# Patient Record
Sex: MALE | Race: WHITE | NOT HISPANIC OR LATINO | Employment: FULL TIME | URBAN - METROPOLITAN AREA
[De-identification: names, ages, dates, MRNs, and addresses within clinical notes are randomized per-mention and may not be internally consistent; named-entity substitution may affect disease eponyms.]

---

## 2021-05-24 ENCOUNTER — RECORDS - HEALTHEAST (OUTPATIENT)
Dept: ADMINISTRATIVE | Facility: CLINIC | Age: 61
End: 2021-05-24

## 2021-05-25 ENCOUNTER — RECORDS - HEALTHEAST (OUTPATIENT)
Dept: ADMINISTRATIVE | Facility: CLINIC | Age: 61
End: 2021-05-25

## 2021-05-27 ENCOUNTER — RECORDS - HEALTHEAST (OUTPATIENT)
Dept: ADMINISTRATIVE | Facility: CLINIC | Age: 61
End: 2021-05-27

## 2021-05-29 ENCOUNTER — RECORDS - HEALTHEAST (OUTPATIENT)
Dept: ADMINISTRATIVE | Facility: CLINIC | Age: 61
End: 2021-05-29

## 2021-06-01 ENCOUNTER — RECORDS - HEALTHEAST (OUTPATIENT)
Dept: ADMINISTRATIVE | Facility: CLINIC | Age: 61
End: 2021-06-01

## 2024-03-11 ENCOUNTER — APPOINTMENT (OUTPATIENT)
Dept: ULTRASOUND IMAGING | Facility: HOSPITAL | Age: 64
End: 2024-03-11
Attending: EMERGENCY MEDICINE
Payer: OTHER MISCELLANEOUS

## 2024-03-11 ENCOUNTER — APPOINTMENT (OUTPATIENT)
Dept: RADIOLOGY | Facility: HOSPITAL | Age: 64
End: 2024-03-11
Attending: STUDENT IN AN ORGANIZED HEALTH CARE EDUCATION/TRAINING PROGRAM
Payer: OTHER MISCELLANEOUS

## 2024-03-11 ENCOUNTER — HOSPITAL ENCOUNTER (EMERGENCY)
Facility: HOSPITAL | Age: 64
Discharge: HOME | End: 2024-03-11
Attending: EMERGENCY MEDICINE | Admitting: EMERGENCY MEDICINE
Payer: OTHER MISCELLANEOUS

## 2024-03-11 VITALS
HEIGHT: 73 IN | TEMPERATURE: 98.8 F | SYSTOLIC BLOOD PRESSURE: 138 MMHG | WEIGHT: 280 LBS | OXYGEN SATURATION: 97 % | HEART RATE: 75 BPM | BODY MASS INDEX: 37.11 KG/M2 | RESPIRATION RATE: 16 BRPM | DIASTOLIC BLOOD PRESSURE: 62 MMHG

## 2024-03-11 DIAGNOSIS — M25.572 ACUTE LEFT ANKLE PAIN: ICD-10-CM

## 2024-03-11 DIAGNOSIS — R79.89 ELEVATED SERUM CREATININE: ICD-10-CM

## 2024-03-11 DIAGNOSIS — E87.5 HYPERKALEMIA: ICD-10-CM

## 2024-03-11 LAB
ANION GAP SERPL CALCULATED.3IONS-SCNC: 8 MMOL/L (ref 7–15)
BASOPHILS # BLD AUTO: 0 10E3/UL (ref 0–0.2)
BASOPHILS NFR BLD AUTO: 0 %
BUN SERPL-MCNC: 56.5 MG/DL (ref 8–23)
CALCIUM SERPL-MCNC: 8.6 MG/DL (ref 8.8–10.2)
CHLORIDE SERPL-SCNC: 112 MMOL/L (ref 98–107)
CREAT SERPL-MCNC: 2.68 MG/DL (ref 0.67–1.17)
CRP SERPL-MCNC: <3 MG/L
DEPRECATED HCO3 PLAS-SCNC: 20 MMOL/L (ref 22–29)
EGFRCR SERPLBLD CKD-EPI 2021: 26 ML/MIN/1.73M2
EOSINOPHIL # BLD AUTO: 0.2 10E3/UL (ref 0–0.7)
EOSINOPHIL NFR BLD AUTO: 3 %
ERYTHROCYTE [DISTWIDTH] IN BLOOD BY AUTOMATED COUNT: 12.2 % (ref 10–15)
GLUCOSE SERPL-MCNC: 161 MG/DL (ref 70–99)
HCT VFR BLD AUTO: 37.9 % (ref 40–53)
HGB BLD-MCNC: 12.4 G/DL (ref 13.3–17.7)
IMM GRANULOCYTES # BLD: 0 10E3/UL
IMM GRANULOCYTES NFR BLD: 0 %
LYMPHOCYTES # BLD AUTO: 1.1 10E3/UL (ref 0.8–5.3)
LYMPHOCYTES NFR BLD AUTO: 14 %
MCH RBC QN AUTO: 31.5 PG (ref 26.5–33)
MCHC RBC AUTO-ENTMCNC: 32.7 G/DL (ref 31.5–36.5)
MCV RBC AUTO: 96 FL (ref 78–100)
MONOCYTES # BLD AUTO: 0.6 10E3/UL (ref 0–1.3)
MONOCYTES NFR BLD AUTO: 8 %
NEUTROPHILS # BLD AUTO: 5.8 10E3/UL (ref 1.6–8.3)
NEUTROPHILS NFR BLD AUTO: 75 %
NRBC # BLD AUTO: 0 10E3/UL
NRBC BLD AUTO-RTO: 0 /100
PLATELET # BLD AUTO: 317 10E3/UL (ref 150–450)
POTASSIUM SERPL-SCNC: 5.5 MMOL/L (ref 3.4–5.3)
RBC # BLD AUTO: 3.94 10E6/UL (ref 4.4–5.9)
SODIUM SERPL-SCNC: 140 MMOL/L (ref 135–145)
WBC # BLD AUTO: 7.7 10E3/UL (ref 4–11)

## 2024-03-11 PROCEDURE — 96374 THER/PROPH/DIAG INJ IV PUSH: CPT

## 2024-03-11 PROCEDURE — 73630 X-RAY EXAM OF FOOT: CPT | Mod: LT

## 2024-03-11 PROCEDURE — 80048 BASIC METABOLIC PNL TOTAL CA: CPT | Performed by: EMERGENCY MEDICINE

## 2024-03-11 PROCEDURE — 99285 EMERGENCY DEPT VISIT HI MDM: CPT | Mod: 25

## 2024-03-11 PROCEDURE — 36415 COLL VENOUS BLD VENIPUNCTURE: CPT | Performed by: EMERGENCY MEDICINE

## 2024-03-11 PROCEDURE — 29505 APPLICATION LONG LEG SPLINT: CPT | Mod: LT

## 2024-03-11 PROCEDURE — 93971 EXTREMITY STUDY: CPT | Mod: LT

## 2024-03-11 PROCEDURE — 86140 C-REACTIVE PROTEIN: CPT | Performed by: EMERGENCY MEDICINE

## 2024-03-11 PROCEDURE — 85025 COMPLETE CBC W/AUTO DIFF WBC: CPT | Performed by: EMERGENCY MEDICINE

## 2024-03-11 PROCEDURE — 258N000003 HC RX IP 258 OP 636: Performed by: EMERGENCY MEDICINE

## 2024-03-11 PROCEDURE — 250N000011 HC RX IP 250 OP 636: Performed by: EMERGENCY MEDICINE

## 2024-03-11 PROCEDURE — 73610 X-RAY EXAM OF ANKLE: CPT | Mod: LT

## 2024-03-11 RX ORDER — KETOROLAC TROMETHAMINE 30 MG/ML
30 INJECTION, SOLUTION INTRAMUSCULAR; INTRAVENOUS ONCE
Status: COMPLETED | OUTPATIENT
Start: 2024-03-11 | End: 2024-03-11

## 2024-03-11 RX ADMIN — KETOROLAC TROMETHAMINE 30 MG: 30 INJECTION, SOLUTION INTRAMUSCULAR; INTRAVENOUS at 07:45

## 2024-03-11 RX ADMIN — SODIUM CHLORIDE 500 ML: 9 INJECTION, SOLUTION INTRAVENOUS at 08:39

## 2024-03-11 ASSESSMENT — COLUMBIA-SUICIDE SEVERITY RATING SCALE - C-SSRS
1. IN THE PAST MONTH, HAVE YOU WISHED YOU WERE DEAD OR WISHED YOU COULD GO TO SLEEP AND NOT WAKE UP?: NO
2. HAVE YOU ACTUALLY HAD ANY THOUGHTS OF KILLING YOURSELF IN THE PAST MONTH?: NO
6. HAVE YOU EVER DONE ANYTHING, STARTED TO DO ANYTHING, OR PREPARED TO DO ANYTHING TO END YOUR LIFE?: NO

## 2024-03-11 ASSESSMENT — ACTIVITIES OF DAILY LIVING (ADL)
ADLS_ACUITY_SCORE: 35
ADLS_ACUITY_SCORE: 35

## 2024-03-11 NOTE — ED PROVIDER NOTES
EMERGENCY DEPARTMENT ENCOUNTER      NAME: Mayito Sood  AGE: 63 year old male  YOB: 1960  MRN: [id number]  EVALUATION DATE & TIME: 3/11/2024  7:17 AM    PCP: No primary care provider on file.    ED PROVIDER: Elizabeth Olsen M.D.      Chief Complaint   Patient presents with    Ankle Pain         FINAL IMPRESSION:  1. Elevated serum creatinine    2. Hyperkalemia    3. Acute left ankle pain        MEDICAL DECISION MAKING:    Pertinent Labs & Imaging studies reviewed. (See chart for details)  ED Course as of 03/11/24 0847   Mon Mar 11, 2024   0721 Afebrile.  Vital signs are unremarkable.  Patient is coming in for evaluation of heel pain.  Was walking at 6 AM.  [place] a pop and was then began to have pain in his calf and in his heel.  He has a history of tendon rupture previously on his right foot status post repair.  No antibiotics recently.  No fluoroquinolones.  No trauma.  No falls.  He is got a history of neuropathy at baseline unchanged.  Having pain primarily in his left heel, posterior ankle and pain up into his calf.    Physical exam for patient here with tenderness palpation along his heel and at the insertion point of his Achilles tendon.  He is able to flex and extend though with discomfort his foot with dorsiflexion and plantarflexion.  Neuropathy at baseline with sensory deficit at baseline on his distal left extremity.  He also has tenderness to palpation in his calf just inferior to his knee.  Otherwise his exam is unremarkable.    Patient with history of tendon rupture in the past, spontaneous.  Follows with orthopedics secondary to that.  No fluoroquinolones or recent antibiotics.  No trauma.  His exam here does have some tenderness, difficult to evaluate because previous surgery on his right foot but he does seem slightly steele in the left ankle.  Plan for x-ray ankle, foot.  Will get ultrasound.  Check labs, CRP.  Does not appear to be consistent with infected joint.  Given the calf  tenderness in an abundance of caution we will get an ultrasound to rule out DVT.  Does not appear to be infectious etiology at this point.  He did hear a pop, likely partial tendon rupture.   0813 Checking labs for patient here, he did get some Toradol for the pain.  His creatinine did did come back elevated, more elevated than it had been previously.  Creatinine here 2.68.  Potassium here as high as well 5.5.  Glucose elevated as well.  No leukocytosis.   0814 Reviewed previous labs for patient, 2/16/2024 potassium up at that time as well 5.3 creatinine at that at that time was 1.73.     0814 Reviewed outpatient note from primary care 2/16/2024   0814 He will need to have this closely followed with his primary care doctor.   0820 Did patient's wife at bedside regarding this.  She states he is recently been placed on medication for weight loss and diabetes.  She says that the injectable but it is not Ozempic.  She states that he has not been snacking he does eat dinner.  Has not been drinking very much water.  Will give him some fluids here for his kidney function.  I did explain to her the importance of very close follow-up with his primary care doctor.   0846 Patient and wife both updated as to results of the basic metabolic panel for patient.  Patient states he has not been drinking water and cannot remember the last time he drank water.  May be an element of dehydration.  Is getting some fluids here.  In terms of his heel and ankle, concern for possible tendon rupture versus tendinopathy.  Is in a cam boot.  Will get around with crutches.  Has had issues in the past and does have orthopedic surgeon that he does follow-up with.  Will have him follow-up with orthopedic surgery, will likely need outpatient MRI nonurgently.  Discussed with him the results of his labs, x-rays and ultrasound.  Plan to discharge home, close follow-up.         Medical Decision Making  Obtained supplemental history:Supplemental history  obtained?: Documented in chart  Reviewed external records: External records reviewed?: Documented in chart  Care impacted by chronic illness:Hypertension  Care significantly affected by social determinants of health:N/A  Did you consider but not order tests?: Work up considered but not performed and documented in chart, if applicable  Did you interpret images independently?: Independent interpretation of ECG and images noted in documentation, when applicable.  Consultation discussion with other provider:Did you involve another provider (consultant, , pharmacy, etc.)?: No  Discharge. No recommendations on prescription strength medication(s). See documentation for any additional details.      Critical care: 0 minutes excluding separately billable procedures.  Includes bedside management, time reviewing test results, review of records, discussing the case with staff, documenting the medical record and time spent with family members (or surrogate decision makers) discussing specific treatment issues.          ED COURSE:  7:17 AM I met with the patient, obtained history, performed an initial exam, and discussed options and plan for diagnostics and treatment here in the ED.   8:15 AM Rechecked and updated patient.   8:40 AM Rechecked and updated patient.     The importance of close follow up was discussed. We reviewed warning signs and symptoms, and I instructed Mr. Sood to return to the emergency department immediately if he develops any new or worsening symptoms. I provided additional verbal discharge instructions. Mr. Sood expressed understanding and agreement with this plan of care, his questions were answered, and he was discharged in stable condition.     MEDICATIONS GIVEN IN THE EMERGENCY:  Medications   ketorolac (TORADOL) injection 30 mg (30 mg Intravenous $Given 3/11/24 4011)   sodium chloride 0.9% BOLUS 500 mL (500 mLs Intravenous $New Bag 3/11/24 4096)       NEW PRESCRIPTIONS STARTED AT TODAY'S ER  "VISIT:  New Prescriptions    No medications on file          =================================================================    HPI    Patient information was obtained from: Patient    Use of : N/A        Mayito Sood is a 63 year old male who presents foot pain.    Patient reports he felt a \"pop\" in his left heel around 6 AM. He has since had pain in the area and has been unable to ambulate. The pain radiates to his calf, which feels tight. Patient has a ruptured disc, so he has decreased sensation in his feet. He notes a history of right foot issues and surgery.  Patient denies any fall.      REVIEW OF SYSTEMS   Review of Systems      PAST MEDICAL HISTORY:  No past medical history on file.    PAST SURGICAL HISTORY:  Past Surgical History:   Procedure Laterality Date    HC REPAIR ACHILLES TENDON,PRIMARY      Description: Primary Repair Of Ruptured Achilles Tendon;  Proc Date: 01/09/2003;    IR LUMBAR EPIDURAL STEROID INJECTION  12/4/2003    IR LUMBAR EPIDURAL STEROID INJECTION  7/7/2009    IR LUMBAR EPIDURAL STEROID INJECTION  8/20/2009    PICC  8/29/2014            CURRENT MEDICATIONS:    No current facility-administered medications for this encounter.    Current Outpatient Medications:     ACCU-CHEK FASTCLIX Misc, [ACCU-CHEK FASTCLIX MISC] USE AS DIRECTED FOUR TIMES DAILY, Disp: 102 each, Rfl: 0    ACCU-CHEK SMARTVIEW TEST STRIP Strp, [ACCU-CHEK SMARTVIEW TEST STRIP STRP] TEST FOUR TIMES DAILY, Disp: 100 strip, Rfl: 0    amLODIPine (NORVASC) 5 MG tablet, [AMLODIPINE (NORVASC) 5 MG TABLET] Take 1 tablet (5 mg total) by mouth daily., Disp: 15 tablet, Rfl: 0    amLODIPine (NORVASC) 5 MG tablet, [AMLODIPINE (NORVASC) 5 MG TABLET] Take 5 mg by mouth daily., Disp: , Rfl:     carboxymethyl-gly-gxnp45-VF 0.5-1-0.5 % Dpet, [CARBOXYMETHYL-GLY-LDFX45-TK 0.5-1-0.5 % DPET] Apply 1-2 drops to eye as needed., Disp: 30 each, Rfl: 0    diphenhydrAMINE-acetaminophen (TYLENOL PM)  mg Tab, " "[DIPHENHYDRAMINE-ACETAMINOPHEN (TYLENOL PM)  MG TAB] Take 1 tablet by mouth bedtime as needed., Disp: , Rfl:     escitalopram oxalate (LEXAPRO) 10 MG tablet, [ESCITALOPRAM OXALATE (LEXAPRO) 10 MG TABLET] Take 10 mg by mouth daily., Disp: , Rfl:     insulin glargine (LANTUS SOLOSTAR) 100 unit/mL (3 mL) pen, [INSULIN GLARGINE (LANTUS SOLOSTAR) 100 UNIT/ML (3 ML) PEN] INJECT 25 UNITS UNDER THE SKIN EVERY EVENING, Disp: 15 mL, Rfl: 0    insulin lispro 100 unit/mL InPn, [INSULIN LISPRO 100 UNIT/ML INPN] Sliding scale- 0 unit, 141-180 2 units, 181-220 4 units, 221-260 6 units, 261-300 8 units, 301-340 10 units--take with meal insulin, Disp: , Rfl:     insulin needles, disposable, (ULTICARE) 32 x 5/32 \" Ndle, [INSULIN NEEDLES, DISPOSABLE, (ULTICARE) 32 X 5/32 \" NDLE] Use as Directed with Humalog, Disp: 100 each, Rfl: 0    lisinopril (PRINIVIL,ZESTRIL) 20 MG tablet, [LISINOPRIL (PRINIVIL,ZESTRIL) 20 MG TABLET] Take 1 tablet (20 mg total) by mouth daily., Disp: 20 tablet, Rfl: 0    mometasone (NASONEX) 50 mcg/actuation nasal spray, [MOMETASONE (NASONEX) 50 MCG/ACTUATION NASAL SPRAY] 1 spray into each nostril 2 (two) times a day., Disp: 17 g, Rfl: 2    pen needle, diabetic 31 gauge x 5/16\" Ndle, [PEN NEEDLE, DIABETIC 31 GAUGE X 5/16\" NDLE] Use 1 pen As Directed as needed., Disp: 100 each, Rfl: 1    polymyxin B-trimethoprim (POLYTRIM) 10,000 unit- 1 mg/mL Drop ophthalmic drops, [POLYMYXIN B-TRIMETHOPRIM (POLYTRIM) 10,000 UNIT- 1 MG/ML DROP OPHTHALMIC DROPS] Apply one drop in affected eye every 3 hours while awake x 7 days., Disp: 10 mL, Rfl: 1    ALLERGIES:  Allergies   Allergen Reactions    Shellfish Containing Products [Shellfish-Derived Products] Anaphylaxis    Iodine Unknown    Prednisone Unknown     Depression--gets emotional and angry         FAMILY HISTORY:  Family History   Problem Relation Age of Onset    Cancer Mother        SOCIAL HISTORY:   Social History     Socioeconomic History    Marital status: " "Single   Tobacco Use    Smoking status: Never    Smokeless tobacco: Never   Substance and Sexual Activity    Alcohol use: No    Drug use: No   Social History Narrative    Patient is employed as a  at a post [employer] and works overnight shifts.  Patient works on cars as a hobby.       PHYSICAL EXAM:    Vitals: /59   Pulse 76   Temp 98.8  F (37.1  C) (Oral)   Resp 16   Ht 1.854 m (6' 1\")   Wt 127 kg (280 lb)   SpO2 97%   BMI 36.94 kg/m     General:. Alert and interactive, comfortable appearing.  HENT: Oropharynx without erythema or exudates. MMM.  TMs clear bilaterally.  Eyes: Pupils mid-sized and equally reactive.   Neck: Full AROM.  No midline tenderness to palpation.  Cardiovascular: Regular rate and rhythm. Peripheral pulses 2+ bilaterally.  Chest/Pulmonary: Normal work of breathing. Lung sounds clear and equal throughout, no wheezes or crackles. No chest wall tenderness or deformities.  Abdomen: Soft, nondistended. Nontender without guarding or rebound.  Back/Spine: No CVA or midline tenderness.  Extremities: No lower extremity edema. Tenderness to palpation along his heel and at the insertion point of his Achilles tendon. Able to flex and extend though with discomfort his foot with dorsiflexion and plantarflexion. Tenderness to palpation in his calf just inferior to his knee.  Skin: Warm and dry. Normal skin color.   Neuro: Speech clear. CNs grossly intact. Moves all extremities appropriately. Strength and sensation grossly intact to all extremities. Neuropathy at baseline with sensory deficit at baseline on his distal left extremity.  Psych: Normal affect/mood, cooperative, memory appropriate.    LAB:  All pertinent labs reviewed and interpreted.  Labs Ordered and Resulted from Time of ED Arrival to Time of ED Departure   BASIC METABOLIC PANEL - Abnormal       Result Value    Sodium 140      Potassium 5.5 (*)     Chloride 112 (*)     Carbon Dioxide (CO2) 20 (*)     Anion Gap 8      Urea " Nitrogen 56.5 (*)     Creatinine 2.68 (*)     GFR Estimate 26 (*)     Calcium 8.6 (*)     Glucose 161 (*)    CBC WITH PLATELETS AND DIFFERENTIAL - Abnormal    WBC Count 7.7      RBC Count 3.94 (*)     Hemoglobin 12.4 (*)     Hematocrit 37.9 (*)     MCV 96      MCH 31.5      MCHC 32.7      RDW 12.2      Platelet Count 317      % Neutrophils 75      % Lymphocytes 14      % Monocytes 8      % Eosinophils 3      % Basophils 0      % Immature Granulocytes 0      NRBCs per 100 WBC 0      Absolute Neutrophils 5.8      Absolute Lymphocytes 1.1      Absolute Monocytes 0.6      Absolute Eosinophils 0.2      Absolute Basophils 0.0      Absolute Immature Granulocytes 0.0      Absolute NRBCs 0.0     CRP INFLAMMATION - Normal    CRP Inflammation <3.00         RADIOLOGY:  US Lower Extremity Venous Duplex Left   Final Result   IMPRESSION:   1.  No deep or superficial venous thrombosis in the left lower extremity.      Foot XR, G/E 3 views, left   Final Result   IMPRESSION: The left ankle is negative for fracture or disruption of ankle mortise. Plantar and Achilles calcaneal spurring. Accessory os trigonum. The left foot is negative for fracture. Degenerative change at the first MTP joint.      XR Ankle Left G/E 3 Views   Final Result   IMPRESSION: The left ankle is negative for fracture or disruption of ankle mortise. Plantar and Achilles calcaneal spurring. Accessory os trigonum. The left foot is negative for fracture. Degenerative change at the first MTP joint.              I, Joelle Awan, am serving as a scribe to document services personally performed by Dr. Elizabeth Olsen  based on my observation and the provider's statements to me. I, Elizabeth Olsen MD attest that Joelle Awan is acting in a scribe capacity, has observed my performance of the services and has documented them in accordance with my direction.      Elizabeth Olsen M.D.  Emergency Medicine  [facility] EMERGENCY  DEPARTMENT  [address] [place] [id number]  [phone]  Dept: [phone]       Elizabeth Olsen MD  03/11/24 0847

## 2024-03-11 NOTE — DISCHARGE INSTRUCTIONS
As we discussed your ultrasound and x-rays here all look normal.  Your lab work was abnormal and the fact that it look like your kidney function is up from what it has been in the past.  You will need to make sure that you are drinking plenty of water.  You will need follow-up with your primary care doctor this week to follow that up.  In terms of your ankle and heel pain.  I am concerned you may have either some tendinitis versus tendon rupture.  Keep the cam boot on while you are at home.  You may take it off to sleep.  Crutches to get around as needed.  It is important that you call and make an appointment with orthopedics for follow-up.

## 2024-03-11 NOTE — ED TRIAGE NOTES
PT was at work when he felt his left ankle pop. Pain in ankle and calf.     Triage Assessment (Adult)       Row Name 03/11/24 0721          Triage Assessment    Airway WDL WDL        Respiratory WDL    Respiratory WDL WDL        Skin Circulation/Temperature WDL    Skin Circulation/Temperature WDL WDL        Cardiac WDL    Cardiac WDL WDL        Peripheral/Neurovascular WDL    Peripheral Neurovascular WDL WDL        Cognitive/Neuro/Behavioral WDL    Cognitive/Neuro/Behavioral WDL WDL

## 2024-03-11 NOTE — ED NOTES
Bed: JNED-20  Expected date: 3/11/24  Expected time: 7:05 AM  Means of arrival: Ambulance  Comments:  [facility] - 63yom left ankle injury.  Was walking and heard a pop, no thinners

## 2024-03-20 ENCOUNTER — HOSPITAL ENCOUNTER (EMERGENCY)
Facility: HOSPITAL | Age: 64
Discharge: HOME | End: 2024-03-20
Admitting: PHYSICIAN ASSISTANT
Payer: COMMERCIAL

## 2024-03-20 VITALS
OXYGEN SATURATION: 97 % | TEMPERATURE: 98.2 F | BODY MASS INDEX: 37.11 KG/M2 | WEIGHT: 280 LBS | RESPIRATION RATE: 22 BRPM | HEIGHT: 73 IN | DIASTOLIC BLOOD PRESSURE: 58 MMHG | HEART RATE: 74 BPM | SYSTOLIC BLOOD PRESSURE: 106 MMHG

## 2024-03-20 DIAGNOSIS — S91.102A OPEN WOUND OF LEFT GREAT TOE, INITIAL ENCOUNTER: ICD-10-CM

## 2024-03-20 LAB
ANION GAP SERPL CALCULATED.3IONS-SCNC: 8 MMOL/L (ref 7–15)
BASOPHILS # BLD AUTO: 0 10E3/UL (ref 0–0.2)
BASOPHILS NFR BLD AUTO: 0 %
BUN SERPL-MCNC: 40.2 MG/DL (ref 8–23)
CALCIUM SERPL-MCNC: 9.2 MG/DL (ref 8.8–10.2)
CHLORIDE SERPL-SCNC: 105 MMOL/L (ref 98–107)
CREAT SERPL-MCNC: 2.11 MG/DL (ref 0.67–1.17)
CRP SERPL-MCNC: 26 MG/L
DEPRECATED HCO3 PLAS-SCNC: 22 MMOL/L (ref 22–29)
EGFRCR SERPLBLD CKD-EPI 2021: 35 ML/MIN/1.73M2
EOSINOPHIL # BLD AUTO: 0.2 10E3/UL (ref 0–0.7)
EOSINOPHIL NFR BLD AUTO: 2 %
ERYTHROCYTE [DISTWIDTH] IN BLOOD BY AUTOMATED COUNT: 11.8 % (ref 10–15)
GLUCOSE SERPL-MCNC: 159 MG/DL (ref 70–99)
HCT VFR BLD AUTO: 38.6 % (ref 40–53)
HGB BLD-MCNC: 12.7 G/DL (ref 13.3–17.7)
IMM GRANULOCYTES # BLD: 0 10E3/UL
IMM GRANULOCYTES NFR BLD: 0 %
LYMPHOCYTES # BLD AUTO: 0.9 10E3/UL (ref 0.8–5.3)
LYMPHOCYTES NFR BLD AUTO: 10 %
MCH RBC QN AUTO: 31.5 PG (ref 26.5–33)
MCHC RBC AUTO-ENTMCNC: 32.9 G/DL (ref 31.5–36.5)
MCV RBC AUTO: 96 FL (ref 78–100)
MONOCYTES # BLD AUTO: 0.7 10E3/UL (ref 0–1.3)
MONOCYTES NFR BLD AUTO: 8 %
NEUTROPHILS # BLD AUTO: 6.6 10E3/UL (ref 1.6–8.3)
NEUTROPHILS NFR BLD AUTO: 80 %
NRBC # BLD AUTO: 0 10E3/UL
NRBC BLD AUTO-RTO: 0 /100
PLATELET # BLD AUTO: 371 10E3/UL (ref 150–450)
POTASSIUM SERPL-SCNC: 5.4 MMOL/L (ref 3.4–5.3)
RBC # BLD AUTO: 4.03 10E6/UL (ref 4.4–5.9)
SODIUM SERPL-SCNC: 135 MMOL/L (ref 135–145)
WBC # BLD AUTO: 8.4 10E3/UL (ref 4–11)

## 2024-03-20 PROCEDURE — 36415 COLL VENOUS BLD VENIPUNCTURE: CPT | Performed by: PHYSICIAN ASSISTANT

## 2024-03-20 PROCEDURE — 86140 C-REACTIVE PROTEIN: CPT | Performed by: PHYSICIAN ASSISTANT

## 2024-03-20 PROCEDURE — 85004 AUTOMATED DIFF WBC COUNT: CPT | Performed by: PHYSICIAN ASSISTANT

## 2024-03-20 PROCEDURE — 80048 BASIC METABOLIC PNL TOTAL CA: CPT | Performed by: PHYSICIAN ASSISTANT

## 2024-03-20 PROCEDURE — 99283 EMERGENCY DEPT VISIT LOW MDM: CPT

## 2024-03-20 RX ORDER — CEPHALEXIN 500 MG/1
500 CAPSULE ORAL 4 TIMES DAILY
Qty: 40 CAPSULE | Refills: 0 | Status: SHIPPED | OUTPATIENT
Start: 2024-03-20 | End: 2024-03-30

## 2024-03-20 ASSESSMENT — COLUMBIA-SUICIDE SEVERITY RATING SCALE - C-SSRS
6. HAVE YOU EVER DONE ANYTHING, STARTED TO DO ANYTHING, OR PREPARED TO DO ANYTHING TO END YOUR LIFE?: NO
2. HAVE YOU ACTUALLY HAD ANY THOUGHTS OF KILLING YOURSELF IN THE PAST MONTH?: NO
1. IN THE PAST MONTH, HAVE YOU WISHED YOU WERE DEAD OR WISHED YOU COULD GO TO SLEEP AND NOT WAKE UP?: NO

## 2024-03-20 ASSESSMENT — ACTIVITIES OF DAILY LIVING (ADL)
ADLS_ACUITY_SCORE: 35
ADLS_ACUITY_SCORE: 33

## 2024-03-20 NOTE — ED TRIAGE NOTES
Pt has a left  achilles tendon rupture on 3/11 and had his cast placed on 3/13.  Today noticed there was blood on his socks and he cut the part of the cast by the toe and noted he has a wound.  Pt is a diabetic and does not feel anything on both feet due to back problems.  Had preop today for surgery next week.     Triage Assessment (Adult)       Row Name 03/20/24 0946          Triage Assessment    Airway WDL WDL        Respiratory WDL    Respiratory WDL WDL        Skin Circulation/Temperature WDL    Skin Circulation/Temperature WDL X  left bif toe has a wound/ sore from cast        Cardiac WDL    Cardiac WDL WDL        Peripheral/Neurovascular WDL    Peripheral Neurovascular WDL WDL        Cognitive/Neuro/Behavioral WDL    Cognitive/Neuro/Behavioral WDL WDL

## 2024-03-20 NOTE — ED PROVIDER NOTES
EMERGENCY DEPARTMENT ENCOUNTER      NAME: Mayito Sood  AGE: 63 year old male  YOB: 1960  MRN: [id number]  EVALUATION DATE & TIME: 3/20/2024  9:50 AM    PCP: Jone Garcia    ED PROVIDER: Manuel Smith PA-C      Chief Complaint   Patient presents with    Foot Injury       FINAL IMPRESSION:  1. Open wound of left great toe, initial encounter        ED COURSE & MEDICAL DECISION MAKING:    Pertinent Labs & Imaging studies reviewed. (See chart for details)  9:51 AM I met the patient and performed my initial interview and exam.   10:43 AM Spoke with [facility]  ASHLEY who recommends leaving the cast in place, recommend following up in clinic later this week for reassessment.  10:43 AM patient updated, plan for discharge.     63 year old male presents to the Emergency Department for evaluation of left achilles tendon rupture, now with possible wound.     ED Course as of 03/20/24 1048   Wed Mar 20, 2024   1003 Patient is a 63-year-old male, past medical history of left-sided Achilles tendon rupture on 03/11/2024, cast placed on 03/13/2024, with previous history of polyneuropathy, and diabetes, who presents emergency department for evaluation of wound.  Patient is diabetic, does not feel anything in his feet, have the cast rubbing against his toe.  Had a preop appointment today, however they did not look at his toe, on examination here, he does have some scabbing, and ulceration of the skin with some surrounding erythema.  The remainder the foot here are unremarkable from what I can see.  The patient denies any fever cough cold or chills.  No other signs of infection at this point.  Will obtain basic labs here, inflammatory markers.  Will attempt to reach out to [facility] regarding recommendations.  Patient is scheduled for surgery on 03/25/2024.  Plan for basic labs, consultation with orthopedics, and disposition.   1022 No leukocytosis here in the emergency department.  Hemoglobin stable  when compared to previous   1040 Mild elevation in CRP here.   1047 Patient's potassium here stable compared to previous, creatinine also stable.  Patient seen and reevaluated, updated on laboratory results here in the emergency department.  I was able to discuss with the PA from [facility], who recommends keeping the cast on, they will see him in clinic later this week, and started him on antibiotics.  Headache is a reasonable plan given his other risk factors for possible worsening wound infection.  Patient be covered with antibiotics here, plan for discharge.       Medical Decision Making  Obtained supplemental history:Supplemental history obtained?: No  Reviewed external records: External records reviewed?: Outpatient Record: Outpatient office visit on 03/20/2024, nurse triage on call from today  Care impacted by chronic illness:Other: Polyneuropathy, SIRS, diabetes  Care significantly affected by social determinants of health:N/A  Did you consider but not order tests?: Work up considered but not performed and documented in chart, if applicable  Did you interpret images independently?: Independent interpretation of ECG and images noted in documentation, when applicable.  Consultation discussion with other provider:Did you involve another provider (consultant, MH, pharmacy, etc.)?: No  Discharge. I prescribed additional prescription strength medication(s) as charted. See documentation for any additional details.         At the conclusion of the encounter I discussed the results of all of the tests and the disposition. The questions were answered. The patient or family acknowledged understanding and was agreeable with the care plan.       0 minutes of critical care time     MEDICATIONS GIVEN IN THE EMERGENCY:  Medications - No data to display    NEW PRESCRIPTIONS STARTED AT TODAY'S ER VISIT  New Prescriptions    CEPHALEXIN (KEFLEX) 500 MG CAPSULE    Take 1 capsule (500 mg) by mouth 4 times daily for 10 days  "         =================================================================    HPI    Patient information was obtained from: Patient     Use of : N/A      Mayito Sood is a 63 year old male with a pertinent history of osteomyelitis, neuropathy, diabetes, hypertension who presents to this ED  for evaluation of left-sided great toe wound, ulceration from cast rubbing on the area.  Patient with a history of polyneuropathy, does not feel his great toe, now has a scab over the area.  No evidence of active bleeding.  No fever cough cold or chills.  Mild amount of erythema.    PAST MEDICAL HISTORY:  No past medical history on file.    PAST SURGICAL HISTORY:  Past Surgical History:   Procedure Laterality Date    HC REPAIR ACHILLES TENDON,PRIMARY      Description: Primary Repair Of Ruptured Achilles Tendon;  Proc Date: 01/09/2003;    IR LUMBAR EPIDURAL STEROID INJECTION  12/4/2003    IR LUMBAR EPIDURAL STEROID INJECTION  7/7/2009    IR LUMBAR EPIDURAL STEROID INJECTION  8/20/2009    [facility]  8/29/2014                CURRENT MEDICATIONS:    cephALEXin (KEFLEX) 500 MG capsule  ACCU-CHEK FASTCLIX Misc  ACCU-CHEK SMARTVIEW TEST STRIP Strp  amLODIPine (NORVASC) 5 MG tablet  amLODIPine (NORVASC) 5 MG tablet  carboxymethyl-[id number]-BE 0.5-1-0.5 % Dpet  diphenhydrAMINE-acetaminophen (TYLENOL PM)  mg Tab  escitalopram oxalate (LEXAPRO) 10 MG tablet  insulin glargine (LANTUS SOLOSTAR) 100 unit/mL (3 mL) pen  insulin lispro 100 unit/mL InPn  insulin needles, disposable, (ULTICARE) 32 x 5/32 \" Ndle  lisinopril (PRINIVIL,ZESTRIL) 20 MG tablet  mometasone (NASONEX) 50 mcg/actuation nasal spray  pen needle, diabetic 31 gauge x 5/16\" Ndle  polymyxin B-trimethoprim (POLYTRIM) 10,000 unit- 1 mg/mL Drop ophthalmic drops         ALLERGIES:  Allergies   Allergen Reactions    Shellfish Containing Products [Shellfish-Derived Products] Anaphylaxis    Iodine Unknown    Prednisone Unknown     Depression--gets emotional and angry   " "      FAMILY HISTORY:  Family History   Problem Relation Age of Onset    Cancer Mother        SOCIAL HISTORY:   Social History     Socioeconomic History    Marital status: Single   Tobacco Use    Smoking status: Never    Smokeless tobacco: Never   Substance and Sexual Activity    Alcohol use: No    Drug use: No   Social History Narrative    Patient is employed as a  at a post [employer] and works overnight shifts.  Patient works on cars as a hobby.       VITALS:  BP 95/55   Pulse 80   Temp 98.2  F (36.8  C) (Oral)   Resp 22   Ht 1.854 m (6' 1\")   Wt 127 kg (280 lb)   SpO2 98%   BMI 36.94 kg/m      PHYSICAL EXAM    Physical Exam  Vitals and nursing note reviewed.   Constitutional:       General: He is not in acute distress.     Appearance: Normal appearance. He is normal weight. He is not toxic-appearing or diaphoretic.   HENT:      Right Ear: External ear normal.      Left Ear: External ear normal.   Eyes:      Conjunctiva/sclera: Conjunctivae normal.   Cardiovascular:      Rate and Rhythm: Normal rate and regular rhythm.   Pulmonary:      Effort: Pulmonary effort is normal.   Abdominal:      General: Abdomen is flat.      Palpations: Abdomen is soft.   Musculoskeletal:      Comments: Cast in place to the left side of the lower leg   Skin:     Findings: Erythema present.      Comments: Wound to the left great toe, erythema, some scabbing of the area.  No evidence of active bleeding.  Patient with history of polyneuropathy, does not have feeling in his toes.   Neurological:      Mental Status: He is alert. Mental status is at baseline.           LAB:  All pertinent labs reviewed and interpreted.  Labs Ordered and Resulted from Time of ED Arrival to Time of ED Departure   CRP INFLAMMATION - Abnormal       Result Value    CRP Inflammation 26.00 (*)    BASIC METABOLIC PANEL - Abnormal    Sodium 135      Potassium 5.4 (*)     Chloride 105      Carbon Dioxide (CO2) 22      Anion Gap 8      Urea Nitrogen 40.2 " (*)     Creatinine 2.11 (*)     GFR Estimate 35 (*)     Calcium 9.2      Glucose 159 (*)    CBC WITH PLATELETS AND DIFFERENTIAL - Abnormal    WBC Count 8.4      RBC Count 4.03 (*)     Hemoglobin 12.7 (*)     Hematocrit 38.6 (*)     MCV 96      MCH 31.5      MCHC 32.9      RDW 11.8      Platelet Count 371      % Neutrophils 80      % Lymphocytes 10      % Monocytes 8      % Eosinophils 2      % Basophils 0      % Immature Granulocytes 0      NRBCs per 100 WBC 0      Absolute Neutrophils 6.6      Absolute Lymphocytes 0.9      Absolute Monocytes 0.7      Absolute Eosinophils 0.2      Absolute Basophils 0.0      Absolute Immature Granulocytes 0.0      Absolute NRBCs 0.0         RADIOLOGY:  Reviewed all pertinent imaging. Please see official radiology report.  No orders to display     PROCEDURES:   None.     Manuel Smith PA-C  Emergency Medicine  [facility] EMERGENCY DEPARTMENT  [facility] [facility] [id number] [phone]  Dept: [phone]       Manuel Smith PA-C  03/20/24 1048

## 2024-03-20 NOTE — DISCHARGE INSTRUCTIONS
You are seen here in the emergency department for evaluation of left-sided foot wound, ulceration.  Your laboratory studies here do not show significant abnormalities.  I am going to place you on some antibiotics to ensure that there is no worsening infection.  I did discuss case with your orthopedic providers, and they recommend keeping the cast in place, they will see you in clinic later this week, reach out to them to schedule an appointment for follow-up to ensure that the wound is healing appropriately, does not getting worse prior to surgery.

## 2024-06-11 ENCOUNTER — HOSPITAL ENCOUNTER (INPATIENT)
Facility: HOSPITAL | Age: 64
LOS: 2 days | Discharge: HOME | End: 2024-06-13
Attending: FAMILY MEDICINE | Admitting: INTERNAL MEDICINE
Payer: COMMERCIAL

## 2024-06-11 DIAGNOSIS — N17.9 AKI (ACUTE KIDNEY INJURY) (H): ICD-10-CM

## 2024-06-11 DIAGNOSIS — N20.1 URETEROLITHIASIS: ICD-10-CM

## 2024-06-11 LAB
ALBUMIN SERPL BCG-MCNC: 4.1 G/DL (ref 3.5–5.2)
ALBUMIN UR-MCNC: 100 MG/DL
ALP SERPL-CCNC: 255 U/L (ref 40–150)
ALT SERPL W P-5'-P-CCNC: 16 U/L (ref 0–70)
ANION GAP SERPL CALCULATED.3IONS-SCNC: 14 MMOL/L (ref 7–15)
APPEARANCE UR: CLEAR
AST SERPL W P-5'-P-CCNC: 19 U/L (ref 0–45)
BASOPHILS # BLD AUTO: 0 10E3/UL (ref 0–0.2)
BASOPHILS NFR BLD AUTO: 0 %
BILIRUB DIRECT SERPL-MCNC: <0.2 MG/DL (ref 0–0.3)
BILIRUB SERPL-MCNC: 0.5 MG/DL
BILIRUB UR QL STRIP: NEGATIVE
BUN SERPL-MCNC: 34.3 MG/DL (ref 8–23)
CALCIUM SERPL-MCNC: 8.4 MG/DL (ref 8.8–10.2)
CHLORIDE SERPL-SCNC: 100 MMOL/L (ref 98–107)
COLOR UR AUTO: ABNORMAL
CREAT SERPL-MCNC: 3.18 MG/DL (ref 0.67–1.17)
DEPRECATED HCO3 PLAS-SCNC: 18 MMOL/L (ref 22–29)
EGFRCR SERPLBLD CKD-EPI 2021: 21 ML/MIN/1.73M2
EOSINOPHIL # BLD AUTO: 0 10E3/UL (ref 0–0.7)
EOSINOPHIL NFR BLD AUTO: 0 %
ERYTHROCYTE [DISTWIDTH] IN BLOOD BY AUTOMATED COUNT: 12.3 % (ref 10–15)
GLUCOSE SERPL-MCNC: 295 MG/DL (ref 70–99)
GLUCOSE UR STRIP-MCNC: >1000 MG/DL
HCT VFR BLD AUTO: 39.9 % (ref 40–53)
HGB BLD-MCNC: 13.6 G/DL (ref 13.3–17.7)
HGB UR QL STRIP: ABNORMAL
IMM GRANULOCYTES # BLD: 0.1 10E3/UL
IMM GRANULOCYTES NFR BLD: 1 %
KETONES UR STRIP-MCNC: NEGATIVE MG/DL
LACTATE SERPL-SCNC: 2.2 MMOL/L (ref 0.7–2)
LEUKOCYTE ESTERASE UR QL STRIP: NEGATIVE
LIPASE SERPL-CCNC: 22 U/L (ref 13–60)
LYMPHOCYTES # BLD AUTO: 0.8 10E3/UL (ref 0.8–5.3)
LYMPHOCYTES NFR BLD AUTO: 7 %
MCH RBC QN AUTO: 32.2 PG (ref 26.5–33)
MCHC RBC AUTO-ENTMCNC: 34.1 G/DL (ref 31.5–36.5)
MCV RBC AUTO: 95 FL (ref 78–100)
MONOCYTES # BLD AUTO: 1 10E3/UL (ref 0–1.3)
MONOCYTES NFR BLD AUTO: 8 %
NEUTROPHILS # BLD AUTO: 10.2 10E3/UL (ref 1.6–8.3)
NEUTROPHILS NFR BLD AUTO: 84 %
NITRATE UR QL: NEGATIVE
NRBC # BLD AUTO: 0 10E3/UL
NRBC BLD AUTO-RTO: 0 /100
PH UR STRIP: 5.5 [PH] (ref 5–7)
PLATELET # BLD AUTO: 332 10E3/UL (ref 150–450)
POTASSIUM SERPL-SCNC: 4.8 MMOL/L (ref 3.4–5.3)
PROT SERPL-MCNC: 8.7 G/DL (ref 6.4–8.3)
RBC # BLD AUTO: 4.22 10E6/UL (ref 4.4–5.9)
RBC URINE: 4 /HPF
SODIUM SERPL-SCNC: 132 MMOL/L (ref 135–145)
SP GR UR STRIP: 1.02 (ref 1–1.03)
UROBILINOGEN UR STRIP-MCNC: <2 MG/DL
WBC # BLD AUTO: 12.1 10E3/UL (ref 4–11)
WBC URINE: 2 /HPF

## 2024-06-11 PROCEDURE — 250N000011 HC RX IP 250 OP 636: Mod: JZ | Performed by: EMERGENCY MEDICINE

## 2024-06-11 PROCEDURE — 83690 ASSAY OF LIPASE: CPT | Performed by: FAMILY MEDICINE

## 2024-06-11 PROCEDURE — 250N000011 HC RX IP 250 OP 636: Mod: JZ | Performed by: INTERNAL MEDICINE

## 2024-06-11 PROCEDURE — 96374 THER/PROPH/DIAG INJ IV PUSH: CPT

## 2024-06-11 PROCEDURE — 96375 TX/PRO/DX INJ NEW DRUG ADDON: CPT

## 2024-06-11 PROCEDURE — 81001 URINALYSIS AUTO W/SCOPE: CPT | Performed by: FAMILY MEDICINE

## 2024-06-11 PROCEDURE — 85025 COMPLETE CBC W/AUTO DIFF WBC: CPT | Performed by: FAMILY MEDICINE

## 2024-06-11 PROCEDURE — 120N000001 HC R&B MED SURG/OB

## 2024-06-11 PROCEDURE — 99285 EMERGENCY DEPT VISIT HI MDM: CPT | Mod: 25

## 2024-06-11 PROCEDURE — 96361 HYDRATE IV INFUSION ADD-ON: CPT

## 2024-06-11 PROCEDURE — 258N000003 HC RX IP 258 OP 636: Mod: JZ | Performed by: FAMILY MEDICINE

## 2024-06-11 PROCEDURE — 36415 COLL VENOUS BLD VENIPUNCTURE: CPT | Performed by: FAMILY MEDICINE

## 2024-06-11 PROCEDURE — 99223 1ST HOSP IP/OBS HIGH 75: CPT | Performed by: INTERNAL MEDICINE

## 2024-06-11 PROCEDURE — 82374 ASSAY BLOOD CARBON DIOXIDE: CPT | Performed by: FAMILY MEDICINE

## 2024-06-11 PROCEDURE — 83605 ASSAY OF LACTIC ACID: CPT | Performed by: FAMILY MEDICINE

## 2024-06-11 PROCEDURE — 84155 ASSAY OF PROTEIN SERUM: CPT | Performed by: FAMILY MEDICINE

## 2024-06-11 PROCEDURE — 250N000013 HC RX MED GY IP 250 OP 250 PS 637: Performed by: FAMILY MEDICINE

## 2024-06-11 PROCEDURE — 258N000003 HC RX IP 258 OP 636: Mod: JZ | Performed by: INTERNAL MEDICINE

## 2024-06-11 PROCEDURE — 250N000011 HC RX IP 250 OP 636: Mod: JZ | Performed by: FAMILY MEDICINE

## 2024-06-11 RX ORDER — INSULIN GLARGINE 100 [IU]/ML
22 INJECTION, SOLUTION SUBCUTANEOUS EVERY MORNING
COMMUNITY
Start: 2024-04-12 | End: 2024-07-09

## 2024-06-11 RX ORDER — HYDROCHLOROTHIAZIDE 50 MG/1
50 TABLET ORAL DAILY
Status: ON HOLD | COMMUNITY
Start: 2024-06-02 | End: 2024-07-10

## 2024-06-11 RX ORDER — AMOXICILLIN 250 MG
2 CAPSULE ORAL 2 TIMES DAILY PRN
Status: DISCONTINUED | OUTPATIENT
Start: 2024-06-11 | End: 2024-06-13 | Stop reason: HOSPADM

## 2024-06-11 RX ORDER — LIDOCAINE 40 MG/G
CREAM TOPICAL
Status: DISCONTINUED | OUTPATIENT
Start: 2024-06-11 | End: 2024-06-13 | Stop reason: HOSPADM

## 2024-06-11 RX ORDER — PRAVASTATIN SODIUM 10 MG
10 TABLET ORAL DAILY
COMMUNITY
Start: 2024-02-16

## 2024-06-11 RX ORDER — AMLODIPINE BESYLATE 10 MG/1
5 TABLET ORAL DAILY
COMMUNITY
Start: 2024-05-03 | End: 2024-07-09

## 2024-06-11 RX ORDER — KETOROLAC TROMETHAMINE 15 MG/ML
15 INJECTION, SOLUTION INTRAMUSCULAR; INTRAVENOUS ONCE
Status: COMPLETED | OUTPATIENT
Start: 2024-06-11 | End: 2024-06-11

## 2024-06-11 RX ORDER — ESCITALOPRAM OXALATE 20 MG/1
20 TABLET ORAL DAILY
COMMUNITY
Start: 2024-05-03 | End: 2024-07-09

## 2024-06-11 RX ORDER — ONDANSETRON 2 MG/ML
4 INJECTION INTRAMUSCULAR; INTRAVENOUS EVERY 6 HOURS PRN
Status: DISCONTINUED | OUTPATIENT
Start: 2024-06-11 | End: 2024-06-13 | Stop reason: HOSPADM

## 2024-06-11 RX ORDER — TAMSULOSIN HYDROCHLORIDE 0.4 MG/1
0.4 CAPSULE ORAL DAILY
Status: ON HOLD | COMMUNITY
Start: 2024-06-09 | End: 2024-06-13

## 2024-06-11 RX ORDER — HYDROMORPHONE HCL IN WATER/PF 6 MG/30 ML
0.4 PATIENT CONTROLLED ANALGESIA SYRINGE INTRAVENOUS
Status: DISCONTINUED | OUTPATIENT
Start: 2024-06-11 | End: 2024-06-13 | Stop reason: HOSPADM

## 2024-06-11 RX ORDER — AMOXICILLIN 250 MG
1 CAPSULE ORAL 2 TIMES DAILY PRN
Status: DISCONTINUED | OUTPATIENT
Start: 2024-06-11 | End: 2024-06-13 | Stop reason: HOSPADM

## 2024-06-11 RX ORDER — EMPAGLIFLOZIN 10 MG/1
10 TABLET, FILM COATED ORAL DAILY
COMMUNITY
Start: 2024-06-02 | End: 2024-07-09

## 2024-06-11 RX ORDER — HYDROMORPHONE HCL IN WATER/PF 6 MG/30 ML
0.2 PATIENT CONTROLLED ANALGESIA SYRINGE INTRAVENOUS
Status: DISCONTINUED | OUTPATIENT
Start: 2024-06-11 | End: 2024-06-13 | Stop reason: HOSPADM

## 2024-06-11 RX ORDER — ONDANSETRON 4 MG/1
4 TABLET, ORALLY DISINTEGRATING ORAL EVERY 6 HOURS PRN
Status: DISCONTINUED | OUTPATIENT
Start: 2024-06-11 | End: 2024-06-13 | Stop reason: HOSPADM

## 2024-06-11 RX ORDER — HYDRALAZINE HYDROCHLORIDE 20 MG/ML
5 INJECTION INTRAMUSCULAR; INTRAVENOUS EVERY 6 HOURS PRN
Status: DISCONTINUED | OUTPATIENT
Start: 2024-06-11 | End: 2024-06-13 | Stop reason: HOSPADM

## 2024-06-11 RX ORDER — SODIUM CHLORIDE, SODIUM LACTATE, POTASSIUM CHLORIDE, CALCIUM CHLORIDE 600; 310; 30; 20 MG/100ML; MG/100ML; MG/100ML; MG/100ML
INJECTION, SOLUTION INTRAVENOUS CONTINUOUS
Status: DISCONTINUED | OUTPATIENT
Start: 2024-06-11 | End: 2024-06-13 | Stop reason: HOSPADM

## 2024-06-11 RX ORDER — CALCIUM CARBONATE 500 MG/1
1000 TABLET, CHEWABLE ORAL 4 TIMES DAILY PRN
Status: DISCONTINUED | OUTPATIENT
Start: 2024-06-11 | End: 2024-06-13 | Stop reason: HOSPADM

## 2024-06-11 RX ADMIN — FAMOTIDINE 20 MG: 10 INJECTION, SOLUTION INTRAVENOUS at 23:57

## 2024-06-11 RX ADMIN — Medication 50 MG: at 17:28

## 2024-06-11 RX ADMIN — HYDROMORPHONE HYDROCHLORIDE 1 MG: 1 INJECTION, SOLUTION INTRAMUSCULAR; INTRAVENOUS; SUBCUTANEOUS at 18:37

## 2024-06-11 RX ADMIN — KETOROLAC TROMETHAMINE 15 MG: 15 INJECTION, SOLUTION INTRAMUSCULAR; INTRAVENOUS at 17:26

## 2024-06-11 RX ADMIN — SODIUM CHLORIDE 1000 ML: 9 INJECTION, SOLUTION INTRAVENOUS at 17:26

## 2024-06-11 RX ADMIN — SODIUM CHLORIDE, POTASSIUM CHLORIDE, SODIUM LACTATE AND CALCIUM CHLORIDE: 600; 310; 30; 20 INJECTION, SOLUTION INTRAVENOUS at 23:57

## 2024-06-11 ASSESSMENT — ACTIVITIES OF DAILY LIVING (ADL)
ADLS_ACUITY_SCORE: 35
ADLS_ACUITY_SCORE: 33

## 2024-06-11 ASSESSMENT — COLUMBIA-SUICIDE SEVERITY RATING SCALE - C-SSRS
6. HAVE YOU EVER DONE ANYTHING, STARTED TO DO ANYTHING, OR PREPARED TO DO ANYTHING TO END YOUR LIFE?: NO
1. IN THE PAST MONTH, HAVE YOU WISHED YOU WERE DEAD OR WISHED YOU COULD GO TO SLEEP AND NOT WAKE UP?: NO
2. HAVE YOU ACTUALLY HAD ANY THOUGHTS OF KILLING YOURSELF IN THE PAST MONTH?: NO

## 2024-06-11 NOTE — ED TRIAGE NOTES
Pt arrive to triage for abdominal pain that began on Saturday. He endorses nausea, vomiting, and loose stools but not diarrhea. He reports pain is located in the LLQ. He reports he was diagnosed with a kidney stone on Saturday.

## 2024-06-11 NOTE — ED NOTES
EMERGENCY DEPARTMENT SIGN OUT NOTE        ED COURSE AND MEDICAL DECISION MAKING  Patient was signed out to me by Dr Vaughn Gunderson at 5:50 PM.  8:57 PM Spoke with Dr. Perea of [facility].  10:01 PM Spoke with hospitalist Dr. Clay for patient's admission.    In brief, Mayito Sood is a 63 year old male who initially presented abdominal pain.     At time of sign out, disposition was pending labs.  UA does not show any evidence of infection but BMP does show very significant TARUN from his baseline including increasing from the 1.92 days ago.  I consulted with [facility], the recommendation was for admission for stent placement in the morning.  Discussed with the hospitalist who agreed to the admission.    FINAL IMPRESSION    1. TARUN (acute kidney injury) (H24)    2. Ureterolithiasis        ED MEDS  Medications   ketorolac (TORADOL) injection 15 mg (15 mg Intravenous $Given 6/11/24 1726)   sodium chloride 0.9% BOLUS 1,000 mL (0 mLs Intravenous Stopped 6/11/24 1900)   dimenhyDRINATE chew tab 50 mg (50 mg Oral $Given 6/11/24 1728)   HYDROmorphone (DILAUDID) injection 1 mg (1 mg Intravenous $Given 6/11/24 1837)       LAB  Labs Ordered and Resulted from Time of ED Arrival to Time of ED Departure   LACTIC ACID WHOLE BLOOD - Abnormal       Result Value    Lactic Acid 2.2 (*)    BASIC METABOLIC PANEL - Abnormal    Sodium 132 (*)     Potassium 4.8      Chloride 100      Carbon Dioxide (CO2) 18 (*)     Anion Gap 14      Urea Nitrogen 34.3 (*)     Creatinine 3.18 (*)     GFR Estimate 21 (*)     Calcium 8.4 (*)     Glucose 295 (*)    HEPATIC FUNCTION PANEL - Abnormal    Protein Total 8.7 (*)     Albumin 4.1      Bilirubin Total 0.5      Alkaline Phosphatase 255 (*)     AST 19      ALT 16      Bilirubin Direct <0.20     CBC WITH PLATELETS AND DIFFERENTIAL - Abnormal    WBC Count 12.1 (*)     RBC Count 4.22 (*)     Hemoglobin 13.6      Hematocrit 39.9 (*)     MCV 95      MCH 32.2      MCHC 34.1      RDW 12.3      Platelet Count 332      %  Neutrophils 84      % Lymphocytes 7      % Monocytes 8      % Eosinophils 0      % Basophils 0      % Immature Granulocytes 1      NRBCs per 100 WBC 0      Absolute Neutrophils 10.2 (*)     Absolute Lymphocytes 0.8      Absolute Monocytes 1.0      Absolute Eosinophils 0.0      Absolute Basophils 0.0      Absolute Immature Granulocytes 0.1      Absolute NRBCs 0.0     ROUTINE UA WITH MICROSCOPIC REFLEX TO CULTURE - Abnormal    Color Urine Light Yellow      Appearance Urine Clear      Glucose Urine >1000 (*)     Bilirubin Urine Negative      Ketones Urine Negative      Specific Gravity Urine 1.017      Blood Urine 0.2 mg/dL (*)     pH Urine 5.5      Protein Albumin Urine 100 (*)     Urobilinogen Urine <2.0      Nitrite Urine Negative      Leukocyte Esterase Urine Negative      RBC Urine 4 (*)     WBC Urine 2     LIPASE - Normal    Lipase 22           RADIOLOGY    No orders to display       DISCHARGE MEDS  New Prescriptions    No medications on file       Polo Taylor DO  [facility] EMERGENCY DEPARTMENT  [facility] [facility] [id number] [phone]         Polo Taylor DO  06/12/24 0012

## 2024-06-11 NOTE — ED PROVIDER NOTES
"EMERGENCY DEPARTMENT ENCOUNTER      NAME: Mayito Sood  AGE: 63 year old male  YOB: 1960  MRN: [id number]  EVALUATION DATE & TIME: No admission date for patient encounter.    PCP: Jone Garcia    ED PROVIDER: Vaughn Gunderson M.D.    Chief Complaint   Patient presents with    Abdominal Pain       FINAL IMPRESSION:  1. TARUN (acute kidney injury) (H24)    2. Ureterolithiasis        ED COURSE & MEDICAL DECISION MAKING:    Pertinent Labs & Imaging studies independently interpreted by me. (See chart for details)      Reviewed prior office visit from May 20 which was with vascular surgery for wound recheck of a toe ulcer which at that time was healed.    ED Course as of 06/12/24 0957 Tue Jun 11, 2024   1650 Patient seen and examined, presents with left-sided abdominal pain along with nausea.  Reports he was diagnosed with a kidney stone a couple days ago.  On initial exam here vitally stable, pale and diaphoretic, appears uncomfortable.     1653 Reviewed most recent emergency department visit at outside facility when patient also presented with left lower quadrant pain, ST with \" 2 mm obstructing left proximal ureteral stone adjacent to the ureteropelvic junction. Mild left hydronephrosis.  Left perinephric fat stranding much more prominent than right. There is also left gillian ureteral fat stranding. \"  creatinine 1.9 at that time.   1700      Signout to oncoming provider.      At the conclusion of the encounter I discussed the results of all of the tests and the disposition. The questions were answered. The patient or family acknowledged understanding and was agreeable with the care plan.     Medical Decision Making  Obtained supplemental history:Supplemental history obtained?: No  Reviewed external records: External records reviewed?: Inpatient Record: [facility] ED 06/09/2024  Care impacted by chronic illness:Diabetes and Hypertension  Care significantly affected by social " determinants of health:N/A  Did you consider but not order tests?: Work up considered but not performed and documented in chart, if applicable  Did you interpret images independently?: Independent interpretation of ECG and images noted in documentation, when applicable.  Consultation discussion with other provider:Did you involve another provider (consultant, , pharmacy, etc.)?: I discussed the care with another health care provider, see documentation for details.  Admission considered. Patient was signed out to the oncoming physician, disposition pending.    MEDICATIONS GIVEN IN THE EMERGENCY:  Medications   lidocaine 1 % 0.1-1 mL ( Other Auto Hold 6/12/24 0920)   lidocaine (LMX4) cream ( Topical Auto Hold 6/12/24 0920)   sodium chloride (PF) 0.9% PF flush 3 mL ( Intracatheter Automatically Held 6/17/24 2230)   sodium chloride (PF) 0.9% PF flush 3 mL ( Intracatheter Auto Hold 6/12/24 0920)   senna-docusate (SENOKOT-S/PERICOLACE) 8.6-50 MG per tablet 1 tablet ( Oral Auto Hold 6/12/24 0920)     Or   senna-docusate (SENOKOT-S/PERICOLACE) 8.6-50 MG per tablet 2 tablet ( Oral Auto Hold 6/12/24 0920)   calcium carbonate (TUMS) chewable tablet 1,000 mg ( Oral Auto Hold 6/12/24 0920)   HYDROmorphone (DILAUDID) injection 0.2 mg ( Intravenous Auto Hold 6/12/24 0920)   HYDROmorphone (DILAUDID) injection 0.4 mg ( Intravenous Auto Hold 6/12/24 0920)   ondansetron (ZOFRAN ODT) ODT tab 4 mg ( Oral Auto Hold 6/12/24 0920)     Or   ondansetron (ZOFRAN) injection 4 mg ( Intravenous Auto Hold 6/12/24 0920)   famotidine (PEPCID) injection 20 mg ( Intravenous Automatically Held 6/17/24 2300)   lactated ringers infusion ( Intravenous $New Bag 6/12/24 0810)   hydrALAZINE (APRESOLINE) injection 5 mg ( Intravenous Auto Hold 6/12/24 0920)   prochlorperazine (COMPAZINE) injection 5 mg ( Intravenous Auto Hold 6/12/24 0920)     Or   prochlorperazine (COMPAZINE) tablet 5 mg ( Oral Auto Hold 6/12/24 0920)     Or   prochlorperazine (COMPAZINE)  suppository 25 mg ( Rectal Auto Hold 6/12/24 0920)   ketorolac (TORADOL) injection 15 mg (15 mg Intravenous $Given 6/11/24 1726)   sodium chloride 0.9% BOLUS 1,000 mL (0 mLs Intravenous Stopped 6/11/24 1900)   dimenhyDRINATE chew tab 50 mg (50 mg Oral $Given 6/11/24 1728)   HYDROmorphone (DILAUDID) injection 1 mg (1 mg Intravenous $Given 6/11/24 1837)       NEW PRESCRIPTIONS STARTED AT TODAY'S ER VISIT  Current Discharge Medication List          =================================================================    HPI    Patient information was obtained from: Patient      Mayito Sood is a 63 year old male with a pertinent history of Hypertension and diabetes who presents to this ED via walk in for evaluation of lower left quadrant abdominal and flank pain.     Patient noticed pain in his left flank and lower left quadrant on Saturday (6/8/24). He said he has had intermittent vomiting and diarrhea since the onset of the pain. Patient visited an emergency room in [place], [place] because [place] there. They informed him that the pain was likely coming from a kidney stone. They prescribed hydrocodone and Flomax.     Patient denied bloody stool and fever. No urinary symptoms.    No there medical problems were mentioned at this time.       REVIEW OF SYSTEMS   Review of Systems as stated in HPI.  All other systems reviewed and negative    PAST MEDICAL HISTORY:  History reviewed. No pertinent past medical history.    PAST SURGICAL HISTORY:  Past Surgical History:   Procedure Laterality Date    HC REPAIR ACHILLES TENDON,PRIMARY      Description: Primary Repair Of Ruptured Achilles Tendon;  Proc Date: 01/09/2003;    IR LUMBAR EPIDURAL STEROID INJECTION  12/4/2003    IR LUMBAR EPIDURAL STEROID INJECTION  7/7/2009    IR LUMBAR EPIDURAL STEROID INJECTION  8/20/2009    [facility]  8/29/2014            CURRENT MEDICATIONS:    Current Facility-Administered Medications   Medication Dose Route Frequency Provider Last Rate  Last Admin    [Auto Hold] calcium carbonate (TUMS) chewable tablet 1,000 mg  1,000 mg Oral 4x Daily PRN Hayde Clay MD        [Auto Hold] famotidine (PEPCID) injection 20 mg  20 mg Intravenous Q24H Hayde Clay MD   20 mg at 06/11/24 2357    [Auto Hold] hydrALAZINE (APRESOLINE) injection 5 mg  5 mg Intravenous Q6H PRN Hayde Clay MD        [Auto Hold] HYDROmorphone (DILAUDID) injection 0.2 mg  0.2 mg Intravenous Q2H PRN Hayde Clay MD        [Auto Hold] HYDROmorphone (DILAUDID) injection 0.4 mg  0.4 mg Intravenous Q2H PRN Hayde Clay MD   0.4 mg at 06/12/24 0811    lactated ringers infusion   Intravenous Continuous Hayde Clay  mL/hr at 06/12/24 0810 New Bag at 06/12/24 0810    [Auto Hold] lidocaine (LMX4) cream   Topical Q1H PRN Hayde Clay MD        [Auto Hold] lidocaine 1 % 0.1-1 mL  0.1-1 mL Other Q1H PRN Hayde Clay MD        [Auto Hold] ondansetron (ZOFRAN ODT) ODT tab 4 mg  4 mg Oral Q6H PRN Hayde Clay MD        Or    [Auto Hold] ondansetron (ZOFRAN) injection 4 mg  4 mg Intravenous Q6H PRN Hayde Clay MD   4 mg at 06/12/24 0522    [Auto Hold] prochlorperazine (COMPAZINE) injection 5 mg  5 mg Intravenous Q6H PRN Niurka Ellsworth MD        Or    [Auto Hold] prochlorperazine (COMPAZINE) tablet 5 mg  5 mg Oral Q6H PRN Niurka Ellsworth MD        Or    [Auto Hold] prochlorperazine (COMPAZINE) suppository 25 mg  25 mg Rectal Q12H PRN Niurka Ellsworth MD        [Auto Hold] senna-docusate (SENOKOT-S/PERICOLACE) 8.6-50 MG per tablet 1 tablet  1 tablet Oral BID PRN Hayde Clay MD        Or    [Auto Hold] senna-docusate (SENOKOT-S/PERICOLACE) 8.6-50 MG per tablet 2 tablet  2 tablet Oral BID PRN Hayde Clay MD        [Auto Hold] sodium chloride (PF) 0.9% PF flush 3 mL  3 mL Intracatheter Q8H Hayde Clay MD   3 mL at 06/11/24 2357    [Auto Hold] sodium chloride (PF) 0.9% PF flush 3 mL  3 mL Intracatheter q1 min prn Hayde Clay MD           ALLERGIES:  Allergies    Allergen Reactions    Shellfish Containing Products [Shellfish-Derived Products] Anaphylaxis    Iodine Unknown    Prednisone Unknown     Depression--gets emotional and angry         FAMILY HISTORY:  Family History   Problem Relation Age of Onset    Cancer Mother        SOCIAL HISTORY:   Social History     Socioeconomic History    Marital status: Single   Tobacco Use    Smoking status: Never    Smokeless tobacco: Never   Substance and Sexual Activity    Alcohol use: No    Drug use: No   Social History Narrative    Patient is employed as a  at a post [employer] and works overnight shifts.  Patient works on cars as a hobby.     Social Determinants of Health     Financial Resource Strain: Low Risk  (3/12/2024)    Received from [facility], [facility]    Financial Resource Strain     Difficulty of Paying Living Expenses: 3   Food Insecurity: No Food Insecurity (3/12/2024)    Received from [facility], [facility]    Food Insecurity     Worried About Running Out of Food in the Last Year: 1   Transportation Needs: No Transportation Needs (3/12/2024)    Received from [facility], [facility]    Transportation Needs     Lack of Transportation (Medical): 1   Social Connections: Socially Isolated (3/12/2024)    Received from [facility], [facility]    Social Connections     Frequency of Communication with Friends and Family: 4   Housing Stability: Low Risk  (3/12/2024)    Received from [facility], [facility]    Housing Stability     Unable to Pay for Housing in the Last Year: 1       VITALS:  BP (!) 178/80 (BP Location: Left arm, Patient Position: Semi-Cool's, Cuff Size: Adult Large)    "Pulse 75   Temp 98.1  F (36.7  C) (Oral)   Resp 18   Ht 1.854 m (6' 1\")   Wt 127 kg (280 lb)   SpO2 98%   BMI 36.94 kg/m      PHYSICAL EXAM:  Physical Exam  Vitals and nursing note reviewed.   Constitutional:       Appearance: He is diaphoretic.   HENT:      Head: Normocephalic and atraumatic.      Right Ear: External ear normal.      Left Ear: External ear normal.      Nose: Nose normal.      Mouth/Throat:      Mouth: Mucous membranes are moist.   Eyes:      Extraocular Movements: Extraocular movements intact.      Conjunctiva/sclera: Conjunctivae normal.      Pupils: Pupils are equal, round, and reactive to light.   Cardiovascular:      Rate and Rhythm: Normal rate and regular rhythm.   Pulmonary:      Effort: Pulmonary effort is normal.      Breath sounds: Normal breath sounds. No wheezing or rales.   Abdominal:      General: Abdomen is flat. There is no distension.      Palpations: Abdomen is soft.      Tenderness: There is abdominal tenderness (Left lateral). There is no guarding.   Musculoskeletal:         General: Normal range of motion.      Cervical back: Normal range of motion and neck supple.      Right lower leg: No edema.      Left lower leg: No edema.   Lymphadenopathy:      Cervical: No cervical adenopathy.   Skin:     General: Skin is warm.      Coloration: Skin is pale.   Neurological:      General: No focal deficit present.      Mental Status: He is alert and oriented to person, place, and time. Mental status is at baseline.      Comments: No gross focal neurologic deficits   Psychiatric:         Mood and Affect: Mood normal.         Behavior: Behavior normal.         Thought Content: Thought content normal.          LAB:  All pertinent labs reviewed and interpreted.  Results for orders placed or performed during the hospital encounter of 06/11/24   Lactic acid whole blood   Result Value Ref Range    Lactic Acid 2.2 (H) 0.7 - 2.0 mmol/L   Basic metabolic panel   Result Value Ref Range    " Sodium 132 (L) 135 - 145 mmol/L    Potassium 4.8 3.4 - 5.3 mmol/L    Chloride 100 98 - 107 mmol/L    Carbon Dioxide (CO2) 18 (L) 22 - 29 mmol/L    Anion Gap 14 7 - 15 mmol/L    Urea Nitrogen 34.3 (H) 8.0 - 23.0 mg/dL    Creatinine 3.18 (H) 0.67 - 1.17 mg/dL    GFR Estimate 21 (L) >60 mL/min/1.73m2    Calcium 8.4 (L) 8.8 - 10.2 mg/dL    Glucose 295 (H) 70 - 99 mg/dL   Hepatic function panel   Result Value Ref Range    Protein Total 8.7 (H) 6.4 - 8.3 g/dL    Albumin 4.1 3.5 - 5.2 g/dL    Bilirubin Total 0.5 <=1.2 mg/dL    Alkaline Phosphatase 255 (H) 40 - 150 U/L    AST 19 0 - 45 U/L    ALT 16 0 - 70 U/L    Bilirubin Direct <0.20 0.00 - 0.30 mg/dL   Result Value Ref Range    Lipase 22 13 - 60 U/L   CBC with platelets and differential   Result Value Ref Range    WBC Count 12.1 (H) 4.0 - 11.0 10e3/uL    RBC Count 4.22 (L) 4.40 - 5.90 10e6/uL    Hemoglobin 13.6 13.3 - 17.7 g/dL    Hematocrit 39.9 (L) 40.0 - 53.0 %    MCV 95 78 - 100 fL    MCH 32.2 26.5 - 33.0 pg    MCHC 34.1 31.5 - 36.5 g/dL    RDW 12.3 10.0 - 15.0 %    Platelet Count 332 150 - 450 10e3/uL    % Neutrophils 84 %    % Lymphocytes 7 %    % Monocytes 8 %    % Eosinophils 0 %    % Basophils 0 %    % Immature Granulocytes 1 %    NRBCs per 100 WBC 0 <1 /100    Absolute Neutrophils 10.2 (H) 1.6 - 8.3 10e3/uL    Absolute Lymphocytes 0.8 0.8 - 5.3 10e3/uL    Absolute Monocytes 1.0 0.0 - 1.3 10e3/uL    Absolute Eosinophils 0.0 0.0 - 0.7 10e3/uL    Absolute Basophils 0.0 0.0 - 0.2 10e3/uL    Absolute Immature Granulocytes 0.1 <=0.4 10e3/uL    Absolute NRBCs 0.0 10e3/uL   UA with Microscopic reflex to Culture    Specimen: Urine, Clean Catch   Result Value Ref Range    Color Urine Light Yellow Colorless, Straw, Light Yellow, Yellow    Appearance Urine Clear Clear    Glucose Urine >1000 (A) Negative mg/dL    Bilirubin Urine Negative Negative    Ketones Urine Negative Negative mg/dL    Specific Gravity Urine 1.017 1.001 - 1.030    Blood Urine 0.2 mg/dL (A) Negative     pH Urine 5.5 5.0 - 7.0    Protein Albumin Urine 100 (A) Negative mg/dL    Urobilinogen Urine <2.0 <2.0 mg/dL    Nitrite Urine Negative Negative    Leukocyte Esterase Urine Negative Negative    RBC Urine 4 (H) <=2 /HPF    WBC Urine 2 <=5 /HPF   Comprehensive metabolic panel   Result Value Ref Range    Sodium 135 135 - 145 mmol/L    Potassium 5.6 (H) 3.4 - 5.3 mmol/L    Carbon Dioxide (CO2) 23 22 - 29 mmol/L    Anion Gap 7 7 - 15 mmol/L    Urea Nitrogen 36.0 (H) 8.0 - 23.0 mg/dL    Creatinine 3.01 (H) 0.67 - 1.17 mg/dL    GFR Estimate 23 (L) >60 mL/min/1.73m2    Calcium 8.0 (L) 8.8 - 10.2 mg/dL    Chloride 105 98 - 107 mmol/L    Glucose 284 (H) 70 - 99 mg/dL    Alkaline Phosphatase 265 (H) 40 - 150 U/L    AST 42 0 - 45 U/L    ALT 48 0 - 70 U/L    Protein Total 7.4 6.4 - 8.3 g/dL    Albumin 3.4 (L) 3.5 - 5.2 g/dL    Bilirubin Total 0.4 <=1.2 mg/dL   CBC with platelets and differential   Result Value Ref Range    WBC Count 11.0 4.0 - 11.0 10e3/uL    RBC Count 3.65 (L) 4.40 - 5.90 10e6/uL    Hemoglobin 11.6 (L) 13.3 - 17.7 g/dL    Hematocrit 35.5 (L) 40.0 - 53.0 %    MCV 97 78 - 100 fL    MCH 31.8 26.5 - 33.0 pg    MCHC 32.7 31.5 - 36.5 g/dL    RDW 12.4 10.0 - 15.0 %    Platelet Count 264 150 - 450 10e3/uL    % Neutrophils 82 %    % Lymphocytes 6 %    % Monocytes 11 %    % Eosinophils 1 %    % Basophils 0 %    % Immature Granulocytes 1 %    NRBCs per 100 WBC 0 <1 /100    Absolute Neutrophils 9.0 (H) 1.6 - 8.3 10e3/uL    Absolute Lymphocytes 0.7 (L) 0.8 - 5.3 10e3/uL    Absolute Monocytes 1.2 0.0 - 1.3 10e3/uL    Absolute Eosinophils 0.1 0.0 - 0.7 10e3/uL    Absolute Basophils 0.0 0.0 - 0.2 10e3/uL    Absolute Immature Granulocytes 0.1 <=0.4 10e3/uL    Absolute NRBCs 0.0 10e3/uL       RADIOLOGY:  Reviewed all pertinent imaging. Please see official radiology report.  No orders to display       I, José Levin , am serving as a scribe to document services personally performed by Dr. Gunderson based on my observation and the  provider's statements to me. I, Vaughn Gunderson MD attest that José Levin  is acting in a scribe capacity, has observed my performance of the services and has documented them in accordance with my direction.    Vaughn Gunderson M.D.  Emergency Medicine  [facility] EMERGENCY DEPARTMENT  [address] [place] [id number]  [phone]  Dept: [phone]       Vaughn Gunderson MD  06/12/24 0958

## 2024-06-12 ENCOUNTER — ANESTHESIA EVENT (OUTPATIENT)
Dept: SURGERY | Facility: HOSPITAL | Age: 64
End: 2024-06-12
Payer: COMMERCIAL

## 2024-06-12 ENCOUNTER — ANESTHESIA (OUTPATIENT)
Dept: SURGERY | Facility: HOSPITAL | Age: 64
End: 2024-06-12
Payer: COMMERCIAL

## 2024-06-12 ENCOUNTER — PREP FOR PROCEDURE (OUTPATIENT)
Dept: UROLOGY | Facility: CLINIC | Age: 64
End: 2024-06-12
Payer: COMMERCIAL

## 2024-06-12 ENCOUNTER — APPOINTMENT (OUTPATIENT)
Dept: RADIOLOGY | Facility: HOSPITAL | Age: 64
End: 2024-06-12
Attending: STUDENT IN AN ORGANIZED HEALTH CARE EDUCATION/TRAINING PROGRAM
Payer: COMMERCIAL

## 2024-06-12 DIAGNOSIS — N20.1 URETERAL STONE: Primary | ICD-10-CM

## 2024-06-12 DIAGNOSIS — D41.4 BLADDER NEOPLASM OF UNCERTAIN MALIGNANT POTENTIAL: ICD-10-CM

## 2024-06-12 LAB
ALBUMIN SERPL BCG-MCNC: 3.4 G/DL (ref 3.5–5.2)
ALP SERPL-CCNC: 265 U/L (ref 40–150)
ALT SERPL W P-5'-P-CCNC: 48 U/L (ref 0–70)
ANION GAP SERPL CALCULATED.3IONS-SCNC: 7 MMOL/L (ref 7–15)
AST SERPL W P-5'-P-CCNC: 42 U/L (ref 0–45)
BASOPHILS # BLD AUTO: 0 10E3/UL (ref 0–0.2)
BASOPHILS NFR BLD AUTO: 0 %
BILIRUB SERPL-MCNC: 0.4 MG/DL
BUN SERPL-MCNC: 36 MG/DL (ref 8–23)
CALCIUM SERPL-MCNC: 8 MG/DL (ref 8.8–10.2)
CHLORIDE SERPL-SCNC: 105 MMOL/L (ref 98–107)
CREAT SERPL-MCNC: 3.01 MG/DL (ref 0.67–1.17)
DEPRECATED HCO3 PLAS-SCNC: 23 MMOL/L (ref 22–29)
EGFRCR SERPLBLD CKD-EPI 2021: 23 ML/MIN/1.73M2
EOSINOPHIL # BLD AUTO: 0.1 10E3/UL (ref 0–0.7)
EOSINOPHIL NFR BLD AUTO: 1 %
ERYTHROCYTE [DISTWIDTH] IN BLOOD BY AUTOMATED COUNT: 12.4 % (ref 10–15)
GLUCOSE BLDC GLUCOMTR-MCNC: 232 MG/DL (ref 70–99)
GLUCOSE BLDC GLUCOMTR-MCNC: 348 MG/DL (ref 70–99)
GLUCOSE BLDC GLUCOMTR-MCNC: 448 MG/DL (ref 70–99)
GLUCOSE SERPL-MCNC: 284 MG/DL (ref 70–99)
HCT VFR BLD AUTO: 35.5 % (ref 40–53)
HGB BLD-MCNC: 11.6 G/DL (ref 13.3–17.7)
IMM GRANULOCYTES # BLD: 0.1 10E3/UL
IMM GRANULOCYTES NFR BLD: 1 %
LYMPHOCYTES # BLD AUTO: 0.7 10E3/UL (ref 0.8–5.3)
LYMPHOCYTES NFR BLD AUTO: 6 %
MCH RBC QN AUTO: 31.8 PG (ref 26.5–33)
MCHC RBC AUTO-ENTMCNC: 32.7 G/DL (ref 31.5–36.5)
MCV RBC AUTO: 97 FL (ref 78–100)
MONOCYTES # BLD AUTO: 1.2 10E3/UL (ref 0–1.3)
MONOCYTES NFR BLD AUTO: 11 %
NEUTROPHILS # BLD AUTO: 9 10E3/UL (ref 1.6–8.3)
NEUTROPHILS NFR BLD AUTO: 82 %
NRBC # BLD AUTO: 0 10E3/UL
NRBC BLD AUTO-RTO: 0 /100
PLATELET # BLD AUTO: 264 10E3/UL (ref 150–450)
POTASSIUM SERPL-SCNC: 5.6 MMOL/L (ref 3.4–5.3)
POTASSIUM SERPL-SCNC: 5.9 MMOL/L (ref 3.4–5.3)
PROT SERPL-MCNC: 7.4 G/DL (ref 6.4–8.3)
RBC # BLD AUTO: 3.65 10E6/UL (ref 4.4–5.9)
SODIUM SERPL-SCNC: 135 MMOL/L (ref 135–145)
WBC # BLD AUTO: 11 10E3/UL (ref 4–11)

## 2024-06-12 PROCEDURE — 250N000012 HC RX MED GY IP 250 OP 636 PS 637: Performed by: HOSPITALIST

## 2024-06-12 PROCEDURE — 999N000180 XR SURGERY CARM FLUORO LESS THAN 5 MIN: Mod: TC

## 2024-06-12 PROCEDURE — 36415 COLL VENOUS BLD VENIPUNCTURE: CPT | Performed by: HOSPITALIST

## 2024-06-12 PROCEDURE — 255N000002 HC RX 255 OP 636: Performed by: STUDENT IN AN ORGANIZED HEALTH CARE EDUCATION/TRAINING PROGRAM

## 2024-06-12 PROCEDURE — 74420 UROGRAPHY RTRGR +-KUB: CPT | Mod: 26 | Performed by: STUDENT IN AN ORGANIZED HEALTH CARE EDUCATION/TRAINING PROGRAM

## 2024-06-12 PROCEDURE — 999N000141 HC STATISTIC PRE-PROCEDURE NURSING ASSESSMENT: Performed by: STUDENT IN AN ORGANIZED HEALTH CARE EDUCATION/TRAINING PROGRAM

## 2024-06-12 PROCEDURE — C2617 STENT, NON-COR, TEM W/O DEL: HCPCS | Performed by: STUDENT IN AN ORGANIZED HEALTH CARE EDUCATION/TRAINING PROGRAM

## 2024-06-12 PROCEDURE — 85025 COMPLETE CBC W/AUTO DIFF WBC: CPT | Performed by: INTERNAL MEDICINE

## 2024-06-12 PROCEDURE — 80053 COMPREHEN METABOLIC PANEL: CPT | Performed by: INTERNAL MEDICINE

## 2024-06-12 PROCEDURE — 0T778DZ DILATION OF LEFT URETER WITH INTRALUMINAL DEVICE, VIA NATURAL OR ARTIFICIAL OPENING ENDOSCOPIC: ICD-10-PCS | Performed by: STUDENT IN AN ORGANIZED HEALTH CARE EDUCATION/TRAINING PROGRAM

## 2024-06-12 PROCEDURE — 52332 CYSTOSCOPY AND TREATMENT: CPT | Mod: LT | Performed by: STUDENT IN AN ORGANIZED HEALTH CARE EDUCATION/TRAINING PROGRAM

## 2024-06-12 PROCEDURE — 250N000009 HC RX 250: Performed by: ANESTHESIOLOGY

## 2024-06-12 PROCEDURE — 258N000003 HC RX IP 258 OP 636: Mod: JZ | Performed by: ANESTHESIOLOGY

## 2024-06-12 PROCEDURE — 99222 1ST HOSP IP/OBS MODERATE 55: CPT | Mod: 25 | Performed by: STUDENT IN AN ORGANIZED HEALTH CARE EDUCATION/TRAINING PROGRAM

## 2024-06-12 PROCEDURE — 84132 ASSAY OF SERUM POTASSIUM: CPT | Performed by: HOSPITALIST

## 2024-06-12 PROCEDURE — BT1F1ZZ FLUOROSCOPY OF LEFT KIDNEY, URETER AND BLADDER USING LOW OSMOLAR CONTRAST: ICD-10-PCS | Performed by: STUDENT IN AN ORGANIZED HEALTH CARE EDUCATION/TRAINING PROGRAM

## 2024-06-12 PROCEDURE — 250N000011 HC RX IP 250 OP 636: Mod: JZ | Performed by: INTERNAL MEDICINE

## 2024-06-12 PROCEDURE — 250N000009 HC RX 250: Performed by: STUDENT IN AN ORGANIZED HEALTH CARE EDUCATION/TRAINING PROGRAM

## 2024-06-12 PROCEDURE — 250N000011 HC RX IP 250 OP 636: Mod: JZ

## 2024-06-12 PROCEDURE — 250N000011 HC RX IP 250 OP 636: Mod: JZ | Performed by: ANESTHESIOLOGY

## 2024-06-12 PROCEDURE — 258N000003 HC RX IP 258 OP 636: Mod: JZ | Performed by: INTERNAL MEDICINE

## 2024-06-12 PROCEDURE — 250N000011 HC RX IP 250 OP 636: Mod: JZ | Performed by: STUDENT IN AN ORGANIZED HEALTH CARE EDUCATION/TRAINING PROGRAM

## 2024-06-12 PROCEDURE — C1769 GUIDE WIRE: HCPCS | Performed by: STUDENT IN AN ORGANIZED HEALTH CARE EDUCATION/TRAINING PROGRAM

## 2024-06-12 PROCEDURE — 120N000001 HC R&B MED SURG/OB

## 2024-06-12 PROCEDURE — 250N000011 HC RX IP 250 OP 636: Performed by: STUDENT IN AN ORGANIZED HEALTH CARE EDUCATION/TRAINING PROGRAM

## 2024-06-12 PROCEDURE — 99232 SBSQ HOSP IP/OBS MODERATE 35: CPT | Performed by: HOSPITALIST

## 2024-06-12 PROCEDURE — 370N000017 HC ANESTHESIA TECHNICAL FEE, PER MIN: Performed by: STUDENT IN AN ORGANIZED HEALTH CARE EDUCATION/TRAINING PROGRAM

## 2024-06-12 PROCEDURE — 360N000082 HC SURGERY LEVEL 2 W/ FLUORO, PER MIN: Performed by: STUDENT IN AN ORGANIZED HEALTH CARE EDUCATION/TRAINING PROGRAM

## 2024-06-12 PROCEDURE — 710N000010 HC RECOVERY PHASE 1, LEVEL 2, PER MIN: Performed by: STUDENT IN AN ORGANIZED HEALTH CARE EDUCATION/TRAINING PROGRAM

## 2024-06-12 PROCEDURE — 250N000013 HC RX MED GY IP 250 OP 250 PS 637: Performed by: ANESTHESIOLOGY

## 2024-06-12 PROCEDURE — 250N000013 HC RX MED GY IP 250 OP 250 PS 637: Performed by: HOSPITALIST

## 2024-06-12 PROCEDURE — 36415 COLL VENOUS BLD VENIPUNCTURE: CPT | Performed by: INTERNAL MEDICINE

## 2024-06-12 PROCEDURE — 258N000001 HC RX 258: Performed by: STUDENT IN AN ORGANIZED HEALTH CARE EDUCATION/TRAINING PROGRAM

## 2024-06-12 PROCEDURE — 272N000001 HC OR GENERAL SUPPLY STERILE: Performed by: STUDENT IN AN ORGANIZED HEALTH CARE EDUCATION/TRAINING PROGRAM

## 2024-06-12 DEVICE — URETERAL STENT
Type: IMPLANTABLE DEVICE | Site: URETER | Status: NON-FUNCTIONAL
Brand: PERCUFLEX™ PLUS
Removed: 2024-07-10

## 2024-06-12 RX ORDER — CEFAZOLIN SODIUM/WATER 3 G/30 ML
3 SYRINGE (ML) INTRAVENOUS
Status: COMPLETED | OUTPATIENT
Start: 2024-06-12 | End: 2024-06-12

## 2024-06-12 RX ORDER — HYDROMORPHONE HCL IN WATER/PF 6 MG/30 ML
0.4 PATIENT CONTROLLED ANALGESIA SYRINGE INTRAVENOUS EVERY 5 MIN PRN
Status: DISCONTINUED | OUTPATIENT
Start: 2024-06-12 | End: 2024-06-12 | Stop reason: HOSPADM

## 2024-06-12 RX ORDER — ACETAMINOPHEN 325 MG/1
975 TABLET ORAL ONCE
Status: COMPLETED | OUTPATIENT
Start: 2024-06-12 | End: 2024-06-12

## 2024-06-12 RX ORDER — PROCHLORPERAZINE 25 MG
25 SUPPOSITORY, RECTAL RECTAL EVERY 12 HOURS PRN
Status: DISCONTINUED | OUTPATIENT
Start: 2024-06-12 | End: 2024-06-13 | Stop reason: HOSPADM

## 2024-06-12 RX ORDER — NICOTINE POLACRILEX 4 MG
15-30 LOZENGE BUCCAL
Status: DISCONTINUED | OUTPATIENT
Start: 2024-06-12 | End: 2024-06-13 | Stop reason: HOSPADM

## 2024-06-12 RX ORDER — PROCHLORPERAZINE MALEATE 5 MG
5 TABLET ORAL EVERY 6 HOURS PRN
Status: DISCONTINUED | OUTPATIENT
Start: 2024-06-12 | End: 2024-06-13 | Stop reason: HOSPADM

## 2024-06-12 RX ORDER — LIDOCAINE HYDROCHLORIDE 20 MG/ML
JELLY TOPICAL PRN
Status: DISCONTINUED | OUTPATIENT
Start: 2024-06-12 | End: 2024-06-12 | Stop reason: HOSPADM

## 2024-06-12 RX ORDER — SODIUM CHLORIDE, SODIUM LACTATE, POTASSIUM CHLORIDE, CALCIUM CHLORIDE 600; 310; 30; 20 MG/100ML; MG/100ML; MG/100ML; MG/100ML
INJECTION, SOLUTION INTRAVENOUS CONTINUOUS
Status: DISCONTINUED | OUTPATIENT
Start: 2024-06-12 | End: 2024-06-12 | Stop reason: HOSPADM

## 2024-06-12 RX ORDER — AMLODIPINE BESYLATE 5 MG/1
10 TABLET ORAL DAILY
Status: DISCONTINUED | OUTPATIENT
Start: 2024-06-12 | End: 2024-06-13 | Stop reason: HOSPADM

## 2024-06-12 RX ORDER — HYDROMORPHONE HCL IN WATER/PF 6 MG/30 ML
0.2 PATIENT CONTROLLED ANALGESIA SYRINGE INTRAVENOUS EVERY 5 MIN PRN
Status: DISCONTINUED | OUTPATIENT
Start: 2024-06-12 | End: 2024-06-12 | Stop reason: HOSPADM

## 2024-06-12 RX ORDER — ONDANSETRON 2 MG/ML
4 INJECTION INTRAMUSCULAR; INTRAVENOUS EVERY 30 MIN PRN
Status: DISCONTINUED | OUTPATIENT
Start: 2024-06-12 | End: 2024-06-12 | Stop reason: HOSPADM

## 2024-06-12 RX ORDER — NALOXONE HYDROCHLORIDE 0.4 MG/ML
0.4 INJECTION, SOLUTION INTRAMUSCULAR; INTRAVENOUS; SUBCUTANEOUS
Status: DISCONTINUED | OUTPATIENT
Start: 2024-06-12 | End: 2024-06-13 | Stop reason: HOSPADM

## 2024-06-12 RX ORDER — PROPOFOL 10 MG/ML
INJECTION, EMULSION INTRAVENOUS CONTINUOUS PRN
Status: DISCONTINUED | OUTPATIENT
Start: 2024-06-12 | End: 2024-06-12

## 2024-06-12 RX ORDER — DEXAMETHASONE SODIUM PHOSPHATE 10 MG/ML
INJECTION, SOLUTION INTRAMUSCULAR; INTRAVENOUS PRN
Status: DISCONTINUED | OUTPATIENT
Start: 2024-06-12 | End: 2024-06-12

## 2024-06-12 RX ORDER — DEXTROSE MONOHYDRATE 25 G/50ML
25-50 INJECTION, SOLUTION INTRAVENOUS
Status: DISCONTINUED | OUTPATIENT
Start: 2024-06-12 | End: 2024-06-13 | Stop reason: HOSPADM

## 2024-06-12 RX ORDER — CEFAZOLIN SODIUM/WATER 3 G/30 ML
3 SYRINGE (ML) INTRAVENOUS SEE ADMIN INSTRUCTIONS
Status: CANCELLED | OUTPATIENT
Start: 2024-07-10

## 2024-06-12 RX ORDER — FENTANYL CITRATE 50 UG/ML
25 INJECTION, SOLUTION INTRAMUSCULAR; INTRAVENOUS EVERY 5 MIN PRN
Status: DISCONTINUED | OUTPATIENT
Start: 2024-06-12 | End: 2024-06-12 | Stop reason: HOSPADM

## 2024-06-12 RX ORDER — HYDROCHLOROTHIAZIDE 25 MG/1
50 TABLET ORAL DAILY
Status: DISCONTINUED | OUTPATIENT
Start: 2024-06-12 | End: 2024-06-13 | Stop reason: HOSPADM

## 2024-06-12 RX ORDER — ONDANSETRON 4 MG/1
4 TABLET, ORALLY DISINTEGRATING ORAL EVERY 30 MIN PRN
Status: DISCONTINUED | OUTPATIENT
Start: 2024-06-12 | End: 2024-06-12 | Stop reason: HOSPADM

## 2024-06-12 RX ORDER — NALOXONE HYDROCHLORIDE 0.4 MG/ML
0.1 INJECTION, SOLUTION INTRAMUSCULAR; INTRAVENOUS; SUBCUTANEOUS
Status: DISCONTINUED | OUTPATIENT
Start: 2024-06-12 | End: 2024-06-12 | Stop reason: HOSPADM

## 2024-06-12 RX ORDER — NALOXONE HYDROCHLORIDE 0.4 MG/ML
0.2 INJECTION, SOLUTION INTRAMUSCULAR; INTRAVENOUS; SUBCUTANEOUS
Status: DISCONTINUED | OUTPATIENT
Start: 2024-06-12 | End: 2024-06-13 | Stop reason: HOSPADM

## 2024-06-12 RX ORDER — CEFAZOLIN SODIUM/WATER 3 G/30 ML
3 SYRINGE (ML) INTRAVENOUS
Status: CANCELLED | OUTPATIENT
Start: 2024-07-10

## 2024-06-12 RX ORDER — TAMSULOSIN HYDROCHLORIDE 0.4 MG/1
0.4 CAPSULE ORAL DAILY
Status: DISCONTINUED | OUTPATIENT
Start: 2024-06-12 | End: 2024-06-13 | Stop reason: HOSPADM

## 2024-06-12 RX ORDER — ESCITALOPRAM OXALATE 10 MG/1
20 TABLET ORAL DAILY
Status: DISCONTINUED | OUTPATIENT
Start: 2024-06-12 | End: 2024-06-13 | Stop reason: HOSPADM

## 2024-06-12 RX ORDER — SODIUM POLYSTYRENE SULFONATE 15 G/60ML
15 SUSPENSION ORAL; RECTAL ONCE
Status: COMPLETED | OUTPATIENT
Start: 2024-06-12 | End: 2024-06-12

## 2024-06-12 RX ORDER — HYDROMORPHONE HYDROCHLORIDE 1 MG/ML
0.3 INJECTION, SOLUTION INTRAMUSCULAR; INTRAVENOUS; SUBCUTANEOUS
Status: DISCONTINUED | OUTPATIENT
Start: 2024-06-12 | End: 2024-06-12 | Stop reason: HOSPADM

## 2024-06-12 RX ORDER — CEFAZOLIN SODIUM/WATER 3 G/30 ML
3 SYRINGE (ML) INTRAVENOUS SEE ADMIN INSTRUCTIONS
Status: DISCONTINUED | OUTPATIENT
Start: 2024-06-12 | End: 2024-06-12 | Stop reason: HOSPADM

## 2024-06-12 RX ORDER — FENTANYL CITRATE 50 UG/ML
INJECTION, SOLUTION INTRAMUSCULAR; INTRAVENOUS PRN
Status: DISCONTINUED | OUTPATIENT
Start: 2024-06-12 | End: 2024-06-12

## 2024-06-12 RX ORDER — LIDOCAINE 40 MG/G
CREAM TOPICAL
Status: DISCONTINUED | OUTPATIENT
Start: 2024-06-12 | End: 2024-06-12 | Stop reason: HOSPADM

## 2024-06-12 RX ORDER — FENTANYL CITRATE 50 UG/ML
50 INJECTION, SOLUTION INTRAMUSCULAR; INTRAVENOUS EVERY 5 MIN PRN
Status: DISCONTINUED | OUTPATIENT
Start: 2024-06-12 | End: 2024-06-12 | Stop reason: HOSPADM

## 2024-06-12 RX ORDER — ONDANSETRON 2 MG/ML
INJECTION INTRAMUSCULAR; INTRAVENOUS PRN
Status: DISCONTINUED | OUTPATIENT
Start: 2024-06-12 | End: 2024-06-12

## 2024-06-12 RX ORDER — PROPOFOL 10 MG/ML
INJECTION, EMULSION INTRAVENOUS PRN
Status: DISCONTINUED | OUTPATIENT
Start: 2024-06-12 | End: 2024-06-12

## 2024-06-12 RX ORDER — DIPHENHYDRAMINE HYDROCHLORIDE 50 MG/ML
INJECTION INTRAMUSCULAR; INTRAVENOUS PRN
Status: DISCONTINUED | OUTPATIENT
Start: 2024-06-12 | End: 2024-06-12

## 2024-06-12 RX ORDER — DEXAMETHASONE SODIUM PHOSPHATE 4 MG/ML
INJECTION, SOLUTION INTRA-ARTICULAR; INTRALESIONAL; INTRAMUSCULAR; INTRAVENOUS; SOFT TISSUE PRN
Status: DISCONTINUED | OUTPATIENT
Start: 2024-06-12 | End: 2024-06-12

## 2024-06-12 RX ORDER — PRAVASTATIN SODIUM 10 MG
10 TABLET ORAL DAILY
Status: DISCONTINUED | OUTPATIENT
Start: 2024-06-12 | End: 2024-06-13 | Stop reason: HOSPADM

## 2024-06-12 RX ADMIN — LIDOCAINE HYDROCHLORIDE 50 ML: 10 INJECTION, SOLUTION INFILTRATION; PERINEURAL at 11:05

## 2024-06-12 RX ADMIN — HYDROMORPHONE HYDROCHLORIDE 0.4 MG: 0.2 INJECTION, SOLUTION INTRAMUSCULAR; INTRAVENOUS; SUBCUTANEOUS at 00:16

## 2024-06-12 RX ADMIN — DIPHENHYDRAMINE HYDROCHLORIDE 50 MG: 50 INJECTION, SOLUTION INTRAMUSCULAR; INTRAVENOUS at 11:19

## 2024-06-12 RX ADMIN — HYDROMORPHONE HYDROCHLORIDE 0.4 MG: 0.2 INJECTION, SOLUTION INTRAMUSCULAR; INTRAVENOUS; SUBCUTANEOUS at 04:07

## 2024-06-12 RX ADMIN — ESCITALOPRAM OXALATE 20 MG: 10 TABLET ORAL at 18:32

## 2024-06-12 RX ADMIN — PROPOFOL 50 MG: 10 INJECTION, EMULSION INTRAVENOUS at 11:06

## 2024-06-12 RX ADMIN — DEXAMETHASONE SODIUM PHOSPHATE 4 MG: 10 INJECTION, SOLUTION INTRAMUSCULAR; INTRAVENOUS at 11:10

## 2024-06-12 RX ADMIN — FAMOTIDINE 20 MG: 10 INJECTION, SOLUTION INTRAVENOUS at 22:05

## 2024-06-12 RX ADMIN — SODIUM CHLORIDE, POTASSIUM CHLORIDE, SODIUM LACTATE AND CALCIUM CHLORIDE: 600; 310; 30; 20 INJECTION, SOLUTION INTRAVENOUS at 10:04

## 2024-06-12 RX ADMIN — Medication 3 G: at 10:58

## 2024-06-12 RX ADMIN — PHENYLEPHRINE HYDROCHLORIDE 100 MCG: 10 INJECTION INTRAVENOUS at 11:30

## 2024-06-12 RX ADMIN — HYDROMORPHONE HYDROCHLORIDE 0.3 MG: 1 INJECTION, SOLUTION INTRAMUSCULAR; INTRAVENOUS; SUBCUTANEOUS at 10:34

## 2024-06-12 RX ADMIN — MIDAZOLAM 2 MG: 1 INJECTION INTRAMUSCULAR; INTRAVENOUS at 11:05

## 2024-06-12 RX ADMIN — INSULIN ASPART 7 UNITS: 100 INJECTION, SOLUTION INTRAVENOUS; SUBCUTANEOUS at 17:47

## 2024-06-12 RX ADMIN — FENTANYL CITRATE 50 MCG: 50 INJECTION INTRAMUSCULAR; INTRAVENOUS at 11:05

## 2024-06-12 RX ADMIN — PHENYLEPHRINE HYDROCHLORIDE 200 MCG: 10 INJECTION INTRAVENOUS at 11:19

## 2024-06-12 RX ADMIN — HYDROMORPHONE HYDROCHLORIDE 0.4 MG: 0.2 INJECTION, SOLUTION INTRAMUSCULAR; INTRAVENOUS; SUBCUTANEOUS at 08:11

## 2024-06-12 RX ADMIN — SODIUM POLYSTYRENE SULFONATE 15 G: 15 SUSPENSION ORAL; RECTAL at 22:55

## 2024-06-12 RX ADMIN — TAMSULOSIN HYDROCHLORIDE 0.4 MG: 0.4 CAPSULE ORAL at 18:32

## 2024-06-12 RX ADMIN — AMLODIPINE BESYLATE 10 MG: 5 TABLET ORAL at 18:32

## 2024-06-12 RX ADMIN — ONDANSETRON 4 MG: 2 INJECTION INTRAMUSCULAR; INTRAVENOUS at 05:22

## 2024-06-12 RX ADMIN — ACETAMINOPHEN 975 MG: 325 TABLET ORAL at 10:07

## 2024-06-12 RX ADMIN — ONDANSETRON 4 MG: 2 INJECTION INTRAMUSCULAR; INTRAVENOUS at 11:03

## 2024-06-12 RX ADMIN — PROPOFOL 150 MCG/KG/MIN: 10 INJECTION, EMULSION INTRAVENOUS at 11:03

## 2024-06-12 RX ADMIN — PRAVASTATIN SODIUM 10 MG: 10 TABLET ORAL at 18:32

## 2024-06-12 RX ADMIN — HYDROMORPHONE HYDROCHLORIDE 0.4 MG: 0.2 INJECTION, SOLUTION INTRAMUSCULAR; INTRAVENOUS; SUBCUTANEOUS at 06:11

## 2024-06-12 RX ADMIN — ONDANSETRON 4 MG: 2 INJECTION INTRAMUSCULAR; INTRAVENOUS at 11:05

## 2024-06-12 RX ADMIN — HYDROMORPHONE HYDROCHLORIDE 0.4 MG: 0.2 INJECTION, SOLUTION INTRAMUSCULAR; INTRAVENOUS; SUBCUTANEOUS at 02:17

## 2024-06-12 RX ADMIN — FENTANYL CITRATE 25 MCG: 50 INJECTION INTRAMUSCULAR; INTRAVENOUS at 11:18

## 2024-06-12 RX ADMIN — SODIUM CHLORIDE, POTASSIUM CHLORIDE, SODIUM LACTATE AND CALCIUM CHLORIDE: 600; 310; 30; 20 INJECTION, SOLUTION INTRAVENOUS at 08:10

## 2024-06-12 ASSESSMENT — ACTIVITIES OF DAILY LIVING (ADL)
ADLS_ACUITY_SCORE: 35
ADLS_ACUITY_SCORE: 35
ADLS_ACUITY_SCORE: 21
ADLS_ACUITY_SCORE: 35
ADLS_ACUITY_SCORE: 20
ADLS_ACUITY_SCORE: 20
ADLS_ACUITY_SCORE: 21
ADLS_ACUITY_SCORE: 35
ADLS_ACUITY_SCORE: 21
ADLS_ACUITY_SCORE: 35
ADLS_ACUITY_SCORE: 21
ADLS_ACUITY_SCORE: 35
ADLS_ACUITY_SCORE: 20
ADLS_ACUITY_SCORE: 35

## 2024-06-12 NOTE — PROGRESS NOTES
"[facility]    Medicine Progress Note - Hospitalist Service    Date of Admission:  6/11/2024    Assessment & Plan     Mayito is a 63-year-old male with past medical history of obesity, diabetes, hypertension, CKD admitted with left-sided flank pain.  Workup revealed obstructing left proximal ureteral calculus with TARUN      Obstructive left uropathy  -CT abdomen was done outside ED in [place] which revealed a left proximal ureteral calculus.  -Status post cystourethroscopy with left retrograde pyelography with placement of a left ureteral stent today.  -Pain control, antiemetics as needed  -Continue oral diet    TARUN on CKD likely secondary to obstructive uropathy  Hyperkalemia  -Baseline creatinine around 2.5, noted to be 3.2 on admission  -Recheck BMP now and monitor K    DM with hyperglycemia  -sliding scale insulin, Monitor BS    Hypertension  -Hold PTA hydrochlorothiazide 2/2 to TARUN  -Continue amlodipine    Mood disorder-PTA lexapro              Diet: Renal Diet (non-dialysis)    DVT Prophylaxis: Ambulate every shift  Gutierrez Catheter: Not present  Lines: None     Cardiac Monitoring: None  Code Status: Full Code      Clinically Significant Risk Factors Present on Admission        # Hyperkalemia: Highest K = 5.6 mmol/L in last 2 days, will monitor as appropriate   # Hypocalcemia: Lowest Ca = 8 mg/dL in last 2 days, will monitor and replace as appropriate     # Hypoalbuminemia: Lowest albumin = 3.4 g/dL at 6/12/2024  7:19 AM, will monitor as appropriate     # Hypertension: Noted on problem list             # Obesity: Estimated body mass index is 36.94 kg/m  as calculated from the following:    Height as of this encounter: 1.854 m (6' 1\").    Weight as of this encounter: 127 kg (280 lb).              Disposition Plan     Medically Ready for Discharge: Anticipated Tomorrow             Niurka Ellsworth MD  Hospitalist Service  [facility]  Securely message with [employer] (more " info)  Text page via [facility] Paging/Directory   ______________________________________________________________________    Interval History   .  Patient is new to me. Chart reviewed and events noted.  Patient seen and examined.   Currently denies any abdominal pain.  States symptoms have improved.  No nausea or vomiting.  No fever.  No dysuria or polyuria.      Physical Exam   Vital Signs: Temp: 98.2  F (36.8  C) Temp src: Oral BP: (!) 146/66 Pulse: 66   Resp: 18 SpO2: 92 % O2 Device: None (Room air) Oxygen Delivery: 2 LPM  Weight: 280 lbs 0 oz    General: Pleasant, NAD  HEENT:EOMI, AT,NC  CVS:RRR, no edema  RS:CTAB  Abd: Soft, NT,ND  Neurology:Grossly normal  Psy:Approrpiate affect        Medical Decision Making       >40 MINUTES SPENT BY ME on the date of service doing chart review, history, exam, documentation & further activities per the note.  MANAGEMENT DISCUSSED with the following over the past 24 hours: Patient, family by bedside       Data     I have personally reviewed the following data over the past 24 hrs:    11.0  \   11.6 (L)   / 264     135 105 36.0 (H) /  232 (H)   5.6 (H) 23 3.01 (H) \     ALT: 48 AST: 42 AP: 265 (H) TBILI: 0.4   ALB: 3.4 (L) TOT PROTEIN: 7.4 LIPASE: 22     Procal: N/A CRP: N/A Lactic Acid: 2.2 (H)          Name band;

## 2024-06-12 NOTE — PROGRESS NOTES
Patient admitted to room 11 at approximately 1325 via cart from surgery.  Reason for Admission:   Report received from:   Patient was accompanied by Self.  Discharge transportation provided by:  Patient ambulated/transferred:  hover mat. air adeel.  Patient is alert and orientated x 4.  Outpatient Observation education provided to: (patient, family, friend)  MDRO Education done if applicable (MRSA, VRE, etc)  Safety risks were identified during admission:  fall.   Yellow risk/fall band applied:  Yes  Detailed Belongings: Shoes, cane, clothes, phone, phone , glasses, credit cards, debit card, and $387 cash. Plan is for girlfriend to take credit & debit cards and cash home this afternoon.

## 2024-06-12 NOTE — MEDICATION SCRIBE - ADMISSION MEDICATION HISTORY
Medication Scribe Admission Medication History    Admission medication history is complete. The information provided in this note is only as accurate as the sources available at the time of the update.    Information Source(s): Patient via in-person    Pertinent Information: Patient states that he did not take his medications since Friday (except Nasonex and Flomax)  Patient states that he stop taking his Victoza and Lisinopril due to sever headache and fall after taking.    Changes made to PTA medication list:  Added: Jardiance, Hydrochlorothiazide, Pravastatin, Tamsulosin (per patient and fill history)  Deleted: Carboxymethyl, Polytrim (per patient)  Changed: None    Allergies reviewed with patient and updates made in EHR: yes    Medication History Completed By: Waqas Barrera 6/11/2024 10:11 PM    PTA Med List   Medication Sig Last Dose    amLODIPine (NORVASC) 10 MG tablet Take 10 mg by mouth daily 6/7/2024 at am    diphenhydrAMINE-acetaminophen (TYLENOL PM)  mg Tab Take 1 tablet by mouth nightly as needed for sleep Unknown at prn    escitalopram (LEXAPRO) 20 MG tablet Take 20 mg by mouth daily 6/7/2024 at am    hydrochlorothiazide (HYDRODIURIL) 50 MG tablet Take 50 mg by mouth daily 6/7/2024 at am    INSULIN GLARGINE 100 UNIT/ML pen 22 Units every morning 6/7/2024 at am    JARDIANCE 10 MG TABS tablet Take 10 mg by mouth daily 6/7/2024 at am    mometasone (NASONEX) 50 mcg/actuation nasal spray [MOMETASONE (NASONEX) 50 MCG/ACTUATION NASAL SPRAY] 1 spray into each nostril 2 (two) times a day. 6/11/2024 at am    pravastatin (PRAVACHOL) 10 MG tablet Take 10 mg by mouth daily 6/7/2024 at am    tamsulosin (FLOMAX) 0.4 MG capsule Take 0.4 mg by mouth daily 6/11/2024 at am

## 2024-06-12 NOTE — ANESTHESIA POSTPROCEDURE EVALUATION
Patient: Mayito Sood    Procedure: Procedure(s):  CYSTOSCOPY, WITH RETROGRADE PYELOGRAM AND URETERAL STENT PLACEMENT       Anesthesia Type:  MAC    Note:  Disposition: Admission   Postop Pain Control: Uneventful            Sign Out: Well controlled pain   PONV: No   Neuro/Psych: Uneventful            Sign Out: Acceptable/Baseline neuro status   Airway/Respiratory: Uneventful            Sign Out: Acceptable/Baseline resp. status   CV/Hemodynamics: Uneventful            Sign Out: Acceptable CV status; No obvious hypovolemia; No obvious fluid overload   Other NRE: NONE   DID A NON-ROUTINE EVENT OCCUR? No           Last vitals:  Vitals Value Taken Time   /70 06/12/24 1245   Temp 36.9  C (98.4  F) 06/12/24 1245   Pulse 63 06/12/24 1256   Resp 12 06/12/24 1256   SpO2 94 % 06/12/24 1256   Vitals shown include unfiled device data.    Electronically Signed By: Michael Alvarado MD  June 12, 2024  1:20 PM

## 2024-06-12 NOTE — H&P
[employer]    History and Physical - Hospitalist Service       Date of Admission:  6/11/2024    Assessment & Plan   Mayito Sood is a 63 year old male with a medical history significant for hypertension, seasonal allergies, type II DM, CKD stage 3 and other medical comorbidities who is admitted with abdominal pain due to obstructive uropathy due to kidney stone and acute on chronic renal failure.    Patient Active Problem List   Diagnosis    Obesity    Polyneuropathy    Lumbar Disc Degeneration    Male Erectile Disorder    Acute Sinusitis    Cellulitis    Hyperglycemia    Acute pain    Neuropathy    SIRS (systemic inflammatory response syndrome) (H)    Bacteremia    Osteomyelitis (H)    Diabetes (H)    HTN (hypertension)    Infection involving bone (H)    Ureterolithiasis    TARUN (acute kidney injury) (H24)      Obstructive uropathy-CT abdomen and pelvis done showed a 2 mm obstructing left proximal ureteric stone at the uteropelvic junction with mild hydronephrosis and left perinephric stranding.  UA does not look infected however patient may be at risk for pyelonephritis.  Will start antibiotics if patient starts spiking fevers.    Acute urolithiasis-management per urology.  Hydration for now.  Add Flonase.  Acute on chronic renal failure    Chronic back pain-baseline creatinine 1.9-2.5.  Avoid nephrotoxic drugs.  Continue hydration and follow-up on urine output.    Hypertension-hydralazine as needed for blood pressure control.  Hold hydrochlorothiazide and amlodipine.    Type II [id number], sliding scale insulin.  Blood sugar goal 100-1 40    Obesity-outpatient follow-up    Seasonal allergies-Flonase as needed.    Rest of patient's medical problems management remains unchanged         Diet:  N.p.o. after midnight  DVT Prophylaxis: Pneumatic Compression Devices  Gutierrez Catheter: Not present  Lines: Peripheral     Cardiac Monitoring: Yes  Code Status:  Full code    Clinically Significant  "Risk Factors Present on Admission          # Hypocalcemia: Lowest Ca = 8.4 mg/dL in last 2 days, will monitor and replace as appropriate        # Acute Kidney Injury, unspecified: based on a >150% or 0.3 mg/dL increase in last creatinine compared to past 90 day average, will monitor renal function  # Hypertension: Noted on problem list             # Obesity: Estimated body mass index is 36.94 kg/m  as calculated from the following:    Height as of this encounter: 1.854 m (6' 1\").    Weight as of this encounter: 127 kg (280 lb).              Disposition Plan     Medically Ready for Discharge: Anticipated in 2-4 Days           Hayde Clay MD  Hospitalist Service  [facility]  Securely message with [employer] (more info)  Text page via [facility] Paging/Directory     ______________________________________________________________________    Chief Complaint   Abdominal pain        History of Present Illness   Mayito Sood is a 63 year old male with a medical history significant for hypertension, seasonal allergies, type II DM, CKD stage 4 and other medical comorbidities who is admitted with abdominal pain due to obstructive uropathy due to kidney stone and acute on chronic renal failure.     Per history, patient presented with acute renal failure (ARF) and CKD attributed to ureterolithiasis and was diagnosed 2 days ago at [place]. The creatinine level was 1.9 and he was found to have a  2 mm ureteral stone at the ureterovesical junction (UVJ) by CT.     He did not appear to have infection then and does not appear to have infection now.  His pain level has progressively increased and she came to the ER for further evaluation.      CT abdomen and pelvis done showed results below.    Comparison: None      Findings:   Minor atelectasis at the lung bases.   Cardiomegaly.     Cholecystectomy.   2 mm obstructing left proximal ureteral stone adjacent to the ureteropelvic junction. Mild left hydronephrosis. "   Left perinephric fat stranding much more prominent than right. There is also left gillian ureteral fat stranding.   Subcentimeter segment 7 liver cyst. Subcentimeter right mid renal cysts.   Abdominal solid organs otherwise unremarkable   No bowel obstruction, pneumoperitoneum, or pneumatosis.   Moderate stool.     Moderate urinary bladder distention.   Prostatomegaly.   Normal appendix.   The bones are intact.     IMPRESSION:   1. 2 mm obstructing left proximal ureteral stone just beyond the ureteropelvic junction.   2. No diverticulitis.       Case was discussed with urologist on-call and the recommendation was to admit for pain control, hydration and possible stent placement in the morning.    Patient had no major complaints when seen.  This is his first kidney stone.  Pain was graded as 4 out of 10    Past Medical History    Type II DM with neuropathy  Hallux malleus  Left foot ulcer  History of osteomyelitis  Hammertoe of the left foot  Gait disturbance  Preulcerative callus  Seasonal allergies  Hypertension  Hyperlipidemia  Rupture of left Achilles tendon 03/20/2024     Stage 3a chronic kidney disease 02/16/2024     Stage 3 chronic kidney disease 02/08/2023     Anxiety and depression 02/08/2023     Allergic rhinitis due to pollen 02/08/2023     Age-related nuclear cataract of both eyes 05/26/2021     Status post laparoscopic cholecystectomy 06/03/2019     Proliferative diabetic retinopathy of both eyes with macular edema associated with type 2 diabetes mellitus 08/30/2018     Overview:    Formatting of this note might be different from the original.  Last seen   Dyslipidemia 07/05/2017     Obesity (BMI 30.0-34.9) 07/05/2017     Type 2 diabetes mellitus without complication, with long-term current use of insulin 02/17/2017     HTN (hypertension) 09/25/2015       Depression, major 10/8/2014     HTN (hypertension) 9/25/2015     Type 2 diabetes mellitus without complication, with long-term current use of insulin  (HC) 2/17/2017     Dyslipidemia 7/5/2017     Calculus of gallbladder       Cellulitis and abscess of leg       Obesity (BMI 30.0-34.9)       Proliferative diabetic retinopathy of both eyes with macular edema associated with type 2 diabetes mellitus (HC)       Cholecystitis            Past Surgical History   Past Surgical History:   Procedure Laterality Date    HC REPAIR ACHILLES TENDON,PRIMARY      Description: Primary Repair Of Ruptured Achilles Tendon;  Proc Date: 01/09/2003;    IR LUMBAR EPIDURAL STEROID INJECTION  12/4/2003    IR LUMBAR EPIDURAL STEROID INJECTION  7/7/2009    IR LUMBAR EPIDURAL STEROID INJECTION  8/20/2009    PICC  8/29/2014            Prior to Admission Medications   Prior to Admission Medications   Prescriptions Last Dose Informant Patient Reported? Taking?   ACCU-CHEK FASTCLIX Misc   No No   Sig: [ACCU-CHEK FASTCLIX MISC] USE AS DIRECTED FOUR TIMES DAILY   ACCU-CHEK SMARTVIEW TEST STRIP Strp   No No   Sig: [ACCU-CHEK SMARTVIEW TEST STRIP STRP] TEST FOUR TIMES DAILY   amLODIPine (NORVASC) 5 MG tablet   Yes No   Sig: [AMLODIPINE (NORVASC) 5 MG TABLET] Take 5 mg by mouth daily.   amLODIPine (NORVASC) 5 MG tablet   No No   Sig: [AMLODIPINE (NORVASC) 5 MG TABLET] Take 1 tablet (5 mg total) by mouth daily.   carboxymethyl-gly-luky63-XV 0.5-1-0.5 % Dpet   No No   Sig: [CARBOXYMETHYL-GLY-QREW81-QR 0.5-1-0.5 % DPET] Apply 1-2 drops to eye as needed.   diphenhydrAMINE-acetaminophen (TYLENOL PM)  mg Tab   Yes No   Sig: [DIPHENHYDRAMINE-ACETAMINOPHEN (TYLENOL PM)  MG TAB] Take 1 tablet by mouth bedtime as needed.   escitalopram oxalate (LEXAPRO) 10 MG tablet   Yes No   Sig: [ESCITALOPRAM OXALATE (LEXAPRO) 10 MG TABLET] Take 10 mg by mouth daily.   insulin glargine (LANTUS SOLOSTAR) 100 unit/mL (3 mL) pen   No No   Sig: [INSULIN GLARGINE (LANTUS SOLOSTAR) 100 UNIT/ML (3 ML) PEN] INJECT 25 UNITS UNDER THE SKIN EVERY EVENING   insulin lispro 100 unit/mL InPn   Yes No   Sig: [INSULIN LISPRO 100  "UNIT/ML INPN] Sliding scale- 0 unit, 141-180 2 units, 181-220 4 units, 221-260 6 units, 261-300 8 units, 301-340 10 units--take with meal insulin   insulin needles, disposable, (ULTICARE) 32 x 5/32 \" Ndle   No No   Sig: [INSULIN NEEDLES, DISPOSABLE, (ULTICARE) 32 X 5/32 \" NDLE] Use as Directed with Humalog   lisinopril (PRINIVIL,ZESTRIL) 20 MG tablet   No No   Sig: [LISINOPRIL (PRINIVIL,ZESTRIL) 20 MG TABLET] Take 1 tablet (20 mg total) by mouth daily.   mometasone (NASONEX) 50 mcg/actuation nasal spray   No No   Sig: [MOMETASONE (NASONEX) 50 MCG/ACTUATION NASAL SPRAY] 1 spray into each nostril 2 (two) times a day.   pen needle, diabetic 31 gauge x 5/16\" Ndle   No No   Sig: [PEN NEEDLE, DIABETIC 31 GAUGE X 5/16\" NDLE] Use 1 pen As Directed as needed.   polymyxin B-trimethoprim (POLYTRIM) 10,000 unit- 1 mg/mL Drop ophthalmic drops   No No   Sig: [POLYMYXIN B-TRIMETHOPRIM (POLYTRIM) 10,000 UNIT- 1 MG/ML DROP OPHTHALMIC DROPS] Apply one drop in affected eye every 3 hours while awake x 7 days.      Facility-Administered Medications: None        Review of Systems    The 10 point Review of Systems is negative other than noted in the HPI or here.     Social History   I have reviewed this patient's social history and updated it with pertinent information if needed.  Social History     Tobacco Use    Smoking status: Never    Smokeless tobacco: Never   Substance Use Topics    Alcohol use: No    Drug use: No         Family History   I have reviewed this patient's family history and updated it with pertinent information if needed.  Family History   Problem Relation Age of Onset    Cancer Mother    Relation  Father   Maternal Aunt   Maternal Grandfather   Maternal Grandmother   Maternal Uncle     Mother    Paternal Grandfather     Paternal Grandmother         Allergies   Allergies   Allergen Reactions    Shellfish Containing Products [Shellfish-Derived Products] Anaphylaxis    Iodine Unknown    Prednisone " Unknown     Depression--gets emotional and angry          Physical Exam   Vital Signs: Temp: 97  F (36.1  C) Temp src: Temporal BP: (!) 144/70 Pulse: 67   Resp: 20 SpO2: 97 % O2 Device: None (Room air)    Weight: 280 lbs 0 oz      General Aox3, appropriate affect, NAD, on RA  HEENT  MMM, EOMI, PERRL  Chest Adeq E b/l, No wheezing  Heart RRR, No M/R/G  Abd-obese , bowel sounds present, left flank pain he  - Deferred,   Extremity- Moving all extremities, No digital clubbing,   No edema  Neuro- Aox3, grossly nonfocal gait not checked  Skin  Has no tattoo, No skin rash     Medical Decision Making       85 MINUTES SPENT BY ME on the date of service doing chart review, history, exam, documentation & further activities per the note.      ------------------ MEDICAL DECISION MAKING ------------------------------------------------------------------------------------------------------  MANAGEMENT DISCUSSED with the following over the past 24 hours: Patient and care team       Data   ------------------------- PAST 24 HR DATA REVIEWED -----------------------------------------------    I have personally reviewed the following data over the past 24 hrs:    12.1 (H)  \   13.6   / 332     132 (L) 100 34.3 (H) /  295 (H)   4.8 18 (L) 3.18 (H) \     ALT: 16 AST: 19 AP: 255 (H) TBILI: 0.5   ALB: 4.1 TOT PROTEIN: 8.7 (H) LIPASE: 22     Procal: N/A CRP: N/A Lactic Acid: 2.2 (H)         Imaging results reviewed over the past 24 hrs:   No results found for this or any previous visit (from the past 24 hour(s)).

## 2024-06-12 NOTE — PROGRESS NOTES
"PRIMARY DIAGNOSIS: \"GENERIC\" NURSING  OUTPATIENT/OBSERVATION GOALS TO BE MET BEFORE DISCHARGE:  ADLs back to baseline: No    Activity and level of assistance: Up with standby assistance.    Pain status: Pain free.    Return to near baseline physical activity:  SBA     Discharge Planner Nurse   Safe discharge environment identified: No  Barriers to discharge: Yes       Entered by: Kandis Hyde RN 06/12/2024 2:31 PM  Pt denies pain. Voided 450cc, tea colored; encouraged PO fluids. Tolerated clear liquids well.    Please review provider order for any additional goals.   Nurse to notify provider when observation goals have been met and patient is ready for discharge.  "

## 2024-06-12 NOTE — PLAN OF CARE
Pt is standby assist when up, per RN discretion as pt just completed procedure. On capnography and telemetry NSR. LR running at 175mL/hr. Tolerated clear liquids; denies n/v or gastric irritation postprandial. Family is in room. Voided 450cc, tea colored; PO fluids encouraged, verbalize understanding. Aware to alert nursing if feeling nauseous, vomiting, dizzy, SOB, and any new deficits.     Problem: Adult Inpatient Plan of Care  Goal: Absence of Hospital-Acquired Illness or Injury  Intervention: Prevent Skin Injury  Recent Flowsheet Documentation  Taken 6/12/2024 1332 by Kandis Hyde, RN  Body Position: position changed independently     Problem: Adult Inpatient Plan of Care  Goal: Optimal Comfort and Wellbeing  Outcome: Progressing     Problem: Pain Acute  Goal: Optimal Pain Control and Function  Outcome: Progressing

## 2024-06-12 NOTE — OP NOTE
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 Cystourethroscopy with left retrograde pyelography  2. Placement of left ureteral stent.  3. Intraoperative interpretation of fluoroscopic imaging.     STAFF SURGEON: Ramon Perea MD, present for entire case.     ANESTHESIA: MAC with Local    ESTIMATED BLOOD LOSS: 0 mL.     DRAINS: 6 Fr 28 cm Double J Stent      OPERATIVE INDICATIONS:   Mayito Sood is a 63 year old male who presented with a left obstructing ureteral stone.  He has TARUN and stage III CKD.  The patient was counseled on the alternatives, risks, and benefits and elected to proceed with the above stated procedure.    DESCRIPTION OF PROCEDURE:    After informed consent was obtained, the patient was taken to the operating room, and moved to the operating table.  After adequate anesthesia was induced, the patient was repositioned in dorsal lithotomy position and prepped and draped in the usual sterile fashion. A timeout was taken to confirm correct patient, procedure and laterality.  He was then premedicated with Benadryl 50 mg due to concerns of iodine allergy based on his shrimp allergy.    A 22-Uzbek cystoscope was inserted into a well lubricated urethra. The urethra was unremarkable.  The bladder was free of tumors, stones or diverticuli.  The media was clear.  Bilateral ureteral orifices were orthotopic.  There was a 0.5 cm sized papillary tumor on the posterior wall of the bladder just medial to the left ureteral orifice.  We did not biopsy in view of the anesthesia and because of the fact that we had not had a prior discussion regarding this additional procedure.  Additionally he is going to need a second procedure under anesthesia where we can adequately take care of this issue.  A Sensor guidewire was advanced into the  left renal pelvis with the aid of a 5-Polish open ended ureteral catheter.  A retrograde pyelogram demonstrated mild   hydronephrosis but no filling defects were clearly seen.  The wire was replaced and a 6 Fr 28 cm Double J Stent was advanced over the guidewire under fluoroscopic guidance with a good curl in the renal pelvis and bladder.  The stent passed up easily with no appreciable resistance.  The bladder was then drained.    The patient tolerated the procedure well.  There were no complications.         PLAN:   - Admit overnight for monitoring under medicine care for TARUN  - Follow-up with Dr Perea for definitive stone management in next 2-3 weeks long with bladder tumor removal    Ramon Perea MD

## 2024-06-12 NOTE — ANESTHESIA PREPROCEDURE EVALUATION
Anesthesia Pre-Procedure Evaluation    Patient: Mayito Sood   MRN: [id number] : 1960        Procedure : Procedure(s):  CYSTOSCOPY, WITH RETROGRADE PYELOGRAM AND URETERAL STENT PLACEMENT          History reviewed. No pertinent past medical history.   Past Surgical History:   Procedure Laterality Date    HC REPAIR ACHILLES TENDON,PRIMARY      Description: Primary Repair Of Ruptured Achilles Tendon;  Proc Date: 2003;    IR LUMBAR EPIDURAL STEROID INJECTION  2003    IR LUMBAR EPIDURAL STEROID INJECTION  2009    IR LUMBAR EPIDURAL STEROID INJECTION  2009    PICC  2014           Allergies   Allergen Reactions    Shellfish Containing Products [Shellfish-Derived Products] Anaphylaxis    Iodine Unknown    Prednisone Unknown     Depression--gets emotional and angry        Social History     Tobacco Use    Smoking status: Never    Smokeless tobacco: Never   Substance Use Topics    Alcohol use: No      Wt Readings from Last 1 Encounters:   24 127 kg (280 lb)        Anesthesia Evaluation   Pt has had prior anesthetic.     No history of anesthetic complications       ROS/MED HX  ENT/Pulmonary:     (+)     VINNIE risk factors,  hypertension, obese,                                Neurologic:    (-) no CVA   Cardiovascular:     (+) Dyslipidemia hypertension- -   -  - -                                      METS/Exercise Tolerance:     Hematologic:     (+)      anemia,          Musculoskeletal:  - neg musculoskeletal ROS     GI/Hepatic:    (-) GERD   Renal/Genitourinary:     (+) renal disease, type: ARF and CRI, Pt does not require dialysis,    Nephrolithiasis ,       Endo:     (+)  type II DM,             Obesity,       Psychiatric/Substance Use:     (+) psychiatric history anxiety and depression       Infectious Disease:  - neg infectious disease ROS     Malignancy:  - neg malignancy ROS     Other:  - neg other ROS          Physical Exam    Airway  airway exam normal      Mallampati: III   TM  "distance: > 3 FB   Neck ROM: full   Mouth opening: > 3 cm    Respiratory Devices and Support         Dental  no notable dental history     (+) Modest Abnormalities - crowns, retainers, 1 or 2 missing teeth      Cardiovascular   cardiovascular exam normal       Rhythm and rate: regular and normal     Pulmonary   pulmonary exam normal        breath sounds clear to auscultation           OUTSIDE LABS:  CBC:   Lab Results   Component Value Date    WBC 11.0 06/12/2024    WBC 12.1 (H) 06/11/2024    HGB 11.6 (L) 06/12/2024    HGB 13.6 06/11/2024    HCT 35.5 (L) 06/12/2024    HCT 39.9 (L) 06/11/2024     06/12/2024     06/11/2024     BMP:   Lab Results   Component Value Date     06/12/2024     (L) 06/11/2024    POTASSIUM 5.6 (H) 06/12/2024    POTASSIUM 4.8 06/11/2024    CHLORIDE 105 06/12/2024    CHLORIDE 100 06/11/2024    CO2 23 06/12/2024    CO2 18 (L) 06/11/2024    BUN 36.0 (H) 06/12/2024    BUN 34.3 (H) 06/11/2024    CR 3.01 (H) 06/12/2024    CR 3.18 (H) 06/11/2024     (H) 06/12/2024     (H) 06/11/2024     COAGS: No results found for: \"PTT\", \"INR\", \"FIBR\"  POC: No results found for: \"BGM\", \"HCG\", \"HCGS\"  HEPATIC:   Lab Results   Component Value Date    ALBUMIN 3.4 (L) 06/12/2024    PROTTOTAL 7.4 06/12/2024    ALT 48 06/12/2024    AST 42 06/12/2024    ALKPHOS 265 (H) 06/12/2024    BILITOTAL 0.4 06/12/2024     OTHER:   Lab Results   Component Value Date    LACT 2.2 (H) 06/11/2024    A1C 10.5 (H) 08/25/2014    QUIANA 8.0 (L) 06/12/2024    LIPASE 22 06/11/2024       Anesthesia Plan    ASA Status:  2    NPO Status:  NPO Appropriate    Anesthesia Type: MAC.     - Reason for MAC: straight local not clinically adequate              Consents    Anesthesia Plan(s) and associated risks, benefits, and realistic alternatives discussed. Questions answered and patient/representative(s) expressed understanding.     - Discussed:     - Discussed with:  Patient            Postoperative Care    Pain " "management: IV analgesics, Oral pain medications, Multi-modal analgesia.   PONV prophylaxis: Ondansetron (or other 5HT-3), Dexamethasone or Solumedrol, Droperidol or Haldol     Comments:               Michael Alvarado MD    I have reviewed the pertinent notes and labs in the chart from the past 30 days and (re)examined the patient.  Any updates or changes from those notes are reflected in this note.    # Hyperkalemia: Highest K = 5.6 mmol/L in last 2 days, will monitor as appropriate  # Hyponatremia: Lowest Na = 132 mmol/L in last 30 days, will monitor as appropriate   # Hypocalcemia: Lowest Ca = 8 mg/dL in last 2 days, will monitor and replace as appropriate    # Hypoalbuminemia: Lowest albumin = 3.4 g/dL at 6/12/2024  7:19 AM, will monitor as appropriate     # Obesity: Estimated body mass index is 36.94 kg/m  as calculated from the following:    Height as of this encounter: 1.854 m (6' 1\").    Weight as of this encounter: 127 kg (280 lb).      "

## 2024-06-12 NOTE — ANESTHESIA CARE TRANSFER NOTE
Patient: Mayito Sood    Procedure: Procedure(s):  CYSTOSCOPY, WITH RETROGRADE PYELOGRAM AND URETERAL STENT PLACEMENT       Diagnosis: TARUN (acute kidney injury) (H24) [N17.9]  Ureterolithiasis [N20.1]  Diagnosis Additional Information: No value filed.    Anesthesia Type:   MAC     Note:    Oropharynx: oral airway in place  Level of Consciousness: awake  Oxygen Supplementation: face mask  Level of Supplemental Oxygen (L/min / FiO2): 6  Independent Airway: airway patency satisfactory and stable  Dentition: dentition unchanged  Vital Signs Stable: post-procedure vital signs reviewed and stable  Report to RN Given: handoff report given  Patient transferred to: PACU    Handoff Report: Identifed the Patient, Identified the Reponsible Provider, Reviewed the pertinent medical history, Discussed the surgical course, Reviewed Intra-OP anesthesia mangement and issues during anesthesia, Set expectations for post-procedure period and Allowed opportunity for questions and acknowledgement of understanding    Vitals:  Vitals Value Taken Time   BP 95/55 06/12/24 1145   Temp 98    Pulse 65 06/12/24 1145   Resp 11 06/12/24 1145   SpO2 94 % 06/12/24 1145   Vitals shown include unfiled device data.    Electronically Signed By: MATTHEW Mena CRNA  June 12, 2024  11:46 AM

## 2024-06-12 NOTE — CONSULTS
Urology Consult    Name:  Mayito Sood  MRN:  [id number]  Age/: 63 year old, 1960    CC: Left flank pain  TARUN    HPI: Mayito Sood is a(n) 63 year old male with significant comorbidities including type 2 diabetes mellitus hypertension and CKD stage III who was admitted last night with left-sided flank pain.  He was seen last at [place] with obstructing left proximal ureteral calculus with TARUN which seems to have worsened at the time of admission and creatinine is up to 3.1  His pain has been unrelenting and difficult to control  He denies any fever chills or shaking  No prior kidney stone history    Past Medical History:  History reviewed. No pertinent past medical history.    Past Surgical History:  Past Surgical History:   Procedure Laterality Date    HC REPAIR ACHILLES TENDON,PRIMARY      Description: Primary Repair Of Ruptured Achilles Tendon;  Proc Date: 2003;    IR LUMBAR EPIDURAL STEROID INJECTION  2003    IR LUMBAR EPIDURAL STEROID INJECTION  2009    IR LUMBAR EPIDURAL STEROID INJECTION  2009    PICC  2014            Allergies:     Allergies   Allergen Reactions    Shellfish Containing Products [Shellfish-Derived Products] Anaphylaxis    Iodine Unknown    Prednisone Unknown     Depression--gets emotional and angry         Medications:  Current Facility-Administered Medications   Medication Dose Route Frequency Provider Last Rate Last Admin    [Auto Hold] calcium carbonate (TUMS) chewable tablet 1,000 mg  1,000 mg Oral 4x Daily PRN Hayde Clay MD        [Auto Hold] famotidine (PEPCID) injection 20 mg  20 mg Intravenous Q24H Hayde Clay MD   20 mg at 24 3137    [Auto Hold] hydrALAZINE (APRESOLINE) injection 5 mg  5 mg Intravenous Q6H PRN Hayde Clay MD        [Auto Hold] HYDROmorphone (DILAUDID) injection 0.2 mg  0.2 mg Intravenous Q2H PRN Hayde Clay MD        [Auto Hold] HYDROmorphone (DILAUDID) injection 0.4 mg  0.4 mg Intravenous Q2H PRN Hayde Clay  MD RENA   0.4 mg at 06/12/24 0811    lactated ringers infusion   Intravenous Continuous Hayde Clay  mL/hr at 06/12/24 0810 New Bag at 06/12/24 0810    [Auto Hold] lidocaine (LMX4) cream   Topical Q1H PRN Hayde Clay MD        [Auto Hold] lidocaine 1 % 0.1-1 mL  0.1-1 mL Other Q1H PRN Hayde Clay MD        [Auto Hold] ondansetron (ZOFRAN ODT) ODT tab 4 mg  4 mg Oral Q6H PRN Hayde Clay MD        Or    [Auto Hold] ondansetron (ZOFRAN) injection 4 mg  4 mg Intravenous Q6H PRN Hayde Clay MD   4 mg at 06/12/24 0522    [Auto Hold] prochlorperazine (COMPAZINE) injection 5 mg  5 mg Intravenous Q6H PRN Niurka Ellsworth MD        Or    [Auto Hold] prochlorperazine (COMPAZINE) tablet 5 mg  5 mg Oral Q6H PRN Niurka lElsworth MD        Or    [Auto Hold] prochlorperazine (COMPAZINE) suppository 25 mg  25 mg Rectal Q12H PRN Niurka Ellsworth MD        [Auto Hold] senna-docusate (SENOKOT-S/PERICOLACE) 8.6-50 MG per tablet 1 tablet  1 tablet Oral BID PRN Hayde Clay MD        Or    [Auto Hold] senna-docusate (SENOKOT-S/PERICOLACE) 8.6-50 MG per tablet 2 tablet  2 tablet Oral BID PRN Hayde Clay MD        [Auto Hold] sodium chloride (PF) 0.9% PF flush 3 mL  3 mL Intracatheter Q8H Hayde Clay MD   3 mL at 06/11/24 2357    [Auto Hold] sodium chloride (PF) 0.9% PF flush 3 mL  3 mL Intracatheter q1 min prn Hayde Clay MD           Social History:  Social History     Socioeconomic History    Marital status: Single     Spouse name: Not on file    Number of children: Not on file    Years of education: Not on file    Highest education level: Not on file   Occupational History    Not on file   Tobacco Use    Smoking status: Never    Smokeless tobacco: Never   Substance and Sexual Activity    Alcohol use: No    Drug use: No    Sexual activity: Not on file   Other Topics Concern    Not on file   Social History Narrative    Patient is employed as a  at a post [employer] and works overnight shifts.   "Patient works on cars as a hobby.     Social Determinants of Health     Financial Resource Strain: Low Risk  (3/12/2024)    Received from [facility] [employer] [facility], [facility]    Financial Resource Strain     Difficulty of Paying Living Expenses: 3     Difficulty of Paying Living Expenses: Not on file   Food Insecurity: No Food Insecurity (3/12/2024)    Received from [facility] [employer] [facility] [employer] [facility], [facility]    Food Insecurity     Worried About Running Out of Food in the Last Year: 1   Transportation Needs: No Transportation Needs (3/12/2024)    [facility]    Transportation Needs     Lack of Transportation (Medical): 1   Physical Activity: Not on file   Stress: Not on file   Social Connections: Socially Isolated (3/12/2024)    Received from [facility] [employer] [facility] [employer] [facility], [facility]    Social Connections     Frequency of Communication with Friends and Family: 4   Interpersonal Safety: Not At Risk (6/9/2024)    Received from [facility] [employer]     Do you feel UNSAFE in any of your personal relationships with your family members or any other acquaintances?: No   Housing Stability: Low Risk  (3/12/2024)    [facility]    Housing Stability     Unable to Pay for Housing in the Last Year: 1       Family History:  Family History   Problem Relation Age of Onset    Cancer Mother        ROS:  The remainder of the complete ROS was negative unless noted in the HPI.    Exam:  BP (!) 178/80 (BP Location: Left arm, Patient Position: Semi-Cool's, Cuff Size: Adult Large)   Pulse 75   Temp 98.1  F (36.7  C) (Oral)   Resp 18   Ht 1.854 m (6' 1\")   Wt " 127 kg (280 lb)   SpO2 98%   BMI 36.94 kg/m    General: Alert, interactive, & in significant pain  Resp: CTAB, no crackles or wheezes  Cardiac: Regular rate; extremities warm;   Abdomen: Soft, nontender, nondistended. .  : Normal   Extremities: No LE edema or obvious joint abnormalities  Skin: Warm and dry, no jaundice or rash    Labs:  Results for orders placed or performed during the hospital encounter of 06/11/24   Lactic acid whole blood     Status: Abnormal   Result Value Ref Range    Lactic Acid 2.2 (H) 0.7 - 2.0 mmol/L   Basic metabolic panel     Status: Abnormal   Result Value Ref Range    Sodium 132 (L) 135 - 145 mmol/L    Potassium 4.8 3.4 - 5.3 mmol/L    Chloride 100 98 - 107 mmol/L    Carbon Dioxide (CO2) 18 (L) 22 - 29 mmol/L    Anion Gap 14 7 - 15 mmol/L    Urea Nitrogen 34.3 (H) 8.0 - 23.0 mg/dL    Creatinine 3.18 (H) 0.67 - 1.17 mg/dL    GFR Estimate 21 (L) >60 mL/min/1.73m2    Calcium 8.4 (L) 8.8 - 10.2 mg/dL    Glucose 295 (H) 70 - 99 mg/dL   Hepatic function panel     Status: Abnormal   Result Value Ref Range    Protein Total 8.7 (H) 6.4 - 8.3 g/dL    Albumin 4.1 3.5 - 5.2 g/dL    Bilirubin Total 0.5 <=1.2 mg/dL    Alkaline Phosphatase 255 (H) 40 - 150 U/L    AST 19 0 - 45 U/L    ALT 16 0 - 70 U/L    Bilirubin Direct <0.20 0.00 - 0.30 mg/dL   Lipase     Status: Normal   Result Value Ref Range    Lipase 22 13 - 60 U/L   CBC with platelets and differential     Status: Abnormal   Result Value Ref Range    WBC Count 12.1 (H) 4.0 - 11.0 10e3/uL    RBC Count 4.22 (L) 4.40 - 5.90 10e6/uL    Hemoglobin 13.6 13.3 - 17.7 g/dL    Hematocrit 39.9 (L) 40.0 - 53.0 %    MCV 95 78 - 100 fL    MCH 32.2 26.5 - 33.0 pg    MCHC 34.1 31.5 - 36.5 g/dL    RDW 12.3 10.0 - 15.0 %    Platelet Count 332 150 - 450 10e3/uL    % Neutrophils 84 %    % Lymphocytes 7 %    % Monocytes 8 %    % Eosinophils 0 %    % Basophils 0 %    % Immature Granulocytes 1 %    NRBCs per 100 WBC 0 <1 /100    Absolute Neutrophils 10.2 (H) 1.6 -  8.3 10e3/uL    Absolute Lymphocytes 0.8 0.8 - 5.3 10e3/uL    Absolute Monocytes 1.0 0.0 - 1.3 10e3/uL    Absolute Eosinophils 0.0 0.0 - 0.7 10e3/uL    Absolute Basophils 0.0 0.0 - 0.2 10e3/uL    Absolute Immature Granulocytes 0.1 <=0.4 10e3/uL    Absolute NRBCs 0.0 10e3/uL   UA with Microscopic reflex to Culture     Status: Abnormal    Specimen: Urine, Clean Catch   Result Value Ref Range    Color Urine Light Yellow Colorless, Straw, Light Yellow, Yellow    Appearance Urine Clear Clear    Glucose Urine >1000 (A) Negative mg/dL    Bilirubin Urine Negative Negative    Ketones Urine Negative Negative mg/dL    Specific Gravity Urine 1.017 1.001 - 1.030    Blood Urine 0.2 mg/dL (A) Negative    pH Urine 5.5 5.0 - 7.0    Protein Albumin Urine 100 (A) Negative mg/dL    Urobilinogen Urine <2.0 <2.0 mg/dL    Nitrite Urine Negative Negative    Leukocyte Esterase Urine Negative Negative    RBC Urine 4 (H) <=2 /HPF    WBC Urine 2 <=5 /HPF    Narrative    Urine Culture not indicated   Comprehensive metabolic panel     Status: Abnormal   Result Value Ref Range    Sodium 135 135 - 145 mmol/L    Potassium 5.6 (H) 3.4 - 5.3 mmol/L    Carbon Dioxide (CO2) 23 22 - 29 mmol/L    Anion Gap 7 7 - 15 mmol/L    Urea Nitrogen 36.0 (H) 8.0 - 23.0 mg/dL    Creatinine 3.01 (H) 0.67 - 1.17 mg/dL    GFR Estimate 23 (L) >60 mL/min/1.73m2    Calcium 8.0 (L) 8.8 - 10.2 mg/dL    Chloride 105 98 - 107 mmol/L    Glucose 284 (H) 70 - 99 mg/dL    Alkaline Phosphatase 265 (H) 40 - 150 U/L    AST 42 0 - 45 U/L    ALT 48 0 - 70 U/L    Protein Total 7.4 6.4 - 8.3 g/dL    Albumin 3.4 (L) 3.5 - 5.2 g/dL    Bilirubin Total 0.4 <=1.2 mg/dL   CBC with platelets and differential     Status: Abnormal   Result Value Ref Range    WBC Count 11.0 4.0 - 11.0 10e3/uL    RBC Count 3.65 (L) 4.40 - 5.90 10e6/uL    Hemoglobin 11.6 (L) 13.3 - 17.7 g/dL    Hematocrit 35.5 (L) 40.0 - 53.0 %    MCV 97 78 - 100 fL    MCH 31.8 26.5 - 33.0 pg    MCHC 32.7 31.5 - 36.5 g/dL    RDW  12.4 10.0 - 15.0 %    Platelet Count 264 150 - 450 10e3/uL    % Neutrophils 82 %    % Lymphocytes 6 %    % Monocytes 11 %    % Eosinophils 1 %    % Basophils 0 %    % Immature Granulocytes 1 %    NRBCs per 100 WBC 0 <1 /100    Absolute Neutrophils 9.0 (H) 1.6 - 8.3 10e3/uL    Absolute Lymphocytes 0.7 (L) 0.8 - 5.3 10e3/uL    Absolute Monocytes 1.2 0.0 - 1.3 10e3/uL    Absolute Eosinophils 0.1 0.0 - 0.7 10e3/uL    Absolute Basophils 0.0 0.0 - 0.2 10e3/uL    Absolute Immature Granulocytes 0.1 <=0.4 10e3/uL    Absolute NRBCs 0.0 10e3/uL   CBC with platelets + differential     Status: Abnormal    Narrative    The following orders were created for panel order CBC with platelets + differential.  Procedure                               Abnormality         Status                     ---------                               -----------         ------                     CBC with platelets and d...[[id number]]  Abnormal            Final result                 Please view results for these tests on the individual orders.   CBC with platelets differential     Status: Abnormal    Narrative    The following orders were created for panel order CBC with platelets differential.  Procedure                               Abnormality         Status                     ---------                               -----------         ------                     CBC with platelets and d...[[id number]]  Abnormal            Final result                 Please view results for these tests on the individual orders.       Imaging: CT ABDOMEN PELVIS W IV CONTRAST  Order: [id number]  Narrative    CT abdomen and pelvis with contrast    Comparison: None      Findings:  Minor atelectasis at the lung bases.  Cardiomegaly.    Cholecystectomy.  2 mm obstructing left proximal ureteral stone adjacent to the ureteropelvic junction. Mild left hydronephrosis.  Left perinephric fat stranding much more prominent than right. There is also left gillian ureteral fat  stranding.  Subcentimeter segment 7 liver cyst. Subcentimeter right mid renal cysts.  Abdominal solid organs otherwise unremarkable  No bowel obstruction, pneumoperitoneum, or pneumatosis.  Moderate stool.    Moderate urinary bladder distention.  Prostatomegaly.  Normal appendix.  The bones are intact.    IMPRESSION:  1. 2 mm obstructing left proximal ureteral stone just beyond the ureteropelvic junction.  2. No diverticulitis.      Assessment and Plan: Mayito Sood is a(n) 63 year old male with history of obstructing left proximal ureteral calculus with new onset TARUN and poor pain control.  He is here for management of his pain and I discussed with him about the significance of his symptoms and the fact that he does have a worsening kidney function currently therefore needs urgent intervention for further relief of the obstruction.  We talked about options for the same and based on his renal function at this time I think the safest approach would be to do an internal diversion with a ureteral stent first and then subsequently set him up for a ureteroscopy in the next couple of weeks after his renal function comes back to his baseline.  We discussed in general about the procedure of cystoscopy and ureteral stent placement and stent related discomfort.  We also talked about possibility of urinary tract infection and the manifesting with fever chills and shaking.  Any fever more than 101 concerned warrant immediate hospital care and he is aware of this.  We then set him up for a second procedure for stent and stone removal in the next couple weeks  Based on the discussions we had he was agreeable to proceed with the planned procedure.  An informed consent was signed.      Ramon Perea MD  [facility]

## 2024-06-12 NOTE — PLAN OF CARE
Problem: Pain Acute  Goal: Optimal Pain Control and Function  Outcome: Progressing  Intervention: Develop Pain Management Plan  Recent Flowsheet Documentation  Taken 6/12/2024 0019 by Martha Ho, RN  Pain Management Interventions: declines  Intervention: Prevent or Manage Pain  Recent Flowsheet Documentation  Taken 6/12/2024 0019 by Martha Ho, RN  Medication Review/Management: medications reviewed     Goal Outcome Evaluation:  VSS overnight.  Pain controlled with IV pain medication.  Alert and oriented and able to make needs known.

## 2024-06-13 VITALS
HEIGHT: 73 IN | HEART RATE: 68 BPM | WEIGHT: 280 LBS | SYSTOLIC BLOOD PRESSURE: 147 MMHG | BODY MASS INDEX: 37.11 KG/M2 | RESPIRATION RATE: 18 BRPM | OXYGEN SATURATION: 99 % | TEMPERATURE: 98.3 F | DIASTOLIC BLOOD PRESSURE: 73 MMHG

## 2024-06-13 PROBLEM — F41.9 ANXIETY AND DEPRESSION: Status: ACTIVE | Noted: 2023-02-08

## 2024-06-13 PROBLEM — N18.32 STAGE 3B CHRONIC KIDNEY DISEASE (H): Status: ACTIVE | Noted: 2023-02-08

## 2024-06-13 PROBLEM — E78.5 DYSLIPIDEMIA: Status: ACTIVE | Noted: 2017-07-05

## 2024-06-13 PROBLEM — S86.012A RUPTURE OF LEFT ACHILLES TENDON: Status: ACTIVE | Noted: 2024-03-20

## 2024-06-13 PROBLEM — N18.31 STAGE 3A CHRONIC KIDNEY DISEASE (H): Status: ACTIVE | Noted: 2023-02-08

## 2024-06-13 PROBLEM — E11.3513 PROLIFERATIVE DIABETIC RETINOPATHY OF BOTH EYES WITH MACULAR EDEMA ASSOCIATED WITH TYPE 2 DIABETES MELLITUS (H): Chronic | Status: ACTIVE | Noted: 2018-08-30

## 2024-06-13 PROBLEM — F32.A ANXIETY AND DEPRESSION: Status: ACTIVE | Noted: 2023-02-08

## 2024-06-13 LAB
ANION GAP SERPL CALCULATED.3IONS-SCNC: 11 MMOL/L (ref 7–15)
BUN SERPL-MCNC: 38.9 MG/DL (ref 8–23)
CALCIUM SERPL-MCNC: 8.6 MG/DL (ref 8.8–10.2)
CHLORIDE SERPL-SCNC: 104 MMOL/L (ref 98–107)
CREAT SERPL-MCNC: 2.27 MG/DL (ref 0.67–1.17)
DEPRECATED HCO3 PLAS-SCNC: 21 MMOL/L (ref 22–29)
EGFRCR SERPLBLD CKD-EPI 2021: 32 ML/MIN/1.73M2
GLUCOSE BLDC GLUCOMTR-MCNC: 232 MG/DL (ref 70–99)
GLUCOSE BLDC GLUCOMTR-MCNC: 249 MG/DL (ref 70–99)
GLUCOSE SERPL-MCNC: 260 MG/DL (ref 70–99)
POTASSIUM SERPL-SCNC: 5.1 MMOL/L (ref 3.4–5.3)
SODIUM SERPL-SCNC: 136 MMOL/L (ref 135–145)

## 2024-06-13 PROCEDURE — 36415 COLL VENOUS BLD VENIPUNCTURE: CPT | Performed by: HOSPITALIST

## 2024-06-13 PROCEDURE — 80048 BASIC METABOLIC PNL TOTAL CA: CPT | Performed by: HOSPITALIST

## 2024-06-13 PROCEDURE — 99239 HOSP IP/OBS DSCHRG MGMT >30: CPT | Performed by: INTERNAL MEDICINE

## 2024-06-13 PROCEDURE — 250N000013 HC RX MED GY IP 250 OP 250 PS 637: Performed by: HOSPITALIST

## 2024-06-13 RX ORDER — OXYBUTYNIN CHLORIDE 5 MG/1
5 TABLET, EXTENDED RELEASE ORAL DAILY PRN
Qty: 21 TABLET | Refills: 0 | Status: SHIPPED | OUTPATIENT
Start: 2024-06-13 | End: 2024-07-04

## 2024-06-13 RX ORDER — TAMSULOSIN HYDROCHLORIDE 0.4 MG/1
0.4 CAPSULE ORAL DAILY
Qty: 21 CAPSULE | Refills: 0 | Status: SHIPPED | OUTPATIENT
Start: 2024-06-13 | End: 2024-07-04

## 2024-06-13 RX ADMIN — AMLODIPINE BESYLATE 10 MG: 5 TABLET ORAL at 08:33

## 2024-06-13 RX ADMIN — INSULIN ASPART 2 UNITS: 100 INJECTION, SOLUTION INTRAVENOUS; SUBCUTANEOUS at 08:34

## 2024-06-13 RX ADMIN — PRAVASTATIN SODIUM 10 MG: 10 TABLET ORAL at 08:33

## 2024-06-13 RX ADMIN — ESCITALOPRAM OXALATE 20 MG: 10 TABLET ORAL at 08:33

## 2024-06-13 RX ADMIN — TAMSULOSIN HYDROCHLORIDE 0.4 MG: 0.4 CAPSULE ORAL at 08:33

## 2024-06-13 ASSESSMENT — ACTIVITIES OF DAILY LIVING (ADL)
ADLS_ACUITY_SCORE: 21

## 2024-06-13 NOTE — PROGRESS NOTES
Patient discharged to home at 10:08 AM via Private Car.  Accompanied by spouse and staff.  Discharge instructions were reviewed with patient and spouse, opportunity offered to ask questions.    Prescriptions e-prescribed to pharmacy.  Access discontinued: Yes  Care plan and education discontinued: Yes  Belongings were sent home with patient/family:  Cell phone/electronics: cell phone and Clothing: Shirt(s):   and Pants:   .

## 2024-06-13 NOTE — PLAN OF CARE
POD0. A&O. VSS on RA, Capno. SBA, cane. Ambulating. Tolerating renal diet. Incentive Spirom encouraged. Voiding urine. No need to strain urine per urology. Denies pain. , provider notified.

## 2024-06-13 NOTE — PLAN OF CARE
"  Problem: Adult Inpatient Plan of Care  Goal: Plan of Care Review  Description: The Plan of Care Review/Shift note should be completed every shift.  The Outcome Evaluation is a brief statement about your assessment that the patient is improving, declining, or no change.  This information will be displayed automatically on your shift  note.  Outcome: Met  Flowsheets (Taken 6/13/2024 0954)  Plan of Care Reviewed With: patient  Goal: Patient-Specific Goal (Individualized)  Description: You can add care plan individualizations to a care plan. Examples of Individualization might be:  \"Parent requests to be called daily at 9am for status\", \"I have a hard time hearing out of my right ear\", or \"Do not touch me to wake me up as it startles  me\".  Outcome: Met  Goal: Absence of Hospital-Acquired Illness or Injury  Outcome: Met  Intervention: Identify and Manage Fall Risk  Recent Flowsheet Documentation  Taken 6/13/2024 0911 by Shruti Hyde  Safety Promotion/Fall Prevention:   assistive device/personal items within reach   clutter free environment maintained   mobility aid in reach   nonskid shoes/slippers when out of bed  Intervention: Prevent Skin Injury  Recent Flowsheet Documentation  Taken 6/13/2024 0911 by Shruti Hyde  Device Skin Pressure Protection: adhesive use limited  Goal: Optimal Comfort and Wellbeing  Outcome: Met  Goal: Readiness for Transition of Care  Outcome: Met     Problem: Pain Acute  Goal: Optimal Pain Control and Function  Outcome: Met  Intervention: Prevent or Manage Pain  Recent Flowsheet Documentation  Taken 6/13/2024 0911 by Shruti Hyde  Medication Review/Management: medications reviewed     Problem: Skin Injury Risk Increased  Goal: Skin Health and Integrity  Outcome: Met   Goal Outcome Evaluation:      Plan of Care Reviewed With: patient    Patient A&Ox4, independent in room with cane, patient denies pain, nausea, vomiting, diarrhea, AVS discussed with patient answered all questions. "

## 2024-06-13 NOTE — PROGRESS NOTES
"Urology progress notes:  I called Mayito and discussed his overnight progress.  I have also evaluated his labs and vitals.  Vital signs:  Temp: 98.3  F (36.8  C) Temp src: Oral BP: (!) 185/88 Pulse: 68   Resp: 18 SpO2: 99 % O2 Device: Nasal cannula Oxygen Delivery: 1 LPM Height: 185.4 cm (6' 1\") Weight: 127 kg (280 lb)  Estimated body mass index is 36.94 kg/m  as calculated from the following:    Height as of this encounter: 1.854 m (6' 1\").    Weight as of this encounter: 127 kg (280 lb).       His blood sugar levels seem to be on the higher side and so is his blood pressure however he is symptomatically much better with minimal pain.  BMP:  Last Comprehensive Metabolic Panel:  Lab Results   Component Value Date     06/13/2024    POTASSIUM 5.1 06/13/2024    CHLORIDE 104 06/13/2024    CO2 21 (L) 06/13/2024    ANIONGAP 11 06/13/2024     (H) 06/13/2024    BUN 38.9 (H) 06/13/2024    CR 2.27 (H) 06/13/2024    GFRESTIMATED 32 (L) 06/13/2024    QUIANA 8.6 (L) 06/13/2024   Creatinine has improved significantly compared to yesterday which is reassuring.  Does have some borderline high potassium levels.  I discussed with him about these findings and the fact that I have added some medications to help him with the stent discomfort if he has any upon discharge.  I will have him scheduled for ureteroscopy as well as bladder biopsy and fulguration for a small incidental bladder tumor in the next 2 to 3 weeks.  He expressed understanding and was agreeable.  Recommendations:  1.  Okay to discharge from urology perspective  2.  Will schedule him for surgery in the next 2 to 3 weeks  3.  Urology will sign off      Ramon Perea MD          "

## 2024-06-13 NOTE — DISCHARGE SUMMARY
"[facility]  Hospitalist Discharge Summary      Date of Admission:  6/11/2024  Date of Discharge:  6/13/2024  Discharging Provider: Zev Angelo MD  Discharge Service: Hospitalist Service    Discharge Diagnoses   Principal Problem:    Left Ureterolithiasis with obstructive uropathy   Active Problems:    Obesity    Polyneuropathy    Diabetes mellitus, type 2 (H)    HTN (hypertension)    TARUN (acute kidney injury) (H24)    Anxiety and depression    Dyslipidemia    Proliferative diabetic retinopathy of both eyes with macular edema associated with type 2 diabetes mellitus (H)    Stage 3b chronic kidney disease (H)        Clinically Significant Risk Factors     # Obesity: Estimated body mass index is 36.94 kg/m  as calculated from the following:    Height as of this encounter: 1.854 m (6' 1\").    Weight as of this encounter: 127 kg (280 lb).       Follow-ups Needed After Discharge        Follow up with primary care provider, Jone Garcia, within 7 days for hospital follow- up.  The following labs/tests are recommended: BMP.     Follow up with urology in 2-3 weeks.       Discharge Disposition   Discharged to home  Condition at discharge: Stable    Hospital Course   62 yo M with h/o obesity, DM2 with retinopathy and neuropathy, HTN, CKD3b, dep/anx, and HLD who presented with Left flank pain and was found to have obstructive uropathy due to left ureteral calculus causing TARUN/CKD.  His symptoms had started a couple days prior to admission and outside CT on 6/9/24 had shown the 2 mm Left ureteral calculus. The patient was taken to the OR on 6/12/24 and underwent cystourethroscopy with left retrograde pyelography with placement of a left ureteral stent.  He was followed overnight and hydrated and his creat has improved from 3 down to 2.2 which is close to his baseline of 1.6-2.0.  Urology has cleared him for discharge so he will follow up with them in 2-3 weeks for definitive stone clearance.  They would " like him to continue ditropan and flomax that had just been started prior to admission.    Consultations This Hospital Stay   UROLOGY IP CONSULT    Code Status   Full Code    Time Spent on this Encounter   I, Zev Angelo MD, personally saw the patient today and spent greater than 30 minutes discharging this patient.       Zev Angelo MD  [facility]  [address] [place] [id number]  Phone: [phone]  Fax: [phone]  ______________________________________________________________________    Physical Exam   Vital Signs: Temp: 98.3  F (36.8  C) Temp src: Oral BP: (!) 147/73 Pulse: 68   Resp: 18 SpO2: 99 % O2 Device: Nasal cannula Oxygen Delivery: 1 LPM  Weight: 280 lbs 0 oz  The patient was interviewed and examined on the day of discharge.         Primary Care Physician   Jone Garcia    Discharge Orders      Reason for your hospital stay    Obstructing kidney stone     Follow-up and recommended labs and tests     Follow up with primary care provider, Jone Garcia, within 7 days for hospital follow- up.  The following labs/tests are recommended: BMP.     Activity    Your activity upon discharge: activity as tolerated     Diet    Follow this diet upon discharge: Orders Placed This Encounter      Renal Diet (non-dialysis)       Significant Results and Procedures   Results for orders placed or performed during the hospital encounter of 06/11/24   XR Surgery BASSAM L/T 5 Min Fluoro    Narrative    This exam was marked as non-reportable because it will not be read by a   radiologist or a [facility] non-radiologist provider.             Discharge Medications   Current Discharge Medication List        START taking these medications    Details   oxyBUTYnin ER (DITROPAN XL) 5 MG 24 hr tablet Take 1 tablet (5 mg) by mouth daily as needed for bladder spasms  Qty: 21 tablet, Refills: 0    Associated Diagnoses: Ureterolithiasis           CONTINUE these medications  "which have CHANGED    Details   tamsulosin (FLOMAX) 0.4 MG capsule Take 1 capsule (0.4 mg) by mouth daily for 21 days  Qty: 21 capsule, Refills: 0    Associated Diagnoses: Ureterolithiasis           CONTINUE these medications which have NOT CHANGED    Details   amLODIPine (NORVASC) 10 MG tablet Take 10 mg by mouth daily      diphenhydrAMINE-acetaminophen (TYLENOL PM)  mg Tab Take 1 tablet by mouth nightly as needed for sleep      escitalopram (LEXAPRO) 20 MG tablet Take 20 mg by mouth daily      hydrochlorothiazide (HYDRODIURIL) 50 MG tablet Take 50 mg by mouth daily      INSULIN GLARGINE 100 UNIT/ML pen 22 Units every morning      JARDIANCE 10 MG TABS tablet Take 10 mg by mouth daily      mometasone (NASONEX) 50 mcg/actuation nasal spray [MOMETASONE (NASONEX) 50 MCG/ACTUATION NASAL SPRAY] 1 spray into each nostril 2 (two) times a day.  Qty: 17 g, Refills: 2    Associated Diagnoses: Nasal congestion      pravastatin (PRAVACHOL) 10 MG tablet Take 10 mg by mouth daily      ACCU-CHEK FASTCLIX Misc [ACCU-CHEK FASTCLIX MISC] USE AS DIRECTED FOUR TIMES DAILY  Qty: 102 each, Refills: 0    Comments: Pt needs to be seen for more refills  Associated Diagnoses: Type II or unspecified type diabetes mellitus without mention of complication, not stated as uncontrolled      ACCU-CHEK SMARTVIEW TEST STRIP Strp [ACCU-CHEK SMARTVIEW TEST STRIP STRP] TEST FOUR TIMES DAILY  Qty: 100 strip, Refills: 0    Associated Diagnoses: DM (diabetes mellitus) (H)      !! insulin needles, disposable, ([employer]) 32 x 5/32 \" Ndle [INSULIN NEEDLES, DISPOSABLE, ([employer]) 32 X 5/32 \" NDLE] Use as Directed with Humalog  Qty: 100 each, Refills: 0    Associated Diagnoses: Type II or unspecified type diabetes mellitus without mention of complication, not stated as uncontrolled      !! pen needle, diabetic 31 gauge x 5/16\" Ndle [PEN NEEDLE, DIABETIC 31 GAUGE X 5/16\" NDLE] Use 1 pen As Directed as needed.  Qty: 100 each, Refills: 1       !! - Potential " duplicate medications found. Please discuss with provider.        Allergies   Allergies   Allergen Reactions    Shellfish Containing Products [Shellfish-Derived Products] Anaphylaxis    Iodine Unknown    Prednisone Unknown     Depression--gets emotional and angry

## 2024-06-13 NOTE — PLAN OF CARE
Problem: Adult Inpatient Plan of Care  Goal: Absence of Hospital-Acquired Illness or Injury  Outcome: Progressing  Intervention: Identify and Manage Fall Risk  Recent Flowsheet Documentation  Taken 6/13/2024 0004 by Jeanie Pires RN  Safety Promotion/Fall Prevention:   assistive device/personal items within reach   mobility aid in reach   nonskid shoes/slippers when out of bed   patient and family education   safety round/check completed  Intervention: Prevent Skin Injury  Recent Flowsheet Documentation  Taken 6/13/2024 0004 by Jeanie Pires RN  Body Position: position changed independently  Skin Protection: adhesive use limited  Device Skin Pressure Protection: adhesive use limited  Intervention: Prevent and Manage VTE (Venous Thromboembolism) Risk  Recent Flowsheet Documentation  Taken 6/13/2024 0004 by Jeanie Pires RN  VTE Prevention/Management: SCDs (sequential compression devices) on  Intervention: Prevent Infection  Recent Flowsheet Documentation  Taken 6/13/2024 0004 by Jeanie Pires RN  Infection Prevention: cohorting utilized     Problem: Pain Acute  Goal: Optimal Pain Control and Function  Outcome: Progressing  Intervention: Prevent or Manage Pain  Recent Flowsheet Documentation  Taken 6/13/2024 0004 by Jeanie Pires RN  Medication Review/Management: medications reviewed  Intervention: Optimize Psychosocial Wellbeing  Recent Flowsheet Documentation  Taken 6/13/2024 0004 by Jeanie Pires RN  Supportive Measures: active listening utilized     Problem: Skin Injury Risk Increased  Goal: Skin Health and Integrity  Outcome: Progressing  Intervention: Plan: Nurse Driven Intervention: Moisture Management  Recent Flowsheet Documentation  Taken 6/13/2024 0004 by Jeanie Pires RN  Bathing/Skin Care: other (see comments)  Intervention: Optimize Skin Protection  Recent Flowsheet Documentation  Taken 6/13/2024 0004 by Jeanie Pires RN  Pressure Reduction  Techniques: frequent weight shift encouraged  Skin Protection: adhesive use limited  Activity Management: activity adjusted per tolerance  Head of Bed (HOB) Positioning: HOB at 30-45 degrees   Goal Outcome Evaluation:  Post day-1 for stent placement. Denies pain. Pt alert and oriented. VSS. on RA, 1L while sleeping. Denies pain. Up independently with use of cane. Potassium of 5.1. Last 249, 260. Renal diet. LR discontinued for now.

## 2024-06-15 ENCOUNTER — PATIENT OUTREACH (OUTPATIENT)
Dept: CARE COORDINATION | Facility: CLINIC | Age: 64
End: 2024-06-15
Payer: COMMERCIAL

## 2024-06-15 NOTE — PROGRESS NOTES
Clinic Care Coordination Contact  Transitions of Care Outreach    Chief Complaint   Patient presents with    Clinic Care Coordination - Post Hospital       Most Recent Admission Date: 6/11/2024   Most Recent Admission Diagnosis: Ureterolithiasis - N20.1  TARUN (acute kidney injury) (H24) - N17.9     Most Recent Discharge Date: 6/13/2024   Most Recent Discharge Diagnosis: TARUN (acute kidney injury) (H24) - N17.9  Ureterolithiasis - N20.1     Transitions of Care Assessment    Discharge Assessment  How are you doing now that you are home?: feeling better  How are your symptoms? (Red Flag symptoms escalate to triage hotline per guidelines): Improved  Do you know how to contact your clinic care team if you have future questions or changes to your health status? : Yes  Does the patient have their discharge instructions? : Yes  Does the patient have questions regarding their discharge instructions? : No  Were you started on any new medications or were there changes to any of your previous medications? : Yes  Does the patient have all of their medications?: Yes  Do you have questions regarding any of your medications? : No  Do you have all of your needed medical supplies or equipment (DME)?  (i.e. oxygen tank, CPAP, cane, etc.): Yes    Post-op ([facility] CTA Only)  If the patient had a surgery or procedure, do they have any questions for a nurse?: No         Follow up Plan     No future appointments.    Outpatient Plan as outlined on AVS reviewed with patient.        Follow-up and recommended labs and tests      Follow up with primary care provider, Jone Garcia, within 7 days for hospital follow- up.  The following labs/tests are recommended: BMP.     For any urgent concerns, please contact our 24 hour nurse triage line: [phone]     Wilma, SYLVAINW  [phone]  [facility]

## 2024-06-20 ENCOUNTER — TELEPHONE (OUTPATIENT)
Dept: UROLOGY | Facility: CLINIC | Age: 64
End: 2024-06-20
Payer: COMMERCIAL

## 2024-06-20 NOTE — TELEPHONE ENCOUNTER
Scheduled surgery for pt with Dr. Perea 7/10 at [facility]. Went through instructions over the phone. I told him he does not need a pre-op physical, but I suggested he check in with his PCP for instructions on his diabetes medications. He is aware to hold Victoza the day before and day of surgery.

## 2024-07-09 ENCOUNTER — ANESTHESIA EVENT (OUTPATIENT)
Dept: SURGERY | Facility: HOSPITAL | Age: 64
End: 2024-07-09
Payer: COMMERCIAL

## 2024-07-09 RX ORDER — POLYMYXIN B SULFATE AND TRIMETHOPRIM 1; 10000 MG/ML; [USP'U]/ML
1 SOLUTION OPHTHALMIC 3 TIMES DAILY
Status: ON HOLD | COMMUNITY
End: 2024-07-10

## 2024-07-09 RX ORDER — LISINOPRIL 20 MG/1
20 TABLET ORAL DAILY
Status: ON HOLD | COMMUNITY
End: 2024-07-10

## 2024-07-09 RX ORDER — ESCITALOPRAM OXALATE 10 MG/1
10 TABLET ORAL DAILY
COMMUNITY

## 2024-07-09 RX ORDER — AMLODIPINE BESYLATE 5 MG/1
5 TABLET ORAL DAILY
Status: ON HOLD | COMMUNITY
End: 2024-07-10

## 2024-07-09 NOTE — PROVIDER NOTIFICATION
Voice message left on cell phone, multiple attempts made to contact patient for pre-op. Voice mail left containing information on arrival time, location, bring ID and insurance card, NPO information given, patient instructed that they will need a  at discharge as well as a responsible adult to stay with them for 24 hours after anesthesia. Call back number given for further discussion. [phone].

## 2024-07-10 ENCOUNTER — ANESTHESIA (OUTPATIENT)
Dept: SURGERY | Facility: HOSPITAL | Age: 64
End: 2024-07-10
Payer: COMMERCIAL

## 2024-07-10 ENCOUNTER — HOSPITAL ENCOUNTER (OUTPATIENT)
Facility: HOSPITAL | Age: 64
Discharge: HOME | End: 2024-07-10
Attending: STUDENT IN AN ORGANIZED HEALTH CARE EDUCATION/TRAINING PROGRAM | Admitting: STUDENT IN AN ORGANIZED HEALTH CARE EDUCATION/TRAINING PROGRAM
Payer: COMMERCIAL

## 2024-07-10 ENCOUNTER — APPOINTMENT (OUTPATIENT)
Dept: RADIOLOGY | Facility: HOSPITAL | Age: 64
End: 2024-07-10
Attending: STUDENT IN AN ORGANIZED HEALTH CARE EDUCATION/TRAINING PROGRAM
Payer: COMMERCIAL

## 2024-07-10 VITALS
HEART RATE: 80 BPM | SYSTOLIC BLOOD PRESSURE: 186 MMHG | TEMPERATURE: 97.2 F | BODY MASS INDEX: 36.72 KG/M2 | OXYGEN SATURATION: 91 % | DIASTOLIC BLOOD PRESSURE: 86 MMHG | WEIGHT: 278.3 LBS | RESPIRATION RATE: 16 BRPM

## 2024-07-10 DIAGNOSIS — N20.1 URETEROLITHIASIS: Primary | ICD-10-CM

## 2024-07-10 DIAGNOSIS — N20.1 URETERAL STONE: Primary | ICD-10-CM

## 2024-07-10 LAB
GLUCOSE BLDC GLUCOMTR-MCNC: 112 MG/DL (ref 70–99)
GLUCOSE BLDC GLUCOMTR-MCNC: 86 MG/DL (ref 70–99)

## 2024-07-10 PROCEDURE — 360N000084 HC SURGERY LEVEL 4 W/ FLUORO, PER MIN: Performed by: STUDENT IN AN ORGANIZED HEALTH CARE EDUCATION/TRAINING PROGRAM

## 2024-07-10 PROCEDURE — 52332 CYSTOSCOPY AND TREATMENT: CPT | Performed by: ANESTHESIOLOGY

## 2024-07-10 PROCEDURE — C1894 INTRO/SHEATH, NON-LASER: HCPCS | Performed by: STUDENT IN AN ORGANIZED HEALTH CARE EDUCATION/TRAINING PROGRAM

## 2024-07-10 PROCEDURE — 258N000003 HC RX IP 258 OP 636: Performed by: ANESTHESIOLOGY

## 2024-07-10 PROCEDURE — 250N000025 HC SEVOFLURANE, PER MIN: Performed by: STUDENT IN AN ORGANIZED HEALTH CARE EDUCATION/TRAINING PROGRAM

## 2024-07-10 PROCEDURE — 999N000180 XR SURGERY CARM FLUORO LESS THAN 5 MIN: Mod: TC

## 2024-07-10 PROCEDURE — 52332 CYSTOSCOPY AND TREATMENT: CPT | Mod: LT | Performed by: STUDENT IN AN ORGANIZED HEALTH CARE EDUCATION/TRAINING PROGRAM

## 2024-07-10 PROCEDURE — 710N000009 HC RECOVERY PHASE 1, LEVEL 1, PER MIN: Performed by: STUDENT IN AN ORGANIZED HEALTH CARE EDUCATION/TRAINING PROGRAM

## 2024-07-10 PROCEDURE — 999N000141 HC STATISTIC PRE-PROCEDURE NURSING ASSESSMENT: Performed by: STUDENT IN AN ORGANIZED HEALTH CARE EDUCATION/TRAINING PROGRAM

## 2024-07-10 PROCEDURE — 74420 UROGRAPHY RTRGR +-KUB: CPT | Mod: 26 | Performed by: STUDENT IN AN ORGANIZED HEALTH CARE EDUCATION/TRAINING PROGRAM

## 2024-07-10 PROCEDURE — 250N000011 HC RX IP 250 OP 636: Performed by: ANESTHESIOLOGY

## 2024-07-10 PROCEDURE — 88341 IMHCHEM/IMCYTCHM EA ADD ANTB: CPT | Mod: 26 | Performed by: PATHOLOGY

## 2024-07-10 PROCEDURE — 88305 TISSUE EXAM BY PATHOLOGIST: CPT | Mod: 26 | Performed by: PATHOLOGY

## 2024-07-10 PROCEDURE — 82962 GLUCOSE BLOOD TEST: CPT

## 2024-07-10 PROCEDURE — 255N000002 HC RX 255 OP 636: Performed by: STUDENT IN AN ORGANIZED HEALTH CARE EDUCATION/TRAINING PROGRAM

## 2024-07-10 PROCEDURE — 52352 CYSTOURETERO W/STONE REMOVE: CPT | Mod: LT | Performed by: STUDENT IN AN ORGANIZED HEALTH CARE EDUCATION/TRAINING PROGRAM

## 2024-07-10 PROCEDURE — 272N000001 HC OR GENERAL SUPPLY STERILE: Performed by: STUDENT IN AN ORGANIZED HEALTH CARE EDUCATION/TRAINING PROGRAM

## 2024-07-10 PROCEDURE — 82365 CALCULUS SPECTROSCOPY: CPT | Performed by: STUDENT IN AN ORGANIZED HEALTH CARE EDUCATION/TRAINING PROGRAM

## 2024-07-10 PROCEDURE — 250N000011 HC RX IP 250 OP 636: Performed by: STUDENT IN AN ORGANIZED HEALTH CARE EDUCATION/TRAINING PROGRAM

## 2024-07-10 PROCEDURE — 370N000017 HC ANESTHESIA TECHNICAL FEE, PER MIN: Performed by: STUDENT IN AN ORGANIZED HEALTH CARE EDUCATION/TRAINING PROGRAM

## 2024-07-10 PROCEDURE — C1769 GUIDE WIRE: HCPCS | Performed by: STUDENT IN AN ORGANIZED HEALTH CARE EDUCATION/TRAINING PROGRAM

## 2024-07-10 PROCEDURE — 250N000009 HC RX 250: Performed by: ANESTHESIOLOGY

## 2024-07-10 PROCEDURE — 52332 CYSTOSCOPY AND TREATMENT: CPT | Performed by: NURSE ANESTHETIST, CERTIFIED REGISTERED

## 2024-07-10 PROCEDURE — C2617 STENT, NON-COR, TEM W/O DEL: HCPCS | Performed by: STUDENT IN AN ORGANIZED HEALTH CARE EDUCATION/TRAINING PROGRAM

## 2024-07-10 PROCEDURE — 710N000012 HC RECOVERY PHASE 2, PER MINUTE: Performed by: STUDENT IN AN ORGANIZED HEALTH CARE EDUCATION/TRAINING PROGRAM

## 2024-07-10 PROCEDURE — 250N000013 HC RX MED GY IP 250 OP 250 PS 637: Performed by: STUDENT IN AN ORGANIZED HEALTH CARE EDUCATION/TRAINING PROGRAM

## 2024-07-10 PROCEDURE — 52204 CYSTOSCOPY W/BIOPSY(S): CPT | Mod: 59 | Performed by: STUDENT IN AN ORGANIZED HEALTH CARE EDUCATION/TRAINING PROGRAM

## 2024-07-10 PROCEDURE — 250N000009 HC RX 250: Performed by: STUDENT IN AN ORGANIZED HEALTH CARE EDUCATION/TRAINING PROGRAM

## 2024-07-10 PROCEDURE — 88342 IMHCHEM/IMCYTCHM 1ST ANTB: CPT | Mod: TC | Performed by: STUDENT IN AN ORGANIZED HEALTH CARE EDUCATION/TRAINING PROGRAM

## 2024-07-10 PROCEDURE — 88342 IMHCHEM/IMCYTCHM 1ST ANTB: CPT | Mod: 26 | Performed by: PATHOLOGY

## 2024-07-10 DEVICE — URETERAL STENT
Type: IMPLANTABLE DEVICE | Site: URETER | Status: FUNCTIONAL
Brand: PERCUFLEX™ PLUS

## 2024-07-10 RX ORDER — CIPROFLOXACIN 500 MG/1
500 TABLET, FILM COATED ORAL ONCE
Qty: 1 TABLET | Refills: 0 | Status: SHIPPED | OUTPATIENT
Start: 2024-07-10 | End: 2024-07-10

## 2024-07-10 RX ORDER — FENTANYL CITRATE-0.9 % NACL/PF 10 MCG/ML
PLASTIC BAG, INJECTION (ML) INTRAVENOUS CONTINUOUS PRN
Status: DISCONTINUED | OUTPATIENT
Start: 2024-07-10 | End: 2024-07-10

## 2024-07-10 RX ORDER — OXYBUTYNIN CHLORIDE 5 MG/1
5 TABLET, EXTENDED RELEASE ORAL ONCE
Status: COMPLETED | OUTPATIENT
Start: 2024-07-10 | End: 2024-07-10

## 2024-07-10 RX ORDER — LIDOCAINE HYDROCHLORIDE 10 MG/ML
INJECTION, SOLUTION INFILTRATION; PERINEURAL PRN
Status: DISCONTINUED | OUTPATIENT
Start: 2024-07-10 | End: 2024-07-10

## 2024-07-10 RX ORDER — ONDANSETRON 2 MG/ML
4 INJECTION INTRAMUSCULAR; INTRAVENOUS EVERY 30 MIN PRN
Status: DISCONTINUED | OUTPATIENT
Start: 2024-07-10 | End: 2024-07-10 | Stop reason: HOSPADM

## 2024-07-10 RX ORDER — FENTANYL CITRATE 50 UG/ML
25 INJECTION, SOLUTION INTRAMUSCULAR; INTRAVENOUS EVERY 5 MIN PRN
Status: DISCONTINUED | OUTPATIENT
Start: 2024-07-10 | End: 2024-07-10 | Stop reason: HOSPADM

## 2024-07-10 RX ORDER — FENTANYL CITRATE 50 UG/ML
50 INJECTION, SOLUTION INTRAMUSCULAR; INTRAVENOUS EVERY 5 MIN PRN
Status: DISCONTINUED | OUTPATIENT
Start: 2024-07-10 | End: 2024-07-10 | Stop reason: HOSPADM

## 2024-07-10 RX ORDER — ONDANSETRON 2 MG/ML
INJECTION INTRAMUSCULAR; INTRAVENOUS PRN
Status: DISCONTINUED | OUTPATIENT
Start: 2024-07-10 | End: 2024-07-10

## 2024-07-10 RX ORDER — TAMSULOSIN HYDROCHLORIDE 0.4 MG/1
0.4 CAPSULE ORAL DAILY
COMMUNITY

## 2024-07-10 RX ORDER — LIRAGLUTIDE 6 MG/ML
0.6 INJECTION SUBCUTANEOUS DAILY
COMMUNITY

## 2024-07-10 RX ORDER — ONDANSETRON 4 MG/1
4 TABLET, ORALLY DISINTEGRATING ORAL EVERY 30 MIN PRN
Status: DISCONTINUED | OUTPATIENT
Start: 2024-07-10 | End: 2024-07-10 | Stop reason: HOSPADM

## 2024-07-10 RX ORDER — SODIUM CHLORIDE, SODIUM LACTATE, POTASSIUM CHLORIDE, CALCIUM CHLORIDE 600; 310; 30; 20 MG/100ML; MG/100ML; MG/100ML; MG/100ML
INJECTION, SOLUTION INTRAVENOUS CONTINUOUS
Status: DISCONTINUED | OUTPATIENT
Start: 2024-07-10 | End: 2024-07-10 | Stop reason: HOSPADM

## 2024-07-10 RX ORDER — DEXAMETHASONE SODIUM PHOSPHATE 4 MG/ML
4 INJECTION, SOLUTION INTRA-ARTICULAR; INTRALESIONAL; INTRAMUSCULAR; INTRAVENOUS; SOFT TISSUE
Status: DISCONTINUED | OUTPATIENT
Start: 2024-07-10 | End: 2024-07-10 | Stop reason: HOSPADM

## 2024-07-10 RX ORDER — ONDANSETRON 4 MG/1
4 TABLET, ORALLY DISINTEGRATING ORAL EVERY 8 HOURS PRN
Qty: 4 TABLET | Refills: 0 | Status: SHIPPED | OUTPATIENT
Start: 2024-07-10

## 2024-07-10 RX ORDER — CEFAZOLIN SODIUM/WATER 3 G/30 ML
3 SYRINGE (ML) INTRAVENOUS
Status: DISCONTINUED | OUTPATIENT
Start: 2024-07-10 | End: 2024-07-10 | Stop reason: HOSPADM

## 2024-07-10 RX ORDER — DEXAMETHASONE SODIUM PHOSPHATE 10 MG/ML
INJECTION, SOLUTION INTRAMUSCULAR; INTRAVENOUS PRN
Status: DISCONTINUED | OUTPATIENT
Start: 2024-07-10 | End: 2024-07-10

## 2024-07-10 RX ORDER — HYDROMORPHONE HCL IN WATER/PF 6 MG/30 ML
0.4 PATIENT CONTROLLED ANALGESIA SYRINGE INTRAVENOUS EVERY 5 MIN PRN
Status: DISCONTINUED | OUTPATIENT
Start: 2024-07-10 | End: 2024-07-10 | Stop reason: HOSPADM

## 2024-07-10 RX ORDER — OXYBUTYNIN CHLORIDE 5 MG/1
5 TABLET, EXTENDED RELEASE ORAL DAILY
Qty: 7 TABLET | Refills: 0 | Status: SHIPPED | OUTPATIENT
Start: 2024-07-10 | End: 2024-07-17

## 2024-07-10 RX ORDER — CEFAZOLIN SODIUM/WATER 3 G/30 ML
3 SYRINGE (ML) INTRAVENOUS SEE ADMIN INSTRUCTIONS
Status: DISCONTINUED | OUTPATIENT
Start: 2024-07-10 | End: 2024-07-10 | Stop reason: HOSPADM

## 2024-07-10 RX ORDER — LIDOCAINE 40 MG/G
CREAM TOPICAL
Status: DISCONTINUED | OUTPATIENT
Start: 2024-07-10 | End: 2024-07-10 | Stop reason: HOSPADM

## 2024-07-10 RX ORDER — NALOXONE HYDROCHLORIDE 0.4 MG/ML
0.1 INJECTION, SOLUTION INTRAMUSCULAR; INTRAVENOUS; SUBCUTANEOUS
Status: DISCONTINUED | OUTPATIENT
Start: 2024-07-10 | End: 2024-07-10 | Stop reason: HOSPADM

## 2024-07-10 RX ORDER — PROPOFOL 10 MG/ML
INJECTION, EMULSION INTRAVENOUS PRN
Status: DISCONTINUED | OUTPATIENT
Start: 2024-07-10 | End: 2024-07-10

## 2024-07-10 RX ORDER — EPHEDRINE SULFATE 50 MG/ML
INJECTION, SOLUTION INTRAMUSCULAR; INTRAVENOUS; SUBCUTANEOUS PRN
Status: DISCONTINUED | OUTPATIENT
Start: 2024-07-10 | End: 2024-07-10

## 2024-07-10 RX ORDER — AMOXICILLIN 250 MG
1-2 CAPSULE ORAL 2 TIMES DAILY
Qty: 30 TABLET | Refills: 0 | Status: SHIPPED | OUTPATIENT
Start: 2024-07-10

## 2024-07-10 RX ORDER — FENTANYL CITRATE 50 UG/ML
INJECTION, SOLUTION INTRAMUSCULAR; INTRAVENOUS PRN
Status: DISCONTINUED | OUTPATIENT
Start: 2024-07-10 | End: 2024-07-10

## 2024-07-10 RX ORDER — HYDROMORPHONE HCL IN WATER/PF 6 MG/30 ML
0.2 PATIENT CONTROLLED ANALGESIA SYRINGE INTRAVENOUS EVERY 5 MIN PRN
Status: DISCONTINUED | OUTPATIENT
Start: 2024-07-10 | End: 2024-07-10 | Stop reason: HOSPADM

## 2024-07-10 RX ADMIN — PHENYLEPHRINE HYDROCHLORIDE 100 MCG: 10 INJECTION INTRAVENOUS at 08:34

## 2024-07-10 RX ADMIN — SUGAMMADEX 250 MG: 100 INJECTION, SOLUTION INTRAVENOUS at 09:14

## 2024-07-10 RX ADMIN — PHENYLEPHRINE HYDROCHLORIDE 100 MCG: 10 INJECTION INTRAVENOUS at 08:46

## 2024-07-10 RX ADMIN — PHENYLEPHRINE HYDROCHLORIDE 200 MCG: 10 INJECTION INTRAVENOUS at 08:39

## 2024-07-10 RX ADMIN — PHENYLEPHRINE HYDROCHLORIDE 100 MCG: 10 INJECTION INTRAVENOUS at 08:31

## 2024-07-10 RX ADMIN — PROPOFOL 100 MG: 10 INJECTION, EMULSION INTRAVENOUS at 09:20

## 2024-07-10 RX ADMIN — MIDAZOLAM 2 MG: 1 INJECTION INTRAMUSCULAR; INTRAVENOUS at 08:14

## 2024-07-10 RX ADMIN — FENTANYL CITRATE 100 MCG: 50 INJECTION INTRAMUSCULAR; INTRAVENOUS at 09:05

## 2024-07-10 RX ADMIN — LIDOCAINE HYDROCHLORIDE 5 ML: 10 INJECTION, SOLUTION INFILTRATION; PERINEURAL at 08:20

## 2024-07-10 RX ADMIN — PHENYLEPHRINE HYDROCHLORIDE 200 MCG: 10 INJECTION INTRAVENOUS at 09:01

## 2024-07-10 RX ADMIN — PHENYLEPHRINE HYDROCHLORIDE 200 MCG: 10 INJECTION INTRAVENOUS at 08:37

## 2024-07-10 RX ADMIN — SODIUM CHLORIDE, POTASSIUM CHLORIDE, SODIUM LACTATE AND CALCIUM CHLORIDE: 600; 310; 30; 20 INJECTION, SOLUTION INTRAVENOUS at 07:25

## 2024-07-10 RX ADMIN — Medication 10 MG: at 08:58

## 2024-07-10 RX ADMIN — FENTANYL CITRATE 100 MCG: 50 INJECTION INTRAMUSCULAR; INTRAVENOUS at 08:20

## 2024-07-10 RX ADMIN — Medication 3 G: at 08:14

## 2024-07-10 RX ADMIN — OXYBUTYNIN CHLORIDE 5 MG: 5 TABLET, EXTENDED RELEASE ORAL at 11:18

## 2024-07-10 RX ADMIN — ONDANSETRON 4 MG: 2 INJECTION INTRAMUSCULAR; INTRAVENOUS at 09:14

## 2024-07-10 RX ADMIN — ROCURONIUM BROMIDE 50 MG: 50 INJECTION, SOLUTION INTRAVENOUS at 08:20

## 2024-07-10 RX ADMIN — SODIUM CHLORIDE, POTASSIUM CHLORIDE, SODIUM LACTATE AND CALCIUM CHLORIDE: 600; 310; 30; 20 INJECTION, SOLUTION INTRAVENOUS at 09:35

## 2024-07-10 RX ADMIN — PROPOFOL 100 MG: 10 INJECTION, EMULSION INTRAVENOUS at 09:05

## 2024-07-10 RX ADMIN — DEXAMETHASONE SODIUM PHOSPHATE 10 MG: 10 INJECTION, SOLUTION INTRAMUSCULAR; INTRAVENOUS at 08:29

## 2024-07-10 RX ADMIN — PROPOFOL 200 MG: 10 INJECTION, EMULSION INTRAVENOUS at 08:20

## 2024-07-10 RX ADMIN — Medication 30 MCG/MIN: at 09:09

## 2024-07-10 RX ADMIN — Medication 15 MG: at 09:05

## 2024-07-10 RX ADMIN — SODIUM CHLORIDE, POTASSIUM CHLORIDE, SODIUM LACTATE AND CALCIUM CHLORIDE: 600; 310; 30; 20 INJECTION, SOLUTION INTRAVENOUS at 09:25

## 2024-07-10 ASSESSMENT — ACTIVITIES OF DAILY LIVING (ADL)
ADLS_ACUITY_SCORE: 21
ADLS_ACUITY_SCORE: 20
ADLS_ACUITY_SCORE: 21

## 2024-07-10 NOTE — ANESTHESIA PROCEDURE NOTES
Airway       Patient location during procedure: OR       Procedure Start/Stop Times: 7/10/2024 8:24 AM  Staff -        CRNA: Neel Zurita APRN CRNA       Performed By: CRNAIndications and Patient Condition       Indications for airway management: gillian-procedural       Induction type:intravenous       Mask difficulty assessment: 1 - vent by mask    Final Airway Details       Final airway type: endotracheal airway       Successful airway: ETT - single  Endotracheal Airway Details        ETT size (mm): 7.0       Cuffed: yes       Cuff volume (mL): 8       Successful intubation technique: direct laryngoscopy       DL Blade Type: Finley 2       Grade View of Cords: 2       Adjucts: stylet       Position: Right       Measured from: gums/teeth       Secured at (cm): 22       Bite block used: None    Post intubation assessment        Placement verified by: capnometry, equal breath sounds and chest rise        Number of attempts at approach: 1       Secured with: silk tape       Ease of procedure: easy       Dentition: Intact and Unchanged    Medication(s) Administered   Medication Administration Time: 7/10/2024 8:24 AM

## 2024-07-10 NOTE — OP NOTE
OPERATIVE NOTE    PREOPERATIVE DIAGNOSIS:  Left-sided ureteral and kidney stone  Papillary bladder tumor    POSTOPERATIVE DIAGNOSIS:  Same    PROCEDURES PERFORMED:   1. Cystourethroscopy  2.  Left ureteroscopy  3.  Left retrograde pyelogram with interpretation of intraoperative fluoroscopic imaging  4 Basket stone extraction  5.  Left ureteral stent exchange  6.  Bladder biopsy and fulguration less than 2 cm in size    STAFF SURGEON:  Dr. Ramon Perea MD, present for the entire case.     ANESTHESIA:  General    ESTIMATED BLOOD LOSS: 2 cc  DRAINS/TUBES:  6 Lithuanian x 26 cm double-J ureteral stent         IV FLUIDS:   Please see dictated anesthesia record  COMPLICATIONS:  None.   SPECIMEN:   Stones for analysis, bladder biopsy for permanent pathology    SIGNIFICANT FINDINGS: Single 2 mm sized stone in the bladder multiple small stones in the lower pole of the left kidney along with upper pole of the left kidney.  Papillary tumor around the medial edge of the left ureteral orifice.  Proximal curl of the ureteral stent seen in the renal pelvis under fluoroscopy and distal curl seen in the bladder fluoroscopically and under direct vision.     BRIEF OPERATIVE INDICATIONS:  Mayito Sood is a(n) 63 year old male with history of obstructing left proximal ureteral stone and small papillary bladder tumor.  After a discussion of all risks, benefits, and alternatives, the patient elected to proceed with definitive stone management. The patient understands the potential need for more than one procedure to eliminate all stone burden.     DESCRIPTION OF PROCEDURE:  After informed consent was obtained, the patient was transported to the operating room & placed supine on the table. Pneumoboots were applied.  After adequate anesthesia was induced, he was placed in lithotomy and prepped and draped in the usual sterile fashion. A timeout was taken to confirm correct patient, procedure and laterality. Pre-operative IV antibiotics were  administered.     A 22-Swedish rigid cystoscope was inserted into a well-lubricated urethra. The anterior urethra was unremarkable,.  The bladder revealed papillary areas around the left ureteral orifice in the background of a significant stent reaction.  There was a small 2 mm sized stone in the bladder which was evacuated and sent for stone analysis.  The left ureteral orifice was identified and stent was partially pulled out cannulated with a Sensor wire . The wire passed without resistance into the upper pole and the ureteral stent was removed.  A dual-lumen catheter was used to perform a retrograde pyelogram and to establish access with a second, sensor wire.  I initially evaluated the ureter in the renal pelvis with flexible ureteroscope over the second wire.  There were no stones in the ureter however there were 2 stones in the lower pole  calyx and 1 in the upper pole calyx.  Therefore A 12/14 and 46-cm ureteral access sheath was advanced up to the proximal ureter and a retrograde pyelogram was performed to serve as a roadmap.     The flexible ureteroscope was used to identify the stone. A Halo basket was used to remove all fragments without any significant issue. Pullback ureteroscopy was performed and showed no retained stone fragments or ureteral injury.    Following this I removed the ureteroscope and inserted the cystoscope with the biopsy forceps and sampled the papillary areas in the medial wall of the bladder.  There was significant stent associated reaction which precluded adequate identification of the previously seen papillary areas.  However we were then subsequently able to fulgurate with monopolar cautery the medial and lateral aspects of the left ureteral orifice to significant satisfaction.  At completion there were no papillary tumors evident.   A 6 Swedish 26-cm double-J stent was advanced over the Sensor wire, and a good proximal curl was seen in the renal pelvis fluoroscopically and the  distal curl was seen in the bladder fluoroscopically and under direct vision. The bladder was drained.  Stent was left on the string which was then taped to the penis with the plastic adhesive.  The patient tolerated the procedure well and there were no apparent complications. The patient  was transported to the postanesthesia care unit in stable condition.     POST-OPERATIVE PLAN: Following recovery in the PACU, the patient can be discharged.  Follow-up with me in 3 months for cystoscopy and labs with imaging.      Ramon Perea MD  [facility], Urology

## 2024-07-10 NOTE — ANESTHESIA POSTPROCEDURE EVALUATION
Patient: Mayito Sood    Procedure: Procedure(s):  CYSTOURETEROSCOPY, WITH RETROGRADE PYELOGRAM, HOLMIUM LASER LITHOTRIPSY OF URETERAL CALCULUS, AND STENT exchange.  CYSTOSCOPY WITH BLADDER BIOPSY AND FULGERATION       Anesthesia Type:  General    Note:  Disposition: Outpatient   Postop Pain Control: Uneventful            Sign Out: Well controlled pain   PONV: No   Neuro/Psych: Uneventful            Sign Out: Acceptable/Baseline neuro status   Airway/Respiratory: Uneventful            Sign Out: Acceptable/Baseline resp. status   CV/Hemodynamics: Uneventful            Sign Out: Acceptable CV status; No obvious hypovolemia; No obvious fluid overload   Other NRE: NONE   DID A NON-ROUTINE EVENT OCCUR? No           Last vitals:  Vitals Value Taken Time   /73 07/10/24 1000   Temp 36.6  C (97.9  F) 07/10/24 0938   Pulse 96 07/10/24 1011   Resp 15 07/10/24 1011   SpO2 96 % 07/10/24 1011   Vitals shown include unfiled device data.    Electronically Signed By: Teto Hallman MD  July 10, 2024  10:12 AM

## 2024-07-10 NOTE — ANESTHESIA CARE TRANSFER NOTE
Patient: Mayito Sood    Procedure: Procedure(s):  CYSTOURETEROSCOPY, WITH RETROGRADE PYELOGRAM, HOLMIUM LASER LITHOTRIPSY OF URETERAL CALCULUS, AND STENT exchange.  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    Anesthesia Type:   General     Note:    Oropharynx: oropharynx clear of all foreign objects  Level of Consciousness: awake  Oxygen Supplementation: face mask  Level of Supplemental Oxygen (L/min / FiO2): 6  Independent Airway: airway patency satisfactory and stable    Vital Signs Stable: post-procedure vital signs reviewed and stable  Report to RN Given: handoff report given  Patient transferred to: PACU    Handoff Report: Identifed the Patient, Identified the Reponsible Provider, Reviewed the pertinent medical history, Discussed the surgical course, Reviewed Intra-OP anesthesia mangement and issues during anesthesia, Set expectations for post-procedure period and Allowed opportunity for questions and acknowledgement of understanding      Vitals:  Vitals Value Taken Time   /78 07/10/24 0934   Temp 97.9    Pulse 93 07/10/24 0939   Resp 15 07/10/24 0939   SpO2 99 % 07/10/24 0939   Vitals shown include unfiled device data.    Electronically Signed By: MATTHEW Cohen CRNA  July 10, 2024  9:40 AM

## 2024-07-10 NOTE — ANESTHESIA PREPROCEDURE EVALUATION
Anesthesia Pre-Procedure Evaluation    Patient: Mayito Sood   MRN: [id number] : 1960        Procedure : Procedure(s):  CYSTOSCOPY, WITH RETROGRADE PYELOGRAM AND URETERAL STENT PLACEMENT          Past Medical History:   Diagnosis Date    Acute pain     Acute sinusitis     TARUN (acute kidney injury) (H24)     Anxiety and depression 2023    Bacteremia     Cellulitis     Chronic back pain     Diabetes mellitus, type 2 (H) 2014    Dyslipidemia 2017    ED (erectile dysfunction)     HTN (hypertension) 2014    Infection involving bone (H)     Obesity     Obstructive uropathy     Osteomyelitis (H)     Polyneuropathy     Created by [employer] [facility] Annotation: 2010  3:22PM - Kushal Griffin: feet  Replacement Utility updated for latest IMO load         Proliferative diabetic retinopathy of both eyes with macular edema associated with type 2 diabetes mellitus (H) 2018    Formatting of this note might be different from the original.   Last seen      SIRS (systemic inflammatory response syndrome) (H)     Stage 3b chronic kidney disease (H) 2023    Ureterolithiasis       Past Surgical History:   Procedure Laterality Date    CHOLECYSTECTOMY      COMBINED CYSTOSCOPY, RETROGRADES, EXCHANGE STENT URETER(S) Left 2024    Procedure: CYSTOSCOPY, WITH RETROGRADE PYELOGRAM AND LEFT URETERAL STENT PLACEMENT;  Surgeon: Ramon Perea MD;  Location: [facility] [place]     REPAIR ACHILLES TENDON,PRIMARY      Description: Primary Repair Of Ruptured Achilles Tendon;  Proc Date: 2003;    IR LUMBAR EPIDURAL STEROID INJECTION  2003    IR LUMBAR EPIDURAL STEROID INJECTION  2009    IR LUMBAR EPIDURAL STEROID INJECTION  2009    PICC  2014           Allergies   Allergen Reactions    Shellfish Containing Products [Shellfish-Derived Products] Anaphylaxis    Prednisone Unknown     Depression--gets emotional and angry        Social History     Tobacco Use     Smoking status: Never     Passive exposure: Never    Smokeless tobacco: Never   Substance Use Topics    Alcohol use: Yes     Comment: rare      Wt Readings from Last 1 Encounters:   07/10/24 126.2 kg (278 lb 4.8 oz)        Anesthesia Evaluation   Pt has had prior anesthetic. Type: General.    No history of anesthetic complications       ROS/MED HX  ENT/Pulmonary:     (+)     VINNIE risk factors,  hypertension, obese,                                Neurologic:    (-) no CVA   Cardiovascular:     (+) Dyslipidemia hypertension- -   -  - -                                      METS/Exercise Tolerance:     Hematologic:     (+)      anemia,          Musculoskeletal:  - neg musculoskeletal ROS     GI/Hepatic:    (-) GERD   Renal/Genitourinary:     (+) renal disease, type: CRI, Pt does not require dialysis,    Nephrolithiasis ,       Endo:     (+)  type II DM,             Obesity,       Psychiatric/Substance Use:     (+) psychiatric history anxiety and depression       Infectious Disease:  - neg infectious disease ROS     Malignancy:  - neg malignancy ROS     Other:  - neg other ROS          Physical Exam    Airway  airway exam normal      Mallampati: III   TM distance: > 3 FB   Neck ROM: full   Mouth opening: > 3 cm    Respiratory Devices and Support         Dental  no notable dental history     (+) Modest Abnormalities - crowns, retainers, 1 or 2 missing teeth      Cardiovascular   cardiovascular exam normal       Rhythm and rate: regular and normal     Pulmonary   pulmonary exam normal        breath sounds clear to auscultation           OUTSIDE LABS:  CBC:   Lab Results   Component Value Date    WBC 11.0 06/12/2024    WBC 12.1 (H) 06/11/2024    HGB 11.6 (L) 06/12/2024    HGB 13.6 06/11/2024    HCT 35.5 (L) 06/12/2024    HCT 39.9 (L) 06/11/2024     06/12/2024     06/11/2024     BMP:   Lab Results   Component Value Date     06/13/2024     06/12/2024    POTASSIUM 5.1 06/13/2024    POTASSIUM 5.9 (H)  "06/12/2024    CHLORIDE 104 06/13/2024    CHLORIDE 105 06/12/2024    CO2 21 (L) 06/13/2024    CO2 23 06/12/2024    BUN 38.9 (H) 06/13/2024    BUN 36.0 (H) 06/12/2024    CR 2.27 (H) 06/13/2024    CR 3.01 (H) 06/12/2024    GLC 86 07/10/2024     (H) 06/13/2024     COAGS: No results found for: \"PTT\", \"INR\", \"FIBR\"  POC: No results found for: \"BGM\", \"HCG\", \"HCGS\"  HEPATIC:   Lab Results   Component Value Date    ALBUMIN 3.4 (L) 06/12/2024    PROTTOTAL 7.4 06/12/2024    ALT 48 06/12/2024    AST 42 06/12/2024    ALKPHOS 265 (H) 06/12/2024    BILITOTAL 0.4 06/12/2024     OTHER:   Lab Results   Component Value Date    LACT 2.2 (H) 06/11/2024    A1C 10.5 (H) 08/25/2014    QUIANA 8.6 (L) 06/13/2024    LIPASE 22 06/11/2024       Anesthesia Plan    ASA Status:  2    NPO Status:  NPO Appropriate    Anesthesia Type: General.     - Airway: ETT   Induction: Intravenous, Propofol.   Maintenance: Inhalation.        Consents    Anesthesia Plan(s) and associated risks, benefits, and realistic alternatives discussed. Questions answered and patient/representative(s) expressed understanding.     - Discussed:     - Discussed with:  Patient      - Extended Intubation/Ventilatory Support Discussed: No.      - Patient is DNR/DNI Status: No     Use of blood products discussed: No .     Postoperative Care    Pain management: Oral pain medications.   PONV prophylaxis: Ondansetron (or other 5HT-3), Dexamethasone or Solumedrol     Comments:               Teto Hallman MD    I have reviewed the pertinent notes and labs in the chart from the past 30 days and (re)examined the patient.  Any updates or changes from those notes are reflected in this note.    # Hyperkalemia: Highest K = 5.9 mmol/L in last 30 days, will monitor as appropriate  # Hyponatremia: Lowest Na = 132 mmol/L in last 30 days, will monitor as appropriate   # Hypocalcemia: Lowest Ca = 8 mg/dL in last 30 days, will monitor and replace as appropriate    # Hypoalbuminemia: Lowest " "albumin = 3.4 g/dL in the past 30 days , will monitor as appropriate     # Obesity: Estimated body mass index is 36.72 kg/m  as calculated from the following:    Height as of 6/11/24: 1.854 m (6' 1\").    Weight as of this encounter: 126.2 kg (278 lb 4.8 oz).      "

## 2024-07-10 NOTE — H&P
Urology H and P Update    I have evaluated Mayito Sood  today and examined him. He does not note any significant issues since her last evaluation. Based on my evaluation, he is fit to proceed ahead with the planned procedure.    Ramon Perea MD

## 2024-07-10 NOTE — OR NURSING
6/12/24 K 5.9, no follow-up K noted since then. Dr. Marie notified, states will assess and order if needed.

## 2024-07-10 NOTE — OR NURSING
Call placed to Dr. Hallman regarding elevated \86. Pt states he is having a little bit of anxiety and did not take BP med this am, states he will take BP med and anxiety med as soon as he gets home. Per Dr. Hallman, OK to discharge patient.

## 2024-07-11 ENCOUNTER — TELEPHONE (OUTPATIENT)
Dept: UROLOGY | Facility: CLINIC | Age: 64
End: 2024-07-11
Payer: COMMERCIAL

## 2024-07-11 NOTE — TELEPHONE ENCOUNTER
Request ordered from [facility] to send patient 1 24hour testing kit.  Neisha Cohn RN

## 2024-07-12 LAB
APPEARANCE STONE: NORMAL
COMPN STONE: NORMAL
SPECIMEN WT: 51 MG

## 2024-07-17 LAB
PATH REPORT.COMMENTS IMP SPEC: NORMAL
PATH REPORT.FINAL DX SPEC: NORMAL
PATH REPORT.GROSS SPEC: NORMAL
PATH REPORT.MICROSCOPIC SPEC OTHER STN: NORMAL
PATH REPORT.RELEVANT HX SPEC: NORMAL
PHOTO IMAGE: NORMAL

## 2024-07-19 ENCOUNTER — TELEPHONE (OUTPATIENT)
Dept: UROLOGY | Facility: CLINIC | Age: 64
End: 2024-07-19
Payer: COMMERCIAL

## 2024-07-19 NOTE — TELEPHONE ENCOUNTER
Message left for patient to call back to nurse regarding setting up ultrasound, lab work and cystoscopy appointment in 3 months.  Also that litholink kits have been sent to his home.  Neisha Cohn RN

## 2024-09-05 ENCOUNTER — LAB (OUTPATIENT)
Dept: LAB | Facility: HOSPITAL | Age: 64
End: 2024-09-05
Payer: COMMERCIAL

## 2024-09-05 DIAGNOSIS — N20.1 URETERAL STONE: ICD-10-CM

## 2024-09-05 LAB
ALBUMIN SERPL BCG-MCNC: 3.8 G/DL (ref 3.5–5.2)
ANION GAP SERPL CALCULATED.3IONS-SCNC: 9 MMOL/L (ref 7–15)
BUN SERPL-MCNC: 38.6 MG/DL (ref 8–23)
CA-I BLD-MCNC: 4.7 MG/DL (ref 4.4–5.2)
CALCIUM SERPL-MCNC: 8.1 MG/DL (ref 8.8–10.4)
CHLORIDE SERPL-SCNC: 105 MMOL/L (ref 98–107)
CREAT SERPL-MCNC: 1.74 MG/DL (ref 0.67–1.17)
EGFRCR SERPLBLD CKD-EPI 2021: 44 ML/MIN/1.73M2
GLUCOSE SERPL-MCNC: 127 MG/DL (ref 70–99)
HCO3 SERPL-SCNC: 20 MMOL/L (ref 22–29)
PHOSPHATE SERPL-MCNC: 4.2 MG/DL (ref 2.5–4.5)
POTASSIUM SERPL-SCNC: 5.5 MMOL/L (ref 3.4–5.3)
PTH-INTACT SERPL-MCNC: 298 PG/ML (ref 15–65)
SODIUM SERPL-SCNC: 134 MMOL/L (ref 135–145)
URATE SERPL-MCNC: 7.5 MG/DL (ref 3.4–7)

## 2024-09-05 PROCEDURE — 82330 ASSAY OF CALCIUM: CPT

## 2024-09-05 PROCEDURE — 84550 ASSAY OF BLOOD/URIC ACID: CPT

## 2024-09-05 PROCEDURE — 36415 COLL VENOUS BLD VENIPUNCTURE: CPT

## 2024-09-05 PROCEDURE — 83970 ASSAY OF PARATHORMONE: CPT

## 2024-09-05 PROCEDURE — 80069 RENAL FUNCTION PANEL: CPT

## 2024-09-05 PROCEDURE — 84100 ASSAY OF PHOSPHORUS: CPT

## 2024-10-02 ENCOUNTER — HOSPITAL ENCOUNTER (OUTPATIENT)
Dept: ULTRASOUND IMAGING | Facility: HOSPITAL | Age: 64
Discharge: HOME | End: 2024-10-02
Attending: STUDENT IN AN ORGANIZED HEALTH CARE EDUCATION/TRAINING PROGRAM | Admitting: STUDENT IN AN ORGANIZED HEALTH CARE EDUCATION/TRAINING PROGRAM
Payer: COMMERCIAL

## 2024-10-02 DIAGNOSIS — N20.1 URETERAL STONE: ICD-10-CM

## 2024-10-02 PROCEDURE — 76770 US EXAM ABDO BACK WALL COMP: CPT

## 2024-10-14 ENCOUNTER — TELEPHONE (OUTPATIENT)
Dept: UROLOGY | Facility: CLINIC | Age: 64
End: 2024-10-14
Payer: COMMERCIAL

## 2024-10-14 NOTE — TELEPHONE ENCOUNTER
Message left for patient to call back to reschedule cystoscopy.  Number for nurse and clinic given. Neisha Cohn RN

## 2024-12-18 ENCOUNTER — TELEPHONE (OUTPATIENT)
Dept: UROLOGY | Facility: CLINIC | Age: 64
End: 2024-12-18

## 2024-12-18 ENCOUNTER — OFFICE VISIT (OUTPATIENT)
Dept: UROLOGY | Facility: CLINIC | Age: 64
End: 2024-12-18
Payer: COMMERCIAL

## 2024-12-18 VITALS — SYSTOLIC BLOOD PRESSURE: 175 MMHG | HEART RATE: 73 BPM | DIASTOLIC BLOOD PRESSURE: 83 MMHG | TEMPERATURE: 97.2 F

## 2024-12-18 DIAGNOSIS — D41.4 BLADDER NEOPLASM OF UNCERTAIN MALIGNANT POTENTIAL: Primary | ICD-10-CM

## 2024-12-18 DIAGNOSIS — E21.3 PARATHYROID HORMONE EXCESS (H): ICD-10-CM

## 2024-12-18 DIAGNOSIS — N18.30 STAGE 3 CHRONIC KIDNEY DISEASE, UNSPECIFIED WHETHER STAGE 3A OR 3B CKD (H): ICD-10-CM

## 2024-12-18 PROCEDURE — 52000 CYSTOURETHROSCOPY: CPT | Performed by: STUDENT IN AN ORGANIZED HEALTH CARE EDUCATION/TRAINING PROGRAM

## 2024-12-18 PROCEDURE — 99214 OFFICE O/P EST MOD 30 MIN: CPT | Mod: 25 | Performed by: STUDENT IN AN ORGANIZED HEALTH CARE EDUCATION/TRAINING PROGRAM

## 2024-12-18 RX ORDER — LIDOCAINE HYDROCHLORIDE 20 MG/ML
JELLY TOPICAL ONCE
Status: COMPLETED | OUTPATIENT
Start: 2024-12-18 | End: 2024-12-18

## 2024-12-18 RX ADMIN — LIDOCAINE HYDROCHLORIDE: 20 JELLY TOPICAL at 14:15

## 2024-12-18 ASSESSMENT — PATIENT HEALTH QUESTIONNAIRE - PHQ9: SUM OF ALL RESPONSES TO PHQ QUESTIONS 1-9: 21

## 2024-12-18 NOTE — PROGRESS NOTES
CHIEF COMPLAINT   It was my pleasure to see Mayito Sood who is a 64 year old male for follow-up of kidney stones and bladder tumor.      HPI:  Mayito Sood is a 64 year old male being seen for follow-up and cystoscopy.  Duration of problem: 3 months  Previous treatments: Cystoscopy ureteroscopy along with bladder biopsy and fulguration      Reviewed previous notes  Denies any issues    Exam:  BP (!) 175/83 (BP Location: Left arm, Patient Position: Sitting, Cuff Size: Adult Regular)   Pulse 73   Temp 97.2  F (36.2  C) (Tympanic)   General: age-appropriate appearing male in NAD sitting in an exam chair  Resp: no respiratory distress  CV: heart rate regular  Abdomen: Degree of obesity is mild. Abdomen is soft and nontender. No organomegaly.  :  Deferred to cystoscopy  Neuro: grossly non focal. Normal reflexes  Motor: excellent strength throughout              CYSTOSCOPY        Pre-procedure diagnosis: Bladder Tumor  Post procedure diagnosis: normal cystoscopy  Procedure performed: cystoscopy  Surgeon: Ramon Perea MD  Anesthesia: local    Indications for procedure: Patient is a 64 year old year old male with a history of bladder tumor .  Here today for surveillance cysto    Description of procedure: After fully informed voluntary consent was obtained patient was brought into the procedure room, identified and placed in a supine position on the cysto table.  The groin/scrotum were prepped and draped in a sterile fashion with betadine.  A 15F flexible cystoscope was inserted into the urethra and the bladder and urethra examined in a systematic manner.  There were no tumor stones or diverticula.  Ureteric orifices were normal in position and number and effluxing clear urine.  The prostate was 4 cm long and  did not show bilobar hypertrophy.  There was no median lobe.  Distal urethra was normal.  The patient tolerated the procedure well and there were no complications.      Assessment/Plan: Patient with a history  of bladder tumorr with negative cystoscopy.    Please review the Detailed notes.  Review of Imaging:  The following imaging exams were independently viewed and interpreted by me and discussed with patient:  Narrative & Impression   EXAM: US RENAL COMPLETE NON-VASCULAR  LOCATION: [facility]  DATE: 10/2/2024     INDICATION:  Ureteral stone  COMPARISON: Chest CT from 5/17/2019  TECHNIQUE: Routine Bilateral Renal and Bladder Ultrasound.     FINDINGS:     RIGHT KIDNEY: 11.4 cm. Normal without hydronephrosis or masses.      LEFT KIDNEY: 12 cm. No hydronephrosis or masses. Questionable small nonobstructing left lower pole renal calculus measuring 5 to 6 mm.     BLADDER: Incompletely distended but otherwise unremarkable.                                                                      IMPRESSION:  1.  Questionable small nonobstructing left lower pole renal calculus.  2.  No hydronephrosis.       Review of Labs:  The following labs were reviewed by me and discussed with the patient:  Pth: Abnormal: 298  Component      Latest Ref Rng 9/5/2024  8:52 AM   Calcium Ionized Whole Blood      4.4 - 5.2 mg/dL 4.7          Assessment & Plan     Bladder neoplasm of uncertain malignant potential  Low malignant potential bladder tumor on last resection  Repeat follow-up cystoscopy in 1 year and if negative no further follow-up necessary  - lidocaine (XYLOCAINE) 2 % external gel  - CYSTOURETHROSCOPY  - US Renal Complete Non-Vascular; Future    Parathyroid hormone excess (H)  Needs follow-up with endocrinology/primary care to discuss this  Since calcium levels are normal my thought is it could be related to his elevated creatinine  I have left him a voicemail regarding the follow-up necessary for this  - Parathyroid Hormone Intact; Future  - Adult Endocrinology  Referral; Future    Stage 3 chronic kidney disease, unspecified whether stage 3a or 3b CKD (H)  Possibly a cause for his high PTH levels,  however, an endocrinology follow-up would be beneficial  - Parathyroid Hormone Intact; Future  - Adult Endocrinology  Referral; Future      Ramon Perea MD  [facility]      ==========================    Additional Billing and Coding Information:  Review of external notes as documented above   Review of the result(s) of each unique test - PTH, calcium, ultrasound kidney              12 minutes spent by me on the date of the encounter doing chart review, review of test results, interpretation of tests, patient visit, and documentation apart from cystoscopy    ==========================

## 2024-12-18 NOTE — PROGRESS NOTES
Patient educated regarding cystoscopy procedure and possible symptoms after completion.  Patient voiced understanding of information.  Handout given to patient.  Consent form signed.     Clinic Administered Medication Documentation    Patient was given Lidocaine Urojet jelly 2% via urethra. Prior to medication administration, verified patient's identity using patient's name and date of birth.    Rui Gonzalez MA

## 2024-12-18 NOTE — PATIENT INSTRUCTIONS
"Follow-up in 1 year with repeat ZOE and repeat cysto  Endocrinology follow-up/follow-up with primary care for PTH levels  If cysto negative next year no further cysto needed   ZOE to be done on follow-up     AFTER YOUR CYSTOSCOPY        You have just completed a cystoscopy, or \"cysto\", which allowed your physician to learn more about your bladder (or to remove a stent placed after surgery). We suggest that you continue to avoid caffeine, fruit juice, and alcohol for the next 24 hours, however, you are encouraged to return to your normal activities.         A few things that are considered normal after your cystoscopy:     * Small amount of bleeding (or spotting) that clears within the next 24 hours     * Slight burning sensation with urination     * Sensation to of needing to avoid more frequently     * The feeling of \"air\" in your urine     * Mild discomfort that is relieved with Tylenol        Please contact our office promptly if you:     * Develop a fever above 101 degrees     * Are unable to urinate     * Develop bright red blood that does not stop     * Severe pain or swelling         Please contact our office with any concerns or questions @[email or web address].  "

## 2025-07-05 NOTE — PLAN OF CARE
Goal Outcome Evaluation:    Problem: Adult Inpatient Plan of Care  Goal: Optimal Comfort and Wellbeing  Outcome: Progressing     Problem: Pain Acute  Goal: Optimal Pain Control and Function  Outcome: Progressing        Pt alert and oriented. VSS on RA, 1L while sleeping. Denies pain. Up independently with use of cane. Potassium of 5.9, MD notified and kayexalate ordered. Makes needs known.       Hold continuous fluids per Dr. Ellsworth.             no discharge, no irritation, no pain, no redness, and no visual changes.

## 2025-07-15 ENCOUNTER — APPOINTMENT (OUTPATIENT)
Dept: RADIOLOGY | Facility: HOSPITAL | Age: 65
End: 2025-07-15
Attending: EMERGENCY MEDICINE
Payer: COMMERCIAL

## 2025-07-15 ENCOUNTER — HOSPITAL ENCOUNTER (INPATIENT)
Facility: HOSPITAL | Age: 65
End: 2025-07-15
Attending: EMERGENCY MEDICINE | Admitting: STUDENT IN AN ORGANIZED HEALTH CARE EDUCATION/TRAINING PROGRAM
Payer: COMMERCIAL

## 2025-07-15 DIAGNOSIS — E11.65 TYPE 2 DIABETES MELLITUS WITH HYPERGLYCEMIA, UNSPECIFIED WHETHER LONG TERM INSULIN USE (H): ICD-10-CM

## 2025-07-15 DIAGNOSIS — E78.5 DYSLIPIDEMIA: ICD-10-CM

## 2025-07-15 DIAGNOSIS — I25.10 CORONARY ARTERY DISEASE INVOLVING NATIVE CORONARY ARTERY OF NATIVE HEART, UNSPECIFIED WHETHER ANGINA PRESENT: Primary | ICD-10-CM

## 2025-07-15 DIAGNOSIS — Z98.890 STATUS POST CORONARY ANGIOGRAM: ICD-10-CM

## 2025-07-15 DIAGNOSIS — G47.30 SLEEP APNEA, UNSPECIFIED TYPE: ICD-10-CM

## 2025-07-15 DIAGNOSIS — Z95.1 S/P CABG (CORONARY ARTERY BYPASS GRAFT): ICD-10-CM

## 2025-07-15 DIAGNOSIS — N18.32 STAGE 3B CHRONIC KIDNEY DISEASE (H): ICD-10-CM

## 2025-07-15 DIAGNOSIS — I21.3 ST ELEVATION MYOCARDIAL INFARCTION (STEMI), UNSPECIFIED ARTERY (H): ICD-10-CM

## 2025-07-15 DIAGNOSIS — I24.9 ACS (ACUTE CORONARY SYNDROME) (H): ICD-10-CM

## 2025-07-15 DIAGNOSIS — R55 PRE-SYNCOPE: ICD-10-CM

## 2025-07-15 LAB
ALBUMIN UR-MCNC: 100 MG/DL
ANION GAP SERPL CALCULATED.3IONS-SCNC: 8 MMOL/L (ref 7–15)
APPEARANCE UR: CLEAR
APTT PPP: 30 SECONDS (ref 22–38)
BASE EXCESS BLDV CALC-SCNC: -7.7 MMOL/L (ref -3–3)
BASOPHILS # BLD AUTO: 0 10E3/UL (ref 0–0.2)
BASOPHILS NFR BLD AUTO: 0 %
BILIRUB UR QL STRIP: NEGATIVE
BUN SERPL-MCNC: 31.2 MG/DL (ref 8–23)
CALCIUM SERPL-MCNC: 8.1 MG/DL (ref 8.8–10.4)
CHLORIDE SERPL-SCNC: 109 MMOL/L (ref 98–107)
COLOR UR AUTO: COLORLESS
CREAT SERPL-MCNC: 1.96 MG/DL (ref 0.67–1.17)
EGFRCR SERPLBLD CKD-EPI 2021: 38 ML/MIN/1.73M2
EOSINOPHIL # BLD AUTO: 0.2 10E3/UL (ref 0–0.7)
EOSINOPHIL NFR BLD AUTO: 2 %
ERYTHROCYTE [DISTWIDTH] IN BLOOD BY AUTOMATED COUNT: 13 % (ref 10–15)
GLUCOSE SERPL-MCNC: 478 MG/DL (ref 70–99)
GLUCOSE UR STRIP-MCNC: >1000 MG/DL
HCO3 BLDV-SCNC: 20 MMOL/L (ref 21–28)
HCO3 SERPL-SCNC: 20 MMOL/L (ref 22–29)
HCT VFR BLD AUTO: 38.1 % (ref 40–53)
HGB BLD-MCNC: 12.8 G/DL (ref 13.3–17.7)
HGB UR QL STRIP: ABNORMAL
HYALINE CASTS: 1 /LPF
IMM GRANULOCYTES # BLD: 0 10E3/UL
IMM GRANULOCYTES NFR BLD: 0 %
INR PPP: 0.98 (ref 0.85–1.15)
KETONES UR STRIP-MCNC: NEGATIVE MG/DL
LEUKOCYTE ESTERASE UR QL STRIP: NEGATIVE
LYMPHOCYTES # BLD AUTO: 0.8 10E3/UL (ref 0.8–5.3)
LYMPHOCYTES NFR BLD AUTO: 9 %
MAGNESIUM SERPL-MCNC: 1.7 MG/DL (ref 1.7–2.3)
MCH RBC QN AUTO: 31.2 PG (ref 26.5–33)
MCHC RBC AUTO-ENTMCNC: 33.6 G/DL (ref 31.5–36.5)
MCV RBC AUTO: 93 FL (ref 78–100)
MONOCYTES # BLD AUTO: 0.6 10E3/UL (ref 0–1.3)
MONOCYTES NFR BLD AUTO: 6 %
MUCOUS THREADS #/AREA URNS LPF: PRESENT /LPF
NEUTROPHILS # BLD AUTO: 7.4 10E3/UL (ref 1.6–8.3)
NEUTROPHILS NFR BLD AUTO: 83 %
NITRATE UR QL: NEGATIVE
NRBC # BLD AUTO: 0 10E3/UL
NRBC BLD AUTO-RTO: 0 /100
NT-PROBNP SERPL-MCNC: 594 PG/ML (ref 0–177)
O2/TOTAL GAS SETTING VFR VENT: 21 %
OXYHGB MFR BLDV: 65 % (ref 70–75)
PCO2 BLDV: 46 MM HG (ref 40–50)
PH BLDV: 7.24 [PH] (ref 7.32–7.43)
PH UR STRIP: 5.5 [PH] (ref 5–7)
PLATELET # BLD AUTO: 291 10E3/UL (ref 150–450)
PO2 BLDV: 38 MM HG (ref 25–47)
POTASSIUM SERPL-SCNC: 5.2 MMOL/L (ref 3.4–5.3)
PROTHROMBIN TIME: 13.2 SECONDS (ref 11.8–14.8)
RBC # BLD AUTO: 4.1 10E6/UL (ref 4.4–5.9)
RBC URINE: <1 /HPF
SAO2 % BLDV: 65.8 % (ref 70–75)
SODIUM SERPL-SCNC: 137 MMOL/L (ref 135–145)
SP GR UR STRIP: 1.02 (ref 1–1.03)
TROPONIN T SERPL HS-MCNC: 67 NG/L
UROBILINOGEN UR STRIP-MCNC: NORMAL MG/DL
WBC # BLD AUTO: 8.9 10E3/UL (ref 4–11)
WBC URINE: 1 /HPF

## 2025-07-15 PROCEDURE — 99285 EMERGENCY DEPT VISIT HI MDM: CPT | Mod: 25 | Performed by: EMERGENCY MEDICINE

## 2025-07-15 PROCEDURE — 85025 COMPLETE CBC W/AUTO DIFF WBC: CPT | Performed by: EMERGENCY MEDICINE

## 2025-07-15 PROCEDURE — 99292 CRITICAL CARE ADDL 30 MIN: CPT

## 2025-07-15 PROCEDURE — 85730 THROMBOPLASTIN TIME PARTIAL: CPT | Performed by: EMERGENCY MEDICINE

## 2025-07-15 PROCEDURE — 84484 ASSAY OF TROPONIN QUANT: CPT | Performed by: EMERGENCY MEDICINE

## 2025-07-15 PROCEDURE — 96361 HYDRATE IV INFUSION ADD-ON: CPT

## 2025-07-15 PROCEDURE — 82805 BLOOD GASES W/O2 SATURATION: CPT | Performed by: EMERGENCY MEDICINE

## 2025-07-15 PROCEDURE — 96374 THER/PROPH/DIAG INJ IV PUSH: CPT

## 2025-07-15 PROCEDURE — 93005 ELECTROCARDIOGRAM TRACING: CPT | Performed by: EMERGENCY MEDICINE

## 2025-07-15 PROCEDURE — 250N000013 HC RX MED GY IP 250 OP 250 PS 637: Performed by: EMERGENCY MEDICINE

## 2025-07-15 PROCEDURE — 83880 ASSAY OF NATRIURETIC PEPTIDE: CPT | Performed by: EMERGENCY MEDICINE

## 2025-07-15 PROCEDURE — 36415 COLL VENOUS BLD VENIPUNCTURE: CPT | Performed by: EMERGENCY MEDICINE

## 2025-07-15 PROCEDURE — 81001 URINALYSIS AUTO W/SCOPE: CPT | Performed by: EMERGENCY MEDICINE

## 2025-07-15 PROCEDURE — 83735 ASSAY OF MAGNESIUM: CPT | Performed by: EMERGENCY MEDICINE

## 2025-07-15 PROCEDURE — 83036 HEMOGLOBIN GLYCOSYLATED A1C: CPT | Performed by: INTERNAL MEDICINE

## 2025-07-15 PROCEDURE — 71045 X-RAY EXAM CHEST 1 VIEW: CPT

## 2025-07-15 PROCEDURE — 85610 PROTHROMBIN TIME: CPT | Performed by: EMERGENCY MEDICINE

## 2025-07-15 PROCEDURE — 93005 ELECTROCARDIOGRAM TRACING: CPT | Performed by: STUDENT IN AN ORGANIZED HEALTH CARE EDUCATION/TRAINING PROGRAM

## 2025-07-15 PROCEDURE — 99291 CRITICAL CARE FIRST HOUR: CPT | Mod: 25

## 2025-07-15 PROCEDURE — 250N000011 HC RX IP 250 OP 636: Performed by: EMERGENCY MEDICINE

## 2025-07-15 PROCEDURE — 82310 ASSAY OF CALCIUM: CPT | Performed by: EMERGENCY MEDICINE

## 2025-07-15 PROCEDURE — 258N000003 HC RX IP 258 OP 636: Performed by: EMERGENCY MEDICINE

## 2025-07-15 RX ORDER — TICAGRELOR 90 MG/1
180 TABLET, FILM COATED ORAL ONCE
Status: COMPLETED | OUTPATIENT
Start: 2025-07-15 | End: 2025-07-15

## 2025-07-15 RX ORDER — ASPIRIN 81 MG/1
324 TABLET, CHEWABLE ORAL ONCE
Status: COMPLETED | OUTPATIENT
Start: 2025-07-15 | End: 2025-07-15

## 2025-07-15 RX ADMIN — SODIUM CHLORIDE 500 ML: 0.9 INJECTION, SOLUTION INTRAVENOUS at 23:00

## 2025-07-15 RX ADMIN — HEPARIN SODIUM 9000 UNITS: 1000 INJECTION, SOLUTION INTRAVENOUS; SUBCUTANEOUS at 23:20

## 2025-07-15 RX ADMIN — TICAGRELOR 180 MG: 90 TABLET ORAL at 23:20

## 2025-07-15 RX ADMIN — ASPIRIN 81 MG CHEWABLE TABLET 324 MG: 81 TABLET CHEWABLE at 23:20

## 2025-07-15 ASSESSMENT — COLUMBIA-SUICIDE SEVERITY RATING SCALE - C-SSRS
2. HAVE YOU ACTUALLY HAD ANY THOUGHTS OF KILLING YOURSELF IN THE PAST MONTH?: NO
1. IN THE PAST MONTH, HAVE YOU WISHED YOU WERE DEAD OR WISHED YOU COULD GO TO SLEEP AND NOT WAKE UP?: NO
6. HAVE YOU EVER DONE ANYTHING, STARTED TO DO ANYTHING, OR PREPARED TO DO ANYTHING TO END YOUR LIFE?: NO

## 2025-07-15 ASSESSMENT — ACTIVITIES OF DAILY LIVING (ADL): ADLS_ACUITY_SCORE: 41

## 2025-07-16 ENCOUNTER — APPOINTMENT (OUTPATIENT)
Dept: ULTRASOUND IMAGING | Facility: HOSPITAL | Age: 65
End: 2025-07-16
Attending: NURSE PRACTITIONER
Payer: COMMERCIAL

## 2025-07-16 PROBLEM — Z98.890 STATUS POST CORONARY ANGIOGRAM: Status: ACTIVE | Noted: 2025-07-16

## 2025-07-16 PROBLEM — I24.9 ACS (ACUTE CORONARY SYNDROME) (H): Status: ACTIVE | Noted: 2025-07-16

## 2025-07-16 PROBLEM — I21.3 ST ELEVATION MYOCARDIAL INFARCTION (STEMI), UNSPECIFIED ARTERY (H): Status: ACTIVE | Noted: 2025-07-16

## 2025-07-16 LAB
ANION GAP SERPL CALCULATED.3IONS-SCNC: 10 MMOL/L (ref 7–15)
ANION GAP SERPL CALCULATED.3IONS-SCNC: 5 MMOL/L (ref 7–15)
B-OH-BUTYR SERPL-SCNC: 0.22 MMOL/L
BUN SERPL-MCNC: 31.3 MG/DL (ref 8–23)
BUN SERPL-MCNC: 32.2 MG/DL (ref 8–23)
CALCIUM SERPL-MCNC: 8.2 MG/DL (ref 8.8–10.4)
CALCIUM SERPL-MCNC: 8.5 MG/DL (ref 8.8–10.4)
CHLORIDE SERPL-SCNC: 107 MMOL/L (ref 98–107)
CHLORIDE SERPL-SCNC: 110 MMOL/L (ref 98–107)
CREAT SERPL-MCNC: 1.75 MG/DL (ref 0.67–1.17)
CREAT SERPL-MCNC: 1.78 MG/DL (ref 0.67–1.17)
EGFRCR SERPLBLD CKD-EPI 2021: 42 ML/MIN/1.73M2
EGFRCR SERPLBLD CKD-EPI 2021: 43 ML/MIN/1.73M2
ERYTHROCYTE [DISTWIDTH] IN BLOOD BY AUTOMATED COUNT: 12.9 % (ref 10–15)
ERYTHROCYTE [DISTWIDTH] IN BLOOD BY AUTOMATED COUNT: 13.1 % (ref 10–15)
EST. AVERAGE GLUCOSE BLD GHB EST-MCNC: 214 MG/DL
GLUCOSE BLDC GLUCOMTR-MCNC: 270 MG/DL (ref 70–99)
GLUCOSE BLDC GLUCOMTR-MCNC: 297 MG/DL (ref 70–99)
GLUCOSE BLDC GLUCOMTR-MCNC: 302 MG/DL (ref 70–99)
GLUCOSE BLDC GLUCOMTR-MCNC: 329 MG/DL (ref 70–99)
GLUCOSE BLDC GLUCOMTR-MCNC: 386 MG/DL (ref 70–99)
GLUCOSE BLDC GLUCOMTR-MCNC: 391 MG/DL (ref 70–99)
GLUCOSE BLDC GLUCOMTR-MCNC: 435 MG/DL (ref 70–99)
GLUCOSE SERPL-MCNC: 294 MG/DL (ref 70–99)
GLUCOSE SERPL-MCNC: 370 MG/DL (ref 70–99)
HBA1C MFR BLD: 9.1 %
HCO3 SERPL-SCNC: 18 MMOL/L (ref 22–29)
HCO3 SERPL-SCNC: 19 MMOL/L (ref 22–29)
HCT VFR BLD AUTO: 38.2 % (ref 40–53)
HCT VFR BLD AUTO: 40.3 % (ref 40–53)
HGB BLD-MCNC: 13.1 G/DL (ref 13.3–17.7)
HGB BLD-MCNC: 13.1 G/DL (ref 13.3–17.7)
HOLD SPECIMEN: NORMAL
MAGNESIUM SERPL-MCNC: 1.6 MG/DL (ref 1.7–2.3)
MCH RBC QN AUTO: 30.3 PG (ref 26.5–33)
MCH RBC QN AUTO: 31.5 PG (ref 26.5–33)
MCHC RBC AUTO-ENTMCNC: 32.5 G/DL (ref 31.5–36.5)
MCHC RBC AUTO-ENTMCNC: 34.3 G/DL (ref 31.5–36.5)
MCV RBC AUTO: 92 FL (ref 78–100)
MCV RBC AUTO: 93 FL (ref 78–100)
PHOSPHATE SERPL-MCNC: 2.2 MG/DL (ref 2.5–4.5)
PLATELET # BLD AUTO: 280 10E3/UL (ref 150–450)
PLATELET # BLD AUTO: 284 10E3/UL (ref 150–450)
POTASSIUM SERPL-SCNC: 5 MMOL/L (ref 3.4–5.3)
POTASSIUM SERPL-SCNC: 5 MMOL/L (ref 3.4–5.3)
POTASSIUM SERPL-SCNC: 5.8 MMOL/L (ref 3.4–5.3)
RBC # BLD AUTO: 4.16 10E6/UL (ref 4.4–5.9)
RBC # BLD AUTO: 4.33 10E6/UL (ref 4.4–5.9)
SODIUM SERPL-SCNC: 133 MMOL/L (ref 135–145)
SODIUM SERPL-SCNC: 136 MMOL/L (ref 135–145)
TROPONIN T SERPL HS-MCNC: 285 NG/L
TROPONIN T SERPL HS-MCNC: 95 NG/L
UFH PPP CHRO-ACNC: 0.12 IU/ML (ref ?–1.1)
UFH PPP CHRO-ACNC: 0.22 IU/ML (ref ?–1.1)
WBC # BLD AUTO: 10.8 10E3/UL (ref 4–11)
WBC # BLD AUTO: 10.9 10E3/UL (ref 4–11)

## 2025-07-16 PROCEDURE — 4A023N7 MEASUREMENT OF CARDIAC SAMPLING AND PRESSURE, LEFT HEART, PERCUTANEOUS APPROACH: ICD-10-PCS | Performed by: THORACIC SURGERY (CARDIOTHORACIC VASCULAR SURGERY)

## 2025-07-16 PROCEDURE — 250N000012 HC RX MED GY IP 250 OP 636 PS 637: Performed by: FAMILY MEDICINE

## 2025-07-16 PROCEDURE — 250N000012 HC RX MED GY IP 250 OP 636 PS 637: Performed by: EMERGENCY MEDICINE

## 2025-07-16 PROCEDURE — 83735 ASSAY OF MAGNESIUM: CPT | Performed by: STUDENT IN AN ORGANIZED HEALTH CARE EDUCATION/TRAINING PROGRAM

## 2025-07-16 PROCEDURE — 250N000011 HC RX IP 250 OP 636: Performed by: FAMILY MEDICINE

## 2025-07-16 PROCEDURE — 99222 1ST HOSP IP/OBS MODERATE 55: CPT | Mod: AI | Performed by: STUDENT IN AN ORGANIZED HEALTH CARE EDUCATION/TRAINING PROGRAM

## 2025-07-16 PROCEDURE — 250N000013 HC RX MED GY IP 250 OP 250 PS 637: Performed by: INTERNAL MEDICINE

## 2025-07-16 PROCEDURE — 250N000012 HC RX MED GY IP 250 OP 636 PS 637: Performed by: INTERNAL MEDICINE

## 2025-07-16 PROCEDURE — 99152 MOD SED SAME PHYS/QHP 5/>YRS: CPT | Performed by: INTERNAL MEDICINE

## 2025-07-16 PROCEDURE — 272N000001 HC OR GENERAL SUPPLY STERILE: Performed by: INTERNAL MEDICINE

## 2025-07-16 PROCEDURE — 82310 ASSAY OF CALCIUM: CPT | Performed by: STUDENT IN AN ORGANIZED HEALTH CARE EDUCATION/TRAINING PROGRAM

## 2025-07-16 PROCEDURE — 85520 HEPARIN ASSAY: CPT | Performed by: FAMILY MEDICINE

## 2025-07-16 PROCEDURE — 36415 COLL VENOUS BLD VENIPUNCTURE: CPT | Performed by: INTERNAL MEDICINE

## 2025-07-16 PROCEDURE — 021209W BYPASS CORONARY ARTERY, THREE ARTERIES FROM AORTA WITH AUTOLOGOUS VENOUS TISSUE, OPEN APPROACH: ICD-10-PCS | Performed by: THORACIC SURGERY (CARDIOTHORACIC VASCULAR SURGERY)

## 2025-07-16 PROCEDURE — 250N000013 HC RX MED GY IP 250 OP 250 PS 637: Performed by: FAMILY MEDICINE

## 2025-07-16 PROCEDURE — 93880 EXTRACRANIAL BILAT STUDY: CPT

## 2025-07-16 PROCEDURE — 85048 AUTOMATED LEUKOCYTE COUNT: CPT | Performed by: INTERNAL MEDICINE

## 2025-07-16 PROCEDURE — 82962 GLUCOSE BLOOD TEST: CPT

## 2025-07-16 PROCEDURE — 99254 IP/OBS CNSLTJ NEW/EST MOD 60: CPT | Mod: FS | Performed by: NURSE PRACTITIONER

## 2025-07-16 PROCEDURE — 250N000009 HC RX 250: Performed by: INTERNAL MEDICINE

## 2025-07-16 PROCEDURE — 82010 KETONE BODYS QUAN: CPT | Performed by: STUDENT IN AN ORGANIZED HEALTH CARE EDUCATION/TRAINING PROGRAM

## 2025-07-16 PROCEDURE — 36415 COLL VENOUS BLD VENIPUNCTURE: CPT | Performed by: FAMILY MEDICINE

## 2025-07-16 PROCEDURE — 85520 HEPARIN ASSAY: CPT | Performed by: INTERNAL MEDICINE

## 2025-07-16 PROCEDURE — 03HY32Z INSERTION OF MONITORING DEVICE INTO UPPER ARTERY, PERCUTANEOUS APPROACH: ICD-10-PCS | Performed by: THORACIC SURGERY (CARDIOTHORACIC VASCULAR SURGERY)

## 2025-07-16 PROCEDURE — 93458 L HRT ARTERY/VENTRICLE ANGIO: CPT | Mod: 26 | Performed by: INTERNAL MEDICINE

## 2025-07-16 PROCEDURE — 85041 AUTOMATED RBC COUNT: CPT | Performed by: STUDENT IN AN ORGANIZED HEALTH CARE EDUCATION/TRAINING PROGRAM

## 2025-07-16 PROCEDURE — 84132 ASSAY OF SERUM POTASSIUM: CPT | Performed by: FAMILY MEDICINE

## 2025-07-16 PROCEDURE — 06BQ4ZZ EXCISION OF LEFT SAPHENOUS VEIN, PERCUTANEOUS ENDOSCOPIC APPROACH: ICD-10-PCS | Performed by: THORACIC SURGERY (CARDIOTHORACIC VASCULAR SURGERY)

## 2025-07-16 PROCEDURE — 84484 ASSAY OF TROPONIN QUANT: CPT | Performed by: EMERGENCY MEDICINE

## 2025-07-16 PROCEDURE — 200N000001 HC R&B ICU

## 2025-07-16 PROCEDURE — 5A1221Z PERFORMANCE OF CARDIAC OUTPUT, CONTINUOUS: ICD-10-PCS | Performed by: THORACIC SURGERY (CARDIOTHORACIC VASCULAR SURGERY)

## 2025-07-16 PROCEDURE — 99223 1ST HOSP IP/OBS HIGH 75: CPT | Mod: 25 | Performed by: INTERNAL MEDICINE

## 2025-07-16 PROCEDURE — 36415 COLL VENOUS BLD VENIPUNCTURE: CPT | Performed by: STUDENT IN AN ORGANIZED HEALTH CARE EDUCATION/TRAINING PROGRAM

## 2025-07-16 PROCEDURE — 99207 PR NO BILLABLE SERVICE THIS VISIT: CPT | Performed by: FAMILY MEDICINE

## 2025-07-16 PROCEDURE — 93458 L HRT ARTERY/VENTRICLE ANGIO: CPT | Performed by: INTERNAL MEDICINE

## 2025-07-16 PROCEDURE — 255N000002 HC RX 255 OP 636: Performed by: INTERNAL MEDICINE

## 2025-07-16 PROCEDURE — 84484 ASSAY OF TROPONIN QUANT: CPT | Performed by: STUDENT IN AN ORGANIZED HEALTH CARE EDUCATION/TRAINING PROGRAM

## 2025-07-16 PROCEDURE — 02100Z9 BYPASS CORONARY ARTERY, ONE ARTERY FROM LEFT INTERNAL MAMMARY, OPEN APPROACH: ICD-10-PCS | Performed by: THORACIC SURGERY (CARDIOTHORACIC VASCULAR SURGERY)

## 2025-07-16 PROCEDURE — 84100 ASSAY OF PHOSPHORUS: CPT | Performed by: STUDENT IN AN ORGANIZED HEALTH CARE EDUCATION/TRAINING PROGRAM

## 2025-07-16 PROCEDURE — 250N000011 HC RX IP 250 OP 636: Performed by: INTERNAL MEDICINE

## 2025-07-16 PROCEDURE — 36415 COLL VENOUS BLD VENIPUNCTURE: CPT | Performed by: EMERGENCY MEDICINE

## 2025-07-16 PROCEDURE — B211YZZ FLUOROSCOPY OF MULTIPLE CORONARY ARTERIES USING OTHER CONTRAST: ICD-10-PCS | Performed by: INTERNAL MEDICINE

## 2025-07-16 PROCEDURE — 3E043XZ INTRODUCTION OF VASOPRESSOR INTO CENTRAL VEIN, PERCUTANEOUS APPROACH: ICD-10-PCS | Performed by: THORACIC SURGERY (CARDIOTHORACIC VASCULAR SURGERY)

## 2025-07-16 PROCEDURE — C1894 INTRO/SHEATH, NON-LASER: HCPCS | Performed by: INTERNAL MEDICINE

## 2025-07-16 PROCEDURE — 250N000013 HC RX MED GY IP 250 OP 250 PS 637: Performed by: STUDENT IN AN ORGANIZED HEALTH CARE EDUCATION/TRAINING PROGRAM

## 2025-07-16 RX ORDER — IODIXANOL 320 MG/ML
INJECTION, SOLUTION INTRAVASCULAR
Status: DISCONTINUED | OUTPATIENT
Start: 2025-07-16 | End: 2025-07-16 | Stop reason: HOSPADM

## 2025-07-16 RX ORDER — SENNOSIDES 8.6 MG
1 TABLET ORAL 2 TIMES DAILY PRN
Status: DISCONTINUED | OUTPATIENT
Start: 2025-07-16 | End: 2025-07-21

## 2025-07-16 RX ORDER — AMLODIPINE BESYLATE 5 MG/1
5 TABLET ORAL DAILY
Status: DISCONTINUED | OUTPATIENT
Start: 2025-07-16 | End: 2025-07-18

## 2025-07-16 RX ORDER — METOPROLOL TARTRATE 25 MG/1
25 TABLET, FILM COATED ORAL 2 TIMES DAILY
Status: DISCONTINUED | OUTPATIENT
Start: 2025-07-16 | End: 2025-07-17

## 2025-07-16 RX ORDER — INSULIN GLARGINE 100 [IU]/ML
24 INJECTION, SOLUTION SUBCUTANEOUS DAILY
Status: ON HOLD | COMMUNITY
End: 2025-07-30

## 2025-07-16 RX ORDER — FUROSEMIDE 10 MG/ML
20 INJECTION INTRAMUSCULAR; INTRAVENOUS ONCE
Status: COMPLETED | OUTPATIENT
Start: 2025-07-16 | End: 2025-07-16

## 2025-07-16 RX ORDER — FENTANYL CITRATE 50 UG/ML
INJECTION, SOLUTION INTRAMUSCULAR; INTRAVENOUS
Status: DISCONTINUED | OUTPATIENT
Start: 2025-07-16 | End: 2025-07-16 | Stop reason: HOSPADM

## 2025-07-16 RX ORDER — DIPHENHYDRAMINE HYDROCHLORIDE 50 MG/ML
INJECTION, SOLUTION INTRAMUSCULAR; INTRAVENOUS
Status: DISCONTINUED | OUTPATIENT
Start: 2025-07-16 | End: 2025-07-16 | Stop reason: HOSPADM

## 2025-07-16 RX ORDER — NALOXONE HYDROCHLORIDE 0.4 MG/ML
0.2 INJECTION, SOLUTION INTRAMUSCULAR; INTRAVENOUS; SUBCUTANEOUS
Status: DISCONTINUED | OUTPATIENT
Start: 2025-07-16 | End: 2025-07-21

## 2025-07-16 RX ORDER — ACETAMINOPHEN 325 MG/1
650 TABLET ORAL EVERY 4 HOURS PRN
Status: DISCONTINUED | OUTPATIENT
Start: 2025-07-16 | End: 2025-07-21

## 2025-07-16 RX ORDER — HYDRALAZINE HYDROCHLORIDE 20 MG/ML
10 INJECTION INTRAMUSCULAR; INTRAVENOUS
Status: COMPLETED | OUTPATIENT
Start: 2025-07-16 | End: 2025-07-16

## 2025-07-16 RX ORDER — ACETAMINOPHEN 325 MG/1
650 TABLET ORAL EVERY 4 HOURS PRN
Status: DISCONTINUED | OUTPATIENT
Start: 2025-07-16 | End: 2025-07-16

## 2025-07-16 RX ORDER — TAMSULOSIN HYDROCHLORIDE 0.4 MG/1
0.4 CAPSULE ORAL DAILY
Status: DISCONTINUED | OUTPATIENT
Start: 2025-07-16 | End: 2025-07-16

## 2025-07-16 RX ORDER — ATROPINE SULFATE 0.1 MG/ML
0.5 INJECTION INTRAVENOUS
Status: ACTIVE | OUTPATIENT
Start: 2025-07-16 | End: 2025-07-16

## 2025-07-16 RX ORDER — LOSARTAN POTASSIUM 25 MG/1
25 TABLET ORAL DAILY
COMMUNITY

## 2025-07-16 RX ORDER — ACETAMINOPHEN 650 MG/1
650 SUPPOSITORY RECTAL EVERY 4 HOURS PRN
Status: DISCONTINUED | OUTPATIENT
Start: 2025-07-16 | End: 2025-07-21

## 2025-07-16 RX ORDER — NALOXONE HYDROCHLORIDE 0.4 MG/ML
0.4 INJECTION, SOLUTION INTRAMUSCULAR; INTRAVENOUS; SUBCUTANEOUS
Status: DISCONTINUED | OUTPATIENT
Start: 2025-07-16 | End: 2025-07-21

## 2025-07-16 RX ORDER — ATORVASTATIN CALCIUM 40 MG/1
80 TABLET, FILM COATED ORAL EVERY EVENING
Status: DISCONTINUED | OUTPATIENT
Start: 2025-07-16 | End: 2025-07-21

## 2025-07-16 RX ORDER — ONDANSETRON 2 MG/ML
INJECTION INTRAMUSCULAR; INTRAVENOUS
Status: DISCONTINUED | OUTPATIENT
Start: 2025-07-16 | End: 2025-07-16 | Stop reason: HOSPADM

## 2025-07-16 RX ORDER — FLUTICASONE PROPIONATE 50 MCG
2 SPRAY, SUSPENSION (ML) NASAL DAILY
COMMUNITY

## 2025-07-16 RX ORDER — DEXTROSE MONOHYDRATE 25 G/50ML
25-50 INJECTION, SOLUTION INTRAVENOUS
Status: DISCONTINUED | OUTPATIENT
Start: 2025-07-16 | End: 2025-07-21

## 2025-07-16 RX ORDER — FLUTICASONE PROPIONATE 50 MCG
2 SPRAY, SUSPENSION (ML) NASAL DAILY
Status: DISCONTINUED | OUTPATIENT
Start: 2025-07-16 | End: 2025-07-21

## 2025-07-16 RX ORDER — SODIUM CHLORIDE 9 MG/ML
75 INJECTION, SOLUTION INTRAVENOUS CONTINUOUS
Status: DISCONTINUED | OUTPATIENT
Start: 2025-07-16 | End: 2025-07-16

## 2025-07-16 RX ORDER — HEPARIN SODIUM 1000 [USP'U]/ML
INJECTION, SOLUTION INTRAVENOUS; SUBCUTANEOUS
Status: DISCONTINUED | OUTPATIENT
Start: 2025-07-16 | End: 2025-07-16 | Stop reason: HOSPADM

## 2025-07-16 RX ORDER — ESCITALOPRAM OXALATE 20 MG/1
20 TABLET ORAL EVERY EVENING
Status: DISCONTINUED | OUTPATIENT
Start: 2025-07-16 | End: 2025-07-21

## 2025-07-16 RX ORDER — LOSARTAN POTASSIUM 25 MG/1
25 TABLET ORAL DAILY
Status: DISCONTINUED | OUTPATIENT
Start: 2025-07-16 | End: 2025-07-23

## 2025-07-16 RX ORDER — ACETAMINOPHEN 325 MG/1
325 TABLET ORAL EVERY 4 HOURS PRN
COMMUNITY

## 2025-07-16 RX ORDER — FLUMAZENIL 0.1 MG/ML
0.2 INJECTION, SOLUTION INTRAVENOUS
Status: DISCONTINUED | OUTPATIENT
Start: 2025-07-16 | End: 2025-07-18

## 2025-07-16 RX ORDER — AMLODIPINE BESYLATE 10 MG/1
10 TABLET ORAL DAILY
Status: ON HOLD | COMMUNITY
End: 2025-07-29

## 2025-07-16 RX ORDER — POLYETHYLENE GLYCOL 3350 17 G/17G
17 POWDER, FOR SOLUTION ORAL DAILY PRN
Status: DISCONTINUED | OUTPATIENT
Start: 2025-07-16 | End: 2025-07-21

## 2025-07-16 RX ORDER — ASPIRIN 81 MG/1
81 TABLET, CHEWABLE ORAL DAILY
Status: DISCONTINUED | OUTPATIENT
Start: 2025-07-16 | End: 2025-07-21

## 2025-07-16 RX ORDER — FENTANYL CITRATE 50 UG/ML
25 INJECTION, SOLUTION INTRAMUSCULAR; INTRAVENOUS
Refills: 0 | Status: DISCONTINUED | OUTPATIENT
Start: 2025-07-16 | End: 2025-07-18

## 2025-07-16 RX ORDER — OXYCODONE HYDROCHLORIDE 5 MG/1
10 TABLET ORAL EVERY 4 HOURS PRN
Refills: 0 | Status: DISCONTINUED | OUTPATIENT
Start: 2025-07-16 | End: 2025-07-21

## 2025-07-16 RX ORDER — OXYCODONE HYDROCHLORIDE 5 MG/1
5 TABLET ORAL EVERY 4 HOURS PRN
Refills: 0 | Status: DISCONTINUED | OUTPATIENT
Start: 2025-07-16 | End: 2025-07-21

## 2025-07-16 RX ORDER — NICOTINE POLACRILEX 4 MG
15-30 LOZENGE BUCCAL
Status: DISCONTINUED | OUTPATIENT
Start: 2025-07-16 | End: 2025-07-21

## 2025-07-16 RX ORDER — HEPARIN SODIUM 10000 [USP'U]/100ML
0-5000 INJECTION, SOLUTION INTRAVENOUS CONTINUOUS
Status: DISCONTINUED | OUTPATIENT
Start: 2025-07-16 | End: 2025-07-21

## 2025-07-16 RX ORDER — METHYLPREDNISOLONE SODIUM SUCCINATE 125 MG/2ML
INJECTION INTRAMUSCULAR; INTRAVENOUS
Status: DISCONTINUED | OUTPATIENT
Start: 2025-07-16 | End: 2025-07-16 | Stop reason: HOSPADM

## 2025-07-16 RX ORDER — NITROGLYCERIN 5 MG/ML
VIAL (ML) INTRAVENOUS
Status: DISCONTINUED | OUTPATIENT
Start: 2025-07-16 | End: 2025-07-16 | Stop reason: HOSPADM

## 2025-07-16 RX ADMIN — HEPARIN SODIUM 1200 UNITS/HR: 10000 INJECTION, SOLUTION INTRAVENOUS at 05:02

## 2025-07-16 RX ADMIN — ASPIRIN 81 MG CHEWABLE TABLET 81 MG: 81 TABLET CHEWABLE at 08:03

## 2025-07-16 RX ADMIN — METOPROLOL TARTRATE 25 MG: 25 TABLET, FILM COATED ORAL at 08:03

## 2025-07-16 RX ADMIN — INSULIN ASPART 8 UNITS: 100 INJECTION, SOLUTION INTRAVENOUS; SUBCUTANEOUS at 07:59

## 2025-07-16 RX ADMIN — INSULIN GLARGINE 25 UNITS: 100 INJECTION, SOLUTION SUBCUTANEOUS at 21:49

## 2025-07-16 RX ADMIN — METOPROLOL TARTRATE 25 MG: 25 TABLET, FILM COATED ORAL at 19:14

## 2025-07-16 RX ADMIN — ATORVASTATIN CALCIUM 80 MG: 40 TABLET, FILM COATED ORAL at 19:14

## 2025-07-16 RX ADMIN — PERFLUTREN 2 ML (DILUTED): 6.52 INJECTION, SUSPENSION INTRAVENOUS at 11:18

## 2025-07-16 RX ADMIN — FUROSEMIDE 20 MG: 10 INJECTION, SOLUTION INTRAMUSCULAR; INTRAVENOUS at 10:09

## 2025-07-16 RX ADMIN — AMLODIPINE BESYLATE 5 MG: 5 TABLET ORAL at 12:23

## 2025-07-16 RX ADMIN — ESCITALOPRAM 20 MG: 20 TABLET, FILM COATED ORAL at 21:44

## 2025-07-16 RX ADMIN — INSULIN GLARGINE 25 UNITS: 100 INJECTION, SOLUTION SUBCUTANEOUS at 00:40

## 2025-07-16 RX ADMIN — INSULIN ASPART 6 UNITS: 100 INJECTION, SOLUTION INTRAVENOUS; SUBCUTANEOUS at 12:03

## 2025-07-16 RX ADMIN — POTASSIUM & SODIUM PHOSPHATES POWDER PACK 280-160-250 MG 1 PACKET: 280-160-250 PACK at 06:00

## 2025-07-16 RX ADMIN — SODIUM ZIRCONIUM CYCLOSILICATE 10 G: 10 POWDER, FOR SUSPENSION ORAL at 06:01

## 2025-07-16 RX ADMIN — INSULIN ASPART 7 UNITS: 100 INJECTION, SOLUTION INTRAVENOUS; SUBCUTANEOUS at 17:25

## 2025-07-16 RX ADMIN — FLUTICASONE PROPIONATE 2 SPRAY: 50 SPRAY, METERED NASAL at 12:35

## 2025-07-16 RX ADMIN — HEPARIN SODIUM 1500 UNITS/HR: 10000 INJECTION, SOLUTION INTRAVENOUS at 21:41

## 2025-07-16 RX ADMIN — HYDRALAZINE HYDROCHLORIDE 10 MG: 20 INJECTION INTRAMUSCULAR; INTRAVENOUS at 09:09

## 2025-07-16 RX ADMIN — POTASSIUM & SODIUM PHOSPHATES POWDER PACK 280-160-250 MG 1 PACKET: 280-160-250 PACK at 10:10

## 2025-07-16 ASSESSMENT — ACTIVITIES OF DAILY LIVING (ADL)
ADLS_ACUITY_SCORE: 19
ADLS_ACUITY_SCORE: 19
ADLS_ACUITY_SCORE: 41
ADLS_ACUITY_SCORE: 19
ADLS_ACUITY_SCORE: 41
ADLS_ACUITY_SCORE: 19
ADLS_ACUITY_SCORE: 41
ADLS_ACUITY_SCORE: 20
ADLS_ACUITY_SCORE: 20
ADLS_ACUITY_SCORE: 19

## 2025-07-16 ASSESSMENT — EJECTION FRACTION: EF_VALUE: .22

## 2025-07-16 NOTE — H&P
[facility]    History and Physical - Hospitalist Service       Date of Admission:  7/15/2025    Assessment & Plan      Mayito Sood is a 64 year old male with a past medical history of Type 2 Diabetes who was admitted on 7/15/25 after presenting to [facility] ED for Chest pain and syncope.    #NSTEMI  - Chest pain started 7/15, had episode of syncope at work  - Patient with troponin 67 -> 97  - EKG with ST Depression. Cardiology not calling STEMI  - [facility] 7/16 AM - Multiple areas of Diffuse coronary disease  Plan  - Cardiology following  - CT Surgery consulted to discuss CABG  - Echo TTE ordered  - s/p aspirin 325mg x1 and continue 81mg daily  - Atorvastatin 80mg daily   - Metoprolol 25mg BID  - Given ticagrelor x1, not continued until CABG evaluation.  - Continue cardiac monitor    #Acute Hypoxic Respiratory Failure  - Patient not on home oxygen, requiring up to 10L NC after [facility].  - Likely combination of fluid overload with NSTEMI and sedation from [facility].   - CXR on 7/15 showing no acute cardiopulmonary process.  Plan  - Cardiology following as above - can decide on utility of diuretics    #Hyperkalemia  - Potassium 5.2 on ED presentation, then 5.8 with AM labs 7/16  Plan  - Already giving doses of insulin with high blood sugar on admission  - Added lokelma x1 + daily after that.  - Trending BMP q6h     #Type 2 Diabetes  #Hyperglycemia without DKA  - Last A1c was 9.1% on 7/15/25  - PTA Meds: Maybe empagliflozin, no insulin for many months per patient although this is listed on previous med lists (pending Pharmacy Med Rec)  - Patient with blood sugar 478, bicarb 20, gap 8, pH 7.24, ketones negative.  Plan  - Cardiology started Lantus 25 units nightly after LHC, will continue  - Sliding Scale Insulin with Glucose Checks            Diet: Advance Diet as Tolerated: Clear Liquid Diet    DVT Prophylaxis: Pneumatic Compression Devices and heparin gtt  Gutierrez Catheter: Not present  Lines: None     Cardiac  Monitoring: ACTIVE order. Indication: Post- PCI/Angiogram (24 hours)  Code Status: Full Code      Clinically Significant Risk Factors Present on Admission          # Hyperchloremia: Highest Cl = 109 mmol/L in last 2 days, will monitor as appropriate              # Hypertension: Noted on problem list          # DMII: A1C = 9.1 % (Ref range: <5.7 %) within past 6 months               Disposition Plan     Medically Ready for Discharge: Anticipated in 5+ Days           Sadiq Guzmán MD  Hospitalist Service  [facility]  Securely message with [employer] (more info)  Text page via [facility] Paging/Directory     ______________________________________________________________________    Chief Complaint   Chest Pain and Syncope    History is obtained from the patient, electronic health record, and emergency department physician    History of Present Illness   Mayito Sood is a 64 year old male with a past medical history of Type 2 Diabetes who was admitted on 7/15/25 after presenting to [facility] ED for Chest pain and syncope.    Patient reports gradual onset of chest pain during the morning of 7/15.  He was at work and then had a syncopal episode prompting ED presentation.  Patient's chest pain is on the left side of the chest, also with palpitations, feeling cold and clammy, chills.  Patient was brought to the ED where initial blood pressures were low 60s over 40s but improved with IV fluid administration.      Past Medical History    Past Medical History:   Diagnosis Date    Acute pain     Acute sinusitis     TARUN (acute kidney injury)     Anxiety and depression 02/08/2023    Bacteremia     Cellulitis     Chronic back pain     Diabetes mellitus, type 2 (H) 08/27/2014    Dyslipidemia 07/05/2017    ED (erectile dysfunction)     HTN (hypertension) 08/29/2014    Infection involving bone (H)     Obesity     Obstructive uropathy     Osteomyelitis (H)     Polyneuropathy     [facility] by [facility]  Annotation: Jul 19 2010  3:22PM - Kushal Griffin: feet  Replacement Utility updated for latest IMO load         Proliferative diabetic retinopathy of both eyes with macular edema associated with type 2 diabetes mellitus (H) 08/30/2018    Formatting of this note might be different from the original.   Last seen      SIRS (systemic inflammatory response syndrome) (H)     Stage 3b chronic kidney disease (H) 02/08/2023    Ureterolithiasis        Past Surgical History   Past Surgical History:   Procedure Laterality Date    CHOLECYSTECTOMY      COMBINED CYSTOSCOPY, RETROGRADES, EXCHANGE STENT URETER(S) Left 06/12/2024    Procedure: CYSTOSCOPY, WITH RETROGRADE PYELOGRAM AND LEFT URETERAL STENT PLACEMENT;  Surgeon: Ramon Perea MD;  Location: [facility] OR    CYSTOSCOPY, BIOPSY BLADDER, COMBINED N/A 7/10/2024    Procedure: CYSTOSCOPY WITH BLADDER BIOPSY AND FULGURATION;  Surgeon: Ramon Perea MD;  Location: [facility] OR    CYSTOURETEROSCOPY, WITH LITHOTRIPSY USING CHACE 120P LASER AND URETERAL STENT INSERTION Left 7/10/2024    Procedure: CYSTOURETEROSCOPY, WITH RETROGRADE PYELOGRAM, HOLMIUM LASER LITHOTRIPSY OF URETERAL CALCULUS, AND STENT EXCHANGE;  Surgeon: Ramon Perea MD;  Location: [facility] OR    HC REPAIR ACHILLES TENDON,PRIMARY      Description: Primary Repair Of Ruptured Achilles Tendon;  Proc Date: 01/09/2003;    IR LUMBAR EPIDURAL STEROID INJECTION  12/04/2003    IR LUMBAR EPIDURAL STEROID INJECTION  07/07/2009    IR LUMBAR EPIDURAL STEROID INJECTION  08/20/2009    PICC  08/29/2014            Prior to Admission Medications   Prior to Admission Medications   Prescriptions Last Dose Informant Patient Reported? Taking?   ACCU-CHEK FASTCLIX Misc  Self No No   Sig: [ACCU-CHEK FASTCLIX MISC] USE AS DIRECTED FOUR TIMES DAILY   ACCU-CHEK SMARTVIEW TEST STRIP Strp  Self No No   Sig: [ACCU-CHEK SMARTVIEW TEST STRIP STRP] TEST FOUR TIMES DAILY   diphenhydrAMINE-acetaminophen (TYLENOL PM)  " mg Tab  Self Yes No   Sig: Take 1 tablet by mouth nightly as needed for sleep   escitalopram (LEXAPRO) 10 MG tablet   Yes No   Sig: Take 10 mg by mouth daily   insulin glargine (LANTUS PEN) 100 UNIT/ML pen   Yes No   Sig: Inject 25 Units Subcutaneous at bedtime   insulin needles, disposable, (ULTICARE) 32 x 5/32 \" Ndle  Self No No   Sig: [INSULIN NEEDLES, DISPOSABLE, (ULTICARE) 32 X 5/32 \" NDLE] Use as Directed with Humalog   liraglutide (VICTOZA) 18 MG/3ML solution   Yes No   Sig: Inject 0.6 mg subcutaneously daily   mometasone (NASONEX) 50 mcg/actuation nasal spray  Self No No   Sig: [MOMETASONE (NASONEX) 50 MCG/ACTUATION NASAL SPRAY] 1 spray into each nostril 2 (two) times a day.   Patient taking differently: Spray 1 spray into both nostrils 2 times daily.   ondansetron (ZOFRAN ODT) 4 MG ODT tab   No No   Sig: Take 1 tablet (4 mg) by mouth every 8 hours as needed for nausea   pen needle, diabetic 31 gauge x 5/16\" Ndle  Self No No   Sig: [PEN NEEDLE, DIABETIC 31 GAUGE X 5/16\" NDLE] Use 1 pen As Directed as needed.   pravastatin (PRAVACHOL) 10 MG tablet  Self Yes No   Sig: Take 10 mg by mouth daily   senna-docusate (SENOKOT-S/PERICOLACE) 8.6-50 MG tablet   No No   Sig: Take 1-2 tablets by mouth 2 times daily   tamsulosin (FLOMAX) 0.4 MG capsule   Yes No   Sig: Take 0.4 mg by mouth daily      Facility-Administered Medications: None        Social History   I have reviewed this patient's social history and updated it with pertinent information if needed.  Social History     Tobacco Use    Smoking status: Never     Passive exposure: Never    Smokeless tobacco: Never   Substance Use Topics    Alcohol use: Yes     Comment: rare    Drug use: No         Family History   I have reviewed this patient's family history and updated it with pertinent information if needed.  Family History   Problem Relation Age of Onset    Cancer Mother          Allergies   Allergies   Allergen Reactions    Iodine Unknown and Anaphylaxis "    Shellfish-Derived Products Anaphylaxis    Prednisone Unknown and Other (See Comments)     Depression--gets emotional and angry    Depression--gets emotional and angry      Depression  pt gets emotional and angry    Metformin Diarrhea        Physical Exam   Vital Signs: Temp: 98.7  F (37.1  C) Temp src: Oral BP: (!) 147/70 Pulse: 71   Resp: 16 SpO2: 94 % O2 Device: Oxymask Oxygen Delivery: 10 LPM  Weight: 290 lbs 0 oz    General: Alert and Oriented x3. Answers questions appropriately. Follows commands.  Resp: Decreased breath sounds at lung bases bilaterally  Cardio: Regular rate and rhythm. No murmurs. No friction rub.  Abd: Soft. Non-distended. Non-tender. Bowel sounds normal. No rebound tenderness.  MSK: No areas of ecchymosis or erythema.  Neuro: CN 2-12 grossly intact. Strength 5/5 in all 4 extremities.  Skin:No rashes. No jaundice.       Medical Decision Making       60 MINUTES SPENT BY ME on the date of service doing chart review, history, exam, documentation & further activities per the note.  MANAGEMENT DISCUSSED with the following over the past 24 hours: RN, ED Physician   Tests ORDERED & REVIEWED in the past 24 hours:  - BMP  - CBC  - Troponin  - VBG  Medical complexity over the past 24 hours:  - Prescription DRUG MANAGEMENT performed      Data     I have personally reviewed the following data over the past 24 hrs:    10.8  \   13.1 (L)   / 284     137 109 (H) 31.2 (H) /  386 (H)   5.2 20 (L) 1.96 (H) \     Trop: 95 (H) BNP: 594 (H)     TSH: N/A T4: N/A A1C: 9.1 (H)     INR:  0.98 PTT:  30   D-dimer:  N/A Fibrinogen:  N/A       Imaging results reviewed over the past 24 hrs:   Recent Results (from the past 24 hours)   XR Chest Port 1 View    Narrative    EXAM: XR CHEST PORT 1 VIEW  LOCATION: [facility]  DATE: 7/15/2025    INDICATION: chest pain  COMPARISON: 5/17/2019      Impression    IMPRESSION:     No acute cardiopulmonary process. Stable cardiomediastinal silhouette.    Cardiac Catheterization    Narrative    CARDIAC CATHETERIZATION AND INTERVENTION REPORT    PATIENT NAME:  Mayito Sood          MEDICAL RECORD NUMBER: [id number]  : 1960       PROCEDURE DATE: 2025        CONCLUSIONS:   Severe multivessel coronary artery disease involving the proximal LAD,   apical LAD, proximal large diagonal branch, proximal to mid left   circumflex, proximal OM 1 and 2 branches, and chronic total occlusion of   the distal RCA that fills via L-R and R-R collaterals.  Normal left-sided filling pressures.  No aortic valve stenosis.      RECOMMENDATIONS:   Usual postprocedure cares, please see orders.  Aspirin, high intensity statin, heparin gtt, beta-blocker.   Transthoracic echocardiogram  Recommend expedited cardiothoracic surgery consultation for evaluation of   surgical revascularization -potential targets include left anterior   descending artery, diagonal branch, OM1 and 2 branches, and possibly the   rPDA.  Aggressive risk factor modification for secondary prevention.    PROCEDURE PERFORMED:   Selective right and left coronary angiography  Left heart catheterization    PRE-OP DIAGNOSIS:  Syncope  NSTEMI    POST-OP DIAGNOSIS:  Syncope  NSTEMI    PROCEDURALISTS: Cheng Mills MD      DIAGNOSTIC PROCEDURAL DETAILS:   Signed, informed consent was obtained. The patient was placed on the table   and prepped and draped in the usual fashion. Time out and site   verification performed.   Access: Right radial artery  Catheters: JL 3.5, JR4  Hemostasis: TR band    PROCEDURAL MEDICATIONS:  Conscious sedation - midazolam and fentanyl, please see procedure log for   dosing.   Local anesthesia - 1% lidocaine   Procedural anticoagulation - 3000u of heparin     CONTRAST VOLUME: 50 mL Visipaque  BLOOD LOSS: minimal   FLUIDS: None   SPECIMENS: None  COMPLICATIONS: None    FINDINGS:  Left Heart Catheterization    LVEDP 15 mmHg  No significant gradient across the aortic valve    Coronary  Angiography:  LEFT MAIN: Short vessel that bifurcates into left anterior descending and   left circumflex arteries.  Left main has mild disease.  LEFT ANTERIOR DESCENDING: Large vessel that gives rise to a large diagonal   branch and multiple septal perforators.  The LAD wraps around the apex   distally.  The proximal LAD has a 80 to 90% stenosis followed by moderate   disease distally.  The apical LAD has diffuse 50 to 70% stenosis.  The   large diagonal branch has 70% proximal stenosis followed by mild disease   distally.  LEFT CIRCUMFLEX: Nondominant vessel that gives rise to a large OM1 branch,   large OM 2 branch, and terminates into a small vessel distally.  The   proximal left circumflex has 50-70% stenosis followed by 80% stenosis in   the mid to distal portion involving the OM 2 branch.  The OM1 branch has a   70 to 80% proximal stenosis followed by mild disease distally.  The OM 2   branch has a 70% proximal stenosis followed by mild disease distally.  RIGHT CORONARY ARTERY: Dominant vessel that gives rise to the posterior   descending artery.  The proximal to mid right coronary artery has moderate   to severe diffuse disease and is chronically occluded distally.  There are   left to right and right to right collaterals that fill the distal RCA.      Please do not hesitate to contact me if you have any questions or   concerns. I was present for the procedure in its entirety. Moderate   conscious sedation at level 3-4 with fentanyl and midazolam was   administered, and I spent continuous face to face time with the patient   which encompassed the duration of the procedure.  Please refer to the   sedation flow sheet for additional information.  I have reviewed and agree   with the documentation provided in the RN sedation flow sheet as outlined.   I concluded sedation and recovery was monitored by the trained independent   observer. I attest that I have ordered all medications administered,   equipment used,  and tests performed during the procedure.    Cheng Mills MD  Interventional Cardiology

## 2025-07-16 NOTE — ED NOTES
Bed: JNED-19  Expected date: 7/15/25  Expected time: 10:32 PM  Means of arrival: Ambulance  Comments:

## 2025-07-16 NOTE — CONSULTS
Cardiothoracic Surgery Consult    Date of Service: 7/16/2025    REFERRING CARDIOLOGIST: Dr. Cheng Cortez    REASON FOR CONSULTATION: Consideration for surgical revascularization     HISTORY OF PRESENT ILLNESS: Mayito Sood is a 64 year old year-old male who has a PMH of CKD, s/p bladder biopsy (pt states tumor removal), h/o kidney stones, Hyperkalemia, CAD, hypertension and DM (per patietn-no insulin for many months due to inventory) who presented to [facility] for chest pain and ruled in for NSTEMI, received Brilinta (7/15/2025). Coronary angiogram performed demonstrates severe multi-vessel coronary artery disease as below. CV Surgery is consulted for possible coronary artery bypass surgery.    Patient reports he had chest discomfort with dizziness and lost consciousness for 5 minutes at work, witnessed by coworkers. Mr. Sood reports before this event, he had felt the best he had felt in a long time. Recently, he stopped some of his blood pressure medications.    The patient's coronary artery disease risk factors include HTN, HLD, DMII and obesity (BMI 37). Patient is generally active. Patient denies history of bleeding/clotting issues and issues with anesthesia in the past.     Family history: non contributory.     Social history: a non smoker. He endorses rare alcohol use. Lives in [place] and works full time for [employer].     A1c 9.1  Creat 1.75  WBC 10.8  Hematocrit 40  Plts 284k  Troponin 285      PAST MEDICAL HISTORY:   Past Medical History:   Diagnosis Date    Acute pain     Acute sinusitis     TARUN (acute kidney injury)     Anxiety and depression 02/08/2023    Bacteremia     Cellulitis     Chronic back pain     Diabetes mellitus, type 2 (H) 08/27/2014    Dyslipidemia 07/05/2017    ED (erectile dysfunction)     HTN (hypertension) 08/29/2014    Infection involving bone (H)     Obesity     Obstructive uropathy     Osteomyelitis (H)     Polyneuropathy     Created by [email or web address] [facility]  Annotation: Jul 19 2010  3:22PM - Kushal Griffin: feet  Replacement Utility updated for latest IMO load         Proliferative diabetic retinopathy of both eyes with macular edema associated with type 2 diabetes mellitus (H) 08/30/2018    Formatting of this note might be different from the original.   Last seen      SIRS (systemic inflammatory response syndrome) (H)     Stage 3b chronic kidney disease (H) 02/08/2023    Ureterolithiasis        PAST SURGICAL HISTORY:   Past Surgical History:   Procedure Laterality Date    CHOLECYSTECTOMY      COMBINED CYSTOSCOPY, RETROGRADES, EXCHANGE STENT URETER(S) Left 06/12/2024    Procedure: CYSTOSCOPY, WITH RETROGRADE PYELOGRAM AND LEFT URETERAL STENT PLACEMENT;  Surgeon: Ramon Perea MD;  Location: [facility] OR    CYSTOSCOPY, BIOPSY BLADDER, COMBINED N/A 7/10/2024    Procedure: CYSTOSCOPY WITH BLADDER BIOPSY AND FULGURATION;  Surgeon: Ramon Perea MD;  Location: [facility] OR    CYSTOURETEROSCOPY, WITH LITHOTRIPSY USING CHACE 120P LASER AND URETERAL STENT INSERTION Left 7/10/2024    Procedure: CYSTOURETEROSCOPY, WITH RETROGRADE PYELOGRAM, HOLMIUM LASER LITHOTRIPSY OF URETERAL CALCULUS, AND STENT EXCHANGE;  Surgeon: Ramon Perea MD;  Location: [facility] OR    HC REPAIR ACHILLES TENDON,PRIMARY      Description: Primary Repair Of Ruptured Achilles Tendon;  Proc Date: 01/09/2003;    IR LUMBAR EPIDURAL STEROID INJECTION  12/04/2003    IR LUMBAR EPIDURAL STEROID INJECTION  07/07/2009    IR LUMBAR EPIDURAL STEROID INJECTION  08/20/2009    PICC  08/29/2014            ALLERGIES:   Allergies   Allergen Reactions    Iodine Unknown and Anaphylaxis    Shellfish-Derived Products Anaphylaxis    Prednisone Unknown and Other (See Comments)     Depression--gets emotional and angry    Depression--gets emotional and angry      Depression  pt gets emotional and angry    Lisinopril Cough    Metformin Diarrhea          CURRENT MEDICATIONS:  Current  Facility-Administered Medications   Medication Dose Route Frequency Provider Last Rate Last Admin    acetaminophen (TYLENOL) tablet 650 mg  650 mg Oral Q4H PRN Sadiq Guzmán MD        Or    acetaminophen (TYLENOL) Suppository 650 mg  650 mg Rectal Q4H PRN Sadiq Guzmán MD        aspirin (ASA) chewable tablet 81 mg  81 mg Oral Daily Cheng Mills MD   81 mg at 07/16/25 0803    atorvastatin (LIPITOR) tablet 80 mg  80 mg Oral QPM Cheng Mills MD        glucose gel 15-30 g  15-30 g Oral Q15 Min PRN Cheng Mills MD        Or    dextrose 50 % injection 25-50 mL  25-50 mL Intravenous Q15 Min PRN Cheng Mills MD        Or    glucagon injection 1 mg  1 mg Subcutaneous Q15 Min PRN Cheng Mills MD        escitalopram (LEXAPRO) tablet 20 mg  20 mg Oral QPM Cheng Mills MD        fentaNYL (PF) (SUBLIMAZE) injection 25 mcg  25 mcg Intravenous Q15 Min PRN Cheng Mills MD        flumazenil (ROMAZICON) injection 0.2 mg  0.2 mg Intravenous q1 min prn Cheng iMlls MD        heparin 25,000 units in 0.45% NaCl 250 mL ANTICOAGULANT infusion  0-5,000 Units/hr Intravenous Continuous Cheng Mills MD 12 mL/hr at 07/16/25 0800 1,200 Units/hr at 07/16/25 0800    HOLD:  Metformin and metformin containing medications if patient received IV contrast with acute kidney injury or severe chronic kidney disease (stage IV or stage V; i.e., eGFR less than 30)   Does not apply HOLD Cheng Mills MD        insulin aspart (NovoLOG) injection (RAPID ACTING)  1-10 Units Subcutaneous TID AC Cheng Mills MD   8 Units at 07/16/25 0759    insulin aspart (NovoLOG) injection (RAPID ACTING)  1-7 Units Subcutaneous At Bedtime Cheng Mills MD   7 Units at 07/16/25 0232    [START ON 7/17/2025] insulin glargine (LANTUS PEN) injection 25 Units  25 Units Subcutaneous At Bedtime Cheng Mills MD        melatonin tablet 3 mg  3 mg Oral At Bedtime PRN Sadiq Guzmán MD        metoprolol tartrate  (LOPRESSOR) tablet 25 mg  25 mg Oral BID Cheng Mills MD   25 mg at 07/16/25 0803    midazolam (VERSED) injection 0.5 mg  0.5 mg Intravenous Q5 Min PRN Cheng Mills MD        naloxone (NARCAN) injection 0.2 mg  0.2 mg Intravenous Q2 Min PRN Cheng Mills MD        Or    naloxone (NARCAN) injection 0.4 mg  0.4 mg Intravenous Q2 Min PRN Cheng Mills MD        Or    naloxone (NARCAN) injection 0.2 mg  0.2 mg Intramuscular Q2 Min PRN Cheng Mills MD        Or    naloxone (NARCAN) injection 0.4 mg  0.4 mg Intramuscular Q2 Min PRN Cheng Mills MD        oxyCODONE (ROXICODONE) tablet 5 mg  5 mg Oral Q4H PRN Cheng Mills MD        Or    oxyCODONE (ROXICODONE) tablet 10 mg  10 mg Oral Q4H PRN Cheng Mills MD        polyethylene glycol (MIRALAX) Packet 17 g  17 g Oral Daily PRN Sadiq Guzmán MD        sennosides (SENOKOT) tablet 1 tablet  1 tablet Oral BID PRN Sadiq Guzmán MD        sodium zirconium cyclosilicate (LOKELMA) packet 10 g  10 g Oral Daily Sadiq Guzmán MD   10 g at 07/16/25 0601         FAMILY HISTORY:   Family History   Problem Relation Age of Onset    Cancer Mother          SOCIAL HISTORY:   Social History     Socioeconomic History    Marital status: Single     Spouse name: Not on file    Number of children: Not on file    Years of education: Not on file    Highest education level: Not on file   Occupational History    Not on file   Tobacco Use    Smoking status: Never     Passive exposure: Never    Smokeless tobacco: Never   Substance and Sexual Activity    Alcohol use: Yes     Comment: rare    Drug use: No    Sexual activity: Not on file   Other Topics Concern    Not on file   Social History Narrative    Patient is employed as a  at a post [employer] and works overnight shifts.  Patient works on cars as a hobby.     Social Drivers of Health     Financial Resource Strain: Low Risk  (7/16/2025)    Financial Resource Strain     Within the past 12 months,  have you or your family members you live with been unable to get utilities (heat, electricity) when it was really needed?: No   Food Insecurity: Low Risk  (7/16/2025)    Food Insecurity     Within the past 12 months, did you worry that your food would run out before you got money to buy more?: No     Within the past 12 months, did the food you bought just not last and you didn t have money to get more?: No   Transportation Needs: Low Risk  (7/16/2025)    Transportation Needs     Within the past 12 months, has lack of transportation kept you from medical appointments, getting your medicines, non-medical meetings or appointments, work, or from getting things that you need?: No   Physical Activity: Not on file   Stress: Not on file   Social Connections: Socially Integrated (5/23/2025)    [facility]    Social Connections     Do you often feel lonely or isolated from those around you?: 0   Interpersonal Safety: Low Risk  (7/16/2025)    Interpersonal Safety     Do you feel physically and emotionally safe where you currently live?: Yes     Within the past 12 months, have you been hit, slapped, kicked or otherwise physically hurt by someone?: No     Within the past 12 months, have you been humiliated or emotionally abused in other ways by your partner or ex-partner?: No   Housing Stability: Low Risk  (7/16/2025)    Housing Stability     Do you have housing? : Yes     Are you worried about losing your housing?: No       REVIEW OF SYSTEMS:  CONSTITUTIONAL: No weight loss, fever   SKIN: No rash  CARDIOVASCULAR: No chest pain or chest discomfort. No palpitations or edema.   RESPIRATORY: No shortness of breath or cough.  GASTROINTESTINAL: No nausea, vomiting or diarrhea. No abdominal pain.  NEUROLOGICAL: No headache, dizziness, syncope  HEMATOLOGIC: No anemia, bleeding or bruising.     PHYSICAL EXAMINATION:   Vitals: /64   Pulse 83   Temp 98.6  F (37  C) (Oral)   Resp 22   " Ht 1.854 m (6' 1\")   Wt 129.1 kg (284 lb 9.6 oz)   SpO2 95%   BMI 37.55 kg/m    GENERAL:  Well developed and well nourished   CARDIOVASCULAR: RRR on monitor  RESPIRATIONS: Non-labored breathing on on nasal canula   ABDOMEN: Soft, non-distended, non-tender  EXTREMITIES: No deformity, mild lower ext edema  NEUROLOGIC: Intact and symmetric with no focal deficits.   PSYCHIATRIC: Alert and oriented x3, pleasant     LABORATORY STUDIES:   Lab Results   Component Value Date    WBC 10.8 07/16/2025    HGB 13.1 (L) 07/16/2025    HCT 40.3 07/16/2025    MCV 93 07/16/2025     07/16/2025      Lab Results   Component Value Date    BUN 31.3 (H) 07/16/2025     (L) 07/16/2025    CO2 18 (L) 07/16/2025     No results found for: \"HGBA1C\"    EKG:   Initial EKG sinus, left axis deviation, LVH with repolarization abnormalities. Repeat EKG at the time of hypotension with sinus tachycardia, LVH with repolarization abnormality     CARDIAC CATHETERIZATION:   CONCLUSIONS:   Severe multivessel coronary artery disease involving the proximal LAD, apical LAD, proximal large diagonal branch, proximal to mid left circumflex, proximal OM 1 and 2 branches, and chronic total occlusion of the distal RCA that fills via L-R and R-R collaterals.  Normal left-sided filling pressures.  No aortic valve stenosis.    TRANSTHORACIC ECHOCARDIOGRAM:   Interpretation Summary   1. Normal left ventricular size and systolic performance. The visually  estimated ejection fraction is 55%.  2. On selected views, the distal anterior segment appears mildly hypokinetic.  3. No significant valvular heart disease is identified on this study.  4. Normal right ventricular size and systolic performance.  5. There is mild left atrial enlargement.  6. There is mild enlargement of the proximal ascending aorta (3.9 cm).    CAROTID ULTRASOUND:  Pending    CXR:      IMPRESSION AND PLAN: Mayito Sood is a 64 year old with severe multi-vessel coronary artery disease, " recent NSTEMI and acute on CKD. Echocardiography demonstrates preserved left ventricular function with an LVEF of 55%. After review of the above information, we believe that he is a reasonable surgical candidate. Our team discussed the technical details of coronary artery bypass grafting with the patient, as well as the expected postoperative course and recovery. The risks include but are not limited to bleeding, infection, stroke, heart or graft failure, dysrhythmia, respiratory failure, kidney or liver injury, bowel or limb ischemia, and death. His calculated STS risk for mortality is 2.0%. The calculated risk of major morbidity or mortality is 11.5% with risk of permanent stroke being 1.42%.     - Timing of CABG to be determined.  Dr. Tomas to see tomorrow    - Received Brilinta 7/15  - following kidney status  - continue to hold ACE/ARB  - CT scan without contrast ordered  - on Heparin gtt  - Continue beta blocker    - Remainder of cares per primary team.       Thank you very much for this referral.       Patient and plan discussed with attending.      STS RISK:        _______  Nida Atkinson CNP  [facility]   Cardiothoracic Surgery  Secure chat or Vocera  Office: [phone]

## 2025-07-16 NOTE — ED PROVIDER NOTES
NAME: Mayito Sood  AGE: 64 year old male  YOB: 1960  MRN: [id number]  EVALUATION DATE & TIME: 7/15/2025 10:38 PM    PCP: Jone Garcia    ED PROVIDER: Guicho Zuniga M.D.      Chief Complaint   Patient presents with    Syncope         FINAL IMPRESSION:  1. ST elevation myocardial infarction (STEMI), unspecified artery (H)    2. ACS (acute coronary syndrome) (H)      MEDICAL DECISION MAKING:    10:14 PM I met with the patient, obtained history, performed an initial exam, and discussed options and plan for diagnostics and treatment here in the ED.   11:11 PM I called code STEMI.   11:14 PM I spoke with Interventional Cardiology, Dr. Mills, about patient's case.   11:17 PM I reevaluated the patient.   1:04 AM I reevaluated the patient.  And the Cath Lab staff here to take patient to Cath Lab.    Patient was clinically assessed and consented to treatment. After assessment, medical decision making and workup were discussed with the patient. The patient was agreeable to plan for testing, workup, and treatment.  Pertinent Labs & Imaging studies reviewed. (See chart for details)       Medical Decision Making  I obtained history from Family Member/Significant Other  I reviewed the EMR: Outpatient Record: Recent outpatient chart with medication changes reviewed from May of this year  Care impacted by Diabetes, Hyperlipidemia, and Hypertension  I considered additional work up, including CTA of the chest, but deferred following changes in EKG and decreased pressure  I independently interpreted the EKG and note show slight widening QRS however on repeat EKG showed significant ST depressions in lateral leads. See radiology report for final interpretation.  I discussed the care with another health care provider: Interventional cardiology  Admit.    MIPS (CTPE, Dental pain, Gutierrez, Sinusitis, Asthma/COPD, Head Trauma): Not Applicable    SEPSIS: The patient has a STEMI     The patient was evaluated at 10:14  PM   Cath lab transfer was delayed due to atypical presentation   ASA given in the ER    Mayito Sood is a 64 year old male who presents with syncope.   Differential diagnosis includes but not limited to acute coronary syndrome, dehydration, hyperglycemia, orthostatic hypotension, DKA, hyperkalemia.  Patient 64-year-old male brought in by EMS for syncopal episode at work.  Patient was not in the heat but does have history of hypertension and diabetes.  Patient's blood sugar has been elevated for the last month as he has been out of his insulin including Lantus and short acting.  Patient presently feeling well except for some lightheadedness when standing but sitting does not describe any symptoms.  He did have some chest pain and lightheadedness earlier in the day in the morning before going to work but does not describe any symptoms presently.  EKG done from triage concerning for some peaked T waves in precordial leads but also widening QRS which is also concerning for cardiac strain.  Patient possibly with hyperkalemia but does not have ketone smell and blood sugar was in the 400s.  Labs will be obtained as well as troponin and placed on cardiac monitor.  Patient brought back to monitored bed as soon as possible due to boarding crisis patient was in triage when I first saw him.  I informed charge nurse patient needed to be brought back and patient will be brought back shortly.  Following bring patient back labs were obtained and did show hyperglycemia.  While getting up to go for urine sample patient had drop in blood pressure and felt lightheaded.  He also experienced chest pain at that time and was placed back in bed.  Chest pain started to resolve quickly but IV fluids 500 mL were given and this helped with some of the chest pain and lightheadedness.  Repeat EKG at that time showed sinus tachycardia with significant ST depressions in lateral leads and further widening of QRS concerning for acute STEMI but only  showing reciprocal changes.  Given the chest pain and new finding on EKG patient was activated for Cath Lab.  I spoke to interventional cardiology who was in Cath Lab with the patient at that time.  They will come and get patient as soon as they are able to clear Cath Lab and proceed with the case.  Patient's chest pain had resolved after the fluids and was maintaining blood pressure.  He did have some drop in oxygen and was placed on nasal cannula.  Patient otherwise reports being comfortable now with no pain.  Labs showed equivocal troponin which we will plan for repeat but patient with plan for Cath Lab already after discussion with cardiology.  Rhythm strip it does appear that some of the depressions did improve however will still continue with plan for Cath Lab.  Cath Lab still with the patient and due to atypical presentation and no chest pain this time while they clear the Cath Lab patient monitored in the ER and then went to Cath Lab with staff.    Critical Care     Performed by: Dr uLis Zuniga  Authorized by: Dr Luis Zuniga  Total critical care time: 164 minutes  Critical care was necessary to treat or prevent imminent or life-threatening deterioration of the following conditions: Syncopal episode, DKA, hyperglycemia, acute coronary syndrome, STEMI.  Critical care was time spent personally by me on the following activities: development of treatment plan with patient or surrogate, discussions with consultants, examination of patient, evaluation of patient's response to treatment, obtaining history from patient or surrogate, ordering and performing treatments and interventions, ordering and review of laboratory studies, ordering and review of radiographic studies, re-evaluation of patient's condition and monitoring for potential decompensation.  Critical care time was exclusive of separately billable procedures and treating other patients.     MEDICATIONS GIVEN IN THE EMERGENCY:  Medications   sodium chloride  0.9% BOLUS 250 mL (has no administration in time range)   atropine injection 0.5 mg (has no administration in time range)   fentaNYL (PF) (SUBLIMAZE) injection 25 mcg (has no administration in time range)   midazolam (VERSED) injection 0.5 mg (has no administration in time range)   naloxone (NARCAN) injection 0.2 mg (has no administration in time range)     Or   naloxone (NARCAN) injection 0.4 mg (has no administration in time range)     Or   naloxone (NARCAN) injection 0.2 mg (has no administration in time range)     Or   naloxone (NARCAN) injection 0.4 mg (has no administration in time range)   flumazenil (ROMAZICON) injection 0.2 mg (has no administration in time range)   HOLD:  Metformin and metformin containing medications if patient received IV contrast with acute kidney injury or severe chronic kidney disease (stage IV or stage V; i.e., eGFR less than 30) (has no administration in time range)   hydrALAZINE (APRESOLINE) injection 10 mg (has no administration in time range)   oxyCODONE (ROXICODONE) tablet 5 mg (has no administration in time range)     Or   oxyCODONE (ROXICODONE) tablet 10 mg (has no administration in time range)   diphenhydrAMINE (BENADRYL) 25 mg, acetaminophen (TYLENOL) 500 mg alternative for Tylenol PM (has no administration in time range)   escitalopram (LEXAPRO) tablet 10 mg (has no administration in time range)   insulin glargine (LANTUS PEN) injection 25 Units (has no administration in time range)   tamsulosin (FLOMAX) capsule 0.4 mg (has no administration in time range)   aspirin (ASA) chewable tablet 81 mg (has no administration in time range)   heparin 25,000 units in 0.45% NaCl 250 mL ANTICOAGULANT infusion (1,200 Units/hr Intravenous Rate/Dose Verify 7/16/25 2480)   metoprolol tartrate (LOPRESSOR) tablet 25 mg (has no administration in time range)   atorvastatin (LIPITOR) tablet 80 mg (has no administration in time range)   glucose gel 15-30 g (has no administration in time range)      Or   dextrose 50 % injection 25-50 mL (has no administration in time range)     Or   glucagon injection 1 mg (has no administration in time range)   insulin aspart (NovoLOG) injection (RAPID ACTING) (has no administration in time range)   insulin aspart (NovoLOG) injection (RAPID ACTING) (7 Units Subcutaneous $Given 7/16/25 0232)   acetaminophen (TYLENOL) tablet 650 mg (has no administration in time range)     Or   acetaminophen (TYLENOL) Suppository 650 mg (has no administration in time range)   sennosides (SENOKOT) tablet 1 tablet (has no administration in time range)   polyethylene glycol (MIRALAX) Packet 17 g (has no administration in time range)   melatonin tablet 3 mg (has no administration in time range)   sodium chloride 0.9% BOLUS 500 mL (0 mLs Intravenous Stopped 7/16/25 0000)   aspirin (ASA) chewable tablet 324 mg (324 mg Oral $Given 7/15/25 2320)   ticagrelor (BRILINTA) tablet 180 mg (180 mg Oral $Given 7/15/25 2320)   heparin (porcine) injection 1000 units/mL (rounds in 500 unit increments) (9,000 Units Intravenous $Given 7/15/25 2320)   insulin glargine (LANTUS PEN) injection 25 Units (25 Units Subcutaneous $Given 7/16/25 0040)       NEW PRESCRIPTIONS STARTED AT TODAY'S ER VISIT:  Current Discharge Medication List             =================================================================    HPI    Patient information was obtained from: Patient.     Use of : N/A         Mayito Sood is a 64 year old male with a past medical history of type II diabetes, hypertension, CKD stage 3b, anxiety, and depression, who presents via EMS for evaluation of a syncopal episode.     Patient woke up this morning with a heart racing sensation and right sided chest pain that he attributed to his blood sugar being elevated as he has not been on his insulin for about 4 weeks. Symptoms alleviated on their own, so he thought nothing of it and went to work. Patient works as a  and while at work he was  walking down a hallway after getting something to drink and he had a syncopal episode were he lost consciousness. Prior to the syncopal episode, patient felt diaphoretic and lightheaded. No chest pain, shortness of breath, or nausea prior to the episode. Patient fell to the ground where he sustained an abrasion to his right knee. He does not believe he hit his head as he denies any head pain. Patient has a personal cardiac history where he reports taking amlodipine and hydrochlorothiazide, but also notes his doses recently changed. He can not recall any specific diagnoses, but notes he has had a stress test many years ago. No other symptoms, complaints, or concerns at this time.     Per chart review:  5/23/2025 Patient was seen at [facility] for diabetes management visit. He has been off of insulin for about 1 month secondary to insurance issues. Currently on Jardiance and was previously on Victoza. He has experienced episodes of dizziness and vomiting, which he attributes to low blood pressure. He has stopped taking his blood pressure medications, including amlodipine and lisinopril, due to side effects.       REVIEW OF SYSTEMS   Review of Systems as per HPI, otherwise systems negative.    PAST MEDICAL HISTORY:  Past Medical History:   Diagnosis Date    Acute pain     Acute sinusitis     TARUN (acute kidney injury)     Anxiety and depression 02/08/2023    Bacteremia     Cellulitis     Chronic back pain     Diabetes mellitus, type 2 (H) 08/27/2014    Dyslipidemia 07/05/2017    ED (erectile dysfunction)     HTN (hypertension) 08/29/2014    Infection involving bone (H)     Obesity     Obstructive uropathy     Osteomyelitis (H)     Polyneuropathy     Created by [facility] Annotation: Jul 19 2010  3:22PM - Kushal Griffin: feet  Replacement Utility updated for latest IMO load         Proliferative diabetic retinopathy of both eyes with macular edema associated with type 2 diabetes mellitus  (H) 08/30/2018    Formatting of this note might be different from the original.   Last seen      SIRS (systemic inflammatory response syndrome) (H)     Stage 3b chronic kidney disease (H) 02/08/2023    Ureterolithiasis        PAST SURGICAL HISTORY:  Past Surgical History:   Procedure Laterality Date    CHOLECYSTECTOMY      COMBINED CYSTOSCOPY, RETROGRADES, EXCHANGE STENT URETER(S) Left 06/12/2024    Procedure: CYSTOSCOPY, WITH RETROGRADE PYELOGRAM AND LEFT URETERAL STENT PLACEMENT;  Surgeon: Ramon Perea MD;  Location: [facility] OR    CYSTOSCOPY, BIOPSY BLADDER, COMBINED N/A 7/10/2024    Procedure: CYSTOSCOPY WITH BLADDER BIOPSY AND FULGURATION;  Surgeon: Ramon Perea MD;  Location: [facility] OR    CYSTOURETEROSCOPY, WITH LITHOTRIPSY USING CHACE 120P LASER AND URETERAL STENT INSERTION Left 7/10/2024    Procedure: CYSTOURETEROSCOPY, WITH RETROGRADE PYELOGRAM, HOLMIUM LASER LITHOTRIPSY OF URETERAL CALCULUS, AND STENT EXCHANGE;  Surgeon: Ramon Perea MD;  Location: [facility] OR    HC REPAIR ACHILLES TENDON,PRIMARY      Description: Primary Repair Of Ruptured Achilles Tendon;  Proc Date: 01/09/2003;    IR LUMBAR EPIDURAL STEROID INJECTION  12/04/2003    IR LUMBAR EPIDURAL STEROID INJECTION  07/07/2009    IR LUMBAR EPIDURAL STEROID INJECTION  08/20/2009    PICC  08/29/2014            CURRENT MEDICATIONS:      Current Facility-Administered Medications:     acetaminophen (TYLENOL) tablet 650 mg, 650 mg, Oral, Q4H PRN **OR** acetaminophen (TYLENOL) Suppository 650 mg, 650 mg, Rectal, Q4H PRN, Sadiq Guzmán MD    aspirin (ASA) chewable tablet 81 mg, 81 mg, Oral, Daily, Cheng Mills MD    atorvastatin (LIPITOR) tablet 80 mg, 80 mg, Oral, QPM, Cheng Mills MD    atropine injection 0.5 mg, 0.5 mg, Intravenous, Once PRN, Cheng Mills MD    glucose gel 15-30 g, 15-30 g, Oral, Q15 Min PRN **OR** dextrose 50 % injection 25-50 mL, 25-50 mL, Intravenous, Q15 Min PRN **OR**  glucagon injection 1 mg, 1 mg, Subcutaneous, Q15 Min PRN, Cheng Mills MD    diphenhydrAMINE (BENADRYL) 25 mg, acetaminophen (TYLENOL) 500 mg alternative for Tylenol PM, , Oral, At Bedtime PRN, Cheng Mills MD    escitalopram (LEXAPRO) tablet 10 mg, 10 mg, Oral, Daily, Cheng Mills MD    fentaNYL (PF) (SUBLIMAZE) injection 25 mcg, 25 mcg, Intravenous, Q15 Min PRN, Cheng Mills MD    flumazenil (ROMAZICON) injection 0.2 mg, 0.2 mg, Intravenous, q1 min prn, Cheng Mills MD    heparin 25,000 units in 0.45% NaCl 250 mL ANTICOAGULANT infusion, 0-5,000 Units/hr, Intravenous, Continuous, Cheng Mills MD, Last Rate: 12 mL/hr at 07/16/25 0503, 1,200 Units/hr at 07/16/25 0503    HOLD:  Metformin and metformin containing medications if patient received IV contrast with acute kidney injury or severe chronic kidney disease (stage IV or stage V; i.e., eGFR less than 30), , Does not apply, HOLD, Cheng Mills MD    hydrALAZINE (APRESOLINE) injection 10 mg, 10 mg, Intravenous, Once PRN, Cheng Mills MD    insulin aspart (NovoLOG) injection (RAPID ACTING), 1-10 Units, Subcutaneous, TID AC, Cheng Mills MD    insulin aspart (NovoLOG) injection (RAPID ACTING), 1-7 Units, Subcutaneous, At Bedtime, Cheng Mills MD, 7 Units at 07/16/25 0232    [START ON 7/17/2025] insulin glargine (LANTUS PEN) injection 25 Units, 25 Units, Subcutaneous, At Bedtime, Cheng Mills MD    melatonin tablet 3 mg, 3 mg, Oral, At Bedtime PRN, Sadiq Guzmán MD    metoprolol tartrate (LOPRESSOR) tablet 25 mg, 25 mg, Oral, BID, Cheng Mills MD    midazolam (VERSED) injection 0.5 mg, 0.5 mg, Intravenous, Q5 Min PRN, Cheng Mills MD    naloxone (NARCAN) injection 0.2 mg, 0.2 mg, Intravenous, Q2 Min PRN **OR** naloxone (NARCAN) injection 0.4 mg, 0.4 mg, Intravenous, Q2 Min PRN **OR** naloxone (NARCAN) injection 0.2 mg, 0.2 mg, Intramuscular, Q2 Min PRN **OR** naloxone (NARCAN) injection 0.4 mg, 0.4 mg,  Intramuscular, Q2 Min PRN, Cheng Mills MD    oxyCODONE (ROXICODONE) tablet 5 mg, 5 mg, Oral, Q4H PRN **OR** oxyCODONE (ROXICODONE) tablet 10 mg, 10 mg, Oral, Q4H PRN, Cheng Mills MD    polyethylene glycol (MIRALAX) Packet 17 g, 17 g, Oral, Daily PRN, Sadiq Guzmán MD    sennosides (SENOKOT) tablet 1 tablet, 1 tablet, Oral, BID PRN, Sadiq Guzmán MD    sodium chloride 0.9% BOLUS 250 mL, 250 mL, Intravenous, Once PRN, Cheng Mills MD    tamsulosin (FLOMAX) capsule 0.4 mg, 0.4 mg, Oral, Daily, Cheng Mills MD    ALLERGIES:  Allergies   Allergen Reactions    Iodine Unknown and Anaphylaxis    Shellfish-Derived Products Anaphylaxis    Prednisone Unknown and Other (See Comments)     Depression--gets emotional and angry    Depression--gets emotional and angry      Depression  pt gets emotional and angry    Metformin Diarrhea       FAMILY HISTORY:  Family History   Problem Relation Age of Onset    Cancer Mother        SOCIAL HISTORY:   Social History     Socioeconomic History    Marital status: Single   Tobacco Use    Smoking status: Never     Passive exposure: Never    Smokeless tobacco: Never   Substance and Sexual Activity    Alcohol use: Yes     Comment: rare    Drug use: No   Social History Narrative    Patient is employed as a  at a post [employer] and works overnight shifts.  Patient works on cars as a hobby.     Social Drivers of Health     Financial Resource Strain: Low Risk  (5/23/2025)    Received from [facility]    Financial Resource Strain     Difficulty of Paying Living Expenses: 3   Food Insecurity: No Food Insecurity (5/23/2025)    Received from [facility]    Food Insecurity     Do you worry your food will run out before you are able to buy more?: 1   Transportation Needs: No Transportation Needs (5/23/2025)    Received from [facility]    Transportation Needs     Does lack  of transportation keep you from medical appointments?: 1     Does lack of transportation keep you from work, meetings or getting things that you need?: 1   Social Connections: Socially Integrated (5/23/2025)    Received from [facility]    Social Connections     Do you often feel lonely or isolated from those around you?: 0   Interpersonal Safety: Not At Risk (6/9/2024)    [facility] IP Custom IPV     Do you feel UNSAFE in any of your personal relationships with your family members or any other acquaintances?: No   Housing Stability: Low Risk  (5/23/2025)    [facility] from [facility]    Housing Stability     What is your housing situation today?: 1       PHYSICAL EXAM:    Vitals: BP (!) 162/89   Pulse 80   Temp 98.7  F (37.1  C) (Oral)   Resp 19   Wt 131.5 kg (290 lb)   SpO2 97%   BMI 38.26 kg/m     Physical Exam  Vitals and nursing note reviewed.   Constitutional:       General: He is not in acute distress.     Appearance: Normal appearance. He is obese. He is diaphoretic. He is not ill-appearing or toxic-appearing.   HENT:      Head: Normocephalic.   Cardiovascular:      Rate and Rhythm: Normal rate and regular rhythm.      Heart sounds: Normal heart sounds.   Pulmonary:      Effort: Pulmonary effort is normal. No respiratory distress.      Breath sounds: Normal breath sounds.   Musculoskeletal:      Cervical back: Normal range of motion and neck supple.      Right lower leg: No edema.      Left lower leg: No edema.   Skin:     General: Skin is warm.      Coloration: Skin is not pale.   Neurological:      General: No focal deficit present.      Mental Status: He is alert and oriented to person, place, and time.      Cranial Nerves: No cranial nerve deficit.      Sensory: No sensory deficit.      Coordination: Coordination normal.   Psychiatric:         Behavior: Behavior normal.            LAB:  All pertinent labs reviewed and interpreted.  Labs Ordered and Resulted from Time of ED Arrival to Time of ED Departure   BASIC METABOLIC PANEL - Abnormal       Result Value    Sodium 137      Potassium 5.2      Chloride 109 (*)     Carbon Dioxide (CO2) 20 (*)     Anion Gap 8      Urea Nitrogen 31.2 (*)     Creatinine 1.96 (*)     GFR Estimate 38 (*)     Calcium 8.1 (*)     Glucose 478 (*)    TROPONIN T, HIGH SENSITIVITY - Abnormal    Troponin T, High Sensitivity 67 (*)    BLOOD GAS VENOUS - Abnormal    pH Venous 7.24 (*)     pCO2 Venous 46      pO2 Venous 38      Bicarbonate Venous 20 (*)     Base Excess/Deficit Venous -7.7 (*)     FIO2 21      Oxyhemoglobin Venous 65 (*)     O2 Sat, Venous 65.8 (*)    ROUTINE UA WITH MICROSCOPIC REFLEX TO CULTURE - Abnormal    Color Urine Colorless      Appearance Urine Clear      Glucose Urine >1000 (*)     Bilirubin Urine Negative      Ketones Urine Negative      Specific Gravity Urine 1.024      Blood Urine 0.03 mg/dL (*)     pH Urine 5.5      Protein Albumin Urine 100 (*)     Urobilinogen Urine Normal      Nitrite Urine Negative      Leukocyte Esterase Urine Negative      Mucus Urine Present (*)     RBC Urine <1      WBC Urine 1      Hyaline Casts Urine 1     NT-PROBNP - Abnormal    NT-proBNP 594 (*)    CBC WITH PLATELETS AND DIFFERENTIAL - Abnormal    WBC Count 8.9      RBC Count 4.10 (*)     Hemoglobin 12.8 (*)     Hematocrit 38.1 (*)     MCV 93      MCH 31.2      MCHC 33.6      RDW 13.0      Platelet Count 291      % Neutrophils 83      % Lymphocytes 9      % Monocytes 6      % Eosinophils 2      % Basophils 0      % Immature Granulocytes 0      NRBCs per 100 WBC 0      Absolute Neutrophils 7.4      Absolute Lymphocytes 0.8      Absolute Monocytes 0.6      Absolute Eosinophils 0.2      Absolute Basophils 0.0      Absolute Immature Granulocytes 0.0      Absolute NRBCs 0.0     TROPONIN T, HIGH SENSITIVITY - Abnormal    Troponin T, High Sensitivity 95 (*)    GLUCOSE BY  METER - Abnormal    GLUCOSE BY METER POCT 435 (*)    MAGNESIUM - Normal    Magnesium 1.7     INR - Normal    INR 0.98      PT 13.2     PARTIAL THROMBOPLASTIN TIME - Normal    aPTT 30         RADIOLOGY:  Cardiac Catheterization   Final Result      XR Chest Port 1 View   Final Result   IMPRESSION:       No acute cardiopulmonary process. Stable cardiomediastinal silhouette.      Echocardiogram Complete    (Results Pending)       EKG:   Performed at: 15-Jul-2025 21:56  Impression: Sinus rhythm with LVH and widened QRS, significant change in morphology compared to prior EKG from June 2016 concerning for cardiac strain or ischemia, possibly hyperkalemia  Rate: 90 bpm  Rhythm: Sinus rhythm  QRS Interval: 118 ms  QTc Interval: 437 ms  Comparison: When compared with ECG of 11-Jun-2016, ST now depressed in Lateral leads. Nonspecific T wave abnormality no longer evident in Inferior leads.      Performed at: 15-Jul-2025 23:07   Impression: Sinus tachycardia, LVH, ST depressions significant in lateral and inferior leads, no ST elevation but significant change compared to prior  Rate: 106 bpm  Rhythm: Sinus tachycardia.  QRS Interval: 124 ms  QTc Interval: 464 ms  Comparison: When compared with ECG of 15-Jul-2025, ST now depressed in Inferior leads. ST more depressed Lateral leads.     I have independently reviewed and interpreted the EKG(s) documented above.     PROCEDURES:   Procedures       I, Roxana Thorpe, am serving as a scribe to document services personally performed by Dr. Guicho Zuniga  based on my observation and the provider's statements to me. I, Guicho Zuniga MD attest that Roxana Thorpe is acting in a scribe capacity, has observed my performance of the services and has documented them in accordance with my direction.      Guicho Zuniga M.D.  Emergency Medicine  [facility]       Guicho Zuniga MD  07/16/25 0518

## 2025-07-16 NOTE — ED NOTES
Patient stated chest pain now is on left side. Placed on 2L O2/nc as O2 sats are varying; sometimes in high 80s.

## 2025-07-16 NOTE — PROGRESS NOTES
[facility]    Medicine Progress Note - Hospitalist Service    Date of Admission:  7/15/2025    Assessment & Plan      Mayito Sood is a 64 year old male with a past medical history of Type 2 Diabetes who was admitted on 7/15/25 after presenting to [facility] ED for Chest pain and syncope.    #NSTEMI  - Chest pain started 7/15, had episode of syncope at work  - Patient with troponin 67 -> 97  - EKG with ST Depression. Cardiology not calling STEMI  - [facility] 7/16 AM - Multiple areas of Diffuse coronary disease  - Cardiology following  - CT Surgery consulted to discuss CABG  - Echo TTE ordered  - s/p aspirin 325mg x1 and continue 81mg   - Atorvastatin 80mg daily   - Metoprolol 25mg BID  - Given ticagrelor x1, not continued -   ---hold off on ACE  ---CTS getting CT chest  --Continue cardiac monitor  --on heparin gtt    #Acute Hypoxic Respiratory Failure  - improved overnight - now on 4 lpm  --Patient not on home oxygen, requiring up to 10L NC after [facility].  - Likely combination of fluid overload with NSTEMI and sedation from [facility].   - CXR on 7/15 showing no acute cardiopulmonary process.  Plan  - Cardiology following as above   --given lasix today    #Hyperkalemia  - resolved  --Potassium 5.2 on ED presentation, then 5.8 with AM labs 7/16  --s/p  insulin  lokelma x1   - recheck in am     Hyponatremia  ---mild, contnue to monitor    Uncontrolled hypertension  --on amlodipine and cozaar  ---hold cozaar   ---started on metoprolol  ---diuresis per cards  - gave lasix once    #Type 2 Diabetes  #Hyperglycemia without DKA  - Last A1c was 9.1% on 7/15/25  - PTA Meds:  empagliflozin, no insulin for many months per patient , no victoza for a month  ---continue Lantus 25 units nightly   ---hold jardiance  - Sliding Scale Insulin with Glucose Checks    CKD III  ---Cr baseline appears to be around 1.75  --hold cozaar for now    BPH  --continue home flomax    hyperlipidemia  ---was on pravastatin  ---now on  atorvastatin          Diet: Advance Diet as Tolerated: Clear Liquid Diet    DVT Prophylaxis: Heparin GTT  Gutierrez Catheter: Not present  Lines: None     Cardiac Monitoring: ACTIVE order. Indication: Post- PCI/Angiogram (24 hours)  Code Status: Full Code      Clinically Significant Risk Factors Present on Admission        # Hyperkalemia: Highest K = 5.8 mmol/L in last 2 days, will monitor as appropriate  # Hyponatremia: Lowest Na = 133 mmol/L in last 2 days, will monitor as appropriate  # Hyperchloremia: Highest Cl = 110 mmol/L in last 2 days, will monitor as appropriate        # Hypomagnesemia: Lowest Mg = 1.6 mg/dL in last 2 days, will replace as needed       # Hypertension: Noted on problem list          # DMII: A1C = 9.1 % (Ref range: <5.7 %) within past 6 months               Social Drivers of Health    Depression: At risk (5/23/2025)    Received from [facility]    PHQ-2     PHQ-2 TOTAL SCORE: 6          Disposition Plan     Medically Ready for Discharge: Anticipated in 5+ Days             Neisha Smalls MD  Hospitalist Service  [facility]  Securely message with [employer] (more info)  Text page via [employer] Paging/Directory   ______________________________________________________________________    Interval History   --- Patient seen with family in room  --- Discusses difficult nature finding a physician to prescribe his insulin as well as issues with pharmacy as reason why he has been out of it for a month  --- No current chest pain  --- Shortness of breath getting better    Physical Exam   Vital Signs: Temp: 98.6  F (37  C) Temp src: Oral BP: (!) 175/90 Pulse: 98   Resp: 22 SpO2: 97 % O2 Device: Nasal cannula Oxygen Delivery: 4 LPM  Weight: 290 lbs 0 oz    General Appearance: Pleasant male apparent distress  Respiratory: Clear to auscultation bilaterally  Cardiovascular: Regular rate and rhythm without murmurs  GI: Obese soft and nontender  Skin: Trace to 1+ lower  extremity edema  Other: Neurologically gross intact without focal deficits appreciated    Medical Decision Making             Data     I have personally reviewed the following data over the past 24 hrs:    10.8  \   13.1 (L)   / 284     136 107 32.2 (H) /  294 (H)   5.0 19 (L) 1.78 (H) \     Trop: 285 (HH) BNP: 594 (H)     TSH: N/A T4: N/A A1C: 9.1 (H)     INR:  0.98 PTT:  30   D-dimer:  N/A Fibrinogen:  N/A       Imaging results reviewed over the past 24 hrs:   Recent Results (from the past 24 hours)   XR Chest Port 1 View    Narrative    EXAM: XR CHEST PORT 1 VIEW  LOCATION: [facility]  DATE: 7/15/2025    INDICATION: chest pain  COMPARISON: 2019      Impression    IMPRESSION:     No acute cardiopulmonary process. Stable cardiomediastinal silhouette.   Cardiac Catheterization    Narrative    CARDIAC CATHETERIZATION AND INTERVENTION REPORT    PATIENT NAME:  Mayito Sood          MEDICAL RECORD NUMBER: [id number]  : 1960       PROCEDURE DATE: 2025        CONCLUSIONS:   Severe multivessel coronary artery disease involving the proximal LAD,   apical LAD, proximal large diagonal branch, proximal to mid left   circumflex, proximal OM 1 and 2 branches, and chronic total occlusion of   the distal RCA that fills via L-R and R-R collaterals.  Normal left-sided filling pressures.  No aortic valve stenosis.      RECOMMENDATIONS:   Usual postprocedure cares, please see orders.  Aspirin, high intensity statin, heparin gtt, beta-blocker.   Transthoracic echocardiogram  Recommend expedited cardiothoracic surgery consultation for evaluation of   surgical revascularization -potential targets include left anterior   descending artery, diagonal branch, OM1 and 2 branches, and possibly the   rPDA.  Aggressive risk factor modification for secondary prevention.    PROCEDURE PERFORMED:   Selective right and left coronary angiography  Left heart catheterization    PRE-OP  "I was rushing to get ready for work, lost my footing, then fell backwards and hit my right ribs on the toilet." Denies hitting head/LOC/blood thinners/any other injuries/CP. DIAGNOSIS:  Syncope  NSTEMI    POST-OP DIAGNOSIS:  Syncope  NSTEMI    PROCEDURALISTS: Cheng Mills MD      DIAGNOSTIC PROCEDURAL DETAILS:   Signed, informed consent was obtained. The patient was placed on the table   and prepped and draped in the usual fashion. Time out and site   verification performed.   Access: Right radial artery  Catheters: JL 3.5, JR4  Hemostasis: TR band    PROCEDURAL MEDICATIONS:  Conscious sedation - midazolam and fentanyl, please see procedure log for   dosing.   Local anesthesia - 1% lidocaine   Procedural anticoagulation - 3000u of heparin     CONTRAST VOLUME: 50 mL Visipaque  BLOOD LOSS: minimal   FLUIDS: None   SPECIMENS: None  COMPLICATIONS: None    FINDINGS:  Left Heart Catheterization    LVEDP 15 mmHg  No significant gradient across the aortic valve    Coronary Angiography:  LEFT MAIN: Short vessel that bifurcates into left anterior descending and   left circumflex arteries.  Left main has mild disease.  LEFT ANTERIOR DESCENDING: Large vessel that gives rise to a large diagonal   branch and multiple septal perforators.  The LAD wraps around the apex   distally.  The proximal LAD has a 80 to 90% stenosis followed by moderate   disease distally.  The apical LAD has diffuse 50 to 70% stenosis.  The   large diagonal branch has 70% proximal stenosis followed by mild disease   distally.  LEFT CIRCUMFLEX: Nondominant vessel that gives rise to a large OM1 branch,   large OM 2 branch, and terminates into a small vessel distally.  The   proximal left circumflex has 50-70% stenosis followed by 80% stenosis in   the mid to distal portion involving the OM 2 branch.  The OM1 branch has a   70 to 80% proximal stenosis followed by mild disease distally.  The OM 2   branch has a 70% proximal stenosis followed by mild disease distally.  RIGHT CORONARY ARTERY: Dominant vessel that gives rise to the posterior   descending artery.  The proximal to mid right coronary artery has moderate   to severe  diffuse disease and is chronically occluded distally.  There are   left to right and right to right collaterals that fill the distal RCA.      Please do not hesitate to contact me if you have any questions or   concerns. I was present for the procedure in its entirety. Moderate   conscious sedation at level 3-4 with fentanyl and midazolam was   administered, and I spent continuous face to face time with the patient   which encompassed the duration of the procedure.  Please refer to the   sedation flow sheet for additional information.  I have reviewed and agree   with the documentation provided in the RN sedation flow sheet as outlined.   I concluded sedation and recovery was monitored by the trained independent   observer. I attest that I have ordered all medications administered,   equipment used, and tests performed during the procedure.    Vicky Mills MD  Interventional Cardiology    Echocardiogram Complete    Narrative    [id number]  [id number]  [id number]  [id number]^LYDIA^VICKY^     [place] [address]     Name: LELAND BRITTON  MRN: [id number]  : 1960  Study Date: 2025 09:15 AM  Age: 64 yrs  Gender: Male  Patient Location: [facility]  Reason For Study: Acute Myocardial Infarction  Ordering Physician: Vicky Mills  Referring Physician: Vicky Mills  Performed By: ANDREA^^^^     BSA: 2.5 m2  Height: 73 in  Weight: 290 lb  HR: 77  BP: 191/90 mmHg  ______________________________________________________________________________  Procedure  Echocardiogram with two-dimensional, color and spectral Doppler. Definity (NDC  #[id number]) given intravenously.  ______________________________________________________________________________  Interpretation Summary     1. Normal left ventricular size and systolic performance. The visually  estimated ejection fraction is 55%.  2. On selected views, the distal anterior segment appears mildly hypokinetic.  3. No significant  valvular heart disease is identified on this study.  4. Normal right ventricular size and systolic performance.  5. There is mild left atrial enlargement.  6. There is mild enlargement of the proximal ascending aorta (3.9 cm).  ______________________________________________________________________________  Left ventricle:  Normal left ventricular size and systolic performance. The visually estimated  ejection fraction is 55%. On selected views, the distal anterior segment  appears mildly hypokinetic. Left ventricular wall thickness is normal.     Assessment of LV Diastolic Function: The cumulative findings suggest impaired  diastolic filling [The septal e' velocity is < 7 cm/s & lateral e' velocity  is  < 10 cm/s. The average E/e' is >14. The TR velocity cannot be determined due  to insufficient tricuspid insufficiency signal. Left atrial volume index is  greater than 34 mL/mÂ ].     Assessment of left atrial pressure (LAP): The cumulative findings suggest  moderately elevated left atrial pressure (the E/A is </= 0.8 and E is > 50  cm/s plus 2 or 3 of 3 of the following present: Average E/e' > 14,TRvel > 2.8  m/s, and/or LA vol. index > 34 ml/m2 ).     Right ventricle:  Normal right ventricular size and systolic performance.     Left atrium:  There is mild left atrial enlargement.     Right atrium:  The right atrium is of normal size.     IVC:  The IVC is of normal caliber.     Aortic valve:  The aortic valve is comprised of three cusps. No significant aortic stenosis  or aortic insufficiency is detected on this study.     Mitral valve:  The mitral valve appears morphologically normal. There is trace mitral  insufficiency.     Tricuspid valve:  The tricuspid valve is grossly morphologically normal. There is trace  tricuspid insufficiency.     Pulmonic valve:  The pulmonic valve is grossly morphologically normal.     Thoracic aorta:  There is mild enlargement of the proximal ascending aorta (3.9 cm).      Pericardium:  There is no significant pericardial effusion.  ______________________________________________________________________________  ______________________________________________________________________________  MMode/2D Measurements & Calculations  IVSd: 0.95 cm  LVIDd: 5.7 cm  LVIDs: 4.0 cm  LVPWd: 0.98 cm  FS: 30.3 %  LV mass(C)d: 218.0 grams  LV mass(C)dI: 86.5 grams/m2  Ao root diam: 3.8 cm  LA dimension: 3.9 cm  asc Aorta Diam: 3.9 cm  LA/Ao: 1.0  LVOT diam: 2.0 cm  LVOT area: 3.1 cm2  Ao root diam index Ht(cm/m): 2.0  Ao root diam index BSA (cm/m2): 1.5  Asc Ao diam index BSA (cm/m2): 1.6  Asc Ao diam index Ht(cm/m): 2.1  EF Biplane: 54.7 %  LA Volume (BP): 102.0 ml     LA Volume Index (BP): 40.5 ml/m2  LA Volume Indexed (AL/bp): 44.0 ml/m2  RV Base: 3.7 cm  RWT: 0.34  TAPSE: 2.9 cm     Doppler Measurements & Calculations  MV E max roshan: 106.0 cm/sec  MV A max roshan: 139.5 cm/sec  MV E/A: 0.76  MV dec slope: 426.6 cm/sec2  MV dec time: 0.25 sec  Ao V2 max: 192.0 cm/sec  Ao max PG: 15.0 mmHg  Ao V2 mean: 140.1 cm/sec  Ao mean P.7 mmHg  Ao V2 VTI: 37.5 cm  FREDA(I,D): 1.9 cm2  FREDA(V,D): 1.6 cm2  LV V1 max PG: 3.7 mmHg  LV V1 max: 96.8 cm/sec  LV V1 VTI: 22.4 cm  SV(LVOT): 69.4 ml  SI(LVOT): 27.5 ml/m2  PA V2 max: 120.7 cm/sec  PA max P.8 mmHg  PA acc time: 0.15 sec  AV Roshan Ratio (DI): 0.50  FREDA Index (cm2/m2): 0.73  E/E': 1.4  E/E' av.0  Lateral E/e': 13.8  Medial E/e': 18.2  Peak E' Roshan: 77.0 cm/sec  RV S Roshan: 20.3 cm/sec     ______________________________________________________________________________  Report approved by: Lenard Hahn MD on 2025 12:20 PM         US Carotid Bilateral    Narrative    EXAM: US CAROTID BILATERAL  LOCATION: [facility]  DATE: 2025    INDICATION: pre op eval. Carotid stenosis.  COMPARISON: None.  TECHNIQUE: Duplex exam performed utilizing 2D gray-scale imaging, Doppler interrogation with color-flow and spectral waveform  analysis. The percent diameter stenosis is determined using Updated Recommendations for Carotid Stenosis Interpretation Criteria   from [facility].    FINDINGS:    RIGHT: Mild plaque at the bifurcation. The peak systolic velocity in the ICA is less than 180 cm/sec, consistent with less than 50% stenosis. Normal velocities in the ECA. Antegrade flow within the vertebral artery.     LEFT: Mild plaque at the bifurcation. The peak systolic velocity in the ICA is less than 180 cm/sec, consistent with less than 50% stenosis. Normal velocities in the ECA. Antegrade flow within the vertebral artery.    VELOCITY CHART:  CCA   Right: 109/21 cm/s   Left: 108/21 cm/s  ICA   Right: 102/28 cm/s   Left: 167/43 cm/s  ECA   Right: 135/22 cm/s   Left: 185/21 cm/s  ICA/CCA PSV Ratio   Right: 0.94   Left: 1.30      Impression    IMPRESSION:  1.  Mild plaque formation, velocities consistent with less than 50% stenosis in the right internal carotid artery.  2.  Mild plaque formation, velocities consistent with less than 50% stenosis in the left internal carotid artery.  3.  Flow within the vertebral arteries is antegrade.

## 2025-07-16 NOTE — PLAN OF CARE
Goal Outcome Evaluation:      Plan of Care Reviewed With: patient    Overall Patient Progress: no changeOverall Patient Progress: no change    Outcome Evaluation: Admitted at 02:00.Post Angiogram. No PCI done. [facility] consult today. TR band with 13 ml per hand off report but was able to remove only 11 ml total by 03:45.Reverse Barbeau normal. CMS WNL. No hematoma. Heparin gtt started at 05:00 . [facility] and Cards consult today.      [facility] - ICU    RN Progress Note:            Pertinent Assessments:      Please refer to flowsheet rows for full assessment                Key Events - This Shift:       High Potasium of 5.8. Updated Dr. Guzmán including other lab results. Ordered Lokelma and Initiate Phos Protocol. Monitor BMP every 6 hours.     RN Managed Protocols Ordered:  Yes  Protocols:Phosphorus  PRN'S:  Protocols Status: Reviewed with Oncoming RN                Barriers to Discharge / Downgrade:     New admit.         Point of Contact Update: YES-OR-NO: Yes  If No, reason:   Name:Iraida ( S.O)  Phone Number:Present during admission  Summary of Conversation: Plan of care.          Problem: Acute Coronary Syndrome  Goal: Optimal Adaptation to Illness  Outcome: Progressing  Intervention: Support Adjustment to Life-Change Event  Recent Flowsheet Documentation  Taken 7/16/2025 0200 by Ann Sauer RN  Family/Support System Care:   caregiver stress acknowledged   self-care encouraged     Problem: Acute Coronary Syndrome  Goal: Effective Cardiac Pump Function  Outcome: Progressing     Problem: Comorbidity Management  Goal: Blood Glucose Levels Within Targeted Range  Outcome: Progressing  Intervention: Monitor and Manage Glycemia  Recent Flowsheet Documentation  Taken 7/16/2025 0400 by Ann Sauer RN  Medication Review/Management:   medications reviewed   high-risk medications identified  Taken 7/16/2025 0200 by Ann Sauer RN  Medication Review/Management:    medications reviewed   high-risk medications identified   [facility] - ICU Admission Note       Dual Skin Assessment:     Patient admitted from [facility] to ICU.      Comprehensive skin inspection completed by myself and Vikki BOWEN.      Abnormal skin assessment findings: yes      If yes above, LDA initiated for skin breakdown/non-blanchable erythema:  Yes, abrasion on right knee due to fall.     Provider notified: N/A     WOC consult order obtained: N/A

## 2025-07-16 NOTE — ED NOTES
"Writer performed additional EKG as pt endorses change in chest pain \"it is radiating more and persistent in left/center chest\" reports it was sharp on the right side. Pt also requiring increased oxygen demands 3L at this time. Varied saturations as low as 89% on 2L.  Pt remains pale and feels clammy. MD briefed on writer observations and notable EKG changes. Provider to bedside  "

## 2025-07-16 NOTE — PLAN OF CARE
Goal Outcome Evaluation:      Plan of Care Reviewed With: patient    Overall Patient Progress: improvingOverall Patient Progress: improving    Outcome Evaluation: Patient denies pain, blood pressure, although still tending to be high, is improved after PO meds added on day shift.    [facility] - ICU    RN Progress Note:            Pertinent Assessments:      Please refer to flowsheet rows for full assessment     Patient remains pain free.  Heparin drip adjusted per protocol.  Blood pressure remains labile/hypertensive but is trending down with Metoprolol and Amlodipine.  Oxygen saturation dropped to 90% on 2L/nasal cannula, currently up to 4L/nasal cannula.  Blood glucose continues to be quite elevated but noted new order of Lantus at bedtime           Key Events - This Shift:       Continue with medications and cares as ordered.     RN Managed Protocols Ordered:  Yes  Protocols:Phosphorus  Protocols Status: Reviewed with Oncoming RN                Barriers to Discharge / Downgrade:              Point of Contact Update: YES-OR-NO: Yes  Significant other updated while visiting.

## 2025-07-16 NOTE — PLAN OF CARE
[facility] - ICU    RN Progress Note:            Pertinent Assessments:      Please refer to flowsheet rows for full assessment     Denies chest pain, BP stable but hypertensive at times. Remains on heparin infusion.           Key Events - This Shift:     CV surgery consult, Echo complete, carotid US complete.     RN Managed Protocols Ordered:  Yes  Protocols:Phosphorus  Protocols Status: In Progress                Barriers to Discharge / Downgrade:     Plan for CABG         Point of Contact Update: YES-OR-NO: Yes  Patient's significant other and sister updated at bedside.           Goal Outcome Evaluation:      Plan of Care Reviewed With: patient    Overall Patient Progress: improvingOverall Patient Progress: improving    Outcome Evaluation: Denies pain, hypertensive - PO meds added. CV surgery work-up in progress

## 2025-07-16 NOTE — PHARMACY-ADMISSION MEDICATION HISTORY
Pharmacy Intern Admission Medication History    Admission medication history is complete. The information provided in this note is only as accurate as the sources available at the time of the update.    Information Source(s): Patient, Hospital records, and CareEverywhere/SureScripts via in-person    Pertinent Information: Filled a 90DS of lisinopril in May but had a severe cough and was switched to losartan. Patient reports has been unable to get refills on insulin and testing supplies. He expressed great frustration with inability to get refills from PCP and having stocking issues of basaglar at his pharmacy. Patient states he has not taken his insulin in over 4 months due to these issues. Also states that he has not taken Victoza in over a week due to feeling ill.     Changes made to PTA medication list:  Added:   Amlodipine  Jardiance  Basaglar  Losartan  Tylenol  Flonase  Deleted:  Tylenol pm  Lantus  Mometasone  Ondansetron  Senna-docusate  Tamsulosin  Changed:   Victoza 0.6 mg -> 1.8 mg subcutaneous every day  Escitalopram 10 mg -> 20 mg every day     Allergies reviewed with patient and updates made in EHR: yes    Medication History Completed By: Milvia Reed 7/16/2025 9:47 AM    PTA Med List   Medication Sig Last Dose/Taking    acetaminophen (TYLENOL) 325 MG tablet Take 325 mg by mouth every 4 hours as needed for mild pain. Past Month    amLODIPine (NORVASC) 10 MG tablet Take 10 mg by mouth daily. 7/15/2025 Evening    empagliflozin (JARDIANCE) 10 MG TABS tablet Take 10 mg by mouth daily. 7/15/2025 Evening    escitalopram (LEXAPRO) 20 MG tablet Take 20 mg by mouth daily. 7/15/2025 Evening    fluticasone (FLONASE) 50 MCG/ACT nasal spray Spray 2 sprays into both nostrils daily. 7/15/2025 Evening    insulin glargine 100 UNIT/ML pen Inject 24 Units subcutaneously daily. More than a month    liraglutide (VICTOZA) 18 MG/3ML solution Inject 1.8 mg subcutaneously daily. Past Month    losartan (COZAAR) 25 MG tablet Take  25 mg by mouth daily. 7/15/2025 Evening    pravastatin (PRAVACHOL) 10 MG tablet Take 10 mg by mouth daily 7/15/2025 Evening

## 2025-07-16 NOTE — ED TRIAGE NOTES
Pt arrives via [facility] after a syncopal episode at work. Patient states he felt like he was having palpitations this morning and was thinking about coming in but it stopped. Patient states he called for help while standing and was lowered to the ground and lost consciousness. Alert and oriented, vss. EKG ordered. Also, bg 471 and has not taken his insulin in 4 months       Triage Assessment (Adult)       Row Name 07/15/25 2085          Respiratory WDL    Respiratory WDL WDL        Skin Circulation/Temperature WDL    Skin Circulation/Temperature WDL WDL        Cardiac WDL    Cardiac WDL X  syncope        Peripheral/Neurovascular WDL    Peripheral Neurovascular WDL WDL        Cognitive/Neuro/Behavioral WDL    Cognitive/Neuro/Behavioral WDL WDL        Veronica Coma Scale    Best Eye Response 4-->(E4) spontaneous     Best Motor Response 6-->(M6) obeys commands     Best Verbal Response 5-->(V5) oriented     Salmon Coma Scale Score 15

## 2025-07-16 NOTE — ED NOTES
Writer to bedside after first cycle of left upper arm BP resulted- 66\41. pt had ambulated to restroom and just laid in the bed and EDT cycled BP. Pt pale per writer observation, reporting chest palpitations but denies dizziness.

## 2025-07-17 ENCOUNTER — APPOINTMENT (OUTPATIENT)
Dept: CT IMAGING | Facility: HOSPITAL | Age: 65
End: 2025-07-17
Attending: NURSE PRACTITIONER
Payer: COMMERCIAL

## 2025-07-17 VITALS
WEIGHT: 284.9 LBS | OXYGEN SATURATION: 96 % | RESPIRATION RATE: 20 BRPM | HEART RATE: 61 BPM | BODY MASS INDEX: 37.76 KG/M2 | DIASTOLIC BLOOD PRESSURE: 59 MMHG | HEIGHT: 73 IN | TEMPERATURE: 97.7 F | SYSTOLIC BLOOD PRESSURE: 129 MMHG

## 2025-07-17 LAB
ANION GAP SERPL CALCULATED.3IONS-SCNC: 9 MMOL/L (ref 7–15)
BUN SERPL-MCNC: 40.8 MG/DL (ref 8–23)
CALCIUM SERPL-MCNC: 8.2 MG/DL (ref 8.8–10.4)
CHLORIDE SERPL-SCNC: 109 MMOL/L (ref 98–107)
CREAT SERPL-MCNC: 2.02 MG/DL (ref 0.67–1.17)
EGFRCR SERPLBLD CKD-EPI 2021: 36 ML/MIN/1.73M2
GLUCOSE BLDC GLUCOMTR-MCNC: 188 MG/DL (ref 70–99)
GLUCOSE BLDC GLUCOMTR-MCNC: 197 MG/DL (ref 70–99)
GLUCOSE BLDC GLUCOMTR-MCNC: 200 MG/DL (ref 70–99)
GLUCOSE BLDC GLUCOMTR-MCNC: 230 MG/DL (ref 70–99)
GLUCOSE SERPL-MCNC: 267 MG/DL (ref 70–99)
HCO3 SERPL-SCNC: 20 MMOL/L (ref 22–29)
HOLD SPECIMEN: NORMAL
HOLD SPECIMEN: NORMAL
PHOSPHATE SERPL-MCNC: 4.1 MG/DL (ref 2.5–4.5)
POTASSIUM SERPL-SCNC: 5.1 MMOL/L (ref 3.4–5.3)
SODIUM SERPL-SCNC: 138 MMOL/L (ref 135–145)
UFH PPP CHRO-ACNC: 0.2 IU/ML (ref ?–1.1)
UFH PPP CHRO-ACNC: 0.3 IU/ML (ref ?–1.1)
UFH PPP CHRO-ACNC: 0.4 IU/ML (ref ?–1.1)

## 2025-07-17 PROCEDURE — 36415 COLL VENOUS BLD VENIPUNCTURE: CPT | Performed by: FAMILY MEDICINE

## 2025-07-17 PROCEDURE — 250N000013 HC RX MED GY IP 250 OP 250 PS 637: Performed by: STUDENT IN AN ORGANIZED HEALTH CARE EDUCATION/TRAINING PROGRAM

## 2025-07-17 PROCEDURE — 200N000001 HC R&B ICU

## 2025-07-17 PROCEDURE — 99207 PR APP CREDIT; MD BILLING SHARED VISIT: CPT | Mod: FS | Performed by: STUDENT IN AN ORGANIZED HEALTH CARE EDUCATION/TRAINING PROGRAM

## 2025-07-17 PROCEDURE — 85520 HEPARIN ASSAY: CPT | Performed by: INTERNAL MEDICINE

## 2025-07-17 PROCEDURE — 250N000013 HC RX MED GY IP 250 OP 250 PS 637: Performed by: FAMILY MEDICINE

## 2025-07-17 PROCEDURE — 99232 SBSQ HOSP IP/OBS MODERATE 35: CPT | Performed by: FAMILY MEDICINE

## 2025-07-17 PROCEDURE — 80048 BASIC METABOLIC PNL TOTAL CA: CPT | Performed by: FAMILY MEDICINE

## 2025-07-17 PROCEDURE — 250N000013 HC RX MED GY IP 250 OP 250 PS 637: Performed by: INTERNAL MEDICINE

## 2025-07-17 PROCEDURE — 71250 CT THORAX DX C-: CPT

## 2025-07-17 PROCEDURE — 85520 HEPARIN ASSAY: CPT | Performed by: FAMILY MEDICINE

## 2025-07-17 PROCEDURE — 84100 ASSAY OF PHOSPHORUS: CPT | Performed by: FAMILY MEDICINE

## 2025-07-17 PROCEDURE — 99233 SBSQ HOSP IP/OBS HIGH 50: CPT | Mod: FS | Performed by: INTERNAL MEDICINE

## 2025-07-17 PROCEDURE — 250N000011 HC RX IP 250 OP 636: Performed by: INTERNAL MEDICINE

## 2025-07-17 RX ADMIN — METOPROLOL TARTRATE 12.5 MG: 25 TABLET, FILM COATED ORAL at 21:20

## 2025-07-17 RX ADMIN — INSULIN ASPART 4 UNITS: 100 INJECTION, SOLUTION INTRAVENOUS; SUBCUTANEOUS at 08:06

## 2025-07-17 RX ADMIN — ACETAMINOPHEN 650 MG: 325 TABLET ORAL at 08:08

## 2025-07-17 RX ADMIN — HEPARIN SODIUM 1650 UNITS/HR: 10000 INJECTION, SOLUTION INTRAVENOUS at 12:48

## 2025-07-17 RX ADMIN — ESCITALOPRAM 20 MG: 20 TABLET, FILM COATED ORAL at 21:20

## 2025-07-17 RX ADMIN — FLUTICASONE PROPIONATE 2 SPRAY: 50 SPRAY, METERED NASAL at 08:12

## 2025-07-17 RX ADMIN — ASPIRIN 81 MG CHEWABLE TABLET 81 MG: 81 TABLET CHEWABLE at 08:09

## 2025-07-17 RX ADMIN — ACETAMINOPHEN 650 MG: 325 TABLET ORAL at 21:20

## 2025-07-17 RX ADMIN — INSULIN ASPART 3 UNITS: 100 INJECTION, SOLUTION INTRAVENOUS; SUBCUTANEOUS at 11:36

## 2025-07-17 RX ADMIN — INSULIN ASPART 2 UNITS: 100 INJECTION, SOLUTION INTRAVENOUS; SUBCUTANEOUS at 16:55

## 2025-07-17 RX ADMIN — SODIUM ZIRCONIUM CYCLOSILICATE 10 G: 10 POWDER, FOR SUSPENSION ORAL at 05:54

## 2025-07-17 RX ADMIN — AMLODIPINE BESYLATE 5 MG: 5 TABLET ORAL at 08:09

## 2025-07-17 RX ADMIN — ATORVASTATIN CALCIUM 80 MG: 40 TABLET, FILM COATED ORAL at 21:19

## 2025-07-17 RX ADMIN — INSULIN GLARGINE 25 UNITS: 100 INJECTION, SOLUTION SUBCUTANEOUS at 21:27

## 2025-07-17 ASSESSMENT — ACTIVITIES OF DAILY LIVING (ADL)
ADLS_ACUITY_SCORE: 30
ADLS_ACUITY_SCORE: 32
ADLS_ACUITY_SCORE: 30
ADLS_ACUITY_SCORE: 32
DEPENDENT_IADLS:: INDEPENDENT
ADLS_ACUITY_SCORE: 32
ADLS_ACUITY_SCORE: 30
ADLS_ACUITY_SCORE: 32
ADLS_ACUITY_SCORE: 30
ADLS_ACUITY_SCORE: 32
ADLS_ACUITY_SCORE: 32
ADLS_ACUITY_SCORE: 30
ADLS_ACUITY_SCORE: 30
ADLS_ACUITY_SCORE: 28

## 2025-07-17 NOTE — PROGRESS NOTES
[facility]   [address]  [place] [address]   [email or web address]   OFFICE: [phone]     CARDIOLOGY FOLLOW-UP NOTE      Impression and Plan     Impression:   NSTEMI/coronary artery disease: Troponin 67 --> 95 --> 285.  Coronary angiogram 7/16/2025 showed severe multivessel disease involving LAD, large diagonal, proximal to mid left circumflex, proximal OM 1 and OM 2, and  of distal RCA filling with collaterals.  CABG consult today.  Continuing heparin infusion. Patient denies chest discomfort or dyspnea.   Hypertension: On metoprolol tartrate 25 mg twice daily, amlodipine 5 mg daily.  Patient's PTA losartan has been held.  Creatinine 2.02  Hyperlipidemia: On atorvastatin 80 mg daily.    Diabetes mellitus type 2: Hemoglobin A1c 9.1.  Serum glucose of 435 in ED with negative urine ketones.  History of medication noncompliance  Syncope: Unknown etiology. Possibly due to obstructive disease though patient has had syncopal episodes in the past- last 1 year ago. Previously may be in the setting of dehydration and low blood sugars vs vasovagal.     Plan:  Continue heparin  CABG consult today  HR's have been in the 40s-50s. Will decrease metoprolol dose to 12.5 and hold for HR <55    Primary Cardiologist: None    Subjective     Patient notes he is feeling good. Denies chest pain, dyspnea. Notes occasional short lived palpitations that have been present all his life and are not worse lately. Patient notes he has struggled getting his insulin over the last 4 months due to availability and finding a doctor to refill it. Patient notes he is active at work as a . Over the last couple months has been to be slowing down more at work.     Cardiac Diagnostics   Telemetry (personally reviewed): Sinus bradycardia    ECG (personally reviewed and interpreted): Sinus tachycardia, LVH    Echocardiogram 7/16/2025  1. Normal left ventricular size and systolic  "performance. The visually  estimated ejection fraction is 55%.  2. On selected views, the distal anterior segment appears mildly hypokinetic.  3. No significant valvular heart disease is identified on this study.  4. Normal right ventricular size and systolic performance.  5. There is mild left atrial enlargement.  6. There is mild enlargement of the proximal ascending aorta (3.9 cm).      Cardiac Cath 7/16/2025  CONCLUSIONS:   Severe multivessel coronary artery disease involving the proximal LAD, apical LAD, proximal large diagonal branch, proximal to mid left circumflex, proximal OM 1 and 2 branches, and chronic total occlusion of the distal RCA that fills via L-R and R-R collaterals.  Normal left-sided filling pressures.  No aortic valve stenosis.        RECOMMENDATIONS:   Usual postprocedure cares, please see orders.  Aspirin, high intensity statin, heparin gtt, beta-blocker.   Transthoracic echocardiogram  Recommend expedited cardiothoracic surgery consultation for evaluation of surgical revascularization -potential targets include left anterior descending artery, diagonal branch, OM1 and 2 branches, and possibly the rPDA.  Aggressive risk factor modification for secondary prevention.    US carotid bilateral 7/16/2025  1.  Mild plaque formation, velocities consistent with less than 50% stenosis in the right internal carotid artery.  2.  Mild plaque formation, velocities consistent with less than 50% stenosis in the left internal carotid artery.  3.  Flow within the vertebral arteries is antegrade.    Physical Examination       BP (!) 156/68   Pulse 55   Temp 98.1  F (36.7  C) (Oral)   Resp 18   Ht 1.854 m (6' 1\")   Wt 129.2 kg (284 lb 14.4 oz)   SpO2 93%   BMI 37.59 kg/m                Intake/Output Summary (Last 24 hours) at 7/17/2025 0836  Last data filed at 7/17/2025 0612  Gross per 24 hour   Intake 1514.56 ml   Output 3625 ml   Net -2110.44 ml       General: pleasant male. No acute distress.   HENT: " external ears normal. Nares patent. Mucous membranes moist.  Eyes: perrla, extraocular muscles intact. No scleral icterus.   Neck: No JVD  Lungs: clear to auscultation  COR:  regular rate and rhythm, No murmurs, rubs, or gallops  Extrem: No edema         Imaging      Chest x-ray 7/15/2025  No acute cardiopulmonary process. Stable cardiomediastinal silhouette.     Lab Results   Lab Results   Component Value Date    BUN 40.8 (H) 07/17/2025     07/17/2025    CO2 20 (L) 07/17/2025       Lab Results   Component Value Date    WBC 10.8 07/16/2025    HGB 13.1 (L) 07/16/2025    HCT 40.3 07/16/2025    MCV 93 07/16/2025     07/16/2025       Lab Results   Component Value Date    INR 0.98 07/15/2025               Current Inpatient Scheduled Medications   Scheduled Meds:  Current Facility-Administered Medications   Medication Dose Route Frequency Provider Last Rate Last Admin    amLODIPine (NORVASC) tablet 5 mg  5 mg Oral Daily Neisha Smalls MD   5 mg at 07/17/25 0809    aspirin (ASA) chewable tablet 81 mg  81 mg Oral Daily Cheng Mills MD   81 mg at 07/17/25 0809    atorvastatin (LIPITOR) tablet 80 mg  80 mg Oral QPM Cheng Mills MD   80 mg at 07/16/25 1914    [Held by provider] empagliflozin (JARDIANCE) tablet 10 mg  10 mg Oral Daily Neisha Smalls MD        escitalopram (LEXAPRO) tablet 20 mg  20 mg Oral QPM Cheng Mills MD   20 mg at 07/16/25 2144    fluticasone (FLONASE) 50 MCG/ACT spray 2 spray  2 spray Both Nostrils Daily Neisha Smalls MD   2 spray at 07/17/25 0812    insulin aspart (NovoLOG) injection (RAPID ACTING)  1-10 Units Subcutaneous TID AC Cheng Mills MD   4 Units at 07/17/25 0806    insulin aspart (NovoLOG) injection (RAPID ACTING)  1-7 Units Subcutaneous At Bedtime Cheng Mills MD   4 Units at 07/16/25 2148    insulin glargine (LANTUS PEN) injection 25 Units  25 Units Subcutaneous At Bedtime Neisha Smalls MD   25 Units at 07/16/25 4668    [Held by provider] losartan (COZAAR)  tablet 25 mg  25 mg Oral Daily Neisha Smalls MD        metoprolol tartrate (LOPRESSOR) tablet 25 mg  25 mg Oral BID Cheng Mills MD   25 mg at 07/16/25 1914    sodium zirconium cyclosilicate (LOKELMA) packet 10 g  10 g Oral Daily Sadiq Guzmán MD   10 g at 07/17/25 0554     Continuous Infusions:  Current Facility-Administered Medications   Medication Dose Route Frequency Provider Last Rate Last Admin    heparin 25,000 units in 0.45% NaCl 250 mL ANTICOAGULANT infusion  0-5,000 Units/hr Intravenous Continuous Cheng Mills MD 15 mL/hr at 07/17/25 0800 1,500 Units/hr at 07/17/25 0800            Medications Prior to Admission   Prior to Admission medications    Medication Sig Start Date End Date Taking? Authorizing Provider   acetaminophen (TYLENOL) 325 MG tablet Take 325 mg by mouth every 4 hours as needed for mild pain.   Yes Unknown, Entered By History   amLODIPine (NORVASC) 10 MG tablet Take 10 mg by mouth daily.   Yes Unknown, Entered By History   empagliflozin (JARDIANCE) 10 MG TABS tablet Take 10 mg by mouth daily.   Yes Unknown, Entered By History   escitalopram (LEXAPRO) 20 MG tablet Take 20 mg by mouth daily.   Yes Reported, Patient   fluticasone (FLONASE) 50 MCG/ACT nasal spray Spray 2 sprays into both nostrils daily.   Yes Unknown, Entered By History   insulin glargine 100 UNIT/ML pen Inject 24 Units subcutaneously daily.   Yes Unknown, Entered By History   liraglutide (VICTOZA) 18 MG/3ML solution Inject 1.8 mg subcutaneously daily.   Yes Reported, Patient   losartan (COZAAR) 25 MG tablet Take 25 mg by mouth daily.   Yes Unknown, Entered By History   pravastatin (PRAVACHOL) 10 MG tablet Take 10 mg by mouth daily 2/16/24  Yes Reported, Patient   ACCU-CHEK FASTCLIX Misc [ACCU-CHEK FASTCLIX MISC] USE AS DIRECTED FOUR TIMES DAILY 6/2/15   Celestina Garrison MD   ACCU-CHEK SMARTVIEW TEST STRIP Strp [ACCU-CHEK SMARTVIEW TEST STRIP STRP] TEST FOUR TIMES DAILY 2/8/15   Celestina Garrison MD   insulin needles,  "disposable, (ULTICARE) 32 x 5/32 \" Ndle [INSULIN NEEDLES, DISPOSABLE, (ULTICARE) 32 X 5/32 \" NDLE] Use as Directed with Humalog 1/22/15   Celestina Garrison MD   pen needle, diabetic 31 gauge x 5/16\" Ndle [PEN NEEDLE, DIABETIC 31 GAUGE X 5/16\" NDLE] Use 1 pen As Directed as needed. 6/11/16   Guicho Malagon PA-C Rachal A Omana, PA-C      "

## 2025-07-17 NOTE — PLAN OF CARE
[facility] - ICU    RN Progress Note:            Pertinent Assessments:      Please refer to flowsheet rows for full assessment     VSS, denies chest pain. Chronic hip pain improved with tylenol and ambulation. Tolerating room air while awake.         Key Events - This Shift:     Increasing activity, ambulating in [place]. Chest CT done this afternoon.     RN Managed Protocols Ordered:  Yes  Protocols:Phosphorus  Protocols Status: In Progress                Barriers to Discharge / Downgrade:     CV surgery work-up         Point of Contact Update: YES-OR-NO: Yes  Patient's significant other updated at bedside.       Goal Outcome Evaluation:      Plan of Care Reviewed With: patient, significant other    Overall Patient Progress: improvingOverall Patient Progress: improving    Outcome Evaluation: Denies chest pain, BP improved. Pre-op CT completed.

## 2025-07-17 NOTE — CONSULTS
Care Management Initial Consult    General Information  Assessment completed with: Patient,    Type of CM/SW Visit: Initial Assessment    Primary Care Provider verified and updated as needed: Yes   Readmission within the last 30 days: no previous admission in last 30 days         Advance Care Planning: Advance Care Planning Reviewed:  (working on completing [facility])          Communication Assessment  Patient's communication style: spoken language (English or Bilingual)    Hearing Difficulty or Deaf: no   Wear Glasses or Blind: yes    Cognitive  Cognitive/Neuro/Behavioral: WDL  Level of Consciousness: alert  Arousal Level: opens eyes spontaneously  Orientation: oriented x 4  Mood/Behavior: calm, cooperative  Best Language: 0 - No aphasia  Speech: clear, spontaneous, logical    Living Environment:   People in home: significant other     Current living Arrangements: house      Able to return to prior arrangements: yes       Family/Social Support:  Care provided by: self  Provides care for: no one  Marital Status: Lives with Significant Other  Support system: Significant Other          Description of Support System: Supportive, Involved         Current Resources:   Patient receiving home care services: No        Community Resources: None  Equipment currently used at home: cane, straight  Supplies currently used at home: None    Employment/Financial:  Employment Status: employed full-time        Financial Concerns:             Does the patient's insurance plan have a 3 day qualifying hospital stay waiver?  Needs verification    Lifestyle & Psychosocial Needs:  Social Drivers of Health     Food Insecurity: Low Risk  (7/16/2025)    Food Insecurity     Within the past 12 months, did you worry that your food would run out before you got money to buy more?: No     Within the past 12 months, did the food you bought just not last and you didn t have money to get more?: No   Depression: At risk (5/23/2025)    Received from Ricardo  [facility] [email or web address] [facility]    PHQ-2     PHQ-2 TOTAL SCORE: 6   Housing Stability: Low Risk  (7/16/2025)    Housing Stability     Do you have housing? : Yes     Are you worried about losing your housing?: No   Tobacco Use: Low Risk  (5/23/2025)    Received from [facility] [email or web address] [facility]    Patient History     Smoking Tobacco Use: Never     Smokeless Tobacco Use: Never     Passive Exposure: Not on file   Financial Resource Strain: Low Risk  (7/16/2025)    Financial Resource Strain     Within the past 12 months, have you or your family members you live with been unable to get utilities (heat, electricity) when it was really needed?: No   Alcohol Use: Not on file   Transportation Needs: Low Risk  (7/16/2025)    Transportation Needs     Within the past 12 months, has lack of transportation kept you from medical appointments, getting your medicines, non-medical meetings or appointments, work, or from getting things that you need?: No   Physical Activity: Not on file   Interpersonal Safety: Low Risk  (7/16/2025)    Interpersonal Safety     Do you feel physically and emotionally safe where you currently live?: Yes     Within the past 12 months, have you been hit, slapped, kicked or otherwise physically hurt by someone?: No     Within the past 12 months, have you been humiliated or emotionally abused in other ways by your partner or ex-partner?: No   Stress: Not on file   Social Connections: Socially Integrated (5/23/2025)    Received from [facility] [email or web address] [facility]    Social Connections     Do you often feel lonely or isolated from those around you?: 0   Health Literacy: Not on file       Functional Status:  Prior to admission patient needed assistance:   Dependent ADLs:: Independent  Dependent IADLs:: Independent       Mental Health Status:          Chemical Dependency Status:                Values/Beliefs:  Spiritual, Cultural Beliefs, Orthodoxy Practices, Values that affect  care:                 Discussed  Partnership in Safe Discharge Planning  document with patient/family: No    Additional Information:    Assessment completed with patient. SO Iraida at bedside. Patient reports he lives in his house with Iraida. He is independent with ADLs/IADLs, ambulates without devices mostly but occasionally will use a cane and has no services in community. Iraida is primary family contact and willing to transport at discharge.    Patient endorses having support at home should he discharge home.    He is working on completing his HCD.  He is requesting Notary and he will have his drivers licenses by tomorrow.      Next Steps:     Secure Notary  Follow for progression and recommendations.      Manda Morocho RN       UNK

## 2025-07-17 NOTE — PROGRESS NOTES
CT Surgery    CAB consult    Denies any CP, syncope.     Creatinine   Date Value Ref Range Status   07/17/2025 2.02 (H) 0.67 - 1.17 mg/dL Final        Surgeon to see. Waiting for Brilinta wash out.   Timing of CAB to be determined.     Nida Atkinson CNP  [facility]   Cardiothoracic Surgery  Secure chat or Vocera  Office: [phone]

## 2025-07-17 NOTE — PLAN OF CARE
Goal Outcome Evaluation:      Plan of Care Reviewed With: patient          Outcome Evaluation: Denies pain. BP WNL.    [facility] - ICU    RN Progress Note:            Pertinent Assessments:      Please refer to flowsheet rows for full assessment     Alert and oriented x4. Denies pain, has tingling in bilat LE at baseline, no change in LE.            Key Events - This Shift:       Sinus sania with BBB, as low at 47 BPM  SBP under 160  Voiding well  BM x1.   Heparin infusion, next antixa at 1135.      RN Managed Protocols Ordered:  Yes  Protocols:Phosphorus and Heparin  Protocols Status: Reviewed with Oncoming RN                Barriers to Discharge / Downgrade:     Work up for CABG.

## 2025-07-17 NOTE — PROGRESS NOTES
[facility]    Medicine Progress Note - Hospitalist Service    Date of Admission:  7/15/2025    Assessment & Plan      Mayito Sood is a 64 year old male with a past medical history of Type 2 Diabetes presented with chest pain and syncope, found to have multivessel coronary artery disease.     #NSTEMI  Multivessel CAD  - Chest pain started 7/15, had episode of syncope at work  - Patient with troponin 67 -> 97  - EKG with ST Depression. Cardiology not calling STEMI  - [facility] 7/16 AM - Multiple areas of Diffuse coronary disease  - Cardiology following  - CT Surgery consulted; determining timing of CABG  - Echo with EF 55% mildly hypokinetic distal anterior segment no significant valvular heart disease  - s/p aspirin 325mg x1 and continue 81mg   - Atorvastatin 80mg daily   - Metoprolol 25mg BID  - Given ticagrelor x1, not continued -   ---hold off on ACE  ---Chest CT ordered.  Ultrasound carotids less than 50% stenosis bilaterally  --Continue cardiac monitor  --on heparin gtt    #Acute Hypoxic Respiratory Failure  - Resolved  --Patient not on home oxygen, requiring up to 10L NC after [facility].  -  fluid overload with NSTEMI -sedation from [facility].   - CXR on 7/15 showing no acute cardiopulmonary process.  --Did get Lasix yesterday    TARUN on CKD III  ---CKD Stage 3 Confirmed, CKD Stage 4 Considered and Ruled Out   ---Cr baseline appears to be around 1.75  --hold cozaar for now  ---did get diuresis yesterday - now done  ---holding jardiance    #Hyperkalemia  - resolved  --Potassium 5.2 on ED presentation, then 5.8 with AM labs 7/16  --s/p  insulin  lokelma x1   - recheck in am     Hyponatremia  ---resolved    Hypertension  --bp improved; on amlodipine and cozaar  ---hold cozaar   ---started on metoprolol    #Type 2 Diabetes  #Hyperglycemia without DKA  - Last A1c was 9.1% on 7/15/25  - PTA Meds:  empagliflozin, no insulin for many months per patient , no victoza for a month  ---continue Lantus 25 units  "nightly   ---hold jardiance  - Sliding Scale Insulin with Glucose Checks    BPH  --continue home flomax    hyperlipidemia  ---was on pravastatin  ---now on atorvastatin    Hypophosphatemia,   --resolved with replacement    Hypocalcemia--continue to monitor    Severe Obesity, BMI 37.59 with CAD, HTN, DM               Diet: Moderate Consistent Carb (60 g CHO per Meal) Diet    DVT Prophylaxis: Heparin GTT  Gutierrez Catheter: Not present  Lines: None     Cardiac Monitoring: ACTIVE order. Indication: Post- PCI/Angiogram (24 hours)  Code Status: Full Code      Clinically Significant Risk Factors        # Hyperkalemia: Highest K = 5.8 mmol/L in last 2 days, will monitor as appropriate  # Hyponatremia: Lowest Na = 133 mmol/L in last 2 days, will monitor as appropriate  # Hyperchloremia: Highest Cl = 110 mmol/L in last 2 days, will monitor as appropriate        # Hypomagnesemia: Lowest Mg = 1.6 mg/dL in last 2 days, will replace as needed       # Hypertension: Noted on problem list           # DMII: A1C = 9.1 % (Ref range: <5.7 %) within past 6 months, PRESENT ON ADMISSION# Severe Obesity: Estimated body mass index is 37.59 kg/m  as calculated from the following:    Height as of this encounter: 1.854 m (6' 1\").    Weight as of this encounter: 129.2 kg (284 lb 14.4 oz). with complications, PRESENT ON ADMISSION            Social Drivers of Health    Depression: At risk (5/23/2025)    Received from [facility]    PHQ-2     PHQ-2 TOTAL SCORE: 6          Disposition Plan     Medically Ready for Discharge: Anticipated in 5+ Days             Neisha Smalls MD  Hospitalist Service  [facility]  Securely message with [employer] (more info)  Text page via [employer] Paging/Directory   ______________________________________________________________________    Interval History   --- Feeling well today.  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    Physical Exam   Vital Signs: Temp: 98.1  F (36.7  C) Temp src: Oral BP: (!) 142/65 Pulse: 53   Resp: 18 SpO2: 99 % O2 Device: None (Room air) Oxygen Delivery: 4 LPM  Weight: 284 lbs 14.4 oz    General Appearance: Pleasant male apparent distress  Respiratory: Clear to auscultation bilaterally  Cardiovascular: Regular rate and rhythm without murmurs  GI: Obese soft and nontender  Skin: Trace lower extremity edema  Other: Neurologically gross intact without focal deficits appreciated    Medical Decision Making             Data     I have personally reviewed the following data over the past 24 hrs:    N/A  \   N/A   / N/A     138 109 (H) 40.8 (H) /  230 (H)   5.1 20 (L) 2.02 (H) \       Imaging results reviewed over the past 24 hrs:   Recent Results (from the past 24 hours)   Echocardiogram Complete    Narrative    [id number]  [id number]  [id number]  [id number]^VICKY^     [place] [address]     Name: LELAND BRITTON  MRN: [id number]  : 1960  Study Date: 2025 09:15 AM  Age: 64 yrs  Gender: Male  Patient Location: [facility]  Reason For Study: Acute Myocardial Infarction  Ordering Physician: Vicky Mills  Referring Physician: Vicky Mills  Performed By: GG^^^^     BSA: 2.5 m2  Height: 73 in  Weight: 290 lb  HR: 77  BP: 191/90 mmHg  ______________________________________________________________________________  Procedure  Echocardiogram with two-dimensional, color and spectral Doppler. Definity (NDC  #[id number]) given intravenously.  ______________________________________________________________________________  Interpretation Summary     1. Normal left ventricular size and systolic performance. The visually  estimated ejection fraction is 55%.  2. On selected views, the distal anterior segment appears mildly hypokinetic.  3. No significant valvular heart disease is identified on this study.  4. Normal right ventricular " size and systolic performance.  5. There is mild left atrial enlargement.  6. There is mild enlargement of the proximal ascending aorta (3.9 cm).  ______________________________________________________________________________  Left ventricle:  Normal left ventricular size and systolic performance. The visually estimated  ejection fraction is 55%. On selected views, the distal anterior segment  appears mildly hypokinetic. Left ventricular wall thickness is normal.     Assessment of LV Diastolic Function: The cumulative findings suggest impaired  diastolic filling [The septal e' velocity is < 7 cm/s & lateral e' velocity  is  < 10 cm/s. The average E/e' is >14. The TR velocity cannot be determined due  to insufficient tricuspid insufficiency signal. Left atrial volume index is  greater than 34 mL/mÂ ].     Assessment of left atrial pressure (LAP): The cumulative findings suggest  moderately elevated left atrial pressure (the E/A is </= 0.8 and E is > 50  cm/s plus 2 or 3 of 3 of the following present: Average E/e' > 14,TRvel > 2.8  m/s, and/or LA vol. index > 34 ml/m2 ).     Right ventricle:  Normal right ventricular size and systolic performance.     Left atrium:  There is mild left atrial enlargement.     Right atrium:  The right atrium is of normal size.     IVC:  The IVC is of normal caliber.     Aortic valve:  The aortic valve is comprised of three cusps. No significant aortic stenosis  or aortic insufficiency is detected on this study.     Mitral valve:  The mitral valve appears morphologically normal. There is trace mitral  insufficiency.     Tricuspid valve:  The tricuspid valve is grossly morphologically normal. There is trace  tricuspid insufficiency.     Pulmonic valve:  The pulmonic valve is grossly morphologically normal.     Thoracic aorta:  There is mild enlargement of the proximal ascending aorta (3.9 cm).     Pericardium:  There is no significant pericardial  effusion.  ______________________________________________________________________________  ______________________________________________________________________________  MMode/2D Measurements & Calculations  IVSd: 0.95 cm  LVIDd: 5.7 cm  LVIDs: 4.0 cm  LVPWd: 0.98 cm  FS: 30.3 %  LV mass(C)d: 218.0 grams  LV mass(C)dI: 86.5 grams/m2  Ao root diam: 3.8 cm  LA dimension: 3.9 cm  asc Aorta Diam: 3.9 cm  LA/Ao: 1.0  LVOT diam: 2.0 cm  LVOT area: 3.1 cm2  Ao root diam index Ht(cm/m): 2.0  Ao root diam index BSA (cm/m2): 1.5  Asc Ao diam index BSA (cm/m2): 1.6  Asc Ao diam index Ht(cm/m): 2.1  EF Biplane: 54.7 %  LA Volume (BP): 102.0 ml     LA Volume Index (BP): 40.5 ml/m2  LA Volume Indexed (AL/bp): 44.0 ml/m2  RV Base: 3.7 cm  RWT: 0.34  TAPSE: 2.9 cm     Doppler Measurements & Calculations  MV E max roshan: 106.0 cm/sec  MV A max roshan: 139.5 cm/sec  MV E/A: 0.76  MV dec slope: 426.6 cm/sec2  MV dec time: 0.25 sec  Ao V2 max: 192.0 cm/sec  Ao max PG: 15.0 mmHg  Ao V2 mean: 140.1 cm/sec  Ao mean P.7 mmHg  Ao V2 VTI: 37.5 cm  FREDA(I,D): 1.9 cm2  FREDA(V,D): 1.6 cm2  LV V1 max PG: 3.7 mmHg  LV V1 max: 96.8 cm/sec  LV V1 VTI: 22.4 cm  SV(LVOT): 69.4 ml  SI(LVOT): 27.5 ml/m2  PA V2 max: 120.7 cm/sec  PA max P.8 mmHg  PA acc time: 0.15 sec  AV Roshan Ratio (DI): 0.50  FREDA Index (cm2/m2): 0.73  E/E': 1.4  E/E' av.0  Lateral E/e': 13.8  Medial E/e': 18.2  Peak E' Roshan: 77.0 cm/sec  RV S Roshan: 20.3 cm/sec     ______________________________________________________________________________  Report approved by: Lenard Hahn MD on 2025 12:20 PM         US Carotid Bilateral    Narrative    EXAM: US CAROTID BILATERAL  LOCATION: [facility]  DATE: 2025    INDICATION: pre op eval. Carotid stenosis.  COMPARISON: None.  TECHNIQUE: Duplex exam performed utilizing 2D gray-scale imaging, Doppler interrogation with color-flow and spectral waveform analysis. The percent diameter stenosis is determined  using Updated Recommendations for Carotid Stenosis Interpretation Criteria   from [facility] Testing.    FINDINGS:    RIGHT: Mild plaque at the bifurcation. The peak systolic velocity in the ICA is less than 180 cm/sec, consistent with less than 50% stenosis. Normal velocities in the ECA. Antegrade flow within the vertebral artery.     LEFT: Mild plaque at the bifurcation. The peak systolic velocity in the ICA is less than 180 cm/sec, consistent with less than 50% stenosis. Normal velocities in the ECA. Antegrade flow within the vertebral artery.    VELOCITY CHART:  CCA   Right: 109/21 cm/s   Left: 108/21 cm/s  ICA   Right: 102/28 cm/s   Left: 167/43 cm/s  ECA   Right: 135/22 cm/s   Left: 185/21 cm/s  ICA/CCA PSV Ratio   Right: 0.94   Left: 1.30      Impression    IMPRESSION:  1.  Mild plaque formation, velocities consistent with less than 50% stenosis in the right internal carotid artery.  2.  Mild plaque formation, velocities consistent with less than 50% stenosis in the left internal carotid artery.  3.  Flow within the vertebral arteries is antegrade.

## 2025-07-18 LAB
ALBUMIN UR-MCNC: 100 MG/DL
ANION GAP SERPL CALCULATED.3IONS-SCNC: 7 MMOL/L (ref 7–15)
APPEARANCE UR: CLEAR
BILIRUB UR QL STRIP: NEGATIVE
BUN SERPL-MCNC: 41.5 MG/DL (ref 8–23)
CALCIUM SERPL-MCNC: 8 MG/DL (ref 8.8–10.4)
CHLORIDE SERPL-SCNC: 109 MMOL/L (ref 98–107)
COLOR UR AUTO: ABNORMAL
CREAT SERPL-MCNC: 1.79 MG/DL (ref 0.67–1.17)
EGFRCR SERPLBLD CKD-EPI 2021: 42 ML/MIN/1.73M2
GLUCOSE BLDC GLUCOMTR-MCNC: 130 MG/DL (ref 70–99)
GLUCOSE BLDC GLUCOMTR-MCNC: 153 MG/DL (ref 70–99)
GLUCOSE BLDC GLUCOMTR-MCNC: 155 MG/DL (ref 70–99)
GLUCOSE BLDC GLUCOMTR-MCNC: 157 MG/DL (ref 70–99)
GLUCOSE SERPL-MCNC: 139 MG/DL (ref 70–99)
GLUCOSE UR STRIP-MCNC: 500 MG/DL
HCO3 SERPL-SCNC: 23 MMOL/L (ref 22–29)
HGB UR QL STRIP: NEGATIVE
HOLD SPECIMEN: NORMAL
KETONES UR STRIP-MCNC: NEGATIVE MG/DL
LEUKOCYTE ESTERASE UR QL STRIP: NEGATIVE
NITRATE UR QL: NEGATIVE
PH UR STRIP: 5.5 [PH] (ref 5–7)
PHOSPHATE SERPL-MCNC: 3.9 MG/DL (ref 2.5–4.5)
POTASSIUM SERPL-SCNC: 4.5 MMOL/L (ref 3.4–5.3)
PREALB SERPL-MCNC: 22.1 MG/DL (ref 20–40)
RBC URINE: 0 /HPF
SODIUM SERPL-SCNC: 139 MMOL/L (ref 135–145)
SP GR UR STRIP: 1.01 (ref 1–1.03)
SQUAMOUS EPITHELIAL: <1 /HPF
UFH PPP CHRO-ACNC: 0.24 IU/ML (ref ?–1.1)
UFH PPP CHRO-ACNC: 0.38 IU/ML (ref ?–1.1)
UFH PPP CHRO-ACNC: 0.44 IU/ML (ref ?–1.1)
UROBILINOGEN UR STRIP-MCNC: NORMAL MG/DL
WBC URINE: 1 /HPF

## 2025-07-18 PROCEDURE — 85520 HEPARIN ASSAY: CPT | Performed by: FAMILY MEDICINE

## 2025-07-18 PROCEDURE — 99207 PR NO BILLABLE SERVICE THIS VISIT: CPT | Performed by: STUDENT IN AN ORGANIZED HEALTH CARE EDUCATION/TRAINING PROGRAM

## 2025-07-18 PROCEDURE — 250N000013 HC RX MED GY IP 250 OP 250 PS 637: Performed by: STUDENT IN AN ORGANIZED HEALTH CARE EDUCATION/TRAINING PROGRAM

## 2025-07-18 PROCEDURE — 36415 COLL VENOUS BLD VENIPUNCTURE: CPT | Performed by: FAMILY MEDICINE

## 2025-07-18 PROCEDURE — 80048 BASIC METABOLIC PNL TOTAL CA: CPT | Performed by: FAMILY MEDICINE

## 2025-07-18 PROCEDURE — 120N000004 HC R&B MS OVERFLOW

## 2025-07-18 PROCEDURE — 250N000013 HC RX MED GY IP 250 OP 250 PS 637: Performed by: INTERNAL MEDICINE

## 2025-07-18 PROCEDURE — 81001 URINALYSIS AUTO W/SCOPE: CPT | Performed by: THORACIC SURGERY (CARDIOTHORACIC VASCULAR SURGERY)

## 2025-07-18 PROCEDURE — 85520 HEPARIN ASSAY: CPT | Performed by: INTERNAL MEDICINE

## 2025-07-18 PROCEDURE — 250N000011 HC RX IP 250 OP 636: Performed by: INTERNAL MEDICINE

## 2025-07-18 PROCEDURE — 84134 ASSAY OF PREALBUMIN: CPT | Performed by: THORACIC SURGERY (CARDIOTHORACIC VASCULAR SURGERY)

## 2025-07-18 PROCEDURE — 36415 COLL VENOUS BLD VENIPUNCTURE: CPT | Performed by: INTERNAL MEDICINE

## 2025-07-18 PROCEDURE — 84100 ASSAY OF PHOSPHORUS: CPT | Performed by: FAMILY MEDICINE

## 2025-07-18 PROCEDURE — 99232 SBSQ HOSP IP/OBS MODERATE 35: CPT | Performed by: FAMILY MEDICINE

## 2025-07-18 PROCEDURE — 250N000013 HC RX MED GY IP 250 OP 250 PS 637: Performed by: FAMILY MEDICINE

## 2025-07-18 RX ORDER — AMLODIPINE BESYLATE 5 MG/1
10 TABLET ORAL DAILY
Status: DISCONTINUED | OUTPATIENT
Start: 2025-07-18 | End: 2025-07-21

## 2025-07-18 RX ADMIN — METOPROLOL TARTRATE 12.5 MG: 25 TABLET, FILM COATED ORAL at 20:44

## 2025-07-18 RX ADMIN — ESCITALOPRAM 20 MG: 20 TABLET, FILM COATED ORAL at 20:44

## 2025-07-18 RX ADMIN — SODIUM ZIRCONIUM CYCLOSILICATE 10 G: 10 POWDER, FOR SUSPENSION ORAL at 05:21

## 2025-07-18 RX ADMIN — ATORVASTATIN CALCIUM 80 MG: 40 TABLET, FILM COATED ORAL at 20:44

## 2025-07-18 RX ADMIN — METOPROLOL TARTRATE 12.5 MG: 25 TABLET, FILM COATED ORAL at 08:17

## 2025-07-18 RX ADMIN — Medication 3 MG: at 20:51

## 2025-07-18 RX ADMIN — HEPARIN SODIUM 1800 UNITS/HR: 10000 INJECTION, SOLUTION INTRAVENOUS at 14:44

## 2025-07-18 RX ADMIN — INSULIN GLARGINE 25 UNITS: 100 INJECTION, SOLUTION SUBCUTANEOUS at 20:48

## 2025-07-18 RX ADMIN — AMLODIPINE BESYLATE 10 MG: 5 TABLET ORAL at 11:31

## 2025-07-18 RX ADMIN — ASPIRIN 81 MG CHEWABLE TABLET 81 MG: 81 TABLET CHEWABLE at 08:16

## 2025-07-18 RX ADMIN — INSULIN ASPART 1 UNITS: 100 INJECTION, SOLUTION INTRAVENOUS; SUBCUTANEOUS at 17:05

## 2025-07-18 RX ADMIN — FLUTICASONE PROPIONATE 2 SPRAY: 50 SPRAY, METERED NASAL at 08:16

## 2025-07-18 RX ADMIN — HEPARIN SODIUM 1800 UNITS/HR: 10000 INJECTION, SOLUTION INTRAVENOUS at 00:30

## 2025-07-18 RX ADMIN — INSULIN ASPART 1 UNITS: 100 INJECTION, SOLUTION INTRAVENOUS; SUBCUTANEOUS at 12:16

## 2025-07-18 ASSESSMENT — ACTIVITIES OF DAILY LIVING (ADL)
ADLS_ACUITY_SCORE: 32

## 2025-07-18 NOTE — PROGRESS NOTES
Care Management Follow Up    Length of Stay (days): 2    Expected Discharge Date: 07/25/2025    Anticipated Discharge Plan:   TBD    Transportation: TBD    PT Recommendations:    OT Recommendations:        Barriers to Discharge: medical stability, planned CABG 7/21    Prior Living Situation:  Patient reports he lives in his house with Iraida. He is independent with ADLs/IADLs, ambulates without devices mostly but occasionally will use a cane and has no services in community. Iraida is primary family contact and willing to transport at discharge.     Patient endorses having support at home should he discharge home.    Discussed  Partnership in Safe Discharge Planning  document with patient/family: No     Handoff Completed: No, handoff not indicated or clinically appropriate    Patient/Spokesperson Updated: patient 7/18    Additional Information:  Medical summary:  Mayito Sood is a 64 year old male with a past medical history of Type 2 Diabetes presented with chest pain and syncope, found to have multivessel coronary artery disease.   Planning CABG 7/21.      7/18/25:  Met with patient regarding HCD. Patient reports he would like to complete another new HCD. He states his SO wants to be his HCA but he prefers to have his sister Lisa. He would like to completed another HCD and does not want SO to know this.  Advised him on importance of a HCD that everyone understands and honors.  He confirmed he has spoken with Lisa about his wishes and agrees to be his HCA.  Advised him to let all family and SO know his wishes and who his HCA is so if he is in the difficult situation to have family make decisions there will be no surprises. He states understanding.    HCD forms provided.    Final discharge plan pending progression and recommendations post CABG.    Next Steps:   Follow for progression and recommendations.  May need notary if HCD completed in hospital.        Manda Morocho RN  Acute Care Management  [facility]  [facility]  For further questions/concerns you can reach us at Vocera Web Console

## 2025-07-18 NOTE — PLAN OF CARE
Goal Outcome Evaluation:      Plan of Care Reviewed With: patient    Overall Patient Progress: improvingOverall Patient Progress: improving     [facility] - ICU    RN Progress Note:            Pertinent Assessments:      Please refer to flowsheet rows for full assessment     Patient still without chest pain.  Moderate left hip pain improved with PRN Tylenol.  Blood pressure occasionally still high, but less so than last evening.  Heparin drip continues at 1650 units/hour and 18:30 Anti Xa in therapeutic range.             Key Events - This Shift:       Patient ambulated outside of room this evening and was able to shower.  Blood glucose improved since starting Lantus yesterday, but still running high 100's to 200.  Nasal cannula placed on patient at bedtime as his oxygen saturation consistently drops when he sleeps.      RN Managed Protocols Ordered:  Yes  Protocols:Phosphorus  Protocols Status: In Progress                Barriers to Discharge / Downgrade:     CV surgery evaluation.         Point of Contact Update: YES-OR-NO: Yes  Family updated when visiting in room.

## 2025-07-18 NOTE — PROGRESS NOTES
Brief progress note. Patient denies chest pain, shortness of breath, lightheadedness. Patient does have history of baseline bradycardia. Was able to get metoprolol 12.5 mg this morning given HR >55. Dr. Tomas saw patient this morning and is planning CABG Monday morning. Patient will remain in the hospital until surgery. Patient is being transferred out of ICU to telemetry. May consider increasing amlodipine or utilizing hydralazine if needed for BP management.     Tamiko Ferrari PA-C

## 2025-07-18 NOTE — PLAN OF CARE
Goal Outcome Evaluation:      Plan of Care Reviewed With: patient    Overall Patient Progress: improvingOverall Patient Progress: improving    Outcome Evaluation: Denies pain. Blood pressure intermittently hypertensive. No new complaints.

## 2025-07-18 NOTE — PLAN OF CARE
"  Problem: Adult Inpatient Plan of Care  Goal: Plan of Care Review  Description: The Plan of Care Review/Shift note should be completed every shift.  The Outcome Evaluation is a brief statement about your assessment that the patient is improving, declining, or no change.  This information will be displayed automatically on your shift  note.  Outcome: Progressing  Flowsheets (Taken 7/18/2025 1511)  Plan of Care Reviewed With:   patient   family  Goal: Patient-Specific Goal (Individualized)  Description: You can add care plan individualizations to a care plan. Examples of Individualization might be:  \"Parent requests to be called daily at 9am for status\", \"I have a hard time hearing out of my right ear\", or \"Do not touch me to wake me up as it startles  me\".  Outcome: Progressing  Goal: Absence of Hospital-Acquired Illness or Injury  Outcome: Progressing  Intervention: Identify and Manage Fall Risk  Recent Flowsheet Documentation  Taken 7/18/2025 1012 by Sadiq Colon RN  Safety Promotion/Fall Prevention:   activity supervised   assistive device/personal items within reach   clutter free environment maintained   nonskid shoes/slippers when out of bed   patient and family education   room organization consistent   safety round/check completed  Intervention: Prevent Infection  Recent Flowsheet Documentation  Taken 7/18/2025 1012 by Sadiq Colon RN  Infection Prevention:   hand hygiene promoted   personal protective equipment utilized  Goal: Optimal Comfort and Wellbeing  Outcome: Progressing  Goal: Readiness for Transition of Care  Outcome: Progressing    Goal Outcome Evaluation:      Plan of Care Reviewed With: patient, family    Pt downgraded from ICU today. A/O x 4. Pressures stable & pt saturating well on RA. Tele tracing NSR. Heparin gtt infusing at 1800 units/hr, anti-xa for recheck in AM. Phos protocol - recheck in AM. Pt had no acute complaints. Awaiting his bypass, declined being provided with " written/video education materials on procedure. Continue monitoring.     Sadiq Colon RN on 7/18/2025 at 3:12 PM

## 2025-07-18 NOTE — PROGRESS NOTES
[facility]    Medicine Progress Note - Hospitalist Service    Date of Admission:  7/15/2025    Assessment & Plan      Mayito Sood is a 64 year old male with a past medical history of Type 2 Diabetes presented with chest pain and syncope, found to have multivessel coronary artery disease.     #NSTEMI  Multivessel CAD  - Chest pain started 7/15, had episode of syncope at work  - Patient with troponin 67 -> 97  - EKG with ST Depression. Cardiology not calling STEMI  - [facility] 7/16 AM - Multiple areas of Diffuse coronary disease  - Cardiology following  - CT Surgery consulted; CABG Monday  - Echo with EF 55% mildly hypokinetic distal anterior segment no significant valvular heart disease  - s/p aspirin 325mg x1 and continue 81mg   - Atorvastatin 80mg daily   - Metoprolol 25mg BID  - Given ticagrelor x1, not continued -   ---hold off on ACE  ---Chest CT with faint hazy groundglass opacities, possible edema.    ---Ultrasound carotids less than 50% stenosis bilaterally  --Continue cardiac monitor  --on heparin gtt    #Acute Hypoxic Respiratory Failure  - Resolved  --Patient not on home oxygen, requiring up to 10L NC after [facility].  -  fluid overload with NSTEMI -sedation from [facility].   ---CT does show some groundglass opacities  - CXR on 7/15 showing no acute cardiopulmonary process.  --Did get Lasix 7/16.  Further diuresis per cardiology     CKD III  ---CKD Stage 3 Confirmed, CKD Stage 4 Considered and Ruled Out   ---Cr baseline appears to be around 1.75-2.0  --cotninue to hold cozaar   ---did get diuresis yesterday - now done  ---holding jardiance    #Hyperkalemia  - resolved  --Potassium 5.2 on ED presentation, then 5.8 with AM labs 7/16  --s/p  insulin  lokelma x1   - recheck in am     Hyponatremia  ---resolved    Hypertension  --bp running high  --- increase amlodipine  ---hold cozaar   ---continue metoprolol with hold parameters for bradycardia  --- consider diuretic    #Type 2  "Diabetes  #Hyperglycemia without DKA  - bs controlled overall  --Last A1c was 9.1% on 7/15/25  - PTA Meds:  empagliflozin, no insulin for many months per patient , no victoza for a month  ---continue Lantus 25 units nightly   ---hold jardiance  - Sliding Scale Insulin with Glucose Checks    BPH  --continue home flomax    hyperlipidemia  ---was on pravastatin  ---now on atorvastatin    Hypophosphatemia,   --resolved with replacement    Hypocalcemia--continue to monitor    Severe Obesity, BMI 37.59 with CAD, HTN, DM               Diet: Moderate Consistent Carb (60 g CHO per Meal) Diet    DVT Prophylaxis: Heparin GTT  Gutierrez Catheter: Not present  Lines: None     Cardiac Monitoring: ACTIVE order. Indication: Post- PCI/Angiogram (24 hours)  Code Status: Full Code      Clinically Significant Risk Factors          # Hyperchloremia: Highest Cl = 109 mmol/L in last 2 days, will monitor as appropriate              # Hypertension: Noted on problem list           # DMII: A1C = 9.1 % (Ref range: <5.7 %) within past 6 months, PRESENT ON ADMISSION# Severe Obesity: Estimated body mass index is 37.34 kg/m  as calculated from the following:    Height as of this encounter: 1.854 m (6' 1\").    Weight as of this encounter: 128.4 kg (283 lb). with complications, PRESENT ON ADMISSION            Social Drivers of Health    Depression: At risk (5/23/2025)    Received from [facility]    PHQ-2     PHQ-2 TOTAL SCORE: 6          Disposition Plan     Medically Ready for Discharge: Anticipated in 5+ Days             Neisha Smalls MD  Hospitalist Service  [facility]  Securely message with [employer] (more info)  Text page via [facility] Paging/Directory   ______________________________________________________________________    Interval History   --- Transferred out of ICU  -- Uneventful night.  Denies chest pain or shortness of breath  --- He is anticipating surgery Monday.  --- Discussed his " CKD    Physical Exam   Vital Signs: Temp: 97.3  F (36.3  C) Temp src: Axillary BP: (!) 161/74 Pulse: 62   Resp: 20 SpO2: 96 % O2 Device: Nasal cannula Oxygen Delivery: 3 LPM  Weight: 283 lbs 0 oz    General Appearance: Pleasant male apparent distress  Respiratory: Clear to auscultation bilaterally  Cardiovascular: Regular rate and rhythm without murmurs  GI: Obese soft and nontender  Skin: Trace lower extremity edema  Other: Neurologically gross intact without focal deficits appreciated    Medical Decision Making             Data     I have personally reviewed the following data over the past 24 hrs:    N/A  \   N/A   / N/A     139 109 (H) 41.5 (H) /  130 (H)   4.5 23 1.79 (H) \       Imaging results reviewed over the past 24 hrs:   Recent Results (from the past 24 hours)   CT Chest w/o Contrast    Narrative    EXAM: CT CHEST W/O CONTRAST  LOCATION: [facility]  DATE: 7/17/2025    INDICATION: pre op eval  COMPARISON: CT chest 5/17/2019  TECHNIQUE: CT chest without IV contrast. Multiplanar reformats were obtained. Dose reduction techniques were used.  CONTRAST: None.    FINDINGS:   LUNGS AND PLEURA: No suspicious pulmonary nodules. Chronic bronchial wall thickening and mild bronchial dilatation. Faint hazy groundglass density opacity throughout both lungs could indicate minimal edema. Strands of fibrosis and/or linear atelectasis   as well as scattered micronodular opacities in the peribronchial lung parenchyma in the mid and lower lungs have increased slightly since 2019. Several benign calcified granulomas again seen. Lungs are otherwise clear. No pleural effusion.    MEDIASTINUM/AXILLAE: No lymphadenopathy. Heart size within normal limits. Aortic valve leaflet calcification. No pericardial effusion. Normal caliber thoracic aorta with no significant calcified atheromatous plaque. Mild enlargement central pulmonary   arteries (MPA diameter 3.6 cm) could indicate some degree of chronic  pulmonary arterial hypertension.    CORONARY ARTERY CALCIFICATION: Moderate vessel coronary artery calcification.    UPPER ABDOMEN: Cholecystectomy.    MUSCULOSKELETAL: Chronic Schmorl's node formation inferior endplate T6 and T11 vertebral bodies.      Impression    IMPRESSION:   1.  Chronic bronchial wall thickening and mild bronchial dilatation.  2.  Faint hazy groundglass density opacity throughout both lungs could indicate minimal edema.  3.  Strands of fibrosis and/or linear atelectasis as well as scattered micronodular opacities in the peribronchial lung parenchyma in the mid and lower lungs have increased slightly since 2019.  4.  Mild enlargement central pulmonary arteries (MPA diameter 3.6 cm) could indicate some degree of chronic pulmonary arterial hypertension.  5.  Moderate vessel coronary artery calcification.

## 2025-07-19 LAB
ANION GAP SERPL CALCULATED.3IONS-SCNC: 9 MMOL/L (ref 7–15)
BUN SERPL-MCNC: 43.7 MG/DL (ref 8–23)
CALCIUM SERPL-MCNC: 8.2 MG/DL (ref 8.8–10.4)
CHLORIDE SERPL-SCNC: 110 MMOL/L (ref 98–107)
CREAT SERPL-MCNC: 1.67 MG/DL (ref 0.67–1.17)
EGFRCR SERPLBLD CKD-EPI 2021: 46 ML/MIN/1.73M2
GLUCOSE BLDC GLUCOMTR-MCNC: 137 MG/DL (ref 70–99)
GLUCOSE BLDC GLUCOMTR-MCNC: 138 MG/DL (ref 70–99)
GLUCOSE BLDC GLUCOMTR-MCNC: 140 MG/DL (ref 70–99)
GLUCOSE BLDC GLUCOMTR-MCNC: 142 MG/DL (ref 70–99)
GLUCOSE SERPL-MCNC: 147 MG/DL (ref 70–99)
HCO3 SERPL-SCNC: 20 MMOL/L (ref 22–29)
HOLD SPECIMEN: NORMAL
PHOSPHATE SERPL-MCNC: 4.2 MG/DL (ref 2.5–4.5)
POTASSIUM SERPL-SCNC: 4.4 MMOL/L (ref 3.4–5.3)
SODIUM SERPL-SCNC: 139 MMOL/L (ref 135–145)
UFH PPP CHRO-ACNC: 0.28 IU/ML (ref ?–1.1)

## 2025-07-19 PROCEDURE — 250N000011 HC RX IP 250 OP 636: Performed by: INTERNAL MEDICINE

## 2025-07-19 PROCEDURE — 250N000013 HC RX MED GY IP 250 OP 250 PS 637: Performed by: INTERNAL MEDICINE

## 2025-07-19 PROCEDURE — 36415 COLL VENOUS BLD VENIPUNCTURE: CPT | Performed by: FAMILY MEDICINE

## 2025-07-19 PROCEDURE — 250N000013 HC RX MED GY IP 250 OP 250 PS 637: Performed by: FAMILY MEDICINE

## 2025-07-19 PROCEDURE — 85520 HEPARIN ASSAY: CPT | Performed by: FAMILY MEDICINE

## 2025-07-19 PROCEDURE — 250N000013 HC RX MED GY IP 250 OP 250 PS 637: Performed by: STUDENT IN AN ORGANIZED HEALTH CARE EDUCATION/TRAINING PROGRAM

## 2025-07-19 PROCEDURE — 99231 SBSQ HOSP IP/OBS SF/LOW 25: CPT | Performed by: INTERNAL MEDICINE

## 2025-07-19 PROCEDURE — 80048 BASIC METABOLIC PNL TOTAL CA: CPT | Performed by: FAMILY MEDICINE

## 2025-07-19 PROCEDURE — 84100 ASSAY OF PHOSPHORUS: CPT | Performed by: FAMILY MEDICINE

## 2025-07-19 PROCEDURE — 99233 SBSQ HOSP IP/OBS HIGH 50: CPT | Performed by: INTERNAL MEDICINE

## 2025-07-19 PROCEDURE — 120N000004 HC R&B MS OVERFLOW

## 2025-07-19 RX ORDER — LOPERAMIDE HYDROCHLORIDE 2 MG/1
2 CAPSULE ORAL 4 TIMES DAILY PRN
Status: DISCONTINUED | OUTPATIENT
Start: 2025-07-19 | End: 2025-07-21

## 2025-07-19 RX ADMIN — LOPERAMIDE HYDROCHLORIDE 2 MG: 2 CAPSULE ORAL at 08:01

## 2025-07-19 RX ADMIN — ESCITALOPRAM 20 MG: 20 TABLET, FILM COATED ORAL at 20:12

## 2025-07-19 RX ADMIN — ASPIRIN 81 MG CHEWABLE TABLET 81 MG: 81 TABLET CHEWABLE at 08:01

## 2025-07-19 RX ADMIN — AMLODIPINE BESYLATE 10 MG: 5 TABLET ORAL at 08:01

## 2025-07-19 RX ADMIN — FLUTICASONE PROPIONATE 2 SPRAY: 50 SPRAY, METERED NASAL at 08:01

## 2025-07-19 RX ADMIN — INSULIN GLARGINE 25 UNITS: 100 INJECTION, SOLUTION SUBCUTANEOUS at 21:38

## 2025-07-19 RX ADMIN — Medication 3 MG: at 21:44

## 2025-07-19 RX ADMIN — SODIUM ZIRCONIUM CYCLOSILICATE 10 G: 10 POWDER, FOR SUSPENSION ORAL at 05:34

## 2025-07-19 RX ADMIN — HEPARIN SODIUM 1800 UNITS/HR: 10000 INJECTION, SOLUTION INTRAVENOUS at 19:08

## 2025-07-19 RX ADMIN — METOPROLOL TARTRATE 12.5 MG: 25 TABLET, FILM COATED ORAL at 20:12

## 2025-07-19 RX ADMIN — HEPARIN SODIUM 1800 UNITS/HR: 10000 INJECTION, SOLUTION INTRAVENOUS at 04:47

## 2025-07-19 RX ADMIN — ATORVASTATIN CALCIUM 80 MG: 40 TABLET, FILM COATED ORAL at 20:12

## 2025-07-19 ASSESSMENT — ACTIVITIES OF DAILY LIVING (ADL)
ADLS_ACUITY_SCORE: 38
ADLS_ACUITY_SCORE: 32
ADLS_ACUITY_SCORE: 38
ADLS_ACUITY_SCORE: 32
ADLS_ACUITY_SCORE: 32
ADLS_ACUITY_SCORE: 38
ADLS_ACUITY_SCORE: 32
ADLS_ACUITY_SCORE: 38
ADLS_ACUITY_SCORE: 32
ADLS_ACUITY_SCORE: 32
ADLS_ACUITY_SCORE: 38
ADLS_ACUITY_SCORE: 32
ADLS_ACUITY_SCORE: 38

## 2025-07-19 NOTE — PLAN OF CARE
Problem: Adult Inpatient Plan of Care  Goal: Absence of Hospital-Acquired Illness or Injury  Outcome: Met  Intervention: Identify and Manage Fall Risk  Recent Flowsheet Documentation  Taken 7/19/2025 0028 by Saravanan Faria RN  Safety Promotion/Fall Prevention:   activity supervised   assistive device/personal items within reach   clutter free environment maintained   nonskid shoes/slippers when out of bed   patient and family education   room organization consistent   safety round/check completed  Intervention: Prevent Skin Injury  Recent Flowsheet Documentation  Taken 7/19/2025 0202 by Saravanan Faria RN  Body Position: turned  Taken 7/19/2025 0028 by Saravanan Faria RN  Body Position: position changed independently  Taken 7/19/2025 0010 by Saravanan Faria RN  Body Position: turned  Intervention: Prevent Infection  Recent Flowsheet Documentation  Taken 7/19/2025 0028 by Saravanan Faria RN  Infection Prevention:   hand hygiene promoted   personal protective equipment utilized  Goal: Optimal Comfort and Wellbeing  Outcome: Met     Problem: Fall Injury Risk  Goal: Absence of Fall and Fall-Related Injury  Outcome: Met  Intervention: Identify and Manage Contributors  Recent Flowsheet Documentation  Taken 7/19/2025 0028 by Saravanan Faria RN  Medication Review/Management: medications reviewed  Intervention: Promote Injury-Free Environment  Recent Flowsheet Documentation  Taken 7/19/2025 0028 by Saravanan Faria RN  Safety Promotion/Fall Prevention:   activity supervised   assistive device/personal items within reach   clutter free environment maintained   nonskid shoes/slippers when out of bed   patient and family education   room organization consistent   safety round/check completed     Problem: Chest Pain  Goal: Resolution of Chest Pain Symptoms  Outcome: Met   Goal Outcome Evaluation:       Pt. Alert and oriented. Telemetry Sinus sania with BBB heart rates 50's. Pt. Using urinal to void. Waiting for CABG on Monday.  "Continues on Heparin drip. Denies any chest pain. Using call light for all needs. Bed alarm on for safety. Sleeping in between cares tonight. Continue with current plan of care.     /66 (BP Location: Right arm)   Pulse 51   Temp 97.5  F (36.4  C) (Oral)   Resp 16   Ht 1.854 m (6' 1\")   Wt 128.4 kg (283 lb)   SpO2 98%   BMI 37.34 kg/m      Saravanan Faria RN                           "

## 2025-07-19 NOTE — PROGRESS NOTES
[facility]    PROGRESS NOTE - Hospitalist Service    Assessment and Plan  Patient is new to me, Mayito Sood is a 64 year old male with a past medical history of Type 2 Diabetes presented to the emergency room with chest pain and syncope, found to have multivessel coronary artery disease.  Awaiting on CABG     Acute coronary syndrome was NSTEMI  - Chest pain started 7/15, had episode of syncope at work  - Patient with troponin 67 -> 97  - EKG with ST Depression. Cardiology not calling STEMI  - Cardiology consult, appreciate input  -Coronary angiogram shows multivessel disease  - CT Surgery consulted; CABG Monday  - Echo with EF 55% mildly hypokinetic distal anterior segment no significant valvular heart disease  - s/p aspirin 325mg x1 and continue 81mg   - Continue atorvastatin 80mg daily   - Metoprolol 25mg BID  - Given ticagrelor x1, not continued -   ---hold off on ACE  ---Chest CT with faint hazy groundglass opacities, possible edema.    ---Ultrasound carotids less than 50% stenosis bilaterally  --Continue cardiac monitor  --on heparin gtt     Acute Hypoxic Respiratory Failure  - Secondary to above, resolved  --Patient not on home oxygen, requiring up to 10L NC after [facility].  -  fluid overload with NSTEMI -sedation from [facility].   ---CT does show some groundglass opacities  - CXR on 7/15 showing no acute cardiopulmonary process.  --Did get Lasix 7/16.  Further diuresis per cardiology     Acute kidney injury   - Baseline CKD III   CKD Stage 4 Considered and Ruled Out   ---Cr baseline appears to be around 1.75-2.0  --cotninue to hold cozaar   ---did get diuresis earlier- now done  ---holding jardiance  -Continue to monitor renal function      Hyperkalemia  - resolved  --Potassium 5.2 on ED presentation, then 5.8 with AM labs 7/16  --s/p  insulin  lokelma x1   - recheck in am      Hyponatremia  ---resolved     Hypertension  --bp running high  --- increase amlodipine  ---hold cozaar   ---continue  metoprolol with hold parameters for bradycardia  --- consider diuretic     Type 2 Diabetes with hyperglycemia  -Poorly controlled at baseline  --Last A1c was 9.1% on 7/15/25  - PTA Meds:  empagliflozin, no insulin for many months per patient , no victoza for a month  ---continue Lantus 25 units nightly   ---hold jardiance  - Sliding Scale Insulin with Glucose Checks     BPH  --continue home flomax     hyperlipidemia  ---was on pravastatin  ---now on atorvastatin     Hypophosphatemia,   --resolved with replacement     Hypocalcemia-  -continue to monitor     Severe Obesity, BMI 37.59  - Patient will benefit from weight loss.    50 MINUTES SPENT BY ME on the date of service doing chart review, history, exam, documentation & further activities per the note    Active Problems:    Status post coronary angiogram    ACS (acute coronary syndrome) (H)    ST elevation myocardial infarction (STEMI), unspecified artery (H)      VTE prophylaxis: IV heparin  DIET: Orders Placed This Encounter      Moderate Consistent Carb (60 g CHO per Meal) Diet      Disposition/Barriers to discharge: CABG surgery  Medically Ready for Discharge: Anticipated in 5+ Days    Code Status: Full Code    Subjective:  Mayito is feeling well simply, denies any chest pain shortness of breath.  Tolerating oral diet.  No acute significant events overnight.    PHYSICAL EXAM  Vitals:    07/17/25 0600 07/18/25 0600 07/19/25 0342   Weight: 129.2 kg (284 lb 14.4 oz) 128.4 kg (283 lb) 127.1 kg (280 lb 3.2 oz)     B/P:132/72 T:97.7 P:58 R:18     Intake/Output Summary (Last 24 hours) at 7/19/2025 1524  Last data filed at 7/19/2025 0900  Gross per 24 hour   Intake 350 ml   Output 400 ml   Net -50 ml      Body mass index is 36.97 kg/m .    Constitutional: awake, alert, cooperative, no apparent distress, and appears stated age  Respiratory: No increased work of breathing, good air exchange, clear to auscultation bilaterally, no crackles or wheezing  Cardiovascular:  Normal apical impulse, regular rate and rhythm, normal S1 and S2, no S3 or S4, and no murmur noted  GI: No scars, normal bowel sounds, soft, non-distended, non-tender, no masses palpated, no hepatosplenomegally  Skin: no bruising or bleeding and normal skin color, texture, turgor  Musculoskeletal: There is no redness, warmth, or swelling of the joints.  Full range of motion noted.  no lower extremity pitting edema present  Neurologic: Awake, alert, oriented to name, place and time.  Cranial nerves II-XII are grossly intact.  Motor is 5 out of 5 bilaterally.   Sensory is intact.    Neuropsychiatric: Appropriate with examiner      PERTINENT LABS/IMAGING:    I have personally reviewed the following data over the past 24 hrs:    N/A  \   N/A   / N/A     139 110 (H) 43.7 (H) /  140 (H)   4.4 20 (L) 1.67 (H) \       Imaging results reviewed over the past 24 hrs:   No results found for this or any previous visit (from the past 24 hours).    Discussed with patient, family, cardiology, nursing staff and discharge planner    Mitchell Elder MD  [facility]  [phone]

## 2025-07-19 NOTE — PROGRESS NOTES
"Cardiology    Impression and Plan     Assessment/Plan:    NSTEMI/coronary artery disease: Troponin 67 --> 95 --> 285.  Coronary angiogram 7/16/2025 showed severe multivessel disease involving LAD, large diagonal, proximal to mid left circumflex, proximal OM 1 and OM 2, and  of distal RCA filling with collaterals.  CTS consulted; on heparin infusion. Pt is stable w/o chest discomfort or dyspnea. Awaiting CABG.         Physical Examination   VITALS: BP (!) 163/78 (BP Location: Right arm, Patient Position: Semi-Cool's, Cuff Size: Adult Regular)   Pulse 51   Temp 97.8  F (36.6  C) (Oral)   Resp 16   Ht 1.854 m (6' 1\")   Wt 127.1 kg (280 lb 3.2 oz)   SpO2 97%   BMI 36.97 kg/m    BMI: Body mass index is 36.97 kg/m .  Wt Readings from Last 3 Encounters:   07/19/25 127.1 kg (280 lb 3.2 oz)   07/10/24 126.2 kg (278 lb 4.8 oz)   06/11/24 127 kg (280 lb)       Intake/Output Summary (Last 24 hours) at 7/19/2025 0904  Last data filed at 7/18/2025 1652  Gross per 24 hour   Intake 300 ml   Output 400 ml   Net -100 ml                   Lab Results Reviewed    Chemistry/lipid CBC Cardiac Enzymes/BNP/TSH/INR   No results for input(s): \"CHOL\", \"HDL\", \"LDL\", \"TRIG\", \"CHOLHDLRATIO\" in the last 37759 hours.  No results for input(s): \"LDL\" in the last 46896 hours.  Recent Labs   Lab Test 07/19/25  0735 07/19/25  0427   NA  --  139   POTASSIUM  --  4.4   CHLORIDE  --  110*   CO2  --  20*   * 147*   BUN  --  43.7*   CR  --  1.67*   GFRESTIMATED  --  46*   QUIANA  --  8.2*     Recent Labs   Lab Test 07/19/25  0427 07/18/25  0648 07/17/25  0436   CR 1.67* 1.79* 2.02*     Recent Labs   Lab Test 07/15/25  2249   A1C 9.1*          Recent Labs   Lab Test 07/16/25 0448   WBC 10.8   HGB 13.1*   HCT 40.3   MCV 93        Recent Labs   Lab Test 07/16/25  0448 07/16/25  0317 07/15/25  2249   HGB 13.1* 13.1* 12.8*    No results for input(s): \"TROPONINI\" in the last 45027 hours.  Recent Labs   Lab Test 07/15/25  2249   NTBNP 594* " "    No results for input(s): \"TSH\" in the last 05943 hours.  Recent Labs   Lab Test 07/15/25  2253   INR 0.98           Current Inpatient Scheduled Medications   Scheduled Meds:  Current Facility-Administered Medications   Medication Dose Route Frequency Provider Last Rate Last Admin    amLODIPine (NORVASC) tablet 10 mg  10 mg Oral Daily Neisha Smalls MD   10 mg at 07/19/25 0801    aspirin (ASA) chewable tablet 81 mg  81 mg Oral Daily Cheng Mills MD   81 mg at 07/19/25 0801    atorvastatin (LIPITOR) tablet 80 mg  80 mg Oral QPM Cheng iMlls MD   80 mg at 07/18/25 2044    [Held by provider] empagliflozin (JARDIANCE) tablet 10 mg  10 mg Oral Daily Neisha Smalls MD        escitalopram (LEXAPRO) tablet 20 mg  20 mg Oral QPM Cheng Mills MD   20 mg at 07/18/25 2044    fluticasone (FLONASE) 50 MCG/ACT spray 2 spray  2 spray Both Nostrils Daily Neisha Smalls MD   2 spray at 07/19/25 0801    insulin aspart (NovoLOG) injection (RAPID ACTING)  1-10 Units Subcutaneous TID AC Cheng Mills MD   1 Units at 07/18/25 1705    insulin aspart (NovoLOG) injection (RAPID ACTING)  1-7 Units Subcutaneous At Bedtime Cheng Mills MD   1 Units at 07/17/25 2129    insulin glargine (LANTUS PEN) injection 25 Units  25 Units Subcutaneous At Bedtime Neisha Smalls MD   25 Units at 07/18/25 2048    [Held by provider] losartan (COZAAR) tablet 25 mg  25 mg Oral Daily Neisha Smalls MD        metoprolol tartrate (LOPRESSOR) half-tab 12.5 mg  12.5 mg Oral BID Tamiko Ferrari PA-C   12.5 mg at 07/18/25 2044     Continuous Infusions:  Current Facility-Administered Medications   Medication Dose Route Frequency Provider Last Rate Last Admin    heparin 25,000 units in 0.45% NaCl 250 mL ANTICOAGULANT infusion  0-5,000 Units/hr Intravenous Continuous Cheng Mills MD 18 mL/hr at 07/19/25 0447 1,800 Units/hr at 07/19/25 0447       No current outpatient medications on file.          Medications Prior to Admission   Prior to " "Admission medications    Medication Sig Start Date End Date Taking? Authorizing Provider   acetaminophen (TYLENOL) 325 MG tablet Take 325 mg by mouth every 4 hours as needed for mild pain.   Yes Unknown, Entered By History   amLODIPine (NORVASC) 10 MG tablet Take 10 mg by mouth daily.   Yes Unknown, Entered By History   empagliflozin (JARDIANCE) 10 MG TABS tablet Take 10 mg by mouth daily.   Yes Unknown, Entered By History   escitalopram (LEXAPRO) 20 MG tablet Take 20 mg by mouth daily.   Yes Reported, Patient   fluticasone (FLONASE) 50 MCG/ACT nasal spray Spray 2 sprays into both nostrils daily.   Yes Unknown, Entered By History   insulin glargine 100 UNIT/ML pen Inject 24 Units subcutaneously daily.   Yes Unknown, Entered By History   liraglutide (VICTOZA) 18 MG/3ML solution Inject 1.8 mg subcutaneously daily.   Yes Reported, Patient   losartan (COZAAR) 25 MG tablet Take 25 mg by mouth daily.   Yes Unknown, Entered By History   pravastatin (PRAVACHOL) 10 MG tablet Take 10 mg by mouth daily 2/16/24  Yes Reported, Patient   ACCU-CHEK FASTCLIX Misc [ACCU-CHEK FASTCLIX MISC] USE AS DIRECTED FOUR TIMES DAILY 6/2/15   Celestina Garrison MD   ACCU-CHEK SMARTVIEW TEST STRIP Strp [ACCU-CHEK SMARTVIEW TEST STRIP STRP] TEST FOUR TIMES DAILY 2/8/15   Celestina Garrison MD   insulin needles, disposable, ([employer]ICARE) 32 x 5/32 \" Ndle [INSULIN NEEDLES, DISPOSABLE, ([employer]ICARE) 32 X 5/32 \" NDLE] Use as Directed with Humalog 1/22/15   Celestina Garrison MD   pen needle, diabetic 31 gauge x 5/16\" Ndle [PEN NEEDLE, DIABETIC 31 GAUGE X 5/16\" NDLE] Use 1 pen As Directed as needed. 6/11/16   Guicho Malagon PA-C Harjit Chahal MD MPH  Non-invasive Cardiologist  [facility]        "

## 2025-07-19 NOTE — PLAN OF CARE
"  Problem: Adult Inpatient Plan of Care  Goal: Plan of Care Review  Description: The Plan of Care Review/Shift note should be completed every shift.  The Outcome Evaluation is a brief statement about your assessment that the patient is improving, declining, or no change.  This information will be displayed automatically on your shift  note.  Outcome: Progressing  Flowsheets (Taken 7/19/2025 1435)  Plan of Care Reviewed With:   patient   family  Goal: Patient-Specific Goal (Individualized)  Description: You can add care plan individualizations to a care plan. Examples of Individualization might be:  \"Parent requests to be called daily at 9am for status\", \"I have a hard time hearing out of my right ear\", or \"Do not touch me to wake me up as it startles  me\".  Outcome: Progressing  Goal: Absence of Hospital-Acquired Illness or Injury  Intervention: Identify and Manage Fall Risk  Recent Flowsheet Documentation  Taken 7/19/2025 0738 by Sadiq Colon RN  Safety Promotion/Fall Prevention:   activity supervised   assistive device/personal items within reach   clutter free environment maintained   nonskid shoes/slippers when out of bed   patient and family education   room organization consistent   safety round/check completed  Intervention: Prevent Infection  Recent Flowsheet Documentation  Taken 7/19/2025 0738 by Sadiq Colon RN  Infection Prevention:   hand hygiene promoted   personal protective equipment utilized  Goal: Readiness for Transition of Care  Outcome: Progressing    Goal Outcome Evaluation:      Plan of Care Reviewed With: patient, family    Pt A/O x 4, very pleasant. Vitally stable & saturating well on RA - denies SOB or BUCKLEY. Tele tracing NSR. Heparin gtt infusing 1800 units/hr. Antixa & phos protocol for recheck in AM. Pt had no acute complaints. Ambulated in [place] twice today - asymptomatic. Denies any angina. Continue monitoring. Plan for bypass Monday.     Sadiq Colon RN on 7/19/2025 at 2:39 PM    "

## 2025-07-19 NOTE — PLAN OF CARE
[id number]    Problem: Adult Inpatient Plan of Care  Goal: Plan of Care Review  Description: The Plan of Care Review/Shift note should be completed every shift.  The Outcome Evaluation is a brief statement about your assessment that the patient is improving, declining, or no change.  This information will be displayed automatically on your shift  note.  Outcome: Progressing  Flowsheets (Taken 7/19/2025 1751)  Plan of Care Reviewed With: patient     Problem: Comorbidity Management  Goal: Blood Glucose Levels Within Targeted Range  Outcome: Progressing  Intervention: Monitor and Manage Glycemia  Recent Flowsheet Documentation  Taken 7/19/2025 1706 by Michell Urbina RN  Medication Review/Management: medications reviewed   Goal Outcome Evaluation:      Plan of Care Reviewed With: patient      Writer took over this pt at 1500. Pt alert and oriented times 4. Vitals stable. Denied any pain. Heparin running at 1800 units/hr. Next Anti-XA tomorrow morning. Blood sugar before meal was 137 mg/dl. No coverage needed.  Pt on phos protocol. Recheck lab in am per protocol. Pt aware of surgery on Monday. He declined to watch video for procedure for Monday. Tele NSR. All cares explained to pt.

## 2025-07-19 NOTE — PLAN OF CARE
Problem: Acute Coronary Syndrome  Goal: Optimal Adaptation to Illness  Outcome: Progressing  Goal: Normalized Cardiac Rhythm  Outcome: Progressing  Goal: Effective Cardiac Pump Function  Outcome: Progressing     Problem: Comorbidity Management  Goal: Blood Glucose Levels Within Targeted Range  Outcome: Progressing  Intervention: Monitor and Manage Glycemia  Recent Flowsheet Documentation  Taken 7/18/2025 1559 by Mo Pillai RN  Medication Review/Management: medications reviewed  Goal: Blood Pressure in Desired Range  Outcome: Progressing  Intervention: Maintain Blood Pressure Management  Recent Flowsheet Documentation  Taken 7/18/2025 1559 by Mo Pillai RN  Medication Review/Management: medications reviewed   Goal Outcome Evaluation:       Pt denies pain nausea sob. Pt is on RA. PT is axox4 calm and cooperative. NSR on tele occasional bradycardia while at rest.  Pt reports neuropathy in ble to ankle from past back injury .  Pt has heparin infusion running at 1800 with a.m. recheck.   Pt blood glucose was 157 at dinner and 155 at HS.   Pt was resting comfortably in bed.

## 2025-07-20 ENCOUNTER — ANESTHESIA EVENT (OUTPATIENT)
Dept: SURGERY | Facility: HOSPITAL | Age: 65
End: 2025-07-20
Payer: COMMERCIAL

## 2025-07-20 LAB
ABO + RH BLD: NORMAL
ANION GAP SERPL CALCULATED.3IONS-SCNC: 9 MMOL/L (ref 7–15)
BLD GP AB SCN SERPL QL: NEGATIVE
BUN SERPL-MCNC: 42.2 MG/DL (ref 8–23)
CALCIUM SERPL-MCNC: 8.3 MG/DL (ref 8.8–10.4)
CHLORIDE SERPL-SCNC: 109 MMOL/L (ref 98–107)
CREAT SERPL-MCNC: 1.74 MG/DL (ref 0.67–1.17)
EGFRCR SERPLBLD CKD-EPI 2021: 44 ML/MIN/1.73M2
ERYTHROCYTE [DISTWIDTH] IN BLOOD BY AUTOMATED COUNT: 12.9 % (ref 10–15)
GLUCOSE BLDC GLUCOMTR-MCNC: 110 MG/DL (ref 70–99)
GLUCOSE BLDC GLUCOMTR-MCNC: 113 MG/DL (ref 70–99)
GLUCOSE BLDC GLUCOMTR-MCNC: 133 MG/DL (ref 70–99)
GLUCOSE BLDC GLUCOMTR-MCNC: 139 MG/DL (ref 70–99)
GLUCOSE BLDC GLUCOMTR-MCNC: 141 MG/DL (ref 70–99)
GLUCOSE SERPL-MCNC: 132 MG/DL (ref 70–99)
HCO3 SERPL-SCNC: 21 MMOL/L (ref 22–29)
HCT VFR BLD AUTO: 35.7 % (ref 40–53)
HGB BLD-MCNC: 11.7 G/DL (ref 13.3–17.7)
MCH RBC QN AUTO: 30.3 PG (ref 26.5–33)
MCHC RBC AUTO-ENTMCNC: 32.8 G/DL (ref 31.5–36.5)
MCV RBC AUTO: 93 FL (ref 78–100)
PHOSPHATE SERPL-MCNC: 3.9 MG/DL (ref 2.5–4.5)
PLATELET # BLD AUTO: 241 10E3/UL (ref 150–450)
POTASSIUM SERPL-SCNC: 4.1 MMOL/L (ref 3.4–5.3)
RBC # BLD AUTO: 3.86 10E6/UL (ref 4.4–5.9)
SODIUM SERPL-SCNC: 139 MMOL/L (ref 135–145)
SPECIMEN EXP DATE BLD: NORMAL
UFH PPP CHRO-ACNC: 0.31 IU/ML (ref ?–1.1)
WBC # BLD AUTO: 7 10E3/UL (ref 4–11)

## 2025-07-20 PROCEDURE — 99232 SBSQ HOSP IP/OBS MODERATE 35: CPT | Performed by: INTERNAL MEDICINE

## 2025-07-20 PROCEDURE — 250N000011 HC RX IP 250 OP 636: Performed by: INTERNAL MEDICINE

## 2025-07-20 PROCEDURE — 250N000013 HC RX MED GY IP 250 OP 250 PS 637: Performed by: FAMILY MEDICINE

## 2025-07-20 PROCEDURE — 85520 HEPARIN ASSAY: CPT | Performed by: FAMILY MEDICINE

## 2025-07-20 PROCEDURE — 86923 COMPATIBILITY TEST ELECTRIC: CPT | Performed by: THORACIC SURGERY (CARDIOTHORACIC VASCULAR SURGERY)

## 2025-07-20 PROCEDURE — 86901 BLOOD TYPING SEROLOGIC RH(D): CPT | Performed by: THORACIC SURGERY (CARDIOTHORACIC VASCULAR SURGERY)

## 2025-07-20 PROCEDURE — 82374 ASSAY BLOOD CARBON DIOXIDE: CPT | Performed by: INTERNAL MEDICINE

## 2025-07-20 PROCEDURE — 84100 ASSAY OF PHOSPHORUS: CPT | Performed by: FAMILY MEDICINE

## 2025-07-20 PROCEDURE — 250N000013 HC RX MED GY IP 250 OP 250 PS 637: Performed by: STUDENT IN AN ORGANIZED HEALTH CARE EDUCATION/TRAINING PROGRAM

## 2025-07-20 PROCEDURE — 250N000013 HC RX MED GY IP 250 OP 250 PS 637: Performed by: INTERNAL MEDICINE

## 2025-07-20 PROCEDURE — 120N000004 HC R&B MS OVERFLOW

## 2025-07-20 PROCEDURE — 85014 HEMATOCRIT: CPT | Performed by: INTERNAL MEDICINE

## 2025-07-20 PROCEDURE — 36415 COLL VENOUS BLD VENIPUNCTURE: CPT | Performed by: FAMILY MEDICINE

## 2025-07-20 PROCEDURE — 99233 SBSQ HOSP IP/OBS HIGH 50: CPT | Performed by: FAMILY MEDICINE

## 2025-07-20 RX ADMIN — Medication 3 MG: at 21:29

## 2025-07-20 RX ADMIN — FLUTICASONE PROPIONATE 2 SPRAY: 50 SPRAY, METERED NASAL at 08:28

## 2025-07-20 RX ADMIN — ASPIRIN 81 MG CHEWABLE TABLET 81 MG: 81 TABLET CHEWABLE at 08:28

## 2025-07-20 RX ADMIN — INSULIN GLARGINE 25 UNITS: 100 INJECTION, SOLUTION SUBCUTANEOUS at 21:35

## 2025-07-20 RX ADMIN — AMLODIPINE BESYLATE 10 MG: 5 TABLET ORAL at 08:28

## 2025-07-20 RX ADMIN — HEPARIN SODIUM 1800 UNITS/HR: 10000 INJECTION, SOLUTION INTRAVENOUS at 10:40

## 2025-07-20 RX ADMIN — ATORVASTATIN CALCIUM 80 MG: 40 TABLET, FILM COATED ORAL at 20:00

## 2025-07-20 RX ADMIN — ESCITALOPRAM 20 MG: 20 TABLET, FILM COATED ORAL at 20:00

## 2025-07-20 RX ADMIN — ACETAMINOPHEN 650 MG: 325 TABLET ORAL at 16:18

## 2025-07-20 RX ADMIN — METOPROLOL TARTRATE 12.5 MG: 25 TABLET, FILM COATED ORAL at 20:00

## 2025-07-20 ASSESSMENT — ACTIVITIES OF DAILY LIVING (ADL)
ADLS_ACUITY_SCORE: 38

## 2025-07-20 NOTE — PLAN OF CARE
Problem: Chest Pain  Goal: Resolution of Chest Pain Symptoms  Outcome: Progressing     Problem: Comorbidity Management  Goal: Blood Pressure in Desired Range  7/20/2025 0250 by Carmina Sterling RN  Outcome: Progressing  7/20/2025 0249 by Carmina Sterling RN  Outcome: Progressing  Intervention: Maintain Blood Pressure Management  Recent Flowsheet Documentation  Taken 7/19/2025 2352 by Carmina Sterling, RN  Medication Review/Management: medications reviewed  Taken 7/19/2025 2012 by Carmina Sterling RN  Medication Review/Management: medications reviewed   Goal Outcome Evaluation:       VSS on RA. SR w/ BBB. Pt not reporting any chest pain or SOB. IV heparin running at 1,800 units/hr, AntiXa is next AM. K and Mg protocols ran and recheck in AM.

## 2025-07-20 NOTE — PLAN OF CARE
"  Problem: Adult Inpatient Plan of Care  Goal: Plan of Care Review  Description: The Plan of Care Review/Shift note should be completed every shift.  The Outcome Evaluation is a brief statement about your assessment that the patient is improving, declining, or no change.  This information will be displayed automatically on your shift  note.  Outcome: Progressing  Flowsheets (Taken 7/20/2025 1441)  Plan of Care Reviewed With:   patient   family  Goal: Patient-Specific Goal (Individualized)  Description: You can add care plan individualizations to a care plan. Examples of Individualization might be:  \"Parent requests to be called daily at 9am for status\", \"I have a hard time hearing out of my right ear\", or \"Do not touch me to wake me up as it startles  me\".  Outcome: Progressing  Goal: Absence of Hospital-Acquired Illness or Injury  Intervention: Identify and Manage Fall Risk  Recent Flowsheet Documentation  Taken 7/20/2025 0800 by Sadiq Colon RN  Safety Promotion/Fall Prevention:   increased rounding and observation   assistive device/personal items within reach   clutter free environment maintained   nonskid shoes/slippers when out of bed   patient and family education   room organization consistent   safety round/check completed   supervised activity  Intervention: Prevent Infection  Recent Flowsheet Documentation  Taken 7/20/2025 0800 by Sadiq Colon RN  Infection Prevention: hand hygiene promoted  Goal: Readiness for Transition of Care  Outcome: Progressing     Goal Outcome Evaluation:      Plan of Care Reviewed With: patient, family    Pt A/O x 4, very pleasant. Vitally stable & saturating well on RA - denies SOB or BUCKLEY. Tele tracing NSR. Heparin gtt infusing 1800 units/hr. Antixa & phos protocol for recheck in AM. Pt had no acute complaints. Ambulated in [place] twice today - asymptomatic. Denies any angina. Continue monitoring. Plan for bypass tomorrow. Continue monitoring.    Sadiq Colon RN on " 7/20/2025 at 2:42 PM

## 2025-07-20 NOTE — PROGRESS NOTES
[facility]    Medicine Progress Note - Hospitalist Service    Date of Admission:  7/15/2025    Assessment & Plan     Mayito Sood is a 64 year old male with a past medical history of Type 2 Diabetes presented to the emergency room with chest pain and syncope, found to have multivessel coronary artery disease.  Awaiting on CABG      NSTEMI  Multivessel CAD  - Chest pain started 7/15, had episode of syncope at work  - Patient with troponin 67 -> 97  - EKG with ST Depression. Cardiology not calling STEMI  - Cardiology consult, appreciate input  -Coronary angiogram shows multivessel disease  - CT Surgery consulted; CABG Monday  - Echo with EF 55% mildly hypokinetic distal anterior segment no significant valvular heart disease  - s/p aspirin 325mg x1 and continue 81mg   - Continue atorvastatin 80mg daily   - Metoprolol 25mg BID  - Given ticagrelor x1, not continued -   ---hold off on ACE  ---Chest CT with faint hazy groundglass opacities, possible edema.    ---Ultrasound carotids less than 50% stenosis bilaterally  --Continue cardiac monitor  --on heparin gtt     Acute Hypoxic Respiratory Failure  - Secondary to above, resolved  --Patient not on home oxygen, requiring up to 10L NC after [facility].  -  fluid overload with NSTEMI -sedation from [facility].   ---CT does show some groundglass opacities  - CXR on 7/15 showing no acute cardiopulmonary process.  --Did get Lasix 7/16.  Further diuresis per cardiology     Acute kidney injury   - Baseline CKD III   CKD Stage 4 Considered and Ruled Out   ---Cr baseline appears to be around 1.75-2.0  --cotninue to hold cozaar   ---did get diuresis earlier- now done  ---holding jardiance  -Continue to monitor renal function      Hyperkalemia  - resolved  --Potassium 5.2 on ED presentation, then 5.8 with AM labs 7/16  --s/p  insulin  lokelma x1   - recheck in am      Hyponatremia  ---resolved     Hypertension  --bp running high  --- increase amlodipine  ---hold cozaar  "  ---continue metoprolol with hold parameters for bradycardia  --- consider diuretic     Type 2 Diabetes with hyperglycemia  -Poorly controlled at baseline  --Last A1c was 9.1% on 7/15/25  - PTA Meds:  empagliflozin, no insulin for many months per patient , no victoza for a month  ---continue Lantus 25 units nightly   ---hold jardiance  - Sliding Scale Insulin with Glucose Checks     BPH  --continue home flomax     hyperlipidemia  ---was on pravastatin  ---now on atorvastatin     Hypophosphatemia,   --resolved with replacement     Hypocalcemia-  -continue to monitor     Severe Obesity, BMI 37.59  - encourage weight loss.        Diet: Moderate Consistent Carb (60 g CHO per Meal) Diet    DVT Prophylaxis: Heparin GTT  Gutierrez Catheter: Not present  Lines: None     Cardiac Monitoring: ACTIVE order. Indication: AMI (NSTEMI/ STEMI) (48 hours)  Code Status: Full Code      Clinically Significant Risk Factors          # Hyperchloremia: Highest Cl = 110 mmol/L in last 2 days, will monitor as appropriate              # Hypertension: Noted on problem list           # DMII: A1C = 9.1 % (Ref range: <5.7 %) within past 6 months # Severe Obesity: Estimated body mass index is 36.84 kg/m  as calculated from the following:    Height as of this encounter: 1.854 m (6' 1\").    Weight as of this encounter: 126.6 kg (279 lb 3.2 oz). with complications             Social Drivers of Health    Depression: At risk (5/23/2025)    Received from [facility]    PHQ-2     PHQ-2 TOTAL SCORE: 6          Disposition Plan     Medically Ready for Discharge: Anticipated in 5+ Days             Neisha Smalls MD  Hospitalist Service  [facility]  Securely message with [employer] (more info)  Text page via [employer] Paging/Directory   ______________________________________________________________________    Interval History   --- Patient seen and chart reviewed.  I encouraged him to watch all of the education for " his procedure tomorrow.  --- Denies chest pain or shortness of breath.    Physical Exam   Vital Signs: Temp: 98.4  F (36.9  C) Temp src: Oral BP: (!) 181/80 Pulse: 61   Resp: 16 SpO2: 98 % O2 Device: None (Room air)    Weight: 279 lbs 3.2 oz    General Appearance: Pleasant male no apparent distress  Respiratory: Clear to auscultation bilaterally  Cardiovascular: The rate and rhythm without murmurs rubs or gallops    Medical Decision Making             Data     I have personally reviewed the following data over the past 24 hrs:    7.0  \   11.7 (L)   / 241     139 109 (H) 42.2 (H) /  133 (H)   4.1 21 (L) 1.74 (H) \       Imaging results reviewed over the past 24 hrs:   No results found for this or any previous visit (from the past 24 hours).

## 2025-07-21 ENCOUNTER — APPOINTMENT (OUTPATIENT)
Dept: RADIOLOGY | Facility: HOSPITAL | Age: 65
End: 2025-07-21
Payer: COMMERCIAL

## 2025-07-21 ENCOUNTER — ANESTHESIA (OUTPATIENT)
Dept: SURGERY | Facility: HOSPITAL | Age: 65
End: 2025-07-21
Payer: COMMERCIAL

## 2025-07-21 PROBLEM — I25.10 CORONARY ARTERY DISEASE INVOLVING NATIVE CORONARY ARTERY OF NATIVE HEART, UNSPECIFIED WHETHER ANGINA PRESENT: Status: ACTIVE | Noted: 2025-07-21

## 2025-07-21 LAB
ALBUMIN SERPL BCG-MCNC: 3.2 G/DL (ref 3.5–5.2)
ALP SERPL-CCNC: 209 U/L (ref 40–150)
ALT SERPL W P-5'-P-CCNC: 41 U/L (ref 0–70)
ANION GAP SERPL CALCULATED.3IONS-SCNC: 16 MMOL/L (ref 7–15)
ANION GAP SERPL CALCULATED.3IONS-SCNC: 9 MMOL/L (ref 7–15)
APTT PPP: 38 SECONDS (ref 22–38)
APTT PPP: 38 SECONDS (ref 22–38)
AST SERPL W P-5'-P-CCNC: 59 U/L (ref 0–45)
ATRIAL RATE - MUSE: 106 BPM
ATRIAL RATE - MUSE: 90 BPM
BASE EXCESS BLDA CALC-SCNC: -2.3 MMOL/L (ref -3–3)
BASE EXCESS BLDA CALC-SCNC: -3.1 MMOL/L (ref -3–3)
BASE EXCESS BLDA CALC-SCNC: -4.8 MMOL/L (ref -3–3)
BASE EXCESS BLDA CALC-SCNC: -5.7 MMOL/L (ref -3–3)
BASE EXCESS BLDA CALC-SCNC: -5.8 MMOL/L (ref -3–3)
BASE EXCESS BLDA CALC-SCNC: -6.3 MMOL/L (ref -3–3)
BASE EXCESS BLDA CALC-SCNC: -6.6 MMOL/L (ref -3–3)
BASE EXCESS BLDA CALC-SCNC: -8 MMOL/L (ref -3–3)
BASE EXCESS BLDA CALC-SCNC: -9.3 MMOL/L (ref -3–3)
BASE EXCESS BLDV CALC-SCNC: -4.1 MMOL/L (ref -3–3)
BASE EXCESS BLDV CALC-SCNC: -7.7 MMOL/L (ref -3–3)
BILIRUB SERPL-MCNC: 0.3 MG/DL
BLD PROD TYP BPU: NORMAL
BLOOD COMPONENT TYPE: NORMAL
BUN SERPL-MCNC: 39 MG/DL (ref 8–23)
BUN SERPL-MCNC: 41.1 MG/DL (ref 8–23)
CA-I BLD-MCNC: 4.2 MG/DL (ref 4.4–5.2)
CA-I BLD-MCNC: 4.3 MG/DL (ref 4.4–5.2)
CA-I BLD-MCNC: 4.3 MG/DL (ref 4.4–5.2)
CA-I BLD-MCNC: 4.6 MG/DL (ref 4.4–5.2)
CA-I BLD-MCNC: 4.7 MG/DL (ref 4.4–5.2)
CALCIUM SERPL-MCNC: 8 MG/DL (ref 8.8–10.4)
CALCIUM SERPL-MCNC: 8.3 MG/DL (ref 8.8–10.4)
CHLORIDE SERPL-SCNC: 111 MMOL/L (ref 98–107)
CHLORIDE SERPL-SCNC: 113 MMOL/L (ref 98–107)
CODING SYSTEM: NORMAL
CPB POCT: NO
CREAT SERPL-MCNC: 1.76 MG/DL (ref 0.67–1.17)
CREAT SERPL-MCNC: 1.78 MG/DL (ref 0.67–1.17)
CROSSMATCH: NORMAL
CROSSMATCH: NORMAL
DIASTOLIC BLOOD PRESSURE - MUSE: 69 MMHG
DIASTOLIC BLOOD PRESSURE - MUSE: NORMAL MMHG
EGFRCR SERPLBLD CKD-EPI 2021: 42 ML/MIN/1.73M2
EGFRCR SERPLBLD CKD-EPI 2021: 43 ML/MIN/1.73M2
ERYTHROCYTE [DISTWIDTH] IN BLOOD BY AUTOMATED COUNT: 12.8 % (ref 10–15)
ERYTHROCYTE [DISTWIDTH] IN BLOOD BY AUTOMATED COUNT: 12.9 % (ref 10–15)
ERYTHROCYTE [DISTWIDTH] IN BLOOD BY AUTOMATED COUNT: 13.1 % (ref 10–15)
FIBRINOGEN PPP-MCNC: 382 MG/DL (ref 170–510)
FIBRINOGEN PPP-MCNC: 384 MG/DL (ref 170–510)
GLUCOSE BLD-MCNC: 103 MG/DL (ref 70–99)
GLUCOSE BLD-MCNC: 107 MG/DL (ref 70–99)
GLUCOSE BLD-MCNC: 111 MG/DL (ref 70–99)
GLUCOSE BLD-MCNC: 124 MG/DL (ref 70–99)
GLUCOSE BLD-MCNC: 137 MG/DL (ref 70–99)
GLUCOSE BLD-MCNC: 97 MG/DL (ref 70–99)
GLUCOSE BLDC GLUCOMTR-MCNC: 110 MG/DL (ref 70–99)
GLUCOSE BLDC GLUCOMTR-MCNC: 124 MG/DL (ref 70–99)
GLUCOSE BLDC GLUCOMTR-MCNC: 137 MG/DL (ref 70–99)
GLUCOSE BLDC GLUCOMTR-MCNC: 142 MG/DL (ref 70–99)
GLUCOSE BLDC GLUCOMTR-MCNC: 146 MG/DL (ref 70–99)
GLUCOSE BLDC GLUCOMTR-MCNC: 161 MG/DL (ref 70–99)
GLUCOSE BLDC GLUCOMTR-MCNC: 163 MG/DL (ref 70–99)
GLUCOSE BLDC GLUCOMTR-MCNC: 166 MG/DL (ref 70–99)
GLUCOSE BLDC GLUCOMTR-MCNC: 178 MG/DL (ref 70–99)
GLUCOSE BLDC GLUCOMTR-MCNC: 223 MG/DL (ref 70–99)
GLUCOSE SERPL-MCNC: 116 MG/DL (ref 70–99)
GLUCOSE SERPL-MCNC: 175 MG/DL (ref 70–99)
HCO3 BLD-SCNC: 20 MMOL/L (ref 21–28)
HCO3 BLD-SCNC: 21 MMOL/L (ref 21–28)
HCO3 BLD-SCNC: 23 MMOL/L (ref 21–28)
HCO3 BLDA-SCNC: 17 MMOL/L (ref 21–28)
HCO3 BLDA-SCNC: 18 MMOL/L (ref 21–28)
HCO3 BLDA-SCNC: 19 MMOL/L (ref 21–28)
HCO3 BLDA-SCNC: 19 MMOL/L (ref 21–28)
HCO3 BLDA-SCNC: 20 MMOL/L (ref 21–28)
HCO3 BLDA-SCNC: 20 MMOL/L (ref 21–28)
HCO3 BLDV-SCNC: 20 MMOL/L (ref 21–28)
HCO3 BLDV-SCNC: 21 MMOL/L (ref 21–28)
HCO3 SERPL-SCNC: 17 MMOL/L (ref 22–29)
HCO3 SERPL-SCNC: 18 MMOL/L (ref 22–29)
HCT VFR BLD AUTO: 29.5 % (ref 40–53)
HCT VFR BLD AUTO: 34.5 % (ref 40–53)
HCT VFR BLD AUTO: 35.7 % (ref 40–53)
HCT VFR BLD CALC: 33 % (ref 40–53)
HGB BLD-MCNC: 10 G/DL (ref 13.3–17.7)
HGB BLD-MCNC: 10.3 G/DL (ref 13.3–17.7)
HGB BLD-MCNC: 10.5 G/DL (ref 13.3–17.7)
HGB BLD-MCNC: 10.5 G/DL (ref 13.3–17.7)
HGB BLD-MCNC: 11.1 G/DL (ref 13.3–17.7)
HGB BLD-MCNC: 11.2 G/DL (ref 13.3–17.7)
HGB BLD-MCNC: 11.6 G/DL (ref 13.3–17.7)
HGB BLD-MCNC: 11.8 G/DL (ref 13.3–17.7)
HGB BLD-MCNC: 11.9 G/DL (ref 13.3–17.7)
HGB BLD-MCNC: 12.2 G/DL (ref 13.3–17.7)
INR PPP: 1.26 (ref 0.85–1.15)
INR PPP: 1.4 (ref 0.85–1.15)
INTERPRETATION ECG - MUSE: NORMAL
INTERPRETATION ECG - MUSE: NORMAL
ISSUE DATE AND TIME: NORMAL
LACTATE BLD-SCNC: 0.4 MMOL/L (ref 0.7–2)
LACTATE BLD-SCNC: 0.4 MMOL/L (ref 0.7–2)
LACTATE BLD-SCNC: 0.5 MMOL/L (ref 0.7–2)
LACTATE BLD-SCNC: 0.6 MMOL/L (ref 0.7–2)
LACTATE BLD-SCNC: 1.2 MMOL/L (ref 0.7–2)
LACTATE BLD-SCNC: 1.5 MMOL/L (ref 0.7–2)
LACTATE SERPL-SCNC: 3 MMOL/L (ref 0.7–2)
MAGNESIUM SERPL-MCNC: 2.6 MG/DL (ref 1.7–2.3)
MCH RBC QN AUTO: 31.1 PG (ref 26.5–33)
MCH RBC QN AUTO: 31.1 PG (ref 26.5–33)
MCH RBC QN AUTO: 31.3 PG (ref 26.5–33)
MCHC RBC AUTO-ENTMCNC: 33.6 G/DL (ref 31.5–36.5)
MCHC RBC AUTO-ENTMCNC: 33.9 G/DL (ref 31.5–36.5)
MCHC RBC AUTO-ENTMCNC: 34.2 G/DL (ref 31.5–36.5)
MCV RBC AUTO: 91 FL (ref 78–100)
MCV RBC AUTO: 92 FL (ref 78–100)
MCV RBC AUTO: 93 FL (ref 78–100)
O2/TOTAL GAS SETTING VFR VENT: 30 %
O2/TOTAL GAS SETTING VFR VENT: 40 %
O2/TOTAL GAS SETTING VFR VENT: 60 %
O2/TOTAL GAS SETTING VFR VENT: 60 %
OXYHGB MFR BLDA: 94 % (ref 92–100)
OXYHGB MFR BLDA: 95 % (ref 92–100)
OXYHGB MFR BLDA: 96 % (ref 92–100)
OXYHGB MFR BLDA: 97 % (ref 92–100)
OXYHGB MFR BLDA: 98 % (ref 92–100)
OXYHGB MFR BLDA: 98 % (ref 92–100)
OXYHGB MFR BLDV: 62 % (ref 70–75)
OXYHGB MFR BLDV: 82 % (ref 70–75)
P AXIS - MUSE: 54 DEGREES
P AXIS - MUSE: 71 DEGREES
PCO2 BLD: 31 MM HG (ref 35–45)
PCO2 BLD: 42 MM HG (ref 35–45)
PCO2 BLD: 42 MM HG (ref 35–45)
PCO2 BLDA: 37 MM HG (ref 35–45)
PCO2 BLDA: 38 MM HG (ref 35–45)
PCO2 BLDA: 38 MM HG (ref 35–45)
PCO2 BLDA: 42 MM HG (ref 35–45)
PCO2 BLDA: 43 MM HG (ref 35–45)
PCO2 BLDA: 46 MM HG (ref 35–45)
PCO2 BLDV: 47 MM HG (ref 40–50)
PCO2 BLDV: 48 MM HG (ref 40–50)
PEEP: 5 CM H2O
PEEP: 5 CM H2O
PH BLD: 7.29 [PH] (ref 7.35–7.45)
PH BLD: 7.34 [PH] (ref 7.35–7.45)
PH BLD: 7.44 [PH] (ref 7.35–7.45)
PH BLDA: 7.26 [PH] (ref 7.35–7.45)
PH BLDA: 7.26 [PH] (ref 7.35–7.45)
PH BLDA: 7.27 [PH] (ref 7.35–7.45)
PH BLDA: 7.29 [PH] (ref 7.35–7.45)
PH BLDA: 7.31 [PH] (ref 7.35–7.45)
PH BLDA: 7.32 [PH] (ref 7.35–7.45)
PH BLDV: 7.23 [PH] (ref 7.32–7.43)
PH BLDV: 7.28 [PH] (ref 7.32–7.43)
PHOSPHATE SERPL-MCNC: 3.7 MG/DL (ref 2.5–4.5)
PHOSPHATE SERPL-MCNC: 4.5 MG/DL (ref 2.5–4.5)
PLATELET # BLD AUTO: 205 10E3/UL (ref 150–450)
PLATELET # BLD AUTO: 229 10E3/UL (ref 150–450)
PLATELET # BLD AUTO: 281 10E3/UL (ref 150–450)
PO2 BLD: 101 MM HG (ref 80–105)
PO2 BLD: 76 MM HG (ref 80–105)
PO2 BLD: 92 MM HG (ref 80–105)
PO2 BLDA: 119 MM HG (ref 80–105)
PO2 BLDA: 174 MM HG (ref 80–105)
PO2 BLDA: 300 MM HG (ref 80–105)
PO2 BLDA: 548 MM HG (ref 80–105)
PO2 BLDA: 67 MM HG (ref 80–105)
PO2 BLDA: 96 MM HG (ref 80–105)
PO2 BLDV: 37 MM HG (ref 25–47)
PO2 BLDV: 50 MM HG (ref 25–47)
POTASSIUM BLD-SCNC: 4 MMOL/L (ref 3.4–5.3)
POTASSIUM BLD-SCNC: 4 MMOL/L (ref 3.4–5.3)
POTASSIUM BLD-SCNC: 4.1 MMOL/L (ref 3.4–5.3)
POTASSIUM BLD-SCNC: 4.4 MMOL/L (ref 3.4–5.3)
POTASSIUM BLD-SCNC: 4.7 MMOL/L (ref 3.4–5.3)
POTASSIUM BLD-SCNC: 5.3 MMOL/L (ref 3.4–5.3)
POTASSIUM BLD-SCNC: 5.4 MMOL/L (ref 3.4–5.3)
POTASSIUM SERPL-SCNC: 4.3 MMOL/L (ref 3.4–5.3)
POTASSIUM SERPL-SCNC: 4.3 MMOL/L (ref 3.4–5.3)
PR INTERVAL - MUSE: 146 MS
PR INTERVAL - MUSE: 158 MS
PROT SERPL-MCNC: 6.1 G/DL (ref 6.4–8.3)
PROTHROMBIN TIME: 16.1 SECONDS (ref 11.8–14.8)
PROTHROMBIN TIME: 17.5 SECONDS (ref 11.8–14.8)
QRS DURATION - MUSE: 118 MS
QRS DURATION - MUSE: 124 MS
QT - MUSE: 350 MS
QT - MUSE: 358 MS
QTC - MUSE: 437 MS
QTC - MUSE: 464 MS
R AXIS - MUSE: -23 DEGREES
R AXIS - MUSE: -31 DEGREES
RBC # BLD AUTO: 3.2 10E6/UL (ref 4.4–5.9)
RBC # BLD AUTO: 3.73 10E6/UL (ref 4.4–5.9)
RBC # BLD AUTO: 3.92 10E6/UL (ref 4.4–5.9)
SAO2 % BLDA: 90 % (ref 96–97)
SAO2 % BLDA: 94.8 % (ref 96–97)
SAO2 % BLDA: 97 % (ref 96–97)
SAO2 % BLDA: 97.4 % (ref 96–97)
SAO2 % BLDA: 98 % (ref 96–97)
SAO2 % BLDA: 98 % (ref 96–97)
SAO2 % BLDA: 99 % (ref 96–97)
SAO2 % BLDV: 63 % (ref 70–75)
SAO2 % BLDV: 83 % (ref 70–75)
SODIUM BLD-SCNC: 142 MMOL/L (ref 135–145)
SODIUM BLD-SCNC: 142 MMOL/L (ref 135–145)
SODIUM BLD-SCNC: 143 MMOL/L (ref 135–145)
SODIUM BLD-SCNC: 143 MMOL/L (ref 135–145)
SODIUM BLD-SCNC: 145 MMOL/L (ref 135–145)
SODIUM BLD-SCNC: 146 MMOL/L (ref 135–145)
SODIUM BLD-SCNC: 147 MMOL/L (ref 135–145)
SODIUM SERPL-SCNC: 138 MMOL/L (ref 135–145)
SODIUM SERPL-SCNC: 146 MMOL/L (ref 135–145)
SYSTOLIC BLOOD PRESSURE - MUSE: 135 MMHG
SYSTOLIC BLOOD PRESSURE - MUSE: NORMAL MMHG
T AXIS - MUSE: 57 DEGREES
T AXIS - MUSE: 63 DEGREES
UFH PPP CHRO-ACNC: <0.1 IU/ML (ref ?–1.1)
UNIT ABO/RH: NORMAL
UNIT NUMBER: NORMAL
UNIT STATUS: NORMAL
UNIT TYPE ISBT: 6200
UNIT TYPE ISBT: 7300
UNIT TYPE ISBT: 9500
UNIT TYPE ISBT: 9500
VENTRICULAR RATE- MUSE: 106 BPM
VENTRICULAR RATE- MUSE: 90 BPM
WBC # BLD AUTO: 20.6 10E3/UL (ref 4–11)
WBC # BLD AUTO: 25.7 10E3/UL (ref 4–11)
WBC # BLD AUTO: 6.1 10E3/UL (ref 4–11)

## 2025-07-21 PROCEDURE — 82330 ASSAY OF CALCIUM: CPT | Performed by: THORACIC SURGERY (CARDIOTHORACIC VASCULAR SURGERY)

## 2025-07-21 PROCEDURE — 250N000013 HC RX MED GY IP 250 OP 250 PS 637: Performed by: STUDENT IN AN ORGANIZED HEALTH CARE EDUCATION/TRAINING PROGRAM

## 2025-07-21 PROCEDURE — P9035 PLATELET PHERES LEUKOREDUCED: HCPCS | Performed by: ANESTHESIOLOGY

## 2025-07-21 PROCEDURE — 85384 FIBRINOGEN ACTIVITY: CPT | Performed by: NURSE ANESTHETIST, CERTIFIED REGISTERED

## 2025-07-21 PROCEDURE — 250N000011 HC RX IP 250 OP 636: Performed by: STUDENT IN AN ORGANIZED HEALTH CARE EDUCATION/TRAINING PROGRAM

## 2025-07-21 PROCEDURE — 83735 ASSAY OF MAGNESIUM: CPT

## 2025-07-21 PROCEDURE — 82805 BLOOD GASES W/O2 SATURATION: CPT | Performed by: THORACIC SURGERY (CARDIOTHORACIC VASCULAR SURGERY)

## 2025-07-21 PROCEDURE — 999N000259 HC STATISTIC EXTUBATION

## 2025-07-21 PROCEDURE — 85384 FIBRINOGEN ACTIVITY: CPT | Performed by: THORACIC SURGERY (CARDIOTHORACIC VASCULAR SURGERY)

## 2025-07-21 PROCEDURE — 999N000009 HC STATISTIC AIRWAY CARE

## 2025-07-21 PROCEDURE — 250N000012 HC RX MED GY IP 250 OP 636 PS 637: Performed by: THORACIC SURGERY (CARDIOTHORACIC VASCULAR SURGERY)

## 2025-07-21 PROCEDURE — 33508 ENDOSCOPIC VEIN HARVEST: CPT | Mod: 59 | Performed by: THORACIC SURGERY (CARDIOTHORACIC VASCULAR SURGERY)

## 2025-07-21 PROCEDURE — P9016 RBC LEUKOCYTES REDUCED: HCPCS | Performed by: THORACIC SURGERY (CARDIOTHORACIC VASCULAR SURGERY)

## 2025-07-21 PROCEDURE — 85730 THROMBOPLASTIN TIME PARTIAL: CPT | Performed by: THORACIC SURGERY (CARDIOTHORACIC VASCULAR SURGERY)

## 2025-07-21 PROCEDURE — 250N000011 HC RX IP 250 OP 636

## 2025-07-21 PROCEDURE — 80053 COMPREHEN METABOLIC PANEL: CPT | Performed by: FAMILY MEDICINE

## 2025-07-21 PROCEDURE — 272N000002 HC OR SUPPLY OTHER OPNP: Performed by: THORACIC SURGERY (CARDIOTHORACIC VASCULAR SURGERY)

## 2025-07-21 PROCEDURE — 250N000013 HC RX MED GY IP 250 OP 250 PS 637: Performed by: THORACIC SURGERY (CARDIOTHORACIC VASCULAR SURGERY)

## 2025-07-21 PROCEDURE — 85014 HEMATOCRIT: CPT

## 2025-07-21 PROCEDURE — 94761 N-INVAS EAR/PLS OXIMETRY MLT: CPT

## 2025-07-21 PROCEDURE — 84100 ASSAY OF PHOSPHORUS: CPT | Performed by: INTERNAL MEDICINE

## 2025-07-21 PROCEDURE — 999N000123 HC STATISTIC OXYGEN O2DAILY TECH TIME

## 2025-07-21 PROCEDURE — 85610 PROTHROMBIN TIME: CPT

## 2025-07-21 PROCEDURE — 258N000003 HC RX IP 258 OP 636

## 2025-07-21 PROCEDURE — 82805 BLOOD GASES W/O2 SATURATION: CPT

## 2025-07-21 PROCEDURE — 84295 ASSAY OF SERUM SODIUM: CPT

## 2025-07-21 PROCEDURE — 250N000009 HC RX 250: Performed by: INTERNAL MEDICINE

## 2025-07-21 PROCEDURE — 999N000141 HC STATISTIC PRE-PROCEDURE NURSING ASSESSMENT: Performed by: THORACIC SURGERY (CARDIOTHORACIC VASCULAR SURGERY)

## 2025-07-21 PROCEDURE — 99291 CRITICAL CARE FIRST HOUR: CPT | Performed by: STUDENT IN AN ORGANIZED HEALTH CARE EDUCATION/TRAINING PROGRAM

## 2025-07-21 PROCEDURE — 250N000013 HC RX MED GY IP 250 OP 250 PS 637

## 2025-07-21 PROCEDURE — 85027 COMPLETE CBC AUTOMATED: CPT | Performed by: THORACIC SURGERY (CARDIOTHORACIC VASCULAR SURGERY)

## 2025-07-21 PROCEDURE — 999N000253 HC STATISTIC WEANING TRIALS

## 2025-07-21 PROCEDURE — 94799 UNLISTED PULMONARY SVC/PX: CPT

## 2025-07-21 PROCEDURE — 258N000003 HC RX IP 258 OP 636: Performed by: ANESTHESIOLOGY

## 2025-07-21 PROCEDURE — 258N000003 HC RX IP 258 OP 636: Performed by: THORACIC SURGERY (CARDIOTHORACIC VASCULAR SURGERY)

## 2025-07-21 PROCEDURE — 84155 ASSAY OF PROTEIN SERUM: CPT

## 2025-07-21 PROCEDURE — 250N000011 HC RX IP 250 OP 636: Performed by: NURSE ANESTHETIST, CERTIFIED REGISTERED

## 2025-07-21 PROCEDURE — 84132 ASSAY OF SERUM POTASSIUM: CPT

## 2025-07-21 PROCEDURE — P9045 ALBUMIN (HUMAN), 5%, 250 ML: HCPCS | Mod: JZ | Performed by: THORACIC SURGERY (CARDIOTHORACIC VASCULAR SURGERY)

## 2025-07-21 PROCEDURE — 250N000011 HC RX IP 250 OP 636: Performed by: INTERNAL MEDICINE

## 2025-07-21 PROCEDURE — 250N000009 HC RX 250: Performed by: THORACIC SURGERY (CARDIOTHORACIC VASCULAR SURGERY)

## 2025-07-21 PROCEDURE — 82330 ASSAY OF CALCIUM: CPT

## 2025-07-21 PROCEDURE — 36415 COLL VENOUS BLD VENIPUNCTURE: CPT | Performed by: FAMILY MEDICINE

## 2025-07-21 PROCEDURE — 360N000079 HC SURGERY LEVEL 6, PER MIN: Performed by: THORACIC SURGERY (CARDIOTHORACIC VASCULAR SURGERY)

## 2025-07-21 PROCEDURE — 94002 VENT MGMT INPAT INIT DAY: CPT

## 2025-07-21 PROCEDURE — 250N000009 HC RX 250: Performed by: NURSE ANESTHETIST, CERTIFIED REGISTERED

## 2025-07-21 PROCEDURE — 99232 SBSQ HOSP IP/OBS MODERATE 35: CPT | Performed by: INTERNAL MEDICINE

## 2025-07-21 PROCEDURE — 33519 CABG ARTERY-VEIN THREE: CPT | Performed by: THORACIC SURGERY (CARDIOTHORACIC VASCULAR SURGERY)

## 2025-07-21 PROCEDURE — 84100 ASSAY OF PHOSPHORUS: CPT

## 2025-07-21 PROCEDURE — 85730 THROMBOPLASTIN TIME PARTIAL: CPT

## 2025-07-21 PROCEDURE — 200N000001 HC R&B ICU

## 2025-07-21 PROCEDURE — 83605 ASSAY OF LACTIC ACID: CPT

## 2025-07-21 PROCEDURE — 999N000159 HC STATISTIC RCP TIME PACU VENT EA 10 MIN

## 2025-07-21 PROCEDURE — 85027 COMPLETE CBC AUTOMATED: CPT | Performed by: FAMILY MEDICINE

## 2025-07-21 PROCEDURE — 250N000011 HC RX IP 250 OP 636: Performed by: THORACIC SURGERY (CARDIOTHORACIC VASCULAR SURGERY)

## 2025-07-21 PROCEDURE — C1760 CLOSURE DEV, VASC: HCPCS | Performed by: THORACIC SURGERY (CARDIOTHORACIC VASCULAR SURGERY)

## 2025-07-21 PROCEDURE — 250N000025 HC SEVOFLURANE, PER MIN: Performed by: THORACIC SURGERY (CARDIOTHORACIC VASCULAR SURGERY)

## 2025-07-21 PROCEDURE — 999N000157 HC STATISTIC RCP TIME EA 10 MIN

## 2025-07-21 PROCEDURE — 410N000004: Performed by: THORACIC SURGERY (CARDIOTHORACIC VASCULAR SURGERY)

## 2025-07-21 PROCEDURE — 258N000003 HC RX IP 258 OP 636: Performed by: NURSE ANESTHETIST, CERTIFIED REGISTERED

## 2025-07-21 PROCEDURE — 999N000065 XR CHEST PORT 1 VIEW

## 2025-07-21 PROCEDURE — 85610 PROTHROMBIN TIME: CPT | Performed by: THORACIC SURGERY (CARDIOTHORACIC VASCULAR SURGERY)

## 2025-07-21 PROCEDURE — 33533 CABG ARTERIAL SINGLE: CPT | Performed by: THORACIC SURGERY (CARDIOTHORACIC VASCULAR SURGERY)

## 2025-07-21 PROCEDURE — 85520 HEPARIN ASSAY: CPT | Performed by: INTERNAL MEDICINE

## 2025-07-21 PROCEDURE — 272N000001 HC OR GENERAL SUPPLY STERILE: Performed by: THORACIC SURGERY (CARDIOTHORACIC VASCULAR SURGERY)

## 2025-07-21 PROCEDURE — 250N000009 HC RX 250

## 2025-07-21 PROCEDURE — 410N000003 HC PER-PERFUSION 1ST 30 MIN: Performed by: THORACIC SURGERY (CARDIOTHORACIC VASCULAR SURGERY)

## 2025-07-21 PROCEDURE — C1898 LEAD, PMKR, OTHER THAN TRANS: HCPCS | Performed by: THORACIC SURGERY (CARDIOTHORACIC VASCULAR SURGERY)

## 2025-07-21 PROCEDURE — 250N000013 HC RX MED GY IP 250 OP 250 PS 637: Performed by: INTERNAL MEDICINE

## 2025-07-21 PROCEDURE — 370N000017 HC ANESTHESIA TECHNICAL FEE, PER MIN: Performed by: THORACIC SURGERY (CARDIOTHORACIC VASCULAR SURGERY)

## 2025-07-21 PROCEDURE — 272N000004 HC RX 272: Performed by: THORACIC SURGERY (CARDIOTHORACIC VASCULAR SURGERY)

## 2025-07-21 RX ORDER — FENTANYL CITRATE 50 UG/ML
INJECTION, SOLUTION INTRAMUSCULAR; INTRAVENOUS PRN
Status: DISCONTINUED | OUTPATIENT
Start: 2025-07-21 | End: 2025-07-21

## 2025-07-21 RX ORDER — CEFAZOLIN SODIUM 2 G/50ML
2 SOLUTION INTRAVENOUS EVERY 8 HOURS
Status: COMPLETED | OUTPATIENT
Start: 2025-07-21 | End: 2025-07-22

## 2025-07-21 RX ORDER — HEPARIN SODIUM 5000 [USP'U]/.5ML
5000 INJECTION, SOLUTION INTRAVENOUS; SUBCUTANEOUS EVERY 8 HOURS
Status: DISCONTINUED | OUTPATIENT
Start: 2025-07-22 | End: 2025-07-30 | Stop reason: HOSPADM

## 2025-07-21 RX ORDER — CALCIUM GLUCONATE 20 MG/ML
2 INJECTION, SOLUTION INTRAVENOUS
Status: ACTIVE | OUTPATIENT
Start: 2025-07-21 | End: 2025-07-29

## 2025-07-21 RX ORDER — NALOXONE HYDROCHLORIDE 0.4 MG/ML
0.2 INJECTION, SOLUTION INTRAMUSCULAR; INTRAVENOUS; SUBCUTANEOUS
Status: DISCONTINUED | OUTPATIENT
Start: 2025-07-21 | End: 2025-07-30 | Stop reason: HOSPADM

## 2025-07-21 RX ORDER — ONDANSETRON 4 MG/1
4 TABLET, ORALLY DISINTEGRATING ORAL EVERY 6 HOURS PRN
Status: DISCONTINUED | OUTPATIENT
Start: 2025-07-21 | End: 2025-07-30 | Stop reason: HOSPADM

## 2025-07-21 RX ORDER — PANTOPRAZOLE SODIUM 40 MG/1
40 TABLET, DELAYED RELEASE ORAL DAILY
Status: DISCONTINUED | OUTPATIENT
Start: 2025-07-22 | End: 2025-07-24

## 2025-07-21 RX ORDER — HEPARIN SODIUM 1000 [USP'U]/ML
INJECTION, SOLUTION INTRAVENOUS; SUBCUTANEOUS PRN
Status: DISCONTINUED | OUTPATIENT
Start: 2025-07-21 | End: 2025-07-21

## 2025-07-21 RX ORDER — CARBOXYMETHYLCELLULOSE SODIUM 5 MG/ML
1 SOLUTION/ DROPS OPHTHALMIC
Status: DISCONTINUED | OUTPATIENT
Start: 2025-07-21 | End: 2025-07-23

## 2025-07-21 RX ORDER — PROPOFOL 10 MG/ML
INJECTION, EMULSION INTRAVENOUS PRN
Status: DISCONTINUED | OUTPATIENT
Start: 2025-07-21 | End: 2025-07-21

## 2025-07-21 RX ORDER — INDOMETHACIN 25 MG/1
50 CAPSULE ORAL ONCE
Status: COMPLETED | OUTPATIENT
Start: 2025-07-21 | End: 2025-07-21

## 2025-07-21 RX ORDER — SODIUM CHLORIDE 9 MG/ML
INJECTION, SOLUTION INTRAVENOUS CONTINUOUS PRN
Status: DISCONTINUED | OUTPATIENT
Start: 2025-07-21 | End: 2025-07-21

## 2025-07-21 RX ORDER — NICOTINE POLACRILEX 4 MG
15-30 LOZENGE BUCCAL
Status: DISCONTINUED | OUTPATIENT
Start: 2025-07-21 | End: 2025-07-22

## 2025-07-21 RX ORDER — HEPARIN SOD,PORCINE/0.9 % NACL 30K/1000ML
INTRAVENOUS SOLUTION INTRAVENOUS
Status: DISCONTINUED | OUTPATIENT
Start: 2025-07-21 | End: 2025-07-21 | Stop reason: HOSPADM

## 2025-07-21 RX ORDER — ASPIRIN 81 MG/1
162 TABLET, CHEWABLE ORAL ONCE
Status: COMPLETED | OUTPATIENT
Start: 2025-07-21 | End: 2025-07-21

## 2025-07-21 RX ORDER — CEFAZOLIN SODIUM/WATER 3 G/30 ML
3 SYRINGE (ML) INTRAVENOUS SEE ADMIN INSTRUCTIONS
Status: DISCONTINUED | OUTPATIENT
Start: 2025-07-21 | End: 2025-07-21 | Stop reason: HOSPADM

## 2025-07-21 RX ORDER — ASPIRIN 81 MG/1
81 TABLET, CHEWABLE ORAL
Status: DISCONTINUED | OUTPATIENT
Start: 2025-07-21 | End: 2025-07-21 | Stop reason: HOSPADM

## 2025-07-21 RX ORDER — DEXMEDETOMIDINE HYDROCHLORIDE 4 UG/ML
.1-1.2 INJECTION, SOLUTION INTRAVENOUS CONTINUOUS
Status: DISCONTINUED | OUTPATIENT
Start: 2025-07-21 | End: 2025-07-21

## 2025-07-21 RX ORDER — ASPIRIN 300 MG/1
300 SUPPOSITORY RECTAL ONCE
Status: COMPLETED | OUTPATIENT
Start: 2025-07-21 | End: 2025-07-21

## 2025-07-21 RX ORDER — HYDROMORPHONE HCL IN WATER/PF 6 MG/30 ML
PATIENT CONTROLLED ANALGESIA SYRINGE INTRAVENOUS
Status: COMPLETED
Start: 2025-07-21 | End: 2025-07-21

## 2025-07-21 RX ORDER — PHENYLEPHRINE HCL IN 0.9% NACL 50MG/250ML
.1-6 PLASTIC BAG, INJECTION (ML) INTRAVENOUS CONTINUOUS
Status: DISCONTINUED | OUTPATIENT
Start: 2025-07-21 | End: 2025-07-21 | Stop reason: HOSPADM

## 2025-07-21 RX ORDER — ONDANSETRON 2 MG/ML
4 INJECTION INTRAMUSCULAR; INTRAVENOUS EVERY 6 HOURS PRN
Status: DISCONTINUED | OUTPATIENT
Start: 2025-07-21 | End: 2025-07-30 | Stop reason: HOSPADM

## 2025-07-21 RX ORDER — POLYETHYLENE GLYCOL 3350 17 G/17G
17 POWDER, FOR SOLUTION ORAL DAILY
Status: DISCONTINUED | OUTPATIENT
Start: 2025-07-22 | End: 2025-07-30 | Stop reason: HOSPADM

## 2025-07-21 RX ORDER — BISACODYL 10 MG
10 SUPPOSITORY, RECTAL RECTAL DAILY PRN
Status: DISCONTINUED | OUTPATIENT
Start: 2025-07-24 | End: 2025-07-30 | Stop reason: HOSPADM

## 2025-07-21 RX ORDER — PROTAMINE SULFATE 10 MG/ML
INJECTION, SOLUTION INTRAVENOUS PRN
Status: DISCONTINUED | OUTPATIENT
Start: 2025-07-21 | End: 2025-07-21

## 2025-07-21 RX ORDER — HYDROMORPHONE HCL IN WATER/PF 6 MG/30 ML
0.2 PATIENT CONTROLLED ANALGESIA SYRINGE INTRAVENOUS
Status: DISCONTINUED | OUTPATIENT
Start: 2025-07-21 | End: 2025-07-23

## 2025-07-21 RX ORDER — PHENYLEPHRINE HCL IN 0.9% NACL 50MG/250ML
.1-4 PLASTIC BAG, INJECTION (ML) INTRAVENOUS CONTINUOUS PRN
Status: DISCONTINUED | OUTPATIENT
Start: 2025-07-21 | End: 2025-07-22

## 2025-07-21 RX ORDER — CEFAZOLIN SODIUM/WATER 3 G/30 ML
3 SYRINGE (ML) INTRAVENOUS
Status: COMPLETED | OUTPATIENT
Start: 2025-07-21 | End: 2025-07-21

## 2025-07-21 RX ORDER — FENTANYL CITRATE 50 UG/ML
50 INJECTION, SOLUTION INTRAMUSCULAR; INTRAVENOUS ONCE
Status: COMPLETED | OUTPATIENT
Start: 2025-07-21 | End: 2025-07-21

## 2025-07-21 RX ORDER — HYDROMORPHONE HCL IN WATER/PF 6 MG/30 ML
0.4 PATIENT CONTROLLED ANALGESIA SYRINGE INTRAVENOUS
Status: DISCONTINUED | OUTPATIENT
Start: 2025-07-21 | End: 2025-07-23

## 2025-07-21 RX ORDER — VANCOMYCIN HYDROCHLORIDE 1 G/20ML
INJECTION, POWDER, LYOPHILIZED, FOR SOLUTION INTRAVENOUS PRN
Status: DISCONTINUED | OUTPATIENT
Start: 2025-07-21 | End: 2025-07-21 | Stop reason: HOSPADM

## 2025-07-21 RX ORDER — CHLORHEXIDINE GLUCONATE ORAL RINSE 1.2 MG/ML
15 SOLUTION DENTAL EVERY 12 HOURS
Status: DISCONTINUED | OUTPATIENT
Start: 2025-07-21 | End: 2025-07-22

## 2025-07-21 RX ORDER — CALCIUM GLUCONATE 20 MG/ML
1 INJECTION, SOLUTION INTRAVENOUS
Status: DISPENSED | OUTPATIENT
Start: 2025-07-21 | End: 2025-07-29

## 2025-07-21 RX ORDER — NITROGLYCERIN 10 MG/100ML
INJECTION INTRAVENOUS PRN
Status: DISCONTINUED | OUTPATIENT
Start: 2025-07-21 | End: 2025-07-21

## 2025-07-21 RX ORDER — HYDRALAZINE HYDROCHLORIDE 20 MG/ML
10 INJECTION INTRAMUSCULAR; INTRAVENOUS EVERY 30 MIN PRN
Status: DISCONTINUED | OUTPATIENT
Start: 2025-07-21 | End: 2025-07-29

## 2025-07-21 RX ORDER — ASPIRIN 81 MG/1
162 TABLET, CHEWABLE ORAL DAILY
Status: DISCONTINUED | OUTPATIENT
Start: 2025-07-22 | End: 2025-07-30 | Stop reason: HOSPADM

## 2025-07-21 RX ORDER — ACETAMINOPHEN 325 MG/1
975 TABLET ORAL EVERY 8 HOURS
Status: DISCONTINUED | OUTPATIENT
Start: 2025-07-21 | End: 2025-07-30 | Stop reason: HOSPADM

## 2025-07-21 RX ORDER — LIDOCAINE HYDROCHLORIDE 20 MG/ML
INJECTION, SOLUTION INFILTRATION; PERINEURAL PRN
Status: DISCONTINUED | OUTPATIENT
Start: 2025-07-21 | End: 2025-07-21

## 2025-07-21 RX ORDER — SODIUM CHLORIDE, SODIUM GLUCONATE, SODIUM ACETATE, POTASSIUM CHLORIDE AND MAGNESIUM CHLORIDE 526; 502; 368; 37; 30 MG/100ML; MG/100ML; MG/100ML; MG/100ML; MG/100ML
1000 INJECTION, SOLUTION INTRAVENOUS
Status: DISCONTINUED | OUTPATIENT
Start: 2025-07-21 | End: 2025-07-21

## 2025-07-21 RX ORDER — ASPIRIN 300 MG/1
300 SUPPOSITORY RECTAL DAILY
Status: DISCONTINUED | OUTPATIENT
Start: 2025-07-22 | End: 2025-07-26

## 2025-07-21 RX ORDER — TAMSULOSIN HYDROCHLORIDE 0.4 MG/1
0.4 CAPSULE ORAL DAILY
Status: DISCONTINUED | OUTPATIENT
Start: 2025-07-23 | End: 2025-07-30 | Stop reason: HOSPADM

## 2025-07-21 RX ORDER — INDOMETHACIN 25 MG/1
CAPSULE ORAL PRN
Status: DISCONTINUED | OUTPATIENT
Start: 2025-07-21 | End: 2025-07-21

## 2025-07-21 RX ORDER — LIDOCAINE 40 MG/G
CREAM TOPICAL
Status: DISCONTINUED | OUTPATIENT
Start: 2025-07-21 | End: 2025-07-21 | Stop reason: HOSPADM

## 2025-07-21 RX ORDER — METHOCARBAMOL 500 MG/1
500 TABLET, FILM COATED ORAL EVERY 6 HOURS PRN
Status: DISCONTINUED | OUTPATIENT
Start: 2025-07-21 | End: 2025-07-30 | Stop reason: HOSPADM

## 2025-07-21 RX ORDER — ONDANSETRON 2 MG/ML
INJECTION INTRAMUSCULAR; INTRAVENOUS PRN
Status: DISCONTINUED | OUTPATIENT
Start: 2025-07-21 | End: 2025-07-21

## 2025-07-21 RX ORDER — PRAVASTATIN SODIUM 10 MG
10 TABLET ORAL EVERY EVENING
Status: DISCONTINUED | OUTPATIENT
Start: 2025-07-22 | End: 2025-07-25

## 2025-07-21 RX ORDER — NALOXONE HYDROCHLORIDE 0.4 MG/ML
0.4 INJECTION, SOLUTION INTRAMUSCULAR; INTRAVENOUS; SUBCUTANEOUS
Status: DISCONTINUED | OUTPATIENT
Start: 2025-07-21 | End: 2025-07-30 | Stop reason: HOSPADM

## 2025-07-21 RX ORDER — DEXTROSE MONOHYDRATE 25 G/50ML
25-50 INJECTION, SOLUTION INTRAVENOUS
Status: DISCONTINUED | OUTPATIENT
Start: 2025-07-21 | End: 2025-07-22

## 2025-07-21 RX ORDER — SODIUM CHLORIDE, SODIUM LACTATE, POTASSIUM CHLORIDE, CALCIUM CHLORIDE 600; 310; 30; 20 MG/100ML; MG/100ML; MG/100ML; MG/100ML
INJECTION, SOLUTION INTRAVENOUS CONTINUOUS
Status: DISCONTINUED | OUTPATIENT
Start: 2025-07-21 | End: 2025-07-21 | Stop reason: HOSPADM

## 2025-07-21 RX ORDER — OXYCODONE HYDROCHLORIDE 5 MG/1
5 TABLET ORAL EVERY 4 HOURS PRN
Status: DISCONTINUED | OUTPATIENT
Start: 2025-07-21 | End: 2025-07-30 | Stop reason: HOSPADM

## 2025-07-21 RX ORDER — GLYCOPYRROLATE 0.2 MG/ML
INJECTION, SOLUTION INTRAMUSCULAR; INTRAVENOUS PRN
Status: DISCONTINUED | OUTPATIENT
Start: 2025-07-21 | End: 2025-07-21

## 2025-07-21 RX ORDER — LIDOCAINE 4 G/G
1-2 PATCH TOPICAL EVERY 24 HOURS
Status: DISCONTINUED | OUTPATIENT
Start: 2025-07-21 | End: 2025-07-30 | Stop reason: HOSPADM

## 2025-07-21 RX ORDER — NEOSTIGMINE METHYLSULFATE 1 MG/ML
VIAL (ML) INJECTION PRN
Status: DISCONTINUED | OUTPATIENT
Start: 2025-07-21 | End: 2025-07-21

## 2025-07-21 RX ORDER — OXYCODONE HYDROCHLORIDE 5 MG/1
10 TABLET ORAL EVERY 4 HOURS PRN
Status: DISCONTINUED | OUTPATIENT
Start: 2025-07-21 | End: 2025-07-30 | Stop reason: HOSPADM

## 2025-07-21 RX ORDER — DEXMEDETOMIDINE HYDROCHLORIDE 4 UG/ML
.2-1.2 INJECTION, SOLUTION INTRAVENOUS CONTINUOUS
Status: DISCONTINUED | OUTPATIENT
Start: 2025-07-21 | End: 2025-07-21 | Stop reason: HOSPADM

## 2025-07-21 RX ORDER — ESCITALOPRAM OXALATE 20 MG/1
20 TABLET ORAL EVERY EVENING
Status: DISCONTINUED | OUTPATIENT
Start: 2025-07-22 | End: 2025-07-30 | Stop reason: HOSPADM

## 2025-07-21 RX ORDER — AMOXICILLIN 250 MG
1 CAPSULE ORAL 2 TIMES DAILY
Status: DISCONTINUED | OUTPATIENT
Start: 2025-07-21 | End: 2025-07-30 | Stop reason: HOSPADM

## 2025-07-21 RX ORDER — PROCHLORPERAZINE MALEATE 10 MG
10 TABLET ORAL EVERY 6 HOURS PRN
Status: DISCONTINUED | OUTPATIENT
Start: 2025-07-21 | End: 2025-07-30 | Stop reason: HOSPADM

## 2025-07-21 RX ORDER — MAGNESIUM HYDROXIDE 1200 MG/15ML
LIQUID ORAL PRN
Status: DISCONTINUED | OUTPATIENT
Start: 2025-07-21 | End: 2025-07-21 | Stop reason: HOSPADM

## 2025-07-21 RX ORDER — CHLORHEXIDINE GLUCONATE ORAL RINSE 1.2 MG/ML
10 SOLUTION DENTAL ONCE
Status: DISCONTINUED | OUTPATIENT
Start: 2025-07-21 | End: 2025-07-21 | Stop reason: HOSPADM

## 2025-07-21 RX ORDER — ASPIRIN 81 MG/1
162 TABLET, CHEWABLE ORAL
Status: DISCONTINUED | OUTPATIENT
Start: 2025-07-21 | End: 2025-07-21 | Stop reason: HOSPADM

## 2025-07-21 RX ORDER — NOREPINEPHRINE BITARTRATE 0.02 MG/ML
.01-.1 INJECTION, SOLUTION INTRAVENOUS CONTINUOUS
Status: DISCONTINUED | OUTPATIENT
Start: 2025-07-21 | End: 2025-07-21 | Stop reason: HOSPADM

## 2025-07-21 RX ADMIN — HEPARIN SODIUM 40000 UNITS: 1000 INJECTION, SOLUTION INTRAVENOUS; SUBCUTANEOUS at 11:54

## 2025-07-21 RX ADMIN — DEXMEDETOMIDINE HYDROCHLORIDE 0.5 MCG/KG/HR: 400 INJECTION INTRAVENOUS at 13:32

## 2025-07-21 RX ADMIN — SODIUM CHLORIDE, SODIUM LACTATE, POTASSIUM CHLORIDE, AND CALCIUM CHLORIDE: .6; .31; .03; .02 INJECTION, SOLUTION INTRAVENOUS at 09:53

## 2025-07-21 RX ADMIN — NOREPINEPHRINE BITARTRATE 4 MCG: 1 INJECTION, SOLUTION, CONCENTRATE INTRAVENOUS at 10:28

## 2025-07-21 RX ADMIN — PHENYLEPHRINE HYDROCHLORIDE 100 MCG: 10 INJECTION INTRAVENOUS at 10:25

## 2025-07-21 RX ADMIN — NOREPINEPHRINE BITARTRATE 4 MCG: 1 INJECTION, SOLUTION, CONCENTRATE INTRAVENOUS at 12:19

## 2025-07-21 RX ADMIN — SODIUM BICARBONATE 50 MEQ: 84 INJECTION, SOLUTION INTRAVENOUS at 16:56

## 2025-07-21 RX ADMIN — CHLORHEXIDINE GLUCONATE 15 ML: 1.2 SOLUTION ORAL at 20:39

## 2025-07-21 RX ADMIN — MIDAZOLAM HYDROCHLORIDE 1 MG: 1 INJECTION, SOLUTION INTRAMUSCULAR; INTRAVENOUS at 09:58

## 2025-07-21 RX ADMIN — HYDROMORPHONE HYDROCHLORIDE 0.4 MG: 0.2 INJECTION, SOLUTION INTRAMUSCULAR; INTRAVENOUS; SUBCUTANEOUS at 16:04

## 2025-07-21 RX ADMIN — NOREPINEPHRINE BITARTRATE 4 MCG: 1 INJECTION, SOLUTION, CONCENTRATE INTRAVENOUS at 12:07

## 2025-07-21 RX ADMIN — PHENYLEPHRINE HYDROCHLORIDE 200 MCG: 10 INJECTION INTRAVENOUS at 10:10

## 2025-07-21 RX ADMIN — FENTANYL CITRATE 100 MCG: 50 INJECTION, SOLUTION INTRAMUSCULAR; INTRAVENOUS at 10:46

## 2025-07-21 RX ADMIN — NEOSTIGMINE METHYLSULFATE 5 MG: 1 INJECTION, SOLUTION INTRAVENOUS at 15:50

## 2025-07-21 RX ADMIN — FENTANYL CITRATE 150 MCG: 50 INJECTION, SOLUTION INTRAMUSCULAR; INTRAVENOUS at 14:13

## 2025-07-21 RX ADMIN — NITROGLYCERIN 20 MCG: 10 INJECTION INTRAVENOUS at 15:01

## 2025-07-21 RX ADMIN — SODIUM BICARBONATE 50 MEQ: 84 INJECTION, SOLUTION INTRAVENOUS at 20:39

## 2025-07-21 RX ADMIN — SODIUM CHLORIDE, SODIUM LACTATE, POTASSIUM CHLORIDE, AND CALCIUM CHLORIDE 250 ML: .6; .31; .03; .02 INJECTION, SOLUTION INTRAVENOUS at 21:00

## 2025-07-21 RX ADMIN — CALCIUM GLUCONATE 1 G: 20 INJECTION, SOLUTION INTRAVENOUS at 17:00

## 2025-07-21 RX ADMIN — NITROGLYCERIN 20 MCG: 10 INJECTION INTRAVENOUS at 14:26

## 2025-07-21 RX ADMIN — HYDROMORPHONE HYDROCHLORIDE 0.4 MG: 0.2 INJECTION, SOLUTION INTRAMUSCULAR; INTRAVENOUS; SUBCUTANEOUS at 18:38

## 2025-07-21 RX ADMIN — GLYCOPYRROLATE 1 MG: 0.2 INJECTION, SOLUTION INTRAMUSCULAR; INTRAVENOUS at 15:50

## 2025-07-21 RX ADMIN — NICARDIPINE HYDROCHLORIDE 1 MG/HR: 0.2 INJECTION, SOLUTION INTRAVENOUS at 17:27

## 2025-07-21 RX ADMIN — HYDROMORPHONE HYDROCHLORIDE 1 MG: 1 INJECTION, SOLUTION INTRAMUSCULAR; INTRAVENOUS; SUBCUTANEOUS at 14:31

## 2025-07-21 RX ADMIN — FENTANYL CITRATE 200 MCG: 50 INJECTION, SOLUTION INTRAMUSCULAR; INTRAVENOUS at 10:09

## 2025-07-21 RX ADMIN — Medication 0.2 MCG/KG/MIN: at 10:30

## 2025-07-21 RX ADMIN — ACETAMINOPHEN 975 MG: 325 TABLET, FILM COATED ORAL at 21:48

## 2025-07-21 RX ADMIN — SODIUM BICARBONATE 50 MEQ: 84 INJECTION, SOLUTION INTRAVENOUS at 14:49

## 2025-07-21 RX ADMIN — ONDANSETRON 4 MG: 2 INJECTION INTRAMUSCULAR; INTRAVENOUS at 15:08

## 2025-07-21 RX ADMIN — OXYCODONE HYDROCHLORIDE 10 MG: 5 TABLET ORAL at 21:48

## 2025-07-21 RX ADMIN — INSULIN HUMAN 1 UNITS/HR: 1 INJECTION, SOLUTION INTRAVENOUS at 17:46

## 2025-07-21 RX ADMIN — SODIUM CHLORIDE: 9 INJECTION, SOLUTION INTRAVENOUS at 10:30

## 2025-07-21 RX ADMIN — ROCURONIUM 100 MG: 50 INJECTION, SOLUTION INTRAVENOUS at 10:10

## 2025-07-21 RX ADMIN — NOREPINEPHRINE BITARTRATE 8 MCG: 1 INJECTION, SOLUTION, CONCENTRATE INTRAVENOUS at 11:58

## 2025-07-21 RX ADMIN — PROPOFOL 120 MG: 10 INJECTION, EMULSION INTRAVENOUS at 10:09

## 2025-07-21 RX ADMIN — PROTAMINE SULFATE 400 MG: 10 INJECTION, SOLUTION INTRAVENOUS at 14:29

## 2025-07-21 RX ADMIN — METOPROLOL TARTRATE 12.5 MG: 25 TABLET, FILM COATED ORAL at 08:11

## 2025-07-21 RX ADMIN — CEFAZOLIN SODIUM 2 G: 2 SOLUTION INTRAVENOUS at 23:36

## 2025-07-21 RX ADMIN — NOREPINEPHRINE BITARTRATE 4 MCG: 1 INJECTION, SOLUTION, CONCENTRATE INTRAVENOUS at 11:54

## 2025-07-21 RX ADMIN — LIDOCAINE HYDROCHLORIDE 50 MG: 20 INJECTION, SOLUTION INFILTRATION; PERINEURAL at 10:09

## 2025-07-21 RX ADMIN — HEPARIN SODIUM 1800 UNITS/HR: 10000 INJECTION, SOLUTION INTRAVENOUS at 01:20

## 2025-07-21 RX ADMIN — NOREPINEPHRINE BITARTRATE 4 MCG: 1 INJECTION, SOLUTION, CONCENTRATE INTRAVENOUS at 11:37

## 2025-07-21 RX ADMIN — EPINEPHRINE 0.03 MCG/KG/MIN: 1 INJECTION INTRAMUSCULAR; INTRAVENOUS; SUBCUTANEOUS at 14:12

## 2025-07-21 RX ADMIN — FENTANYL CITRATE 50 MCG: 50 INJECTION, SOLUTION INTRAMUSCULAR; INTRAVENOUS at 10:00

## 2025-07-21 RX ADMIN — Medication 3 G: at 14:22

## 2025-07-21 RX ADMIN — NITROGLYCERIN 20 MCG: 10 INJECTION INTRAVENOUS at 14:29

## 2025-07-21 RX ADMIN — ALBUMIN HUMAN 12.5 G: 0.05 INJECTION, SOLUTION INTRAVENOUS at 20:39

## 2025-07-21 RX ADMIN — ALBUMIN HUMAN 12.5 G: 0.05 INJECTION, SOLUTION INTRAVENOUS at 16:30

## 2025-07-21 RX ADMIN — LIDOCAINE 1 PATCH: 246 PATCH TOPICAL at 16:45

## 2025-07-21 RX ADMIN — NITROGLYCERIN 20 MCG: 10 INJECTION INTRAVENOUS at 14:32

## 2025-07-21 RX ADMIN — CHLORHEXIDINE GLUCONATE 0.12% ORAL RINSE 10 ML: 1.2 LIQUID ORAL at 08:48

## 2025-07-21 RX ADMIN — ROCURONIUM 30 MG: 50 INJECTION, SOLUTION INTRAVENOUS at 14:00

## 2025-07-21 RX ADMIN — PHENYLEPHRINE HYDROCHLORIDE 100 MCG: 10 INJECTION INTRAVENOUS at 10:18

## 2025-07-21 RX ADMIN — AMINOCAPROIC ACID 1 G/HR: 250 INJECTION, SOLUTION INTRAVENOUS at 11:30

## 2025-07-21 RX ADMIN — FENTANYL CITRATE 50 MCG: 50 INJECTION, SOLUTION INTRAMUSCULAR; INTRAVENOUS at 16:41

## 2025-07-21 RX ADMIN — MIDAZOLAM HYDROCHLORIDE 1 MG: 1 INJECTION, SOLUTION INTRAMUSCULAR; INTRAVENOUS at 09:53

## 2025-07-21 RX ADMIN — AMINOCAPROIC ACID 5 G: 250 INJECTION, SOLUTION INTRAVENOUS at 10:30

## 2025-07-21 RX ADMIN — ROCURONIUM 50 MG: 50 INJECTION, SOLUTION INTRAVENOUS at 11:05

## 2025-07-21 RX ADMIN — NITROGLYCERIN 20 MCG: 10 INJECTION INTRAVENOUS at 14:31

## 2025-07-21 RX ADMIN — DEXMEDETOMIDINE HYDROCHLORIDE 1 MCG/KG/HR: 400 INJECTION INTRAVENOUS at 17:37

## 2025-07-21 RX ADMIN — ASPIRIN 81 MG CHEWABLE TABLET 162 MG: 81 TABLET CHEWABLE at 08:11

## 2025-07-21 RX ADMIN — ROCURONIUM 50 MG: 50 INJECTION, SOLUTION INTRAVENOUS at 12:00

## 2025-07-21 RX ADMIN — Medication 3 G: at 10:22

## 2025-07-21 RX ADMIN — ASPIRIN 81 MG CHEWABLE TABLET 162 MG: 81 TABLET CHEWABLE at 20:39

## 2025-07-21 ASSESSMENT — ACTIVITIES OF DAILY LIVING (ADL)
ADLS_ACUITY_SCORE: 41
ADLS_ACUITY_SCORE: 38
ADLS_ACUITY_SCORE: 38
ADLS_ACUITY_SCORE: 37
ADLS_ACUITY_SCORE: 43
ADLS_ACUITY_SCORE: 37
ADLS_ACUITY_SCORE: 43
ADLS_ACUITY_SCORE: 37
ADLS_ACUITY_SCORE: 38
ADLS_ACUITY_SCORE: 38
ADLS_ACUITY_SCORE: 37
ADLS_ACUITY_SCORE: 43
ADLS_ACUITY_SCORE: 38
ADLS_ACUITY_SCORE: 38
ADLS_ACUITY_SCORE: 37
ADLS_ACUITY_SCORE: 38
ADLS_ACUITY_SCORE: 37
ADLS_ACUITY_SCORE: 37
ADLS_ACUITY_SCORE: 43
ADLS_ACUITY_SCORE: 38
ADLS_ACUITY_SCORE: 43

## 2025-07-21 ASSESSMENT — COPD QUESTIONNAIRES: COPD: 0

## 2025-07-21 ASSESSMENT — LIFESTYLE VARIABLES: TOBACCO_USE: 0

## 2025-07-21 NOTE — PRE-PROCEDURE
The patient was seen and examined by me.  He has had an NSTEMI and was found to have severe triple vessel coronary artery disease.  We plan to do a multi-vessel coronary artery bypass grafting procedure.  He understands that the risks for this procedure include: bleeding, infection, stroke, sternal dehiscence, myocardial infarction, arrhythmias, the need for a pacemaker, prolonged ventilation, pneumonia, liver/renal failure, aortic dissection, and an operative mortality of about 4 percent.  He accepts these risks and wishes to proceed this morning.  Informed consent has been obtained.

## 2025-07-21 NOTE — ANESTHESIA CARE TRANSFER NOTE
Patient: Mayito Sood    Procedure: Procedure(s):  CORONARY ARTERY BYPASS GRAFT TIMES FOUR, WITH LEFT ENDOSCOPIC VESSEL PROCUREMENT, LEFT INTERNAL MAMMARY ARTERY HARVEST, EPIAORTIC ULTRASOUND  ECHOCARDIOGRAM, TRANSESOPHAGEAL, INTRAOPERATIVE       Diagnosis: CAD (coronary artery disease) [I25.10]  Diagnosis Additional Information: No value filed.    Anesthesia Type:   General     Note:    Oropharynx: ventilatory support and endotracheal tube in place  Level of Consciousness: iatrogenic sedation and unresponsive (ETT/Vent)  Patient oxygen source: ETT/Vent.    Independent Airway: airway patency not satisfactory and stable  Dentition: dentition unchanged  Vital Signs Stable: post-procedure vital signs reviewed and stable  Report to RN Given: handoff report given  Patient transferred to: ICU    ICU Handoff: Call for PAUSE to initiate/utilize ICU HANDOFF, Identified Patient, Identified Responsible Provider, Reviewed the Pertinent Medical History, Discussed Surgical Course, Reviewed Intra-OP Anesthesia Management and Issues during Anesthesia, Set Expectations for Post Procedure Period and Allowed Opportunity for Questions and Acknowledgement of Understanding      Vitals:  Vitals Value Taken Time   /48 07/21/25 15:57   Temp     Pulse 91 07/21/25 15:58   Resp     SpO2 95 % 07/21/25 15:59   Vitals shown include unfiled device data.    Pt transported to ICU on monitor and bag ventilated by CRNA, Circulator, Assist, and line tech. VSS throughout with all LDA's intact and functioning on arrival. Report given to RN with all questions answered.     Electronically Signed By: MATTHEW Mora CRNA  July 21, 2025  3:59 PM

## 2025-07-21 NOTE — PROGRESS NOTES
Pt A/Ox4. Denied pain and SOB. VSS. ASA, metoprolol, and nasal prep completed per pre-procedure orders. [facility] nurse to give chlorhexidine liquid, sent with pt. Pt left unit via wheelchair at 0820 to transfer to [facility]. All belongings sent to ICU room 348.

## 2025-07-21 NOTE — PROGRESS NOTES
Patient arrived from OR at 1550.  Patient is intubated with size 7.5 ETT Secured 24@ teeth. Placed on Drager Vent with the following settings: 16, 600, +5, 60%  ISTAT drawn at 1631 on above settings: pH: 7.27, CO2 47, PO2 67, Bicarb 18, O2 sat 90, BE -8.0  Vent rate setting changed to 24 post ABG.    Bryson Maya, RT

## 2025-07-21 NOTE — PLAN OF CARE
Problem: Chest Pain  Goal: Resolution of Chest Pain Symptoms  Outcome: Progressing     Problem: Comorbidity Management  Goal: Blood Glucose Levels Within Targeted Range  Outcome: Progressing  Goal: Blood Pressure in Desired Range  Outcome: Progressing     Problem: Acute Coronary Syndrome  Goal: Optimal Adaptation to Illness  Outcome: Progressing  Goal: Normalized Cardiac Rhythm  Outcome: Progressing  Goal: Effective Cardiac Pump Function  Outcome: Progressing   Goal Outcome Evaluation:       Pt denies pain nausea sob.   Pt had hair clipped and took shower tonight.  Pt declines educational videos.     Pt blood glucose was 141 at bedtime.  Prn melatonin was given.  Pt states he is starting to get anxious.      Heparin infusion running at 1800 units with recheck in a.m.

## 2025-07-21 NOTE — ANESTHESIA POSTPROCEDURE EVALUATION
Patient: Mayito Sood    Procedure: Procedure(s):  CORONARY ARTERY BYPASS GRAFT TIMES FOUR, WITH LEFT ENDOSCOPIC VESSEL PROCUREMENT, LEFT INTERNAL MAMMARY ARTERY HARVEST, EPIAORTIC ULTRASOUND  ECHOCARDIOGRAM, TRANSESOPHAGEAL, INTRAOPERATIVE       Anesthesia Type:  General    Note:  Disposition: Inpatient; ICU            ICU Sign Out: Anesthesiologist/ICU physician sign out WAS performed   Postop Pain Control: Uneventful            Sign Out: Well controlled pain   PONV: No   Neuro/Psych: Uneventful            Sign Out: PLANNED postop sedation   Airway/Respiratory:             Sign Out: AIRWAY IN SITU/Resp. Support               Airway in situ/Resp. Support: ETT                 Reason: Planned Pre-op   CV/Hemodynamics:             Sign Out: Detailed CV status               Blood Pressure: Normal; Pressors               Rate/Rhythm: Normal HR               Perfusion:  Adequate perfusion indices   Other NRE: NONE   DID A NON-ROUTINE EVENT OCCUR? No           Last vitals:  Vitals:    07/21/25 1639 07/21/25 1645 07/21/25 1700   BP: (!) 149/63     Pulse: 89 89 89   Resp:      Temp:      SpO2: 96% 96% 96%       Electronically Signed By: Vaughn Vela MD  July 21, 2025  5:27 PM

## 2025-07-21 NOTE — ANESTHESIA PROCEDURE NOTES
Arterial Line Procedure Note    Pre-Procedure   Staff -        Anesthesiologist:  Vaughn Vela MD       Performed By: anesthesiologist       Location: OR       Pre-Anesthestic Checklist: patient identified, IV checked, risks and benefits discussed, informed consent, monitors and equipment checked, pre-op evaluation and at physician/surgeon's request  Timeout:       Correct Patient: Yes        Correct Procedure: Yes        Correct Site: Yes        Correct Position: Yes   Line Placement:   This line was placed Pre Induction starting at 7/21/2025 11:10 AM and ending at 7/21/2025 11:10 AM  Procedure   Procedure: arterial line       Laterality: right       Insertion Site: radial.  Sterile Prep        Standard elements of sterile barrier followed       Skin prep: Chloraprep  Insertion/Injection        Technique: ultrasound guided and Seldinger Technique        1. Ultrasound was used to evaluate the access site.       2. Artery evaluated via ultrasound for patency/adequacy.       3. Using real-time ultrasound the needle/catheter was observed entering the artery/vein.       4. Permanent image was captured and entered into the patient's record.       5. The visualized structures were anatomically normal.       6. There were no apparent abnormal pathologic findings.       Catheter Type/Size: 20 G, 12 cm  Narrative         Secured by: anchor securement device       Tegaderm dressing used.       Complications: None apparent,        Arterial waveform: Yes        IBP within 10% of NIBP: Yes

## 2025-07-21 NOTE — ANESTHESIA PROCEDURE NOTES
Airway         Procedure Start/Stop Times: 7/21/2025 10:13 AM  Staff -        Anesthesiologist:  Vaughn Vela MD       CRNA: Lizzy Powell APRN CRNA       Performed By: CRNAIndications and Patient Condition       Indications for airway management: gillian-procedural       Induction type:intravenous       Mask difficulty assessment: 1 - vent by mask    Final Airway Details       Final airway type: endotracheal airway       Successful airway: ETT - single, Oral and Single subglottic suction  Endotracheal Airway Details        ETT size (mm): 8.0       Cuffed: yes       Successful intubation technique: video laryngoscopy       VL Blade Size: Glidescope 4       Grade View of Cords: 1       Adjucts: stylet       Position: Right       Measured from: gums/teeth       Secured at (cm): 23       Bite block used: None    Post intubation assessment        Placement verified by: capnometry, equal breath sounds and chest rise        Number of attempts at approach: 1       Number of other approaches attempted: 0       Secured with: tape       Ease of procedure: easy       Dentition: Intact and Unchanged    Medication(s) Administered   Medication Administration Time: 7/21/2025 10:13 AM

## 2025-07-21 NOTE — PLAN OF CARE
Problem: Chest Pain  Goal: Resolution of Chest Pain Symptoms  Outcome: Progressing     Problem: Comorbidity Management  Goal: Blood Pressure in Desired Range  Outcome: Progressing  Intervention: Maintain Blood Pressure Management  Recent Flowsheet Documentation  Taken 7/21/2025 0018 by Carmina Sterling, RN  Medication Review/Management: medications reviewed   Goal Outcome Evaluation:        SB-SR w/ BBB. NPO since midnight. Pt clipped and showered for CABG this AM at 10:00. Heparin paused 6h before. Pt educated on how to use heart pillow and incentive spirometer. Q4h  and 124. Pt not reporting any pain or SOB.

## 2025-07-21 NOTE — ANESTHESIA PREPROCEDURE EVALUATION
Anesthesia Pre-Procedure Evaluation    Patient: Mayito Sood   MRN: [id number] : 1960          Procedure : Procedure(s):  CORONARY ARTERY BYPASS GRAFT, WITH ENDOSCOPIC VESSEL PROCUREMENT,INTERNAL MAMMARY ARTERY HARVEST  ECHOCARDIOGRAM, TRANSESOPHAGEAL, INTRAOPERATIVE  POSSIBLE LEFT RADIAL ARTERY HARVEST     NSTEMI/coronary artery disease: Troponin 67 --> 95 --> 285.  Coronary angiogram 2025 showed severe multivessel disease involving LAD, large diagonal, proximal to mid left circumflex, proximal OM 1 and OM 2, and  of distal RCA filling with collaterals.  CTS consulted; on heparin infusion. Pt is stable w/o chest discomfort or dyspnea. Awaiting CABG.     Mr. Mayito Sood is a 64 year old male with complex past medical history who presented with chest pain and syncope.  Briefly, patient was at work and felt lightheaded and dizzy followed by a syncopal episode.  Reportedly he lost consciousness for 5 minutes, witnessed by coworkers.  Denies head trauma.  Prior to this episode has had rapidly progressive angina.  In the emergency department he had an episode of hypotension that improved with fluid resuscitation.  Of note, serum glucose greater than 478 but urine ketones negative.    Past Medical History:   Diagnosis Date    Acute pain     Acute sinusitis     TARUN (acute kidney injury)     Anxiety and depression 2023    Bacteremia     Cellulitis     Chronic back pain     Diabetes mellitus, type 2 (H) 2014    Dyslipidemia 2017    ED (erectile dysfunction)     HTN (hypertension) 2014    Infection involving bone (H)     Obesity     Obstructive uropathy     Osteomyelitis (H)     Polyneuropathy     Created by [facility] Annotation: 2010  3:22PM - Kushal Griffin: feet  Replacement Utility updated for latest IMO load         Proliferative diabetic retinopathy of both eyes with macular edema associated with type 2 diabetes mellitus (H) 2018    Formatting of  this note might be different from the original.   Last seen      SIRS (systemic inflammatory response syndrome) (H)     Stage 3b chronic kidney disease (H) 02/08/2023    Ureterolithiasis       Past Surgical History:   Procedure Laterality Date    CHOLECYSTECTOMY      COMBINED CYSTOSCOPY, RETROGRADES, EXCHANGE STENT URETER(S) Left 06/12/2024    Procedure: CYSTOSCOPY, WITH RETROGRADE PYELOGRAM AND LEFT URETERAL STENT PLACEMENT;  Surgeon: Ramon Perea MD;  Location: [facility] OR    CV CORONARY ANGIOGRAM N/A 7/16/2025    Procedure: Coronary Angiogram;  Surgeon: Cheng Mills MD;  Location: [facility] CATH LAB CV    CV LEFT HEART CATH N/A 7/16/2025    Procedure: Left Heart Catheterization;  Surgeon: Cheng Mills MD;  Location: [facility] LAB CV    CYSTOSCOPY, BIOPSY BLADDER, COMBINED N/A 7/10/2024    Procedure: CYSTOSCOPY WITH BLADDER BIOPSY AND FULGURATION;  Surgeon: Ramon Perea MD;  Location: [facility] OR    CYSTOURETEROSCOPY, WITH LITHOTRIPSY USING CHACE 120P LASER AND URETERAL STENT INSERTION Left 7/10/2024    Procedure: CYSTOURETEROSCOPY, WITH RETROGRADE PYELOGRAM, HOLMIUM LASER LITHOTRIPSY OF URETERAL CALCULUS, AND STENT EXCHANGE;  Surgeon: Ramon Perea MD;  Location: [facility] OR    HC REPAIR ACHILLES TENDON,PRIMARY      Description: Primary Repair Of Ruptured Achilles Tendon;  Proc Date: 01/09/2003;    IR LUMBAR EPIDURAL STEROID INJECTION  12/04/2003    IR LUMBAR EPIDURAL STEROID INJECTION  07/07/2009    IR LUMBAR EPIDURAL STEROID INJECTION  08/20/2009    PICC  08/29/2014           Allergies   Allergen Reactions    Iodine Unknown and Anaphylaxis    Shellfish-Derived Products Anaphylaxis    Prednisone Unknown and Other (See Comments)     Depression--gets emotional and angry    Depression--gets emotional and angry      Depression  pt gets emotional and angry    Lisinopril Cough    Metformin Diarrhea      Social History     Tobacco Use    Smoking status: Never      Passive exposure: Never    Smokeless tobacco: Never   Substance Use Topics    Alcohol use: Yes     Comment: rare      Wt Readings from Last 1 Encounters:   07/21/25 126.6 kg (279 lb 1.6 oz)        Anesthesia Evaluation   Pt has had prior anesthetic. Type: General (easy prior DL).    No history of anesthetic complications       ROS/MED HX  ENT/Pulmonary: Comment: Sats 97 on RA this AM, some pulm edema on presentation   (-) tobacco use and COPD   Neurologic: Comment: IMPRESSION:  1.  Mild plaque formation, velocities consistent with less than 50% stenosis in the right internal carotid artery.  2.  Mild plaque formation, velocities consistent with less than 50% stenosis in the left internal carotid artery.  3.  Flow within the vertebral arteries is antegrade.    LE neuroatpy related to disc herniation, foot numbness and calf weakness per patient   (-) no CVA   Cardiovascular:     (+)  hypertension- -  CAD -  - -                                 Previous cardiac testing   Echo: Date: Results:  Interpretation Summary     1. Normal left ventricular size and systolic performance. The visually  estimated ejection fraction is 55%.  2. On selected views, the distal anterior segment appears mildly hypokinetic.  3. No significant valvular heart disease is identified on this study.  4. Normal right ventricular size and systolic performance.  5. There is mild left atrial enlargement.  6. There is mild enlargement of the proximal ascending aorta (3.9 cm).    Stress Test:  Date: Results:    ECG Reviewed:  Date: Results:    Cath:  Date: Results:  FINDINGS:  Left Heart Catheterization    LVEDP 15 mmHg  No significant gradient across the aortic valve     Coronary Angiography:  LEFT MAIN: Short vessel that bifurcates into left anterior descending and left circumflex arteries.  Left main has mild disease.  LEFT ANTERIOR DESCENDING: Large vessel that gives rise to a large diagonal branch and multiple septal perforators.  The LAD wraps  around the apex distally.  The proximal LAD has a 80 to 90% stenosis followed by moderate disease distally.  The apical LAD has diffuse 50 to 70% stenosis.  The large diagonal branch has 70% proximal stenosis followed by mild disease distally.  LEFT CIRCUMFLEX: Nondominant vessel that gives rise to a large OM1 branch, large OM 2 branch, and terminates into a small vessel distally.  The proximal left circumflex has 50-70% stenosis followed by 80% stenosis in the mid to distal portion involving the OM 2 branch.  The OM1 branch has a 70 to 80% proximal stenosis followed by mild disease distally.  The OM 2 branch has a 70% proximal stenosis followed by mild disease distally.  RIGHT CORONARY ARTERY: Dominant vessel that gives rise to the posterior descending artery.  The proximal to mid right coronary artery has moderate to severe diffuse disease and is chronically occluded distally.  There are left to right and right to right collaterals that fill the distal RCA.   (-) ICD   METS/Exercise Tolerance:     Hematologic:       Musculoskeletal:       GI/Hepatic:    (-) GERD   Renal/Genitourinary:     (+) renal disease, type: CRI and ARF,      BPH,      Endo:     (+)  type II DM,             Obesity,       Psychiatric/Substance Use:    (-) alcohol abuse history   Infectious Disease:       Malignancy:       Other:              Physical Exam  Airway  Mallampati: III  TM distance: >3 FB  Neck ROM: full  Mouth opening: >= 4 cm    Cardiovascular   Rhythm: regular  Rate: normal rate     Dental   (+) Completely normal teeth      Pulmonary Breath sounds clear to auscultation        Neurological   He appears awake, alert and oriented x3.    Other Findings       OUTSIDE LABS:  CBC:   Lab Results   Component Value Date    WBC 6.1 07/21/2025    WBC 7.0 07/20/2025    HGB 12.2 (L) 07/21/2025    HGB 11.7 (L) 07/20/2025    HCT 35.7 (L) 07/21/2025    HCT 35.7 (L) 07/20/2025     07/21/2025     07/20/2025     BMP:   Lab Results  "  Component Value Date     07/21/2025     07/20/2025    POTASSIUM 4.3 07/21/2025    POTASSIUM 4.1 07/20/2025    CHLORIDE 111 (H) 07/21/2025    CHLORIDE 109 (H) 07/20/2025    CO2 18 (L) 07/21/2025    CO2 21 (L) 07/20/2025    BUN 41.1 (H) 07/21/2025    BUN 42.2 (H) 07/20/2025    CR 1.76 (H) 07/21/2025    CR 1.74 (H) 07/20/2025     (H) 07/21/2025     (H) 07/21/2025     COAGS:   Lab Results   Component Value Date    PTT 30 07/15/2025    INR 0.98 07/15/2025     POC: No results found for: \"BGM\", \"HCG\", \"HCGS\"  HEPATIC:   Lab Results   Component Value Date    ALBUMIN 3.8 09/05/2024    PROTTOTAL 7.4 06/12/2024    ALT 48 06/12/2024    AST 42 06/12/2024    ALKPHOS 265 (H) 06/12/2024    BILITOTAL 0.4 06/12/2024     OTHER:   Lab Results   Component Value Date    LACT 2.2 (H) 06/11/2024    A1C 9.1 (H) 07/15/2025    QUIANA 8.3 (L) 07/21/2025    PHOS 3.7 07/21/2025    MAG 1.6 (L) 07/16/2025    LIPASE 22 06/11/2024       Anesthesia Plan    ASA Status:  4      NPO Status: NPO Appropriate   Anesthesia Type: General.  Airway: oral.  Induction: intravenous.  Maintenance: Balanced.   Techniques and Equipment:     - Airway:   Central Line Kit: Double Lumen Kit  Arterial Line Kit: Radial     - Monitoring Plan: standard ASA monitoring, arterial line kit, central line kit     Consents    Anesthesia Plan(s) and associated risks, benefits, and realistic alternatives discussed. Questions answered and patient/representative(s) expressed understanding.     - Discussed: CRNA     - Discussed with:  Patient        - Pt is DNR/DNI Status: no DNR     Blood Consent:      - Discussed with: patient.     - Consented: consented to blood products     Postoperative Care    Pain management: multimodal analgesia.     Comments:    Other Comments: Risk of MACE, stroke, sore throat + oral dental damage from oett, UGI from LEONARDO, blood tx, post op intubation all discussed with patient.   Positiong injury discussed, especially given DM and LE " "neuropathy from back.               Vaughn Vela MD    I have reviewed the pertinent notes and labs in the chart from the past 30 days and (re)examined the patient.  Any updates or changes from those notes are reflected in this note.    Clinically Significant Risk Factors          # Hyperchloremia: Highest Cl = 111 mmol/L in last 2 days, will monitor as appropriate              # Hypertension: Noted on problem list           # DMII: A1C = 9.1 % (Ref range: <5.7 %) within past 6 months # Severe Obesity: Estimated body mass index is 36.82 kg/m  as calculated from the following:    Height as of this encounter: 1.854 m (6' 1\").    Weight as of this encounter: 126.6 kg (279 lb 1.6 oz). with complications                   "

## 2025-07-21 NOTE — OP NOTE
OPERATIVE REPORT     OPERATING ROOM:  Room 1    Date of Operation:  July 21, 2025    PROCEDURES PERFORMED:   Median sternotomy   Take down of the left internal mammary artery    Endoscopic greater saphenous vein procurement from the left lower extremity    Epiaortic ultrasound of the ascending aorta    Placement on central cardiopulmonary bypass    Quadruple vessel coronary artery bypass grafting;   -  Left internal mammary artery to the left anterior descending coronary artery  -  Separate reversed saphenous vein grafts to the left anterior descending diagonal branch, the obtuse marginal 1 and the obtuse marginal 2 coronary arteries.     Placement of temporary atrial and ventricular pacing wires     SURGEONS:    Attending Surgeon:  Lina Bermudez MD  First Assistant: MATTHEW Martinez, CNP  Second Assistant:  Chica Moseley PA-C     ANESTHESIA:  General endotracheal    SKIN PREP:  Betadine and Duraprep    INCISION:  Median sternotomy, skip incision left upper leg     DRAINS: Two 32 Fr mediastinal and one 24 Fr Abdoul drain left pleural space  CULTURES: None  SPECIMENS: None  CLOSURE: Routine     PREOPERATIVE DIAGNOSES:  S/P NSTEMI and syncope  Acute coronary syndrome  Severe multivessel coronary artery disease  Poorly controlled diabetes mellitus  CKD stage IV     POSTOPERATIVE DIAGNOSES:  Same     BRIEF HISTORY:    Mayito Sood is a 64 year old year old gentleman who presented with an NSTEMI last week.  He received Brilinta. He was found to have severe multivessel disease on coronary angiogram with preserved left ventricular function.   The above procedures were planned.     FINDINGS AT OPERATION:  His ascending aorta was free of any plaque seen on epiaortic ultrasound.  His pulmonary artery pressures were one third systemic.  His overall left ventricular function was preserved.  The left internal mammary artery was a 3 mm in diameter conduit with excellent flow.  The reversed saphenous vein graft measured 4  to 5 mm in diameter and was of good quality.  The obtuse marginal 2 was a 1.5 mm in diameter target vessel, a fair to good vessel for bypass grafting.  The obtuse marginal 1 was a 2.5 mm in diameter target vessel, an excellent vessel for bypass grafting.  The left anterior descending diagonal branch was a 2 mm in diameter target vessel, an excellent vessel for bypass grafting.  The left anterior descending coronary artery in its apical third was a 2 mm in diameter target vessel, an excellent vessel for bypass grafting.  His heart was globally enlarged.        PROCEDURES:  The patient arrived in the operating room and was positioned supine.  An arterial line was placed.  Satisfactory general endotracheal anesthesia was induced.  A transesophageal probe, [place]-Johnny catheter and Gutierrez catheter were inserted.  The patient's neck, chest, abdomen, both groins and lower extremities were prepped and draped in a standard sterile fashion.  His left radial artery has calcific involvement and was not used.    A complete median sternotomy was made.  With the aid of the Rultract retractor, the left internal mammary artery was taken down from the xiphoid process to the subclavian vein.  At the same time Chica Moseley made an incision in the left upper leg and 5 cm of the greater saphenous vein was exposed. The endoscope was then passed proximally and distally and the greater saphenous vein was dissected out circumferentially.  Its branches were clipped or cauterized.  It was clipped proximally and distally and extracted. It was cannulated and distended.  Its branches were controlled with Ligaclips.  The leg wound was rendered hemostatic and closed in layers using running 2-0 and 3-0 Vicryl.  Dermabond was applied to the skin.     Heparin was administered.  The left internal mammary artery was prepared in the usual fashion.  An Octobase retractor was inserted.  The pericardium was opened and tented up on pericardial sutures.  The  aorta was  from the pulmonary artery.  Epiaortic ultrasound of the ascending aorta was carried out.  Pursestring sutures were placed in the ascending aorta and right atrium for cannulation.  When the ACT was appropriate cannulation was performed and central cardiopulmonary bypass was established.  The patient's temperature was allowed to drift. The aorta was cross-clamped and 1200 mL of cold blood cardioplegia was administered antegrade.  A good arrest was achieved.  Cold topical saline and slush was applied to the heart intermittently during the period of aortic cross-clamp.      Attention was first turned to the right coronary artery and there were no appropriate targets there.   Next attention was turned to the obtuse marginal 2 coronary artery.  It was dissected out for a distance of 1 cm and then opened for a distance of 8 mm.  It was bypassed in an end to side fashion using reversed saphenous vein graft and running 7-0 Prolene.  The vein graft was flushed with cold blood cardioplegia and sized to the ascending aorta and the patient received another 400 mL of cold blood cardioplegia in an antegrade fashion. Next attention was turned to the obtuse marginal one coronary artery.  It was dissected out for a distance of 1 cm and then opened for a distance of 8 mm.  It was bypassed in an end to side fashion using reversed saphenous vein graft and running 7-0 Prolene. The vein graft was flushed with cold blood cardioplegia and sized to the ascending aorta and the patient received another 500 mL of cold blood cardioplegia in an antegrade fashion. Next the left anterior descending diagonal branch was dissected out for a distance of 1 cm and opened for a distance of 8 mm.  It was bypassed in an end to side fashion using reversed saphenous vein graft and running 7-0 Prolene.  The vein graft was flushed with cold blood cardioplegia and sized to the ascending aorta.  The patient received another 400 mL of cold  blood cardioplegia in an antegrade fashion.   Finally attention was turned to the left anterior descending coronary artery in the interventricular groove.  It was dissected out in its apical third for a distance of 1 cm and then opened for a distance of 8 mm.  A pleural rent was created for passage of the left internal mammary artery and then the final distal anastomosis was performed between the left internal mammary artery and the left anterior descending coronary artery in an end to side fashion using running 7-0 Prolene. As this last distal anastomosis was being performed gentle warming was begun.  The gray occluder was briefly released from the left internal mammary artery and good flow was seen down the left anterior descending coronary artery.  The gray occluder was once more applied to the left internal mammary artery and the patient received a final dose of cold blood cardioplegia in an antegrade fashion.  It had been decided to perform the 3 proximal anastomoses with the aortic cross-clamp in place.  Therefore 3, 4 mm punch aortotomies were created.  The proximal aortoasaphenous anastomoses were performed in an end to side fashion using running 6-0 Prolene.     The gray occluder was released from the left internal mammary artery and a hot shot was delivered in an antegrade fashion. The aortic cross-clamp was released.  The aortic cross-clamp time was 1 hour and 44 minutes.  The proximal and distal anastomoses were examined and found to be hemostatic. He required defibrillation twice.  Ventricular and atrial pacing wires were applied and the patient was A-V sequentially paced at a rate of 90 and then just atrially paced.  The retrograde cardioplegia catheter was removed and that site repaired with two sets of 4-0 Prolene. The left pleural space was drained of any accumulated blood and gentle ventilation resumed. The root vent was removed and that site repaired with two sets of plegetted 4-0 Prolene.       The patient was placed on low dose epinephrine and neosynephrine.  When he was warm the heart was allowed to fill and eject and the patient  from cardiopulmonary bypass without difficulty. Total time on cardiopulmonary bypass was 2 hours and 1 minute.  Protamine was administered to reverse the heparin.  The patient was decannulated and the cannulation sites were repaired with 4-0 Prolene.  Hemostasis was satisfactory after the administration of one 5 pack of platelets.     The drains were placed and secured to the skin. The sternum was approximated with a combination of single and double stainless steel sternal wires.   The sternal wound was irrigated with warm antibiotic-containing solution.  It was closed in 4 layers using 0 Stratafix for 2 layers, 2-0 Stratafix for the next layer and a subcuticular stitch of 4-0 Monocryl.      The sponge, needle and instrument counts were reported as correct.      The estimated blood loss was 1000 mL.      Mayito Sood was brought to the Intensive Care Unit in critical but stable condition.    Complications: None     Lina Bermudez MD on 7/21/2025 at 3:35 PM

## 2025-07-21 NOTE — CONSULTS
[facility]  Critical Care Consult Note     07/21/2025     Name: Mayito Sood MRN#: [id number]   Age: 64 year old YOB: 1960        Summary     Past medical history of HTN, HLD, poorly controlled T2DM, CKD, obesity     Presented 7/21/25 for chest pain, palpitations & syncope. Found to have NSTEMI with severe multivessel CAD, preserved biventricular systolic function, decompensated HFpEF, & acute hypoxemic respiratory failure requiring low-flow oxygen     7/21/25 s/p 4 vessel CABG. Uncomplicated intra-operative course with preserved pre & post-operative biventricular systolic function. Post-operative mechanical ventilation requirement (7/21 - current) in conjunction with hemodynamic instability requiring the use of vasopressors-inotropes      Assessment & Plan      Neurology, Psychiatry, Sedation, Analgesia:  Sedation & Analgesia   - daily spontaneous awakening trials as able  - light sedation strategy, RASS goal 0 to -1 (PMID 01562079)  - dexmedetomidine infusion    - multimodal pain management: APAP, prn hydromorphone & oxycodone   - plan for extubation within six hours of arrival in the ICU as able     Risk for neurologic sequelae of cardiopulmonary bypass   - wean sedation for neurological examination as able    Cardiovascular:  Post-operative hemodynamic instability  - routine hemodynamic management per CV surgery: epinephrine, phenylephrine, & nicardipine have been made available   - ensure adequate DO2 with appropriate Hgb & SaO2; avoid excessive acidemia   - would favor balanced crystalloid for volume replacement - International Collaboration for Transfusion Medicine Guidelines (2024) recommend against the use of albumin for volume replacement in adult patients undergoing cardiovascular surgery (PMID 98310748)    Severe multivessel CAD  7/21/25 s/p 4 vessel CABG   - Aspirin & statin     Post-operative bradyarrhythmia   S/p placement of temporary epicardial pacing wires   - A paced at 90  BPM    Respiratory, Airway:  Post-operative invasive mechanical ventilation   7/21/25 radiograph demonstrated slightly high ETT, mild retrocardiac opacification, lines & chest tube in good position   - supplemental O2 to maintain spO2 >= 90-96% & PaO2 >= 60 mmHg  - post-operative lung protection (TV <8 mL/kg IBW, Pplat <30, driving pressure <16) (PMID [id number])  - target normocarbia pH 7.35 - 7.45, continuously monitored end tidal CO2  - enhanced recovery after cardiac surgery / fast-track may extubate within six hours   - ensure adequate pain control & pulmonary hygiene at extubation, IS, flutter-valve, EzPAP    Post-operative chest tubes  - monitor output      Gastrointestinal:  No active issues     Renal, Acid-base, Electrolytes, Volume :  CKD, chronic NAGMA suspected RTA  - consider scheduled enteral bicarbonate     Target K >3.6 meq/L (PMID: [id number])     Infectious Disease:  Jayde-operative antibiotics per CV surgery     Systemic inflammatory response   Secondary to surgical trauma & CPB circuit-blood interface contact  - monitor for persistence or signs of infection & organ dysfunction     Hematology, Oncology:  Post-operative anemia  - monitor for bleeding: restrictive transfusion strategy Hgb goal >7 - 8 to ensure adequate DO2 (PMID [id number])    Risk for post-cardiac bypass coagulopathy   - correct hypothermia, acidosis, hypertension, hypocalcemia   - Epsilon-aminocaproic acid & post-cardiopulmonary bypass protamine     Risk for post-cardiac bypass thrombocytopenia   Anticipate fall in platelet count after cardiopulmonary bypass in most patients, typically to levels approximately half of baseline, remy between postoperative days 2-4, & persists for 4 - 6 days following surgery. Anti-PF4-heparin LEBRON testing is positive in 25% to 50% of patients between postoperative days 3 and 10, while HIT is confirmed in only 1% to 4% of patients following CPB     Endocrine:  MICU insulin/glucose protocol   - maintain BG  "of 140 to 180 mg/dL with a continuous IV insulin infusion    Checklist:  FEN: advance as tolerated at extubation   VTE ppx: subcutaneous UFH  GI ppx: pantoprazole  Bowel regimen: PEG & senna   VAP ppx: Head of bed >30 degrees, daily oral care  Lines/tube-size: R-internal jugular PAC, art line, polanco, chest tube, ETT  Skin: sternotomy site clean & dry   PT/OT/SLP, early mobility: cardiac rehab   Code Status: full       History of the Present Illness      Airway - easy  Pre-echo - preserved biventricular systolic function   Post-echo - \" \"   Notable vitals & infusions - low dose epi    Pacer settings & rate - A paced at 90 BPM   EBL - 200  Notable labs - chronic NAGMA   Products - 1 unit ptls   Reversal given - yest   Notable case events - uncomplicated course        Past Medical History      HTN, HLD, poorly controlled T2DM, CKD, obesity      Social History      Reviewed in the electronic medical record      Family History      Reviewed in the electronic medical record      Allergies      Allergies   Allergen Reactions    Iodine Unknown and Anaphylaxis    Shellfish-Derived Products Anaphylaxis    Prednisone Unknown and Other (See Comments)     Depression--gets emotional and angry    Depression--gets emotional and angry      Depression  pt gets emotional and angry    Lisinopril Cough    Metformin Diarrhea         Review of Systems      Review of systems not obtained due to patient factors.      Medications      Prior to Admission Medications  Medications Prior to Admission   Medication Sig Dispense Refill Last Dose/Taking    acetaminophen (TYLENOL) 325 MG tablet Take 325 mg by mouth every 4 hours as needed for mild pain.   Past Month    amLODIPine (NORVASC) 10 MG tablet Take 10 mg by mouth daily.   7/15/2025 Evening    empagliflozin (JARDIANCE) 10 MG TABS tablet Take 10 mg by mouth daily.   7/15/2025 Evening    escitalopram (LEXAPRO) 20 MG tablet Take 20 mg by mouth daily.   7/15/2025 Evening    fluticasone (FLONASE) " "50 MCG/ACT nasal spray Spray 2 sprays into both nostrils daily.   7/15/2025 Evening    insulin glargine 100 UNIT/ML pen Inject 24 Units subcutaneously daily.   More than a month    liraglutide (VICTOZA) 18 MG/3ML solution Inject 1.8 mg subcutaneously daily.   Past Month    losartan (COZAAR) 25 MG tablet Take 25 mg by mouth daily.   7/15/2025 Evening    pravastatin (PRAVACHOL) 10 MG tablet Take 10 mg by mouth daily   7/15/2025 Evening    ACCU-CHEK FASTCLIX Misc [ACCU-CHEK FASTCLIX MISC] USE AS DIRECTED FOUR TIMES DAILY 102 each 0     ACCU-CHEK SMARTVIEW TEST STRIP Strp [ACCU-CHEK SMARTVIEW TEST STRIP STRP] TEST FOUR TIMES DAILY 100 strip 0     insulin needles, disposable, (ULTICARE) 32 x 5/32 \" Ndle [INSULIN NEEDLES, DISPOSABLE, ([employer]ICARE) 32 X 5/32 \" NDLE] Use as Directed with Humalog 100 each 0     pen needle, diabetic 31 gauge x 5/16\" Ndle [PEN NEEDLE, DIABETIC 31 GAUGE X 5/16\" NDLE] Use 1 pen As Directed as needed. 100 each 1          Physical Exam      Neurologic: Sedated. Opens eyes, tracks, & follows commands in all extremities.    HEENT: Head and face normal. No nasal discharge. Oropharynx normal. Eyelids, conjunctiva, & sclera normal.   Neck: Neck appearance normal. No neck masses. Thyroid not enlarged.  Respiratory: Lungs clear bilaterally. No wheezes, crackles, or rhonchi.   Cardiovascular: Regular rate & rhythm  Friction rub  Gastrointestinal: Bowel sounds present. Obese.   Musculoskeletal: Skeletal configuration normal and muscle mass normal for age. Joint appearance overall normal.  Skin, Hair, & Nails: Skin color normal. Sternotomy site clean & dry  Hair & nails normal.  Extremities: No peripheral edema. Cool      All pertinent vital signs, ventilator settings, I&Os, laboratory, microbiology, ECGs, & imaging data has been personally reviewed. Total Critical Care time, excluding procedures, was 50 minutes     CHRIS Hermosillo MD  Critical Care Medicine         "

## 2025-07-21 NOTE — ANESTHESIA PROCEDURE NOTES
Central Line/PA Catheter Placement    Pre-Procedure   Staff -        Anesthesiologist:  Vaughn Vela MD       Performed By: anesthesiologist       Location: OR       Pre-Anesthestic Checklist: patient identified, IV checked, site marked, risks and benefits discussed, informed consent, monitors and equipment checked, pre-op evaluation and at physician/surgeon's request  Timeout:       Correct Patient: Yes        Correct Procedure: Yes        Correct Site: Yes        Correct Position: Yes        Correct Laterality: Yes   Line Placement:   This line was placed Post Induction starting at 7/21/2025 11:11 AM and ending at 7/21/2025 11:11 AM    Procedure   Procedure: central line       Laterality: right       Insertion Site: internal jugular.       Patient Position: Trendelenburg  Sterile Prep        All elements of maximal sterile barrier technique followed       Patient Prep/Sterile Barriers: draped, hand hygiene, gloves , hat , mask , draped, gown, sterile gel and probe cover       Skin prep: Chloraprep  Insertion/Injection        Technique: ultrasound guided and Seldinger Technique        1. Ultrasound was used to evaluate the access site.       2. Vein evaluated via ultrasound for patency/adequacy.       3. Using real-time ultrasound the needle/catheter was observed entering the artery/vein.       4. Permanent image was captured and entered into the patient's record.       5. The visualized structures were anatomically normal.       6. There were no apparent abnormal pathologic findings.       Introducer Type: 9 Fr, 2-lumen MAC        Type: PA/CVC with Introducer       Catheter Size: 9 Fr       Catheter Length: 10       Number of Lumens: double lumen       PA Catheter Type: CCO         Appropriate RV, RA and PA waveforms noted:  Yes            Withdrawn and Locked at cm: 58            Balloon down at end of the procedure:   Narrative         Secured by: suture       Tegaderm dressing used.       Complications:  None apparent,        blood aspirated from all lumens,        All lumens flushed: Yes       Verification method: Ultrasound   Comments:  Column manometry used to confirm venous placement prior to RIJ dilation.

## 2025-07-21 NOTE — ANESTHESIA PROCEDURE NOTES
Perioperative LEONARDO Procedure Note    Staff -        Performed By: anesthesiologist  Preanesthesia Checklist:  Patient identified, IV assessed, risks and benefits discussed, monitors and equipment assessed, procedure being performed at surgeon's request and anesthesia consent obtained.    LEONARDO Probe Insertion    Probe Status PRE Insertion: NO obvious damage  Probe type:  Adult 3D  Bite block used:   Soft  Insertion Technique: Jaw Lift  Insertion complications: None obvious  Billing Report:LEONARDO report by Anesthesiologist (See Separate Report note)  Probe Status POST Removal: NO obvious damage        Post Intervention Findings  Procedure(s) performed:  CABG.  Regional wall motion:. Surgeon(s) notified of all postintervention findings: Yes.             Post Intervention comments: Post cabg: Bi-ventricular systolic function unchanged on 0.03 epi. Valves unchanged. No aortic dissection appreciated s/p aortic decannulation. Surgeon notified of all relevant findings. .    Echocardiogram Comments  Echocardiogram comments: PreCPB: LVEF 55%, no significant regional wall motion abnormalities. Globally normal RV systolic function. AV tricuspid, no aortic insufficiency. Trace MR. Trace TR. No PFO by CFD. No Ao Dissection or mobile atheroma identified. Surgeons notified of all relevant findings. .

## 2025-07-21 NOTE — BRIEF OP NOTE
[facility]    Brief Operative Note    Pre-operative diagnosis: CAD (coronary artery disease) [I25.10]  Post-operative diagnosis Same    Procedure: CORONARY ARTERY BYPASS GRAFT TIMES FOUR, WITH LEFT ENDOSCOPIC VESSEL PROCUREMENT, LEFT INTERNAL MAMMARY ARTERY HARVEST, EPIAORTIC ULTRASOUND, N/A - Chest  ECHOCARDIOGRAM, TRANSESOPHAGEAL, INTRAOPERATIVE, N/A - Heart    Surgeons:  1.  Lina Bermudez MD, primary  2.  MTATHEW Martinez, CNP, first assistant  3.  Chica Moseley PA-C, physician assistant  Anesthesia: General   Estimated Blood Loss: 1000 mL  Inicisions:  Median sternotomy, left upper leg  Drains: Two 32 Fr mediastinal and one 24 Fr Abdoul left chest  Specimens: None  Findings: Small most distal OM, other targets were excellent as was the conduit.  Complications:  None.  Implants:  None

## 2025-07-22 ENCOUNTER — APPOINTMENT (OUTPATIENT)
Dept: OCCUPATIONAL THERAPY | Facility: HOSPITAL | Age: 65
End: 2025-07-22
Payer: COMMERCIAL

## 2025-07-22 ENCOUNTER — APPOINTMENT (OUTPATIENT)
Dept: RADIOLOGY | Facility: HOSPITAL | Age: 65
End: 2025-07-22
Payer: COMMERCIAL

## 2025-07-22 LAB
ALBUMIN SERPL BCG-MCNC: 3.3 G/DL (ref 3.5–5.2)
ALP SERPL-CCNC: 156 U/L (ref 40–150)
ALT SERPL W P-5'-P-CCNC: 27 U/L (ref 0–70)
ANION GAP SERPL CALCULATED.3IONS-SCNC: 10 MMOL/L (ref 7–15)
AST SERPL W P-5'-P-CCNC: 54 U/L (ref 0–45)
ATRIAL RATE - MUSE: 89 BPM
BASE EXCESS BLDA CALC-SCNC: 0.2 MMOL/L (ref -3–3)
BILIRUB SERPL-MCNC: 0.2 MG/DL
BUN SERPL-MCNC: 38.3 MG/DL (ref 8–23)
CA-I BLD-MCNC: 4.2 MG/DL (ref 4.4–5.2)
CA-I BLD-MCNC: 4.3 MG/DL (ref 4.4–5.2)
CA-I BLD-MCNC: 4.4 MG/DL (ref 4.4–5.2)
CALCIUM SERPL-MCNC: 7.8 MG/DL (ref 8.8–10.4)
CHLORIDE SERPL-SCNC: 111 MMOL/L (ref 98–107)
CREAT SERPL-MCNC: 1.97 MG/DL (ref 0.67–1.17)
DIASTOLIC BLOOD PRESSURE - MUSE: NORMAL MMHG
EGFRCR SERPLBLD CKD-EPI 2021: 37 ML/MIN/1.73M2
ERYTHROCYTE [DISTWIDTH] IN BLOOD BY AUTOMATED COUNT: 13.4 % (ref 10–15)
GLUCOSE BLDC GLUCOMTR-MCNC: 114 MG/DL (ref 70–99)
GLUCOSE BLDC GLUCOMTR-MCNC: 116 MG/DL (ref 70–99)
GLUCOSE BLDC GLUCOMTR-MCNC: 116 MG/DL (ref 70–99)
GLUCOSE BLDC GLUCOMTR-MCNC: 122 MG/DL (ref 70–99)
GLUCOSE BLDC GLUCOMTR-MCNC: 130 MG/DL (ref 70–99)
GLUCOSE BLDC GLUCOMTR-MCNC: 142 MG/DL (ref 70–99)
GLUCOSE BLDC GLUCOMTR-MCNC: 148 MG/DL (ref 70–99)
GLUCOSE BLDC GLUCOMTR-MCNC: 154 MG/DL (ref 70–99)
GLUCOSE BLDC GLUCOMTR-MCNC: 159 MG/DL (ref 70–99)
GLUCOSE BLDC GLUCOMTR-MCNC: 162 MG/DL (ref 70–99)
GLUCOSE BLDC GLUCOMTR-MCNC: 165 MG/DL (ref 70–99)
GLUCOSE BLDC GLUCOMTR-MCNC: 189 MG/DL (ref 70–99)
GLUCOSE SERPL-MCNC: 117 MG/DL (ref 70–99)
HCO3 BLD-SCNC: 26 MMOL/L (ref 21–28)
HCO3 SERPL-SCNC: 26 MMOL/L (ref 22–29)
HCT VFR BLD AUTO: 28.5 % (ref 40–53)
HGB BLD-MCNC: 9.5 G/DL (ref 13.3–17.7)
INTERPRETATION ECG - MUSE: NORMAL
MAGNESIUM SERPL-MCNC: 2.2 MG/DL (ref 1.7–2.3)
MCH RBC QN AUTO: 30.5 PG (ref 26.5–33)
MCHC RBC AUTO-ENTMCNC: 33.3 G/DL (ref 31.5–36.5)
MCV RBC AUTO: 92 FL (ref 78–100)
O2/TOTAL GAS SETTING VFR VENT: 30 %
OXYHGB MFR BLDA: 95 % (ref 92–100)
P AXIS - MUSE: 34 DEGREES
PCO2 BLD: 44 MM HG (ref 35–45)
PH BLD: 7.37 [PH] (ref 7.35–7.45)
PHOSPHATE SERPL-MCNC: 3.4 MG/DL (ref 2.5–4.5)
PLATELET # BLD AUTO: 208 10E3/UL (ref 150–450)
PO2 BLD: 84 MM HG (ref 80–105)
POTASSIUM SERPL-SCNC: 4.4 MMOL/L (ref 3.4–5.3)
PR INTERVAL - MUSE: 170 MS
PROT SERPL-MCNC: 5.7 G/DL (ref 6.4–8.3)
QRS DURATION - MUSE: 162 MS
QT - MUSE: 412 MS
QTC - MUSE: 501 MS
R AXIS - MUSE: -29 DEGREES
RBC # BLD AUTO: 3.11 10E6/UL (ref 4.4–5.9)
SAO2 % BLDA: 96.6 % (ref 96–97)
SODIUM SERPL-SCNC: 147 MMOL/L (ref 135–145)
SYSTOLIC BLOOD PRESSURE - MUSE: NORMAL MMHG
T AXIS - MUSE: 39 DEGREES
VENTRICULAR RATE- MUSE: 89 BPM
WBC # BLD AUTO: 12.1 10E3/UL (ref 4–11)

## 2025-07-22 PROCEDURE — 93005 ELECTROCARDIOGRAM TRACING: CPT

## 2025-07-22 PROCEDURE — 250N000011 HC RX IP 250 OP 636: Performed by: NURSE PRACTITIONER

## 2025-07-22 PROCEDURE — 250N000012 HC RX MED GY IP 250 OP 636 PS 637: Performed by: NURSE PRACTITIONER

## 2025-07-22 PROCEDURE — 999N000156 HC STATISTIC RCP CONSULT EA 30 MIN

## 2025-07-22 PROCEDURE — 250N000009 HC RX 250: Performed by: INTERNAL MEDICINE

## 2025-07-22 PROCEDURE — 250N000011 HC RX IP 250 OP 636

## 2025-07-22 PROCEDURE — 83735 ASSAY OF MAGNESIUM: CPT

## 2025-07-22 PROCEDURE — 94761 N-INVAS EAR/PLS OXIMETRY MLT: CPT

## 2025-07-22 PROCEDURE — 82330 ASSAY OF CALCIUM: CPT

## 2025-07-22 PROCEDURE — 97535 SELF CARE MNGMENT TRAINING: CPT | Mod: GO

## 2025-07-22 PROCEDURE — 200N000001 HC R&B ICU

## 2025-07-22 PROCEDURE — 250N000013 HC RX MED GY IP 250 OP 250 PS 637

## 2025-07-22 PROCEDURE — 258N000003 HC RX IP 258 OP 636

## 2025-07-22 PROCEDURE — 250N000011 HC RX IP 250 OP 636: Mod: JZ | Performed by: THORACIC SURGERY (CARDIOTHORACIC VASCULAR SURGERY)

## 2025-07-22 PROCEDURE — 84100 ASSAY OF PHOSPHORUS: CPT

## 2025-07-22 PROCEDURE — 82247 BILIRUBIN TOTAL: CPT

## 2025-07-22 PROCEDURE — 97166 OT EVAL MOD COMPLEX 45 MIN: CPT | Mod: GO

## 2025-07-22 PROCEDURE — 85041 AUTOMATED RBC COUNT: CPT

## 2025-07-22 PROCEDURE — 82805 BLOOD GASES W/O2 SATURATION: CPT

## 2025-07-22 PROCEDURE — 94660 CPAP INITIATION&MGMT: CPT

## 2025-07-22 PROCEDURE — P9045 ALBUMIN (HUMAN), 5%, 250 ML: HCPCS | Mod: JZ | Performed by: THORACIC SURGERY (CARDIOTHORACIC VASCULAR SURGERY)

## 2025-07-22 PROCEDURE — 94799 UNLISTED PULMONARY SVC/PX: CPT

## 2025-07-22 PROCEDURE — 99232 SBSQ HOSP IP/OBS MODERATE 35: CPT | Performed by: FAMILY MEDICINE

## 2025-07-22 PROCEDURE — 250N000013 HC RX MED GY IP 250 OP 250 PS 637: Performed by: NURSE PRACTITIONER

## 2025-07-22 PROCEDURE — 999N000157 HC STATISTIC RCP TIME EA 10 MIN

## 2025-07-22 PROCEDURE — 71045 X-RAY EXAM CHEST 1 VIEW: CPT

## 2025-07-22 PROCEDURE — 36415 COLL VENOUS BLD VENIPUNCTURE: CPT | Performed by: THORACIC SURGERY (CARDIOTHORACIC VASCULAR SURGERY)

## 2025-07-22 PROCEDURE — 82330 ASSAY OF CALCIUM: CPT | Performed by: THORACIC SURGERY (CARDIOTHORACIC VASCULAR SURGERY)

## 2025-07-22 PROCEDURE — 93010 ELECTROCARDIOGRAM REPORT: CPT | Performed by: STUDENT IN AN ORGANIZED HEALTH CARE EDUCATION/TRAINING PROGRAM

## 2025-07-22 PROCEDURE — 97110 THERAPEUTIC EXERCISES: CPT | Mod: GO

## 2025-07-22 RX ORDER — FUROSEMIDE 10 MG/ML
40 INJECTION INTRAMUSCULAR; INTRAVENOUS 2 TIMES DAILY
Status: DISCONTINUED | OUTPATIENT
Start: 2025-07-22 | End: 2025-07-24

## 2025-07-22 RX ORDER — DEXTROSE MONOHYDRATE 25 G/50ML
25-50 INJECTION, SOLUTION INTRAVENOUS
Status: DISCONTINUED | OUTPATIENT
Start: 2025-07-22 | End: 2025-07-30 | Stop reason: HOSPADM

## 2025-07-22 RX ORDER — NICOTINE POLACRILEX 4 MG
15-30 LOZENGE BUCCAL
Status: DISCONTINUED | OUTPATIENT
Start: 2025-07-22 | End: 2025-07-30 | Stop reason: HOSPADM

## 2025-07-22 RX ORDER — INDOMETHACIN 25 MG/1
100 CAPSULE ORAL ONCE
Status: COMPLETED | OUTPATIENT
Start: 2025-07-22 | End: 2025-07-22

## 2025-07-22 RX ADMIN — ONDANSETRON 4 MG: 2 INJECTION, SOLUTION INTRAMUSCULAR; INTRAVENOUS at 06:34

## 2025-07-22 RX ADMIN — HYDROMORPHONE HYDROCHLORIDE 0.4 MG: 0.2 INJECTION, SOLUTION INTRAMUSCULAR; INTRAVENOUS; SUBCUTANEOUS at 00:30

## 2025-07-22 RX ADMIN — POLYETHYLENE GLYCOL 3350 17 G: 17 POWDER, FOR SOLUTION ORAL at 08:49

## 2025-07-22 RX ADMIN — HYDROMORPHONE HYDROCHLORIDE 0.4 MG: 0.2 INJECTION, SOLUTION INTRAMUSCULAR; INTRAVENOUS; SUBCUTANEOUS at 04:06

## 2025-07-22 RX ADMIN — CALCIUM GLUCONATE 1 G: 20 INJECTION, SOLUTION INTRAVENOUS at 05:35

## 2025-07-22 RX ADMIN — FUROSEMIDE 40 MG: 10 INJECTION, SOLUTION INTRAMUSCULAR; INTRAVENOUS at 21:22

## 2025-07-22 RX ADMIN — METOPROLOL TARTRATE 12.5 MG: 25 TABLET, FILM COATED ORAL at 21:30

## 2025-07-22 RX ADMIN — CALCIUM GLUCONATE 1 G: 20 INJECTION, SOLUTION INTRAVENOUS at 21:48

## 2025-07-22 RX ADMIN — SENNOSIDES, DOCUSATE SODIUM 1 TABLET: 50; 8.6 TABLET, FILM COATED ORAL at 21:23

## 2025-07-22 RX ADMIN — ACETAMINOPHEN 975 MG: 325 TABLET, FILM COATED ORAL at 21:21

## 2025-07-22 RX ADMIN — ACETAMINOPHEN 975 MG: 325 TABLET, FILM COATED ORAL at 05:31

## 2025-07-22 RX ADMIN — OXYCODONE HYDROCHLORIDE 10 MG: 5 TABLET ORAL at 07:48

## 2025-07-22 RX ADMIN — ONDANSETRON 4 MG: 2 INJECTION, SOLUTION INTRAMUSCULAR; INTRAVENOUS at 16:45

## 2025-07-22 RX ADMIN — INSULIN ASPART 1 UNITS: 100 INJECTION, SOLUTION INTRAVENOUS; SUBCUTANEOUS at 11:58

## 2025-07-22 RX ADMIN — INSULIN ASPART 1 UNITS: 100 INJECTION, SOLUTION INTRAVENOUS; SUBCUTANEOUS at 16:48

## 2025-07-22 RX ADMIN — PROCHLORPERAZINE EDISYLATE 10 MG: 5 INJECTION INTRAMUSCULAR; INTRAVENOUS at 12:02

## 2025-07-22 RX ADMIN — INSULIN GLARGINE 10 UNITS: 100 INJECTION, SOLUTION SUBCUTANEOUS at 08:49

## 2025-07-22 RX ADMIN — CALCIUM GLUCONATE 1 G: 20 INJECTION, SOLUTION INTRAVENOUS at 11:55

## 2025-07-22 RX ADMIN — HEPARIN SODIUM 5000 UNITS: 5000 INJECTION, SOLUTION INTRAVENOUS; SUBCUTANEOUS at 13:30

## 2025-07-22 RX ADMIN — FUROSEMIDE 40 MG: 10 INJECTION, SOLUTION INTRAMUSCULAR; INTRAVENOUS at 08:48

## 2025-07-22 RX ADMIN — PRAVASTATIN SODIUM 10 MG: 10 TABLET ORAL at 21:23

## 2025-07-22 RX ADMIN — CALCIUM GLUCONATE 1 G: 20 INJECTION, SOLUTION INTRAVENOUS at 00:01

## 2025-07-22 RX ADMIN — OXYCODONE HYDROCHLORIDE 10 MG: 5 TABLET ORAL at 02:38

## 2025-07-22 RX ADMIN — ALBUMIN HUMAN 12.5 G: 0.05 INJECTION, SOLUTION INTRAVENOUS at 04:38

## 2025-07-22 RX ADMIN — SODIUM BICARBONATE 100 MEQ: 84 INJECTION, SOLUTION INTRAVENOUS at 00:45

## 2025-07-22 RX ADMIN — ESCITALOPRAM 20 MG: 20 TABLET, FILM COATED ORAL at 21:22

## 2025-07-22 RX ADMIN — ACETAMINOPHEN 975 MG: 325 TABLET, FILM COATED ORAL at 13:30

## 2025-07-22 RX ADMIN — OXYCODONE HYDROCHLORIDE 10 MG: 5 TABLET ORAL at 21:22

## 2025-07-22 RX ADMIN — CEFAZOLIN SODIUM 2 G: 2 SOLUTION INTRAVENOUS at 05:48

## 2025-07-22 RX ADMIN — SODIUM CHLORIDE, SODIUM LACTATE, POTASSIUM CHLORIDE, AND CALCIUM CHLORIDE 250 ML: .6; .31; .03; .02 INJECTION, SOLUTION INTRAVENOUS at 02:43

## 2025-07-22 RX ADMIN — PANTOPRAZOLE SODIUM 40 MG: 40 TABLET, DELAYED RELEASE ORAL at 08:49

## 2025-07-22 RX ADMIN — LIDOCAINE 1 PATCH: 246 PATCH TOPICAL at 16:45

## 2025-07-22 RX ADMIN — HEPARIN SODIUM 5000 UNITS: 5000 INJECTION, SOLUTION INTRAVENOUS; SUBCUTANEOUS at 21:23

## 2025-07-22 RX ADMIN — CEFAZOLIN SODIUM 2 G: 2 SOLUTION INTRAVENOUS at 13:30

## 2025-07-22 RX ADMIN — METHOCARBAMOL 500 MG: 500 TABLET ORAL at 13:35

## 2025-07-22 RX ADMIN — ASPIRIN 162 MG: 81 TABLET, COATED ORAL at 08:49

## 2025-07-22 RX ADMIN — OXYCODONE HYDROCHLORIDE 10 MG: 5 TABLET ORAL at 16:44

## 2025-07-22 RX ADMIN — HYDROMORPHONE HYDROCHLORIDE 0.4 MG: 0.2 INJECTION, SOLUTION INTRAMUSCULAR; INTRAVENOUS; SUBCUTANEOUS at 19:37

## 2025-07-22 RX ADMIN — ONDANSETRON 4 MG: 2 INJECTION, SOLUTION INTRAMUSCULAR; INTRAVENOUS at 10:25

## 2025-07-22 ASSESSMENT — ACTIVITIES OF DAILY LIVING (ADL)
ADLS_ACUITY_SCORE: 45
ADLS_ACUITY_SCORE: 48
ADLS_ACUITY_SCORE: 46
ADLS_ACUITY_SCORE: 41
ADLS_ACUITY_SCORE: 45
ADLS_ACUITY_SCORE: 46
ADLS_ACUITY_SCORE: 46
ADLS_ACUITY_SCORE: 47
ADLS_ACUITY_SCORE: 48
ADLS_ACUITY_SCORE: 48
ADLS_ACUITY_SCORE: 46
IADL_COMMENTS: IND. W/ IADL TASKS
ADLS_ACUITY_SCORE: 45
ADLS_ACUITY_SCORE: 46
ADLS_ACUITY_SCORE: 45
ADLS_ACUITY_SCORE: 41
ADLS_ACUITY_SCORE: 46
ADLS_ACUITY_SCORE: 46
ADLS_ACUITY_SCORE: 41
ADLS_ACUITY_SCORE: 48

## 2025-07-22 NOTE — PROGRESS NOTES
Critical Care Sign Off Note     The critical care service will sign off. Please contact us for questions or re-engagement.     CHRIS Hermosillo MD  Critical Care Medicine

## 2025-07-22 NOTE — PROGRESS NOTES
Brief intensivist update  7/22/2025     While sitting up in bed patient drank water, then suddenly became nauseated & vomited.   Likely aspirated some amount of emesis, does not remember the event. No change in hemodynamics.  Transferred to a chair w/o any issues. Put on HFNC for the PEEP rather than FiO2 requirements (was 90-91% on RA during his transfer to the chair).    Jessica Narvaez MD

## 2025-07-22 NOTE — PLAN OF CARE
Problem: Adult Inpatient Plan of Care  Goal: Plan of Care Review  Description: The Plan of Care Review/Shift note should be completed every shift.  The Outcome Evaluation is a brief statement about your assessment that the patient is improving, declining, or no change.  This information will be displayed automatically on your shift  note.  Flowsheets (Taken 7/22/2025 1356)  Outcome Evaluation: lines dc'd, pain controlled, up with therapy  Plan of Care Reviewed With: patient  Overall Patient Progress: improving  Goal: Absence of Hospital-Acquired Illness or Injury  Intervention: Prevent Skin Injury  Recent Flowsheet Documentation  Taken 7/22/2025 1200 by Bette Awan RN  Body Position:   supine, legs elevated   supine, head elevated     Problem: Skin Injury Risk Increased  Goal: Skin Health and Integrity  Intervention: Plan: Nurse Driven Intervention: Moisture Management  Recent Flowsheet Documentation  Taken 7/22/2025 1200 by Bette Awan RN  Moisture Interventions: Urinary collection device  Taken 7/22/2025 0800 by Bette Awan RN  Moisture Interventions: Urinary collection device  Intervention: Plan: Nurse Driven Intervention: Friction and Shear  Recent Flowsheet Documentation  Taken 7/22/2025 1200 by Bette Awan RN  Friction/Shear Interventions: HOB 30 degrees or less  Taken 7/22/2025 0800 by Bette Awan RN  Friction/Shear Interventions: HOB 30 degrees or less  Intervention: Optimize Skin Protection  Recent Flowsheet Documentation  Taken 7/22/2025 1200 by Bette Awan RN  Activity Management: back to bed  Head of Bed (HOB) Positioning: HOB at 30-45 degrees  Taken 7/22/2025 0800 by Bette Awan RN  Activity Management: up in chair     Problem: Cardiovascular Surgery  Goal: Optimal Cerebral Tissue Perfusion  Intervention: Protect and Optimize Cerebral Perfusion  Recent Flowsheet Documentation  Taken 7/22/2025 1200 by Bette Awan RN  Head of Bed (HOB) Positioning: HOB at  30-45 degrees     Problem: Restraint, Nonviolent  Goal: Absence of Harm or Injury  Intervention: Protect Skin and Joint Integrity  Recent Flowsheet Documentation  Taken 7/22/2025 1200 by Bette Awan RN  Body Position:   supine, legs elevated   supine, head elevated   Goal Outcome Evaluation:      Plan of Care Reviewed With: patient    Overall Patient Progress: improvingOverall Patient Progress: improving    Outcome Evaluation: lines dc'd, pain controlled, up with therapy

## 2025-07-22 NOTE — PROGRESS NOTES
CVTS Daily Progress Note   POD#1 s/p CABG x4 EF 55%   Attending: Lara  LOS: 6    SUBJECTIVE/INTERVAL EVENTS:    Patient arrived to ICU from OR yesterday afternoon. He was subsequently extubated and weaned from pressors. Patient progressing well. Maintaining oxygen saturations on nasal cannula. Normotensive off pressors. Ambulating with therapy to chair. Pain well controlled. - BM / - flatus. Tolerating diet without nausea. UOP adequate. Chest tube output appropriate. Hgb 9.5.  Patient denies new chest pain, shortness of breath, abdominal pain, calf pain, nausea. Patient has no questions today..     OBJECTIVE:  Temp:  [97.9  F (36.6  C)-100.6  F (38.1  C)] 98.8  F (37.1  C)  Pulse:  [60-93] 89  Resp:  [16] 16  BP: (118-154)/(48-74) 125/67  MAP:  [62 mmHg-99 mmHg] 70 mmHg  Arterial Line BP: ()/(46-73) 111/55  FiO2 (%):  [30 %-60 %] 35 %  SpO2:  [90 %-100 %] 96 %  Vitals:    07/18/25 0600 07/19/25 0342 07/20/25 0457 07/21/25 0018   Weight: 128.4 kg (283 lb) 127.1 kg (280 lb 3.2 oz) 126.6 kg (279 lb 3.2 oz) 126.6 kg (279 lb 1.6 oz)    07/21/25 0700   Weight: 126 kg (277 lb 11.2 oz)       Clinically Significant Risk Factors        # Hyperkalemia: Highest K = 5.4 mmol/L in last 2 days, will monitor as appropriate  # Hypernatremia: Highest Na = 147 mmol/L in last 2 days, will monitor as appropriate  # Hyperchloremia: Highest Cl = 113 mmol/L in last 2 days, will monitor as appropriate      # Hypocalcemia: Lowest Ca = 7.8 mg/dL in last 2 days, will monitor and replace as appropriate     # Hypoalbuminemia: Lowest albumin = 3.2 g/dL at 7/21/2025  4:06 PM, will monitor as appropriate  # Coagulation Defect: INR = 1.26 (Ref range: 0.85 - 1.15) and/or PTT = 38 Seconds (Ref range: 22 - 38 Seconds), will monitor for bleeding    # Hypertension: Noted on problem list           # DMII: A1C = 9.1 % (Ref range: <5.7 %) within past 6 months # Severe Obesity: Estimated body mass index is 36.64 kg/m  as calculated from the  "following:    Height as of this encounter: 1.854 m (6' 1\").    Weight as of this encounter: 126 kg (277 lb 11.2 oz). with complications                     Current Medications:    Scheduled Meds:  Current Facility-Administered Medications   Medication Dose Route Frequency Provider Last Rate Last Admin    acetaminophen (TYLENOL) tablet 975 mg  975 mg Oral Q8H China Cerna PA-C   975 mg at 07/22/25 0531    aspirin (ASA) chewable tablet 162 mg  162 mg Oral or NG Tube Daily China Cerna PA-C        Or    aspirin (ASA) Suppository 300 mg  300 mg Rectal Daily China Cerna PA-C        ceFAZolin (ANCEF) 2 g in dextrose 50 mL intermittent infusion  2 g Intravenous Q8H China Cerna PA-C   2 g at 07/22/25 0548    chlorhexidine (PERIDEX) 0.12 % solution 15 mL  15 mL Mouth/Throat Q12H Isiah Hermosillo MD   15 mL at 07/21/25 2039    [Held by provider] empagliflozin (JARDIANCE) tablet 10 mg  10 mg Oral Daily China Cerna PA-C        escitalopram (LEXAPRO) tablet 20 mg  20 mg Oral QPM China Cerna PA-C        heparin ANTICOAGULANT injection 5,000 Units  5,000 Units Subcutaneous Q8H China Cerna PA-C        Lidocaine (LIDOCARE) 4 % Patch 1-2 patch  1-2 patch Transdermal Q24H China Cerna PA-C   1 patch at 07/21/25 1645    [Held by provider] losartan (COZAAR) tablet 25 mg  25 mg Oral Daily China Cerna PA-C        pantoprazole (PROTONIX) 2 mg/mL suspension 40 mg  40 mg Oral or NG Tube Daily China Cerna PA-C        Or    pantoprazole (PROTONIX) EC tablet 40 mg  40 mg Oral Daily China Cerna PA-C        polyethylene glycol (MIRALAX) Packet 17 g  17 g Oral Daily China Cerna PA-C        pravastatin (PRAVACHOL) tablet 10 mg  10 mg Oral QPM China Cerna PA-C        senna-docusate (SENOKOT-S/PERICOLACE) 8.6-50 MG per tablet 1 tablet  1 tablet Oral BID China Cerna PA-C        [START ON 7/23/2025] tamsulosin (FLOMAX) " capsule 0.4 mg  0.4 mg Oral Daily China Cerna PA-C         Continuous Infusions:  Current Facility-Administered Medications   Medication Dose Route Frequency Provider Last Rate Last Admin    EPINEPHrine (ADRENALIN) 5 mg in sodium chloride 0.9 % 250 mL infusion CENTRAL  0.01-0.1 mcg/kg/min Intravenous Continuous PRN China Cerna PA-C   Stopped at 07/21/25 2315    insulin regular (MYXREDLIN) 1 unit/mL infusion  0-24 Units/hr Intravenous Continuous China Cerna PA-C 2 mL/hr at 07/22/25 0658 2 Units/hr at 07/22/25 0658    niCARdipine 40 mg in 200 mL NS (CARDENE) infusion  0.5-15 mg/hr Intravenous Continuous PRN China Cerna PA-C   Stopped at 07/21/25 2024    No lozenges or gum should be given while patient on BIPAP/AVAPS/AVAPS AE   Does not apply Continuous PRN Jessica Narvaez MD        Patient may continue current oral medications   Does not apply Continuous PRN Jessica Narvaez MD        phenylephrine (PRAVEENA-SYNEPHRINE) 50 mg in NaCl 0.9 % 250 mL infusion  0.1-4 mcg/kg/min Intravenous Continuous PRN China Cerna PA-C   Stopped at 07/22/25 0137     PRN Meds:.  Current Facility-Administered Medications   Medication Dose Route Frequency Provider Last Rate Last Admin    [START ON 7/24/2025] bisacodyl (DULCOLAX) suppository 10 mg  10 mg Rectal Daily PRN China Cerna PA-C        calcium gluconate 1 g in 50 mL in sodium chloride intermittent infusion  1 g Intravenous Once PRN China Cerna PA-C   1 g at 07/22/25 0535    calcium gluconate 2 g in  mL intermittent infusion  2 g Intravenous Once PRN China Cerna PA-C        calcium gluconate 3 g in sodium chloride 0.9 % 100 mL intermittent infusion  3 g Intravenous Once PRN China Cerna PA-C        carboxymethylcellulose PF (REFRESH PLUS) 0.5 % ophthalmic solution 1 drop  1 drop Both Eyes Q1H PRN Jessica Narvaez MD        glucose gel 15-30 g  15-30 g Oral Q15 Min PRN Eryn,  China LEZAMA PA-C        Or    dextrose 50 % injection 25-50 mL  25-50 mL Intravenous Q15 Min PRN China Cerna PA-C        Or    glucagon injection 1 mg  1 mg Subcutaneous Q15 Min PRN China Cerna PA-C        EPINEPHrine (ADRENALIN) 5 mg in sodium chloride 0.9 % 250 mL infusion CENTRAL  0.01-0.1 mcg/kg/min Intravenous Continuous PRN China Cerna PA-C   Stopped at 07/21/25 2315    hydrALAZINE (APRESOLINE) injection 10 mg  10 mg Intravenous Q30 Min PRN Chnia Cerna PA-C        HYDROmorphone (DILAUDID) injection 0.2 mg  0.2 mg Intravenous Q2H PRN China Cerna PA-C        Or    HYDROmorphone (DILAUDID) injection 0.4 mg  0.4 mg Intravenous Q2H PRN China Cerna PA-C   0.4 mg at 07/22/25 0406    lactated ringers BOLUS 250 mL  250 mL Intravenous Q15 Min PRN China Cerna PA-C   250 mL at 07/22/25 0243    [START ON 7/23/2025] magnesium hydroxide (MILK OF MAGNESIA) suspension 30 mL  30 mL Oral Daily PRN China Cerna PA-C        methocarbamol (ROBAXIN) tablet 500 mg  500 mg Oral Q6H PRN China Cerna PA-C        naloxone (NARCAN) injection 0.2 mg  0.2 mg Intravenous Q2 Min PRN Lina Bermudez MD        Or    naloxone (NARCAN) injection 0.4 mg  0.4 mg Intravenous Q2 Min PRN Lina Bermudez MD        Or    naloxone (NARCAN) injection 0.2 mg  0.2 mg Intramuscular Q2 Min PRN Lina Bermudez MD        Or    naloxone (NARCAN) injection 0.4 mg  0.4 mg Intramuscular Q2 Min PRN Lina Bermudez MD        niCARdipine 40 mg in 200 mL NS (CARDENE) infusion  0.5-15 mg/hr Intravenous Continuous PRN China Cerna PA-C   Stopped at 07/21/25 2024    No lozenges or gum should be given while patient on BIPAP/AVAPS/AVAPS AE   Does not apply Continuous PRN Jessica Narvaez MD        ondansetron (ZOFRAN ODT) ODT tab 4 mg  4 mg Oral Q6H PRN China Cerna PA-C        Or    ondansetron (ZOFRAN) injection 4 mg  4 mg Intravenous Q6H PRN  China Cerna PA-C   4 mg at 07/22/25 0634    oxyCODONE (ROXICODONE) tablet 5 mg  5 mg Oral Q4H PRN China Cerna PA-C        Or    oxyCODONE (ROXICODONE) tablet 10 mg  10 mg Oral Q4H PRN China Cerna PA-C   10 mg at 07/22/25 0238    Patient may continue current oral medications   Does not apply Continuous PRN Jessica Narvaez MD        phenylephrine (PRAVEENA-SYNEPHRINE) 50 mg in NaCl 0.9 % 250 mL infusion  0.1-4 mcg/kg/min Intravenous Continuous PRN China Cerna PA-C   Stopped at 07/22/25 0137    prochlorperazine (COMPAZINE) injection 10 mg  10 mg Intravenous Q6H PRN China Cerna PA-C        Or    prochlorperazine (COMPAZINE) tablet 10 mg  10 mg Oral Q6H PRN China Cerna PA-C           Cardiographics:    Telemetry monitoring demonstrates NSR (Paced) with rates in the 90 per my personal review.    Imaging:  Results for orders placed or performed during the hospital encounter of 07/15/25   XR Chest Port 1 View    Impression    IMPRESSION:     No acute cardiopulmonary process. Stable cardiomediastinal silhouette.   US Carotid Bilateral    Impression    IMPRESSION:  1.  Mild plaque formation, velocities consistent with less than 50% stenosis in the right internal carotid artery.  2.  Mild plaque formation, velocities consistent with less than 50% stenosis in the left internal carotid artery.  3.  Flow within the vertebral arteries is antegrade.   CT Chest w/o Contrast    Impression    IMPRESSION:   1.  Chronic bronchial wall thickening and mild bronchial dilatation.  2.  Faint hazy groundglass density opacity throughout both lungs could indicate minimal edema.  3.  Strands of fibrosis and/or linear atelectasis as well as scattered micronodular opacities in the peribronchial lung parenchyma in the mid and lower lungs have increased slightly since 2019.  4.  Mild enlargement central pulmonary arteries (MPA diameter 3.6 cm) could indicate some degree of chronic  pulmonary arterial hypertension.  5.  Moderate vessel coronary artery calcification.     XR Chest Port 1 View    Impression    IMPRESSION: Endotracheal tube tip is about 5.8 cm above the marco antonio. Right internal jugular Weatherford-Johnny catheter tip is probably in the proximal right pulmonary artery and is deflected back to the left. Consider withdrawal and advancement prior to obtaining   a wedge pressure.     There are mediastinal and left chest tubes. Low lung volumes. No consolidation. No pleural effusions or pneumothorax. Unchanged cardiac size. Median sternotomy. Status post CABG x4.   XR Chest Port 1 View    Impression    IMPRESSION: Interval median sternotomy and CABG. Right internal jugular Weatherford-Johnny catheter with tip overlying the right pulmonary artery. Mediastinal drain and left chest tube. No significant pneumothorax. Enlarged cardiomediastinal silhouette. No   pulmonary vascular congestion. Streaky perihilar and left lower lung opacities, favoring atelectasis. No significant pleural fluid.       Labs, personally reviewed.  Hemoglobin   Date Value Ref Range Status   07/22/2025 9.5 (L) 13.3 - 17.7 g/dL Final   07/21/2025 11.6 (L) 13.3 - 17.7 g/dL Final   07/21/2025 10.0 (L) 13.3 - 17.7 g/dL Final     Hemoglobin POCT   Date Value Ref Range Status   07/21/2025 11.2 (L) 13.3 - 17.7 g/dL Final   07/21/2025 11.8 (L) 13.3 - 17.7 g/dL Final   07/21/2025 10.5 (L) 13.3 - 17.7 g/dL Final     WBC Count   Date Value Ref Range Status   07/22/2025 12.1 (H) 4.0 - 11.0 10e3/uL Final   07/21/2025 25.7 (H) 4.0 - 11.0 10e3/uL Final   07/21/2025 20.6 (H) 4.0 - 11.0 10e3/uL Final     Platelet Count   Date Value Ref Range Status   07/22/2025 208 150 - 450 10e3/uL Final   07/21/2025 281 150 - 450 10e3/uL Final   07/21/2025 205 150 - 450 10e3/uL Final     Creatinine   Date Value Ref Range Status   07/22/2025 1.97 (H) 0.67 - 1.17 mg/dL Final   07/21/2025 1.78 (H) 0.67 - 1.17 mg/dL Final   07/21/2025 1.76 (H) 0.67 - 1.17 mg/dL Final      Potassium   Date Value Ref Range Status   07/22/2025 4.4 3.4 - 5.3 mmol/L Final   07/21/2025 4.3 3.4 - 5.3 mmol/L Final     Potassium POCT   Date Value Ref Range Status   07/21/2025 4.0 3.4 - 5.3 mmol/L Final   07/21/2025 4.0 3.4 - 5.3 mmol/L Final   07/21/2025 4.4 3.4 - 5.3 mmol/L Final     Magnesium   Date Value Ref Range Status   07/22/2025 2.2 1.7 - 2.3 mg/dL Final   07/21/2025 2.6 (H) 1.7 - 2.3 mg/dL Final   07/16/2025 1.6 (L) 1.7 - 2.3 mg/dL Final          I/O:  I/O last 3 completed shifts:  In: 4892.48 [P.O.:1220; I.V.:2217.48; Other:460; IV Piggyback:500]  Out: 2840 [Urine:1660; Blood:700; Chest Tube:480]       Physical Exam:    General: Patient seen up in chair conversant/pleasant  HEENT: MAN, no sclera icterus, moist mucosa, nasal cannula in place  CV: RRR on monitor. 2+ peripheral pulses in all extremities. Mild edema.   Pulm: Non-labored effort on nasal cannula. Chest tubes in place, no air leak. Incision C/D/I.  Abd: Soft, NT, ND  : Gutierrez with lani urine  Ext: Mild pedal edema, SCDs in place, warm, distal pulses intact  Neuro: A&Ox3, KNIGHT, and CN grossly intact      ASSESSMENT/PLAN:    Mayito Sood is a 64 year old male with a history of history of CKD, kidney stones, HTN, DM, and obesity who presented with an NSTEMI  who is s/p CABG x4  .    Active Problems:    Status post coronary angiogram    ACS (acute coronary syndrome) (H)    ST elevation myocardial infarction (STEMI), unspecified artery (H)    Coronary artery disease involving native coronary artery of native heart, unspecified whether angina present        NEURO:   - Scheduled Tylenol/lidocaine patches and PRN Tylenol/oxycodone/dilaudid for pain    CV:   - Pre-op EF 55%  - Normotensive  - Patient weaned from Epi   - Metoprolol tartrate 12.5 mg BID  -  mg  - Pravastatin 10mg daily  - Chest tubes/TPW to remain today      PULM:   - Extubated POD#1  - Maintaining oxygen saturations on nasal cannula - wean as able  - Encourage  pulmonary toilet  - Needs outpatient sleep study     FEN/GI:  - Diet: Clears, ADAT to Cardiac  - Continue electrolyte replacement protocol  - Bowel regimen, LBM preop    RENAL:  - adequate UOP/hr. Continue to monitor closely.  - Cr 1.9 (baseline ~ 1.7-2.0)  - Gutierrez to remain in for close monitoring of I/O and during period of diuresis/relative immobility - plan for removal POD2  - Diuresis with Lasix 40 BID     HEME:  - Acute blood loss anemia post-op.   - Hgb 9.5, no bleeding concerns. Hep SQ, ASA    ID:  - Jayde op ppx complete, febrile. No concerns for infection    ENDO:   - HbA1c 9 %  - BG goal < 180 to promote optimal healing  - PTA on insulin, but not on due to inventory  - Transition to sliding scale insulin    PPx:   - DVT: SCDs, SQ heparin TID, ambulation   - GI: Protonix 40mg PO daily    DISPO:   - Continue critical care in ICU  - PT/OT recs at discharge: home w/ outpt cardiac rehab  - Medically Ready for Discharge: Anticipated in 2-4 Days        Patient discussed with Dr. Bermudez.        Nida Atkinson, CNP  [facility]   Cardiothoracic Surgery  Secure chat or Vocera  Office: [phone]

## 2025-07-22 NOTE — PROGRESS NOTES
[facility]    Medicine Progress Note - Hospitalist Service    Date of Admission:  7/15/2025    Assessment & Plan     Mayito Sood is a 64 year old male with a past medical history of Type 2 Diabetes presented to the emergency room with chest pain and syncope, found to have multivessel coronary artery disease.  Awaiting on CABG      7/21 :     Plan for CABG today  Continue cardiac meds  CT surgery following      Not medically ready for discharge at this time.        A/p :       NSTEMI  Multivessel CAD  - Chest pain started 7/15, had episode of syncope at work  - Patient with troponin 67 -> 97  - EKG with ST Depression. Cardiology not calling STEMI  - Cardiology consult, appreciate input  -Coronary angiogram shows multivessel disease  - CT Surgery consulted; CABG Monday  - Echo with EF 55% mildly hypokinetic distal anterior segment no significant valvular heart disease  - s/p aspirin 325mg x1 and continue 81mg   - Continue atorvastatin 80mg daily   - Metoprolol 25mg BID  - Given ticagrelor x1, not continued -   ---hold off on ACE  ---Chest CT with faint hazy groundglass opacities, possible edema.    ---Ultrasound carotids less than 50% stenosis bilaterally  --Continue cardiac monitor  --on heparin gtt     Acute Hypoxic Respiratory Failure  - Secondary to above, resolved  --Patient not on home oxygen, requiring up to 10L NC after [facility].  -  fluid overload with NSTEMI -sedation from [facility].   ---CT does show some groundglass opacities  - CXR on 7/15 showing no acute cardiopulmonary process.  --Did get Lasix 7/16.  Further diuresis per cardiology     Acute kidney injury   - Baseline CKD III   CKD Stage 4 Considered and Ruled Out   ---Cr baseline appears to be around 1.75-2.0  --cotninue to hold cozaar   ---did get diuresis earlier- now done  ---holding jardiance  -Continue to monitor renal function      Hyperkalemia  - resolved  --Potassium 5.2 on ED presentation, then 5.8 with AM labs 7/16  --s/p   insulin  lokelma x1   - recheck in am      Hyponatremia  ---resolved     Hypertension  --bp running high  --- increase amlodipine  ---hold cozaar   ---continue metoprolol with hold parameters for bradycardia  --- consider diuretic     Type 2 Diabetes with hyperglycemia  -Poorly controlled at baseline  --Last A1c was 9.1% on 7/15/25  - PTA Meds:  empagliflozin, no insulin for many months per patient , no victoza for a month  ---continue Lantus 25 units nightly   ---hold jardiance  - Sliding Scale Insulin with Glucose Checks     BPH  --continue home flomax     hyperlipidemia  ---was on pravastatin  ---now on atorvastatin     Hypophosphatemia,   --resolved with replacement     Hypocalcemia-  -continue to monitor     Severe Obesity, BMI 37.59  - encourage weight loss.        Diet: Advance Diet as Tolerated: Clear Liquid Diet    DVT Prophylaxis: Heparin GTT  Gutierrez Catheter: PRESENT, indication: ICU only: hourly urine output needed for patient care  Lines: PRESENT      CVC Double Lumen Right Internal jugular-Site Assessment: WDL  Arterial Line 07/21/25 Radial-Site Assessment: Unable to visualize    Cardiac Monitoring: ACTIVE order. Indication: Open heart surgery (72 hours)  Code Status: Full Code      Clinically Significant Risk Factors        # Hyperkalemia: Highest K = 5.4 mmol/L in last 2 days, will monitor as appropriate  # Hypernatremia: Highest Na = 147 mmol/L in last 2 days, will monitor as appropriate  # Hyperchloremia: Highest Cl = 113 mmol/L in last 2 days, will monitor as appropriate      # Hypocalcemia: Lowest Ca = 8 mg/dL in last 2 days, will monitor and replace as appropriate     # Hypoalbuminemia: Lowest albumin = 3.2 g/dL at 7/21/2025  4:06 PM, will monitor as appropriate  # Coagulation Defect: INR = 1.26 (Ref range: 0.85 - 1.15) and/or PTT = 38 Seconds (Ref range: 22 - 38 Seconds), will monitor for bleeding    # Hypertension: Noted on problem list           # DMII: A1C = 9.1 % (Ref range: <5.7 %) within  "past 6 months # Severe Obesity: Estimated body mass index is 36.64 kg/m  as calculated from the following:    Height as of this encounter: 1.854 m (6' 1\").    Weight as of this encounter: 126 kg (277 lb 11.2 oz). with complications             Social Drivers of Health    Depression: At risk (5/23/2025)    Received from [facility]    PHQ-2     PHQ-2 TOTAL SCORE: 6          Disposition Plan     Medically Ready for Discharge: Anticipated in 5+ Days             Landon Lopez MD  Hospitalist Service  [facility]  Securely message with [employer] (more info)  Text page via [employer] Paging/Directory   ______________________________________________________________________        Physical Exam   Vital Signs: Temp: 100.6  F (38.1  C) Temp src: Pulmonary Artery BP: (!) 149/63 Pulse: 89   Resp: 16 SpO2: 95 % O2 Device: Mechanical Ventilator    Weight: 277 lbs 11.2 oz    General Appearance: Pleasant male no apparent distress  Respiratory: Clear to auscultation bilaterally  Cardiovascular: The rate and rhythm without murmurs rubs or gallops    Medical Decision Making             Data     I have personally reviewed the following data over the past 24 hrs:    25.7 (H)  \   11.2 (L)   / 281     146 (H) 113 (H) 39.0 (H) /  137 (H)   4.0 17 (L) 1.78 (H) \     ALT: 41 AST: 59 (H) AP: 209 (H) TBILI: 0.3   ALB: 3.2 (L) TOT PROTEIN: 6.1 (L) LIPASE: N/A     Procal: N/A CRP: N/A Lactic Acid: 3.0 (H)       INR:  1.26 (H) PTT:  38   D-dimer:  N/A Fibrinogen:  384       Imaging results reviewed over the past 24 hrs:   Recent Results (from the past 24 hours)   Echo LEONARDO - OR *Canceled*    Narrative    The following orders were created for panel order Echo LEONARDO - OR.  Procedure                               Abnormality         Status                     ---------                               -----------         ------                       Please view results for these tests on the individual " "orders.   Echo LEONARDO - OR *Canceled*    Narrative    The following orders were created for panel order Echo LEONARDO - OR.  Procedure                               Abnormality         Status                     ---------                               -----------         ------                       Please view results for these tests on the individual orders.   POC US Guided Vascular Access    Narrative    Ultrasound was performed as guidance to an anesthesia procedure.  Click   \"PACS images\" hyperlink below to view any stored images.  For specific   procedure details, view procedure note authored by anesthesia.   LEONARDO with Report    Narrative    Vaughn Vela MD     7/21/2025  5:28 PM  Perioperative LEONARDO Procedure Note    Staff -        Performed By: anesthesiologist  Preanesthesia Checklist:  Patient identified, IV assessed, risks and   benefits discussed, monitors and equipment assessed, procedure being   performed at surgeon's request and anesthesia consent obtained.    LEONARDO Probe Insertion    Probe Status PRE Insertion: NO obvious damage  Probe type:  Adult 3D  Bite block used:   Soft  Insertion Technique: Jaw Lift  Insertion complications: None obvious  Billing Report:LEONARDO report by Anesthesiologist (See Separate Report note)  Probe Status POST Removal: NO obvious damage        Post Intervention Findings  Procedure(s) performed:  CABG.  Regional wall motion:. Surgeon(s) notified   of all postintervention findings: Yes.             Post Intervention comments: Post cabg: Bi-ventricular systolic function   unchanged on 0.03 epi. Valves unchanged. No aortic dissection appreciated   s/p aortic decannulation. Surgeon notified of all relevant findings. .    Echocardiogram Comments  Echocardiogram comments: PreCPB: LVEF 55%, no significant regional wall   motion abnormalities. Globally normal RV systolic function. AV tricuspid,   no aortic insufficiency. Trace MR. Trace TR. No PFO by CFD. No Ao   Dissection or mobile " atheroma identified. Surgeons notified of all   relevant findings. .   XR Chest Port 1 View    Narrative    EXAM: XR CHEST PORT 1 VIEW  LOCATION: [facility]  DATE: 7/21/2025    INDICATION: Post Op CVTS Surgery  COMPARISON: 7/15/2025      Impression    IMPRESSION: Endotracheal tube tip is about 5.8 cm above the marco antonio. Right internal jugular [place]ny catheter tip is probably in the proximal right pulmonary artery and is deflected back to the left. Consider withdrawal and advancement prior to obtaining   a wedge pressure.     There are mediastinal and left chest tubes. Low lung volumes. No consolidation. No pleural effusions or pneumothorax. Unchanged cardiac size. Median sternotomy. Status post CABG x4.

## 2025-07-22 NOTE — PLAN OF CARE
Goal Outcome Evaluation:           CT EXTUBATION FAST TRACK:    [facility] - ICU     FastTrack Candidate: Yes, Extubation Goal Time ( 6 Hours )       Patient Status: Extubated     Pt was extubated without complications at 2045.

## 2025-07-22 NOTE — PROGRESS NOTES
"Care Management Follow Up    Length of Stay (days): 6    Expected Discharge Date: 07/25/2025    Anticipated Discharge Plan:   Home with OP CR.     Transportation: Anticipate Family/friend    PT Recommendations:    OT Recommendations:  home with outpatient cardiac rehab, home with assist     Barriers to Discharge: medical stability/ CT Surgery following. POD #1 CABGx4.       Prior Living Situation:   \"SO Iraida at bedside. Patient reports he lives in his house with Iraida. He is independent with ADLs/IADLs, ambulates without devices mostly but occasionally will use a cane and has no services in community. Iraida is primary family contact and willing to transport at discharge.     Patient endorses having support at home should he discharge home.     He is working on completing his HCD.  Requesting notary once completed. \"      Discussed  Partnership in Safe Discharge Planning  document with patient/family: No     Handoff Completed: No, handoff not indicated or clinically appropriate    Patient/Spokesperson Updated: Yes. Who? Pt     Additional Information:  OT recs home with assist and OP CR. Pt agreeable to this plan.       Next Steps: Follow up with pt to see where he is at with completing HCD, see if notary avail this week? CM will continue to monitor progression of care, review team recommendations and provide discharge planning assist as needed.       Belinda Awan RN  Acute Care Management  [facility]  For further questions/concerns you can reach us at Vocera Web Console    "

## 2025-07-22 NOTE — PLAN OF CARE
Goal Outcome Evaluation:      Plan of Care Reviewed With: patient    Overall Patient Progress: improvingOverall Patient Progress: improving    Outcome Evaluation: Post-op day 1 CAB, up to chair this am.      [facility] - ICU    RN Progress Note: [id number]          Key Events - This Shift:       Needing Bipap overnight to maintain sats, would desat to 80s when off. Complaining of dry mouth with Bipap but does like having it, rested easily.  Phenylephrine off at 0137.  Albumin given around 0430 for soft pressures and decreasing CVP.   Up to chair at 0630. While getting patient to edge of bed, felt nauseous and vomited, did vagal down and pass out. Laid down on side, responded quickly but did not recall episode. Pressures recovered to 120/70s, so assisted up to chair.      RN Managed Protocols Ordered:  Yes  Protocols:Potassium, Magnesium, and Phosphorus  PRN'S:iCal  Protocols Status: Endorsed to Oncoming RN Calcium replaced, recheck around 0900. All others rechecked in AM.                Barriers to Discharge / Downgrade:     Art line and swan in place.

## 2025-07-22 NOTE — PROGRESS NOTES
07/22/25 1000   Appointment Info   Signing Clinician's Name / Credentials (OT) Tamar Yo SAMINA OTR/L CLT   Living Environment   People in Home significant other   Current Living Arrangements house   Home Accessibility stairs to enter home   Number of Stairs, Main Entrance 3   Transportation Anticipated family or friend will provide   Self-Care   Usual Activity Tolerance good   Current Activity Tolerance moderate   Regular Exercise No   Equipment Currently Used at Home none   Fall history within last six months no   Activity/Exercise/Self-Care Comment Ind. w/ ADL routine   Instrumental Activities of Daily Living (IADL)   IADL Comments Ind. w/ IADL tasks   General Information   Onset of Illness/Injury or Date of Surgery 07/15/25   Referring Physician    Patient/Family Therapy Goal Statement (OT) d/c home   Additional Occupational Profile Info/Pertinent History of Current Problem CABG   Existing Precautions/Restrictions cardiac;sternal   Cognitive Status Examination   Orientation Status orientation to person, place and time   Affect/Mental Status (Cognitive) WNL   Follows Commands WNL   Range of Motion Comprehensive   Comment, General Range of Motion NT d/t sternal prec.   Transfers   Transfers sit-stand transfer   Sit-Stand Transfer   Sit-Stand [place] (Transfers) contact guard   Balance   Balance Assessment standing static balance   Standing Balance: Static minimal assist;contact guard   Activities of Daily Living   BADL Assessment/Intervention   (Anticipate will need education for LB drsg post CABG)   Clinical Impression   Criteria for Skilled Therapeutic Interventions Met (OT) Yes, treatment indicated   OT Diagnosis decreased act. tolerance   Influenced by the following impairments s/p CABG   OT Problem List-Impairments impacting ADL problems related to;activity tolerance impaired;mobility;strength;post-surgical precautions   Assessment of Occupational Performance 3-5 Performance Deficits    Identified Performance Deficits trnf safety, act. tolerance/endurance, cardiovascular response to exercise, ADL ind   Planned Therapy Interventions (OT) ADL retraining;balance training;strengthening;transfer training;home program guidelines;progressive activity/exercise   Clinical Decision Making Complexity (OT) detailed assessment/moderate complexity   Risk & Benefits of therapy have been explained evaluation/treatment results reviewed;risks/benefits reviewed;patient   OT Total Evaluation Time   OT Eval, Moderate Complexity Minutes (98280) 8   OT Goals   Therapy Frequency (OT) 2 times/day   OT Goals Cardiac Phase 1   OT: Understanding of cardiac education to maximize quality of life, condition management, and health outcomes Patient;Demonstrate;Verbalize   OT: Perform aerobic activity with stable cardiovascular response continuous;15 minutes;ambulation   OT: Functional/aerobic ambulation tolerance with stable cardiovascular response in order to return to home and community environment Modified independent;Greater than 300 feet   OT: Navigation of stairs simulating home set up with stable cardiovascular response in order to return to home and community environment Modified independent;Greater than 10 stairs   Interventions   Interventions Quick Adds Self-Care/Home Management;Therapeutic Procedures/Exercise;Cardiac Rehab   Self-Care/Home Management   Self-Care/Home Mgmt/ADL, Compensatory, Meal Prep Minutes (18407) 8   Treatment Detail/Skilled Intervention Chair trsf- CGA with step by step cues, review of sternal precautions   Therapeutic Procedures/Exercise   Therapeutic Procedure: strength, endurance, ROM, flexibillity minutes (31499) 15   Treatment Detail/Skilled Intervention seated LE exercise  3 sets x 15   Ambulation   Workload 10   Symptoms Dizziness;Diaphoresis   Cardiovascular Response Normal   Exercise Details CGA with 4ww and cues for posture, eyes to look forward and PLB   Vital Signs Details upon return  to room , ? with orthostatics are a factor   Cardiac Education   Education Provided Precautions   OT Discharge Planning   OT Plan amb, ed, ed   OT Discharge Recommendation (DC Rec) home with outpatient cardiac rehab;home with assist   OT Rationale for DC Rec Anticipate patient will progress to home at d/c   OT Brief overview of current status CGA   OT Total Distance Amb During Session (feet) 10   Total Session Time   Timed Code Treatment Minutes 23   Total Session Time (sum of timed and untimed services) 31

## 2025-07-22 NOTE — PROGRESS NOTES
Patient extubated at 2045. Weaned on 8/5 40% for 87 minutes with adequate RR and Vt. Suctioned inline via ETT, orally, and subglottically prior to pulling ETT. Placed on 5 L Oxymask to maintain 92% SpO2. Pt is awake and alert, following all commands. BS are diminished crackles. No stridor present. Pt reports no SOB or WOB. Emergency equipment at bedside. Ventilator at bedside. RT will continue to monitor closely      VICKY TAYA RRT

## 2025-07-23 ENCOUNTER — APPOINTMENT (OUTPATIENT)
Dept: OCCUPATIONAL THERAPY | Facility: HOSPITAL | Age: 65
End: 2025-07-23
Payer: COMMERCIAL

## 2025-07-23 LAB
ANION GAP SERPL CALCULATED.3IONS-SCNC: 14 MMOL/L (ref 7–15)
BUN SERPL-MCNC: 43.1 MG/DL (ref 8–23)
CA-I BLD-MCNC: 4.5 MG/DL (ref 4.4–5.2)
CALCIUM SERPL-MCNC: 8.1 MG/DL (ref 8.8–10.4)
CHLORIDE SERPL-SCNC: 103 MMOL/L (ref 98–107)
CREAT SERPL-MCNC: 2.74 MG/DL (ref 0.67–1.17)
EGFRCR SERPLBLD CKD-EPI 2021: 25 ML/MIN/1.73M2
ERYTHROCYTE [DISTWIDTH] IN BLOOD BY AUTOMATED COUNT: 13.6 % (ref 10–15)
GLUCOSE BLDC GLUCOMTR-MCNC: 158 MG/DL (ref 70–99)
GLUCOSE BLDC GLUCOMTR-MCNC: 162 MG/DL (ref 70–99)
GLUCOSE BLDC GLUCOMTR-MCNC: 175 MG/DL (ref 70–99)
GLUCOSE SERPL-MCNC: 162 MG/DL (ref 70–99)
HCO3 SERPL-SCNC: 22 MMOL/L (ref 22–29)
HCT VFR BLD AUTO: 27.6 % (ref 40–53)
HGB BLD-MCNC: 8.7 G/DL (ref 13.3–17.7)
MAGNESIUM SERPL-MCNC: 2.1 MG/DL (ref 1.7–2.3)
MCH RBC QN AUTO: 30.3 PG (ref 26.5–33)
MCHC RBC AUTO-ENTMCNC: 31.5 G/DL (ref 31.5–36.5)
MCV RBC AUTO: 96 FL (ref 78–100)
PHOSPHATE SERPL-MCNC: 5.1 MG/DL (ref 2.5–4.5)
PLATELET # BLD AUTO: 171 10E3/UL (ref 150–450)
POTASSIUM SERPL-SCNC: 4.3 MMOL/L (ref 3.4–5.3)
RBC # BLD AUTO: 2.87 10E6/UL (ref 4.4–5.9)
SODIUM SERPL-SCNC: 139 MMOL/L (ref 135–145)
WBC # BLD AUTO: 13.3 10E3/UL (ref 4–11)

## 2025-07-23 PROCEDURE — 99254 IP/OBS CNSLTJ NEW/EST MOD 60: CPT | Performed by: NURSE PRACTITIONER

## 2025-07-23 PROCEDURE — 94660 CPAP INITIATION&MGMT: CPT

## 2025-07-23 PROCEDURE — 83735 ASSAY OF MAGNESIUM: CPT | Performed by: THORACIC SURGERY (CARDIOTHORACIC VASCULAR SURGERY)

## 2025-07-23 PROCEDURE — 99254 IP/OBS CNSLTJ NEW/EST MOD 60: CPT | Mod: FS | Performed by: INTERNAL MEDICINE

## 2025-07-23 PROCEDURE — 99233 SBSQ HOSP IP/OBS HIGH 50: CPT | Performed by: INTERNAL MEDICINE

## 2025-07-23 PROCEDURE — 94799 UNLISTED PULMONARY SVC/PX: CPT

## 2025-07-23 PROCEDURE — 82330 ASSAY OF CALCIUM: CPT | Performed by: THORACIC SURGERY (CARDIOTHORACIC VASCULAR SURGERY)

## 2025-07-23 PROCEDURE — 999N000157 HC STATISTIC RCP TIME EA 10 MIN

## 2025-07-23 PROCEDURE — 80051 ELECTROLYTE PANEL: CPT | Performed by: FAMILY MEDICINE

## 2025-07-23 PROCEDURE — 84100 ASSAY OF PHOSPHORUS: CPT | Performed by: THORACIC SURGERY (CARDIOTHORACIC VASCULAR SURGERY)

## 2025-07-23 PROCEDURE — 36415 COLL VENOUS BLD VENIPUNCTURE: CPT | Performed by: FAMILY MEDICINE

## 2025-07-23 PROCEDURE — 200N000001 HC R&B ICU

## 2025-07-23 PROCEDURE — 250N000011 HC RX IP 250 OP 636

## 2025-07-23 PROCEDURE — 250N000013 HC RX MED GY IP 250 OP 250 PS 637

## 2025-07-23 PROCEDURE — 94761 N-INVAS EAR/PLS OXIMETRY MLT: CPT

## 2025-07-23 PROCEDURE — 97535 SELF CARE MNGMENT TRAINING: CPT | Mod: GO

## 2025-07-23 PROCEDURE — 85027 COMPLETE CBC AUTOMATED: CPT | Performed by: FAMILY MEDICINE

## 2025-07-23 RX ADMIN — PROCHLORPERAZINE EDISYLATE 10 MG: 5 INJECTION INTRAMUSCULAR; INTRAVENOUS at 06:03

## 2025-07-23 RX ADMIN — PRAVASTATIN SODIUM 10 MG: 10 TABLET ORAL at 21:52

## 2025-07-23 RX ADMIN — ONDANSETRON 4 MG: 2 INJECTION, SOLUTION INTRAMUSCULAR; INTRAVENOUS at 05:49

## 2025-07-23 RX ADMIN — ACETAMINOPHEN 975 MG: 325 TABLET, FILM COATED ORAL at 21:52

## 2025-07-23 RX ADMIN — INSULIN ASPART 1 UNITS: 100 INJECTION, SOLUTION INTRAVENOUS; SUBCUTANEOUS at 17:07

## 2025-07-23 RX ADMIN — LIDOCAINE 1 PATCH: 246 PATCH TOPICAL at 17:04

## 2025-07-23 RX ADMIN — HEPARIN SODIUM 5000 UNITS: 5000 INJECTION, SOLUTION INTRAVENOUS; SUBCUTANEOUS at 21:52

## 2025-07-23 RX ADMIN — ACETAMINOPHEN 975 MG: 325 TABLET, FILM COATED ORAL at 05:48

## 2025-07-23 RX ADMIN — OXYCODONE HYDROCHLORIDE 10 MG: 5 TABLET ORAL at 03:04

## 2025-07-23 RX ADMIN — METHOCARBAMOL 500 MG: 500 TABLET ORAL at 07:38

## 2025-07-23 RX ADMIN — HYDROMORPHONE HYDROCHLORIDE 0.2 MG: 0.2 INJECTION, SOLUTION INTRAMUSCULAR; INTRAVENOUS; SUBCUTANEOUS at 05:48

## 2025-07-23 RX ADMIN — ESCITALOPRAM 20 MG: 20 TABLET, FILM COATED ORAL at 21:52

## 2025-07-23 RX ADMIN — PROCHLORPERAZINE EDISYLATE 10 MG: 5 INJECTION INTRAMUSCULAR; INTRAVENOUS at 19:42

## 2025-07-23 RX ADMIN — TAMSULOSIN HYDROCHLORIDE 0.4 MG: 0.4 CAPSULE ORAL at 08:49

## 2025-07-23 RX ADMIN — HEPARIN SODIUM 5000 UNITS: 5000 INJECTION, SOLUTION INTRAVENOUS; SUBCUTANEOUS at 13:15

## 2025-07-23 RX ADMIN — ASPIRIN 162 MG: 81 TABLET, COATED ORAL at 08:49

## 2025-07-23 RX ADMIN — METHOCARBAMOL 500 MG: 500 TABLET ORAL at 19:18

## 2025-07-23 RX ADMIN — METHOCARBAMOL 500 MG: 500 TABLET ORAL at 13:14

## 2025-07-23 RX ADMIN — INSULIN ASPART 1 UNITS: 100 INJECTION, SOLUTION INTRAVENOUS; SUBCUTANEOUS at 08:49

## 2025-07-23 RX ADMIN — PANTOPRAZOLE SODIUM 40 MG: 40 TABLET, DELAYED RELEASE ORAL at 08:50

## 2025-07-23 RX ADMIN — SENNOSIDES, DOCUSATE SODIUM 1 TABLET: 50; 8.6 TABLET, FILM COATED ORAL at 21:52

## 2025-07-23 RX ADMIN — INSULIN ASPART 1 UNITS: 100 INJECTION, SOLUTION INTRAVENOUS; SUBCUTANEOUS at 13:15

## 2025-07-23 RX ADMIN — ACETAMINOPHEN 975 MG: 325 TABLET, FILM COATED ORAL at 13:14

## 2025-07-23 RX ADMIN — HEPARIN SODIUM 5000 UNITS: 5000 INJECTION, SOLUTION INTRAVENOUS; SUBCUTANEOUS at 05:49

## 2025-07-23 ASSESSMENT — ACTIVITIES OF DAILY LIVING (ADL)
ADLS_ACUITY_SCORE: 46
ADLS_ACUITY_SCORE: 51
ADLS_ACUITY_SCORE: 43
ADLS_ACUITY_SCORE: 46
ADLS_ACUITY_SCORE: 46
ADLS_ACUITY_SCORE: 51
ADLS_ACUITY_SCORE: 43
ADLS_ACUITY_SCORE: 46
ADLS_ACUITY_SCORE: 47
ADLS_ACUITY_SCORE: 46
ADLS_ACUITY_SCORE: 46
ADLS_ACUITY_SCORE: 43
ADLS_ACUITY_SCORE: 46
ADLS_ACUITY_SCORE: 46
ADLS_ACUITY_SCORE: 47
ADLS_ACUITY_SCORE: 46
ADLS_ACUITY_SCORE: 43

## 2025-07-23 NOTE — CONSULTS
CLINICAL NUTRITION SERVICES NOTE    Received consult to provide S/p CV surgery diet education.  Pt in ICU, POD # 2 CABG x 4.  Pt had event during ICU Rounds this am (syncopal/bradycardia).  Diet: Low Saturated Fat Na < 2400 mg, pt is drinking some fluids.  Prior to surgery, pt eating 75 -100% of meals.  Will plan to provide nutrition education when pt feeling a little better.

## 2025-07-23 NOTE — PROGRESS NOTES
CVTS Daily Progress Note   POD#2 s/p CABG x4 EF 55%   Attending: Lara  LOS: 7    SUBJECTIVE/INTERVAL EVENTS:    Patient with near syncopal/bradycardic event this morning when up to the bathroom after pacer disconnected. Pacer reconnected and patient recovered. Apparently happened POD#1 as well. Otherwise, patient progressing overall well. NSR under pacer. Normotensive. Maintaining oxygen saturations on nasal cannula. Ambulating with therapy to chair. Pain well controlled. - BM / flatus. Tolerating diet without nausea. UOP borderline adequate. Chest tube output appropriate. Hgb 8.7.  Cr 2.74 (1.97). Patient denies new chest pain, shortness of breath, abdominal pain, calf pain, nausea. Patient has no questions today.     OBJECTIVE:  Temp:  [98.2  F (36.8  C)-98.8  F (37.1  C)] 98.2  F (36.8  C)  Pulse:  [72-90] 89  Resp:  [16] 16  BP: ()/(43-69) 116/57  FiO2 (%):  [35 %] 35 %  SpO2:  [89 %-98 %] 98 %  Vitals:    07/19/25 0342 07/20/25 0457 07/21/25 0018 07/21/25 0700   Weight: 127.1 kg (280 lb 3.2 oz) 126.6 kg (279 lb 3.2 oz) 126.6 kg (279 lb 1.6 oz) 126 kg (277 lb 11.2 oz)    07/23/25 0607   Weight: 133.2 kg (293 lb 11.2 oz)       Clinically Significant Risk Factors        # Hyperkalemia: Highest K = 5.4 mmol/L in last 2 days, will monitor as appropriate  # Hypernatremia: Highest Na = 147 mmol/L in last 2 days, will monitor as appropriate  # Hyperchloremia: Highest Cl = 113 mmol/L in last 2 days, will monitor as appropriate      # Hypocalcemia: Lowest Ca = 7.8 mg/dL in last 2 days, will monitor and replace as appropriate     # Hypoalbuminemia: Lowest albumin = 3.2 g/dL at 7/21/2025  4:06 PM, will monitor as appropriate    # Coagulation Defect: INR = 1.26 (Ref range: 0.85 - 1.15) and/or PTT = 38 Seconds (Ref range: 22 - 38 Seconds), will monitor for bleeding   # Acute Kidney Injury, unspecified: based on a >150% or 0.3 mg/dL increase in last creatinine compared to past 90 day average, will monitor renal  "function  # Hypertension: Noted on problem list           # DMII: A1C = 9.1 % (Ref range: <5.7 %) within past 6 months # Severe Obesity: Estimated body mass index is 38.75 kg/m  as calculated from the following:    Height as of this encounter: 1.854 m (6' 1\").    Weight as of this encounter: 133.2 kg (293 lb 11.2 oz). with complications       # History of CABG: noted on surgical history               Current Medications:    Scheduled Meds:  Current Facility-Administered Medications   Medication Dose Route Frequency Provider Last Rate Last Admin    acetaminophen (TYLENOL) tablet 975 mg  975 mg Oral Q8H China Cerna PA-C   975 mg at 07/23/25 0548    aspirin (ASA) chewable tablet 162 mg  162 mg Oral or NG Tube Daily China Cerna PA-C   162 mg at 07/23/25 0849    Or    aspirin (ASA) Suppository 300 mg  300 mg Rectal Daily China Cerna PA-C        [Held by provider] empagliflozin (JARDIANCE) tablet 10 mg  10 mg Oral Daily China Cerna PA-C        escitalopram (LEXAPRO) tablet 20 mg  20 mg Oral QPM China Cenra PA-C   20 mg at 07/22/25 2122    [Held by provider] furosemide (LASIX) injection 40 mg  40 mg Intravenous BID Nida Atkinson APRN CNP   40 mg at 07/22/25 2122    heparin ANTICOAGULANT injection 5,000 Units  5,000 Units Subcutaneous Q8H China Cerna PA-C   5,000 Units at 07/23/25 0549    insulin aspart (NovoLOG) injection (RAPID ACTING)  1-7 Units Subcutaneous TID AC Nida Atkinson APRN CNP   1 Units at 07/23/25 0849    insulin aspart (NovoLOG) injection (RAPID ACTING)  1-5 Units Subcutaneous At Bedtime Nida Atkinson APRN CNP        Lidocaine (LIDOCARE) 4 % Patch 1-2 patch  1-2 patch Transdermal Q24H China Cerna PA-C   1 patch at 07/22/25 1645    [Held by provider] losartan (COZAAR) tablet 25 mg  25 mg Oral Daily China Cerna, ASHLEY        metoprolol tartrate (LOPRESSOR) half-tab 12.5 mg  12.5 mg Oral BID Nida Atkinson, APRN CNP  "  12.5 mg at 07/22/25 2130    pantoprazole (PROTONIX) 2 mg/mL suspension 40 mg  40 mg Oral or NG Tube Daily China Cerna PA-C        Or    pantoprazole (PROTONIX) EC tablet 40 mg  40 mg Oral Daily China Cerna PA-C   40 mg at 07/23/25 0850    polyethylene glycol (MIRALAX) Packet 17 g  17 g Oral Daily China Cerna PA-C   17 g at 07/22/25 0849    pravastatin (PRAVACHOL) tablet 10 mg  10 mg Oral QPM China Cerna PA-C   10 mg at 07/22/25 2123    senna-docusate (SENOKOT-S/PERICOLACE) 8.6-50 MG per tablet 1 tablet  1 tablet Oral BID China Cerna PA-C   1 tablet at 07/22/25 2123    tamsulosin (FLOMAX) capsule 0.4 mg  0.4 mg Oral Daily China Cerna PA-C   0.4 mg at 07/23/25 0849     Continuous Infusions:  Current Facility-Administered Medications   Medication Dose Route Frequency Provider Last Rate Last Admin    No lozenges or gum should be given while patient on BIPAP/AVAPS/AVAPS AE   Does not apply Continuous PRJessica Orellana MD        Patient may continue current oral medications   Does not apply Continuous PRJessica Orellana MD         PRN Meds:.  Current Facility-Administered Medications   Medication Dose Route Frequency Provider Last Rate Last Admin    [START ON 7/24/2025] bisacodyl (DULCOLAX) suppository 10 mg  10 mg Rectal Daily PRN China Cerna PA-C        calcium gluconate 1 g in 50 mL in sodium chloride intermittent infusion  1 g Intravenous Once PRN China Cerna PA-C   1 g at 07/22/25 2148    calcium gluconate 2 g in  mL intermittent infusion  2 g Intravenous Once PRN China Cerna PA-C        calcium gluconate 3 g in sodium chloride 0.9 % 100 mL intermittent infusion  3 g Intravenous Once PRN China Cerna PA-C        glucose gel 15-30 g  15-30 g Oral Q15 Min PRN Nida Atkinson, APRN CNP        Or    dextrose 50 % injection 25-50 mL  25-50 mL Intravenous Q15 Min PRN Nida Atkinson, MATTHEW CNP         Or    glucagon injection 1 mg  1 mg Subcutaneous Q15 Min PRN Nida Atkinson APRN CNP        hydrALAZINE (APRESOLINE) injection 10 mg  10 mg Intravenous Q30 Min PRN China Cerna PA-C        HYDROmorphone (DILAUDID) injection 0.2 mg  0.2 mg Intravenous Q2H PRN China Cerna PA-C   0.2 mg at 07/23/25 0548    Or    HYDROmorphone (DILAUDID) injection 0.4 mg  0.4 mg Intravenous Q2H PRN China Cerna PA-C   0.4 mg at 07/22/25 1937    lactated ringers BOLUS 250 mL  250 mL Intravenous Q15 Min PRN China Cerna PA-C   250 mL at 07/22/25 0243    magnesium hydroxide (MILK OF MAGNESIA) suspension 30 mL  30 mL Oral Daily PRN China Cerna PA-C        methocarbamol (ROBAXIN) tablet 500 mg  500 mg Oral Q6H PRN China Cerna PA-C   500 mg at 07/23/25 0738    naloxone (NARCAN) injection 0.2 mg  0.2 mg Intravenous Q2 Min PRN Lina Bermudez MD        Or    naloxone (NARCAN) injection 0.4 mg  0.4 mg Intravenous Q2 Min PRN Lina Bermudez MD        Or    naloxone (NARCAN) injection 0.2 mg  0.2 mg Intramuscular Q2 Min PRN Lina Bermudez MD        Or    naloxone (NARCAN) injection 0.4 mg  0.4 mg Intramuscular Q2 Min PRN Lina Bermudez MD        No lozenges or gum should be given while patient on BIPAP/AVAPS/AVAPS AE   Does not apply Continuous PRN Jessica Narvaez MD        ondansetron (ZOFRAN ODT) ODT tab 4 mg  4 mg Oral Q6H PRN China Cerna PA-C        Or    ondansetron (ZOFRAN) injection 4 mg  4 mg Intravenous Q6H PRN China Cerna PA-C   4 mg at 07/23/25 0549    oxyCODONE (ROXICODONE) tablet 5 mg  5 mg Oral Q4H PRN China Cerna PA-C        Or    oxyCODONE (ROXICODONE) tablet 10 mg  10 mg Oral Q4H PRN China Cerna PA-C   10 mg at 07/23/25 0304    Patient may continue current oral medications   Does not apply Continuous PRN Jessica Narvaez MD        prochlorperazine (COMPAZINE) injection 10 mg  10 mg Intravenous Q6H PRN  China Cerna PA-C   10 mg at 07/23/25 0603    Or    prochlorperazine (COMPAZINE) tablet 10 mg  10 mg Oral Q6H PRN China Cerna PA-C           Cardiographics:    Telemetry monitoring demonstrates paced rhythm at 90bmp per my personal review.    Imaging:  Results for orders placed or performed during the hospital encounter of 07/15/25   XR Chest Port 1 View    Impression    IMPRESSION:     No acute cardiopulmonary process. Stable cardiomediastinal silhouette.   US Carotid Bilateral    Impression    IMPRESSION:  1.  Mild plaque formation, velocities consistent with less than 50% stenosis in the right internal carotid artery.  2.  Mild plaque formation, velocities consistent with less than 50% stenosis in the left internal carotid artery.  3.  Flow within the vertebral arteries is antegrade.   CT Chest w/o Contrast    Impression    IMPRESSION:   1.  Chronic bronchial wall thickening and mild bronchial dilatation.  2.  Faint hazy groundglass density opacity throughout both lungs could indicate minimal edema.  3.  Strands of fibrosis and/or linear atelectasis as well as scattered micronodular opacities in the peribronchial lung parenchyma in the mid and lower lungs have increased slightly since 2019.  4.  Mild enlargement central pulmonary arteries (MPA diameter 3.6 cm) could indicate some degree of chronic pulmonary arterial hypertension.  5.  Moderate vessel coronary artery calcification.     XR Chest Port 1 View    Impression    IMPRESSION: Endotracheal tube tip is about 5.8 cm above the marco antonio. Right internal jugular Alexis-Johnny catheter tip is probably in the proximal right pulmonary artery and is deflected back to the left. Consider withdrawal and advancement prior to obtaining   a wedge pressure.     There are mediastinal and left chest tubes. Low lung volumes. No consolidation. No pleural effusions or pneumothorax. Unchanged cardiac size. Median sternotomy. Status post CABG x4.   XR Chest Port 1  View    Impression    IMPRESSION: Interval median sternotomy and CABG. Right internal jugular [place]-Johnny catheter with tip overlying the right pulmonary artery. Mediastinal drain and left chest tube. No significant pneumothorax. Enlarged cardiomediastinal silhouette. No   pulmonary vascular congestion. Streaky perihilar and left lower lung opacities, favoring atelectasis. No significant pleural fluid.       Labs, personally reviewed.  Hemoglobin   Date Value Ref Range Status   07/23/2025 8.7 (L) 13.3 - 17.7 g/dL Final   07/22/2025 9.5 (L) 13.3 - 17.7 g/dL Final   07/21/2025 11.6 (L) 13.3 - 17.7 g/dL Final     Hemoglobin POCT   Date Value Ref Range Status   07/21/2025 11.2 (L) 13.3 - 17.7 g/dL Final   07/21/2025 11.8 (L) 13.3 - 17.7 g/dL Final   07/21/2025 10.5 (L) 13.3 - 17.7 g/dL Final     WBC Count   Date Value Ref Range Status   07/23/2025 13.3 (H) 4.0 - 11.0 10e3/uL Final   07/22/2025 12.1 (H) 4.0 - 11.0 10e3/uL Final   07/21/2025 25.7 (H) 4.0 - 11.0 10e3/uL Final     Platelet Count   Date Value Ref Range Status   07/23/2025 171 150 - 450 10e3/uL Final   07/22/2025 208 150 - 450 10e3/uL Final   07/21/2025 281 150 - 450 10e3/uL Final     Creatinine   Date Value Ref Range Status   07/23/2025 2.74 (H) 0.67 - 1.17 mg/dL Final   07/22/2025 1.97 (H) 0.67 - 1.17 mg/dL Final   07/21/2025 1.78 (H) 0.67 - 1.17 mg/dL Final     Potassium   Date Value Ref Range Status   07/23/2025 4.3 3.4 - 5.3 mmol/L Final   07/22/2025 4.4 3.4 - 5.3 mmol/L Final   07/21/2025 4.3 3.4 - 5.3 mmol/L Final     Potassium POCT   Date Value Ref Range Status   07/21/2025 4.0 3.4 - 5.3 mmol/L Final   07/21/2025 4.0 3.4 - 5.3 mmol/L Final   07/21/2025 4.4 3.4 - 5.3 mmol/L Final     Magnesium   Date Value Ref Range Status   07/23/2025 2.1 1.7 - 2.3 mg/dL Final   07/22/2025 2.2 1.7 - 2.3 mg/dL Final   07/21/2025 2.6 (H) 1.7 - 2.3 mg/dL Final          I/O:  I/O last 3 completed shifts:  In: 1487.33 [P.O.:1000; I.V.:387.33; IV Piggyback:100]  Out: 1365  [Urine:955; Emesis/NG output:150; Chest Tube:260]       Physical Exam:    General: Patient seen up in chair conversant/pleasant  HEENT: MAN, no sclera icterus, moist mucosa, nasal cannula in place  CV: Paced rhythm on monitor. 2+ peripheral pulses in all extremities. Mild edema.   Pulm: Non-labored effort on nasal cannula. Chest tubes in place, no air leak. Incision C/D/I.  Abd: Soft, NT, ND  : Gutierrez with lani urine  Ext: Mild pedal edema, SCDs in place, warm, distal pulses intact  Neuro: A&Ox3, KNIGHT, and CN grossly intact      ASSESSMENT/PLAN:    Mayito Sood is a 64 year old male with a history of history of CKD, kidney stones, HTN, DM, and obesity who presented with an NSTEMI  who is s/p CABG x4  .    Active Problems:    Status post coronary angiogram    ACS (acute coronary syndrome) (H)    ST elevation myocardial infarction (STEMI), unspecified artery (H)    Coronary artery disease involving native coronary artery of native heart, unspecified whether angina present        NEURO:   - Scheduled Tylenol/lidocaine patches and PRN Tylenol/oxycodone/Robaxin for pain  - PTA Lexapro    CV:   - Pre-op EF 55%  - Normotensive  - EP consult to eval rhythm/not tolerating native rhythm  - Metoprolol tartrate 12.5 mg BID (held in light of EP consult)  -  mg  - Pravastatin 10mg daily  - Chest tubes removed this AM / TPW to remain     PULM:   - Extubated POD#1  - Maintaining oxygen saturations on nasal cannula - wean as able  - Encourage pulmonary toilet  - Needs outpatient sleep study     FEN/GI:  - Diet: ADAT to cardiac diet  - Continue electrolyte replacement protocol  - Bowel regimen    RENAL:  - Adequate UOP/hr. Continue to monitor closely.  - Cr 2.74 (baseline ~ 1.7-2.0).  - Nephrology consult; appreciate recs  - Gutierrez to be removed today  - Diuresis held given TARUN    HEME:  - Acute blood loss anemia post-op.   - Hgb 9.5, no bleeding concerns. Hep SQ, ASA    ID:  - Jayde op ppx complete, febrile. No concerns for  infection    ENDO:   - HbA1c 9 %  - BG goal < 180 to promote optimal healing  - SSI  - PTA on insulin, but not on due to inventory  - Lantus 5u daily  - PTA Jardiance held  - PTA Flomax    PPx:   - DVT: SCDs, SQ heparin TID, ambulation   - GI: Protonix 40mg PO daily    DISPO:   - Continue critical care in ICU for now  - PT/OT recs at discharge: home w/ outpt cardiac rehab  - Medically Ready for Discharge: Anticipated in 2-4 Days        Patient discussed with Dr. Bermudez.        _______  Chica Moseley PA-C  [facility] Cardiothoracic Surgery  Communication via [employer] Secure Messaging

## 2025-07-23 NOTE — PROGRESS NOTES
[facility]    Medicine Progress Note - Hospitalist Service    Date of Admission:  7/15/2025    Assessment & Plan     Mayito Sood is a 64 year old male with a past medical history of Type 2 Diabetes presented to the emergency room with chest pain and syncope, found to have multivessel coronary artery disease.  Awaiting on CABG      7/21 : s/p CABG    7/23 :     Continue post op care  Chest tubes removed  Holding iv lasix, creatinine trending up 1.9--2.7  CT surgery  Cardiology consulted for symptomatic sania      Not medically ready for discharge at this time.        A/p :       NSTEMI  Multivessel CAD  - Chest pain started 7/15, had episode of syncope at work  - Patient with troponin 67 -> 97  - EKG with ST Depression. Cardiology not calling STEMI  - Cardiology consult, appreciate input  -Coronary angiogram shows multivessel disease  - CT Surgery consulted; CABG Monday  - Echo with EF 55% mildly hypokinetic distal anterior segment no significant valvular heart disease  - s/p aspirin 325mg x1 and continue 81mg   - Continue atorvastatin 80mg daily   - Metoprolol 25mg BID  - Given ticagrelor x1, not continued -   ---hold off on ACE  ---Chest CT with faint hazy groundglass opacities, possible edema.    ---Ultrasound carotids less than 50% stenosis bilaterally  --Continue cardiac monitor  --on heparin gtt     Acute Hypoxic Respiratory Failure  - Secondary to above, resolved  --Patient not on home oxygen, requiring up to 10L NC after [facility].  -  fluid overload with NSTEMI -sedation from [facility].   ---CT does show some groundglass opacities  - CXR on 7/15 showing no acute cardiopulmonary process.  --Did get Lasix 7/16.  Further diuresis per cardiology     Acute kidney injury   - Baseline CKD III   CKD Stage 4 Considered and Ruled Out   ---Cr baseline appears to be around 1.75-2.0  --cotninue to hold cozaar   ---did get diuresis earlier- now done  ---holding jardiance  -Continue to monitor renal function       Hyperkalemia  - resolved  --Potassium 5.2 on ED presentation, then 5.8 with AM labs 7/16  --s/p  insulin  lokelma x1   - recheck in am      Hyponatremia  ---resolved     Hypertension  --bp running high  --- increase amlodipine  ---hold cozaar   ---continue metoprolol with hold parameters for bradycardia  --- consider diuretic     Type 2 Diabetes with hyperglycemia  -Poorly controlled at baseline  --Last A1c was 9.1% on 7/15/25  - PTA Meds:  empagliflozin, no insulin for many months per patient , no victoza for a month  ---continue Lantus 25 units nightly   ---hold jardiance  - Sliding Scale Insulin with Glucose Checks     BPH  --continue home flomax     hyperlipidemia  ---was on pravastatin  ---now on atorvastatin     Hypophosphatemia,   --resolved with replacement     Hypocalcemia-  -continue to monitor     Severe Obesity, BMI 37.59  - encourage weight loss.        Diet: Low Saturated Fat Na <2400 mg    DVT Prophylaxis: Heparin GTT  Gutierrez Catheter: PRESENT, indication: ICU only: hourly urine output needed for patient care  Lines: None     Cardiac Monitoring: ACTIVE order. Indication: Open heart surgery (72 hours)  Code Status: Full Code      Clinically Significant Risk Factors         # Hypernatremia: Highest Na = 147 mmol/L in last 2 days, will monitor as appropriate  # Hyperchloremia: Highest Cl = 111 mmol/L in last 2 days, will monitor as appropriate      # Hypocalcemia: Lowest Ca = 7.8 mg/dL in last 2 days, will monitor and replace as appropriate     # Hypoalbuminemia: Lowest albumin = 3.2 g/dL at 7/21/2025  4:06 PM, will monitor as appropriate  # Coagulation Defect: INR = 1.26 (Ref range: 0.85 - 1.15) and/or PTT = 38 Seconds (Ref range: 22 - 38 Seconds), will monitor for bleeding   # Acute Kidney Injury, unspecified: based on a >150% or 0.3 mg/dL increase in last creatinine compared to past 90 day average, will monitor renal function  # Hypertension: Noted on problem list           # DMII: A1C = 9.1 %  "(Ref range: <5.7 %) within past 6 months # Severe Obesity: Estimated body mass index is 38.75 kg/m  as calculated from the following:    Height as of this encounter: 1.854 m (6' 1\").    Weight as of this encounter: 133.2 kg (293 lb 11.2 oz). with complications       # History of CABG: noted on surgical history       Social Drivers of Health    Depression: At risk (5/23/2025)    Received from [employer] [facility]    PHQ-2     PHQ-2 TOTAL SCORE: 6          Disposition Plan     Medically Ready for Discharge: Anticipated in 5+ Days             Landon Lopez MD  Hospitalist Service  [facility]  Securely message with Hello [employer] (more info)  Text page via [employer] Paging/Directory   ______________________________________________________________________        Physical Exam   Vital Signs: Temp: 98.2  F (36.8  C) Temp src: Oral BP: 126/64 Pulse: 89   Resp: 16 SpO2: 95 % O2 Device: Nasal cannula Oxygen Delivery: (S) 4 LPM  Weight: 293 lbs 11.2 oz    General Appearance: Pleasant male no apparent distress  Respiratory: Clear to auscultation bilaterally  Cardiovascular: The rate and rhythm without murmurs rubs or gallops    Medical Decision Making             Data     I have personally reviewed the following data over the past 24 hrs:    13.3 (H)  \   8.7 (L)   / 171     139 103 43.1 (H) /  175 (H)   4.3 22 2.74 (H) \       Imaging results reviewed over the past 24 hrs:   No results found for this or any previous visit (from the past 24 hours).    "

## 2025-07-23 NOTE — PROGRESS NOTES
Patient on and off V60 through out the night. While asleep he is on AVAPS 16/600/+8/30%12-28. While awake he is on 5 L NC. Non-eventful evening. Emergency equipment at bedside. RT will continue to follow.

## 2025-07-23 NOTE — CONSULTS
"[facility]  Cardiac Electrophysiology  [address]  [place] [address]   Office: [phone]  Fax: [phone]     Cardiac Electrophysiology Consultation      Patient: Mayito Sood  Referring Provider: Cheng Mills  Date of admission: 7/15/2025   EP attending: Dr. Judson España      CHIEF COMPLAINT/REASON FOR CONSULTATION  Presyncope     Assessment/Recommendations     Symptomatic bradycardia, syncope: episode of presyncope today (POD 2) occurring while standing with features of orthostasis and vasovagal syncope. With modest sinus bradycardia during event today do not suspect arrhythmogenic contribution. No further documented profound bradycardia or high grade AV block postoperatively. Trial off pacemaker and resume beta blocker per [facility]. Will continue to follow on telemetry              History of Present Illness     Mayito Sood is a 64 year old male with HTN, T2DM, CKD, obesity, now status post 4 vessel CABG 7/21/2025; EP consulted for presyncope and pacemaker recommendations. This morning his pacemaker was disconnected while he was sitting in a chair.  Staff observed after standing up to ambulate he was minimally responsive while standing, associated with sinus rhythm/sinus bradycardia by telemetry. He recalls suddenly becoming dizzy, sweaty and nauseous after standing. He recovered spontaneously and has since felt well; he attributes symptoms to pain medications and notes a similar instance occurring after gallbladder surgery. He has also had episodes outside of surgery that have occurred in public where he would \"collapse\" at random though does not believe he has ever lost consciousness. In the past these episodes were attributed to excess medications and hypoglycemia and resolved after discontinuing medications including antihypertensives, until the instance that ultimately brought him to the ED this hospitalization. In addition to NSTEMI he was hypotensive " "and his blood sugar was profoundly elevated upon admission. His recovery is otherwise going well and he is slowly getting stronger. He denies palpitations, orthopnea, lightheadedness or dizziness.         Data Review     EKG/telemetry  Telemetry shows paced rhythm 90 bpm with underlying sinus. Briefly sinus rhythm 60s with 1:1 AV conduction and rate isolated PVCs this morning   EKG 7/22/2025: atrial paced 89 bpm   7/15/2025: SR 90 bpm,  ms, QT/QTc 358/437 ms    TTE 7/16/2025  1. Normal left ventricular size and systolic performance. The visually  estimated ejection fraction is 55%.  2. On selected views, the distal anterior segment appears mildly hypokinetic.  3. No significant valvular heart disease is identified on this study.  4. Normal right ventricular size and systolic performance.  5. There is mild left atrial enlargement.  6. There is mild enlargement of the proximal ascending aorta (3.9 cm).       Physical Examination  Review of Systems   VITALS: /58   Pulse 89   Temp 98.2  F (36.8  C) (Oral)   Resp 16   Ht 1.854 m (6' 1\")   Wt 133.2 kg (293 lb 11.2 oz)   SpO2 97%   BMI 38.75 kg/m    Wt Readings from Last 3 Encounters:   07/23/25 133.2 kg (293 lb 11.2 oz)   07/10/24 126.2 kg (278 lb 4.8 oz)   06/11/24 127 kg (280 lb)       Intake/Output Summary (Last 24 hours) at 7/23/2025 1248  Last data filed at 7/23/2025 0600  Gross per 24 hour   Intake 1120 ml   Output 870 ml   Net 250 ml       CONSTITUTIONAL: no distress  E/N/T:  Oral mucosa pink, moist mucous membranes  RESPIRATORY:  Respiratory effort is normal  CARDIOVASCULAR:  regular, normal S1 and S2  EXTREMITIES:  No clubbing or cyanosis  SKIN:  warm and dry  NEURO/PSYCH:  Oriented x 3 with normal affect.   ROS: 10 point ROS neg other than the symptoms noted above in the HPI.         Medical History  Surgical History   Past Medical History:   Diagnosis Date    Acute pain     Acute sinusitis     TARUN (acute kidney injury)     Anxiety and " depression 02/08/2023    Bacteremia     Cellulitis     Chronic back pain     Diabetes mellitus, type 2 (H) 08/27/2014    Dyslipidemia 07/05/2017    ED (erectile dysfunction)     HTN (hypertension) 08/29/2014    Infection involving bone (H)     Obesity     Obstructive uropathy     Osteomyelitis (H)     Polyneuropathy     Created by [facility] Annotation: Jul 19 2010  3:22PM - Kushal Griffin: feet  Replacement Utility updated for latest IMO load         Proliferative diabetic retinopathy of both eyes with macular edema associated with type 2 diabetes mellitus (H) 08/30/2018    Formatting of this note might be different from the original.   Last seen      SIRS (systemic inflammatory response syndrome) (H)     Stage 3b chronic kidney disease (H) 02/08/2023    Ureterolithiasis     Past Surgical History:   Procedure Laterality Date    CHOLECYSTECTOMY      COMBINED CYSTOSCOPY, RETROGRADES, EXCHANGE STENT URETER(S) Left 06/12/2024    Procedure: CYSTOSCOPY, WITH RETROGRADE PYELOGRAM AND LEFT URETERAL STENT PLACEMENT;  Surgeon: Ramon Perea MD;  Location: [facility] OR    CORONARY ARTERY BYPASS GRAFT, WITH ENDOSCOPIC VESSEL PROCUREMENT N/A 7/21/2025    Procedure: CORONARY ARTERY BYPASS GRAFT TIMES FOUR, WITH LEFT ENDOSCOPIC VESSEL PROCUREMENT, LEFT INTERNAL MAMMARY ARTERY HARVEST, EPIAORTIC ULTRASOUND;  Surgeon: Lina Bermudez MD;  Location: [facility] OR    CV CORONARY ANGIOGRAM N/A 7/16/2025    Procedure: Coronary Angiogram;  Surgeon: Cheng Mills MD;  Location: [facility] CATH LAB CV    CV LEFT HEART CATH N/A 7/16/2025    Procedure: Left Heart Catheterization;  Surgeon: Cheng Mills MD;  Location: [facility] LAB CV    CYSTOSCOPY, BIOPSY BLADDER, COMBINED N/A 7/10/2024    Procedure: CYSTOSCOPY WITH BLADDER BIOPSY AND FULGURATION;  Surgeon: Ramon Perea MD;  Location: [facility] OR    CYSTOURETEROSCOPY, WITH LITHOTRIPSY USING CHACE 120P LASER AND URETERAL STENT INSERTION  Left 7/10/2024    Procedure: CYSTOURETEROSCOPY, WITH RETROGRADE PYELOGRAM, HOLMIUM LASER LITHOTRIPSY OF URETERAL CALCULUS, AND STENT EXCHANGE;  Surgeon: Ramon Perea MD;  Location: [facility] OR    HC REPAIR ACHILLES TENDON,PRIMARY      Description: Primary Repair Of Ruptured Achilles Tendon;  Proc Date: 01/09/2003;    IR LUMBAR EPIDURAL STEROID INJECTION  12/04/2003    IR LUMBAR EPIDURAL STEROID INJECTION  07/07/2009    IR LUMBAR EPIDURAL STEROID INJECTION  08/20/2009    PICC  08/29/2014         TRANSESOPHAGEAL ECHOCARDIOGRAM INTRAOPERATIVE N/A 7/21/2025    Procedure: ECHOCARDIOGRAM, TRANSESOPHAGEAL, INTRAOPERATIVE;  Surgeon: Lina Bermudez MD;  Location: [facility]         Family History Social History   Family History   Problem Relation Age of Onset    Cancer Mother         Social History     Tobacco Use    Smoking status: Never     Passive exposure: Never    Smokeless tobacco: Never   Substance Use Topics    Alcohol use: Yes     Comment: rare    Drug use: No         Medications  Allergies     Current Facility-Administered Medications:     acetaminophen (TYLENOL) tablet 975 mg, 975 mg, Oral, Q8H, China Cerna PA-LISETH, 975 mg at 07/23/25 0548    aspirin (ASA) chewable tablet 162 mg, 162 mg, Oral or NG Tube, Daily, 162 mg at 07/23/25 0849 **OR** aspirin (ASA) Suppository 300 mg, 300 mg, Rectal, Daily, China Cerna PA-C    [START ON 7/24/2025] bisacodyl (DULCOLAX) suppository 10 mg, 10 mg, Rectal, Daily PRN, China Cerna PA-C    calcium gluconate 1 g in 50 mL in sodium chloride intermittent infusion, 1 g, Intravenous, Once PRN, China Cerna PA-LISETH, 1 g at 07/22/25 2148    calcium gluconate 2 g in  mL intermittent infusion, 2 g, Intravenous, Once PRN, China Cerna PA-C    calcium gluconate 3 g in sodium chloride 0.9 % 100 mL intermittent infusion, 3 g, Intravenous, Once PRN, China Cerna PA-LISETH    glucose gel 15-30 g, 15-30 g, Oral, Q15 Min  PRN **OR** dextrose 50 % injection 25-50 mL, 25-50 mL, Intravenous, Q15 Min PRN **OR** glucagon injection 1 mg, 1 mg, Subcutaneous, Q15 Min PRN, Nida Atkinson APRN CNP    [Held by provider] empagliflozin (JARDIANCE) tablet 10 mg, 10 mg, Oral, Daily, China Cerna PA-C    escitalopram (LEXAPRO) tablet 20 mg, 20 mg, Oral, QPM, China Cerna PA-C, 20 mg at 07/22/25 2122    [Held by provider] furosemide (LASIX) injection 40 mg, 40 mg, Intravenous, BID, Nida Atkinson APRN CNP, 40 mg at 07/22/25 2122    heparin ANTICOAGULANT injection 5,000 Units, 5,000 Units, Subcutaneous, Q8H, China Cerna PA-C, 5,000 Units at 07/23/25 0549    hydrALAZINE (APRESOLINE) injection 10 mg, 10 mg, Intravenous, Q30 Min PRN, China Cerna PA-C    insulin aspart (NovoLOG) injection (RAPID ACTING), 1-7 Units, Subcutaneous, TID AC, Nida Atkinson APRN CNP, 1 Units at 07/23/25 0849    insulin aspart (NovoLOG) injection (RAPID ACTING), 1-5 Units, Subcutaneous, At Bedtime, Nida Atkinson APRN CNP    insulin glargine (LANTUS PEN) injection 5 Units, 5 Units, Subcutaneous, QAM AC, Chica Moseley PA-C    lactated ringers BOLUS 250 mL, 250 mL, Intravenous, Q15 Min PRN, China Cerna PA-C, 250 mL at 07/22/25 0243    Lidocaine (LIDOCARE) 4 % Patch 1-2 patch, 1-2 patch, Transdermal, Q24H, China Cerna PA-C, 1 patch at 07/22/25 1645    magnesium hydroxide (MILK OF MAGNESIA) suspension 30 mL, 30 mL, Oral, Daily PRN, China Cerna PA-C    methocarbamol (ROBAXIN) tablet 500 mg, 500 mg, Oral, Q6H PRN, China Cerna, ASHLEY, 500 mg at 07/23/25 0738    [Held by provider] metoprolol tartrate (LOPRESSOR) half-tab 12.5 mg, 12.5 mg, Oral, BID, Nida Atkinson, APRN CNP, 12.5 mg at 07/22/25 2130    naloxone (NARCAN) injection 0.2 mg, 0.2 mg, Intravenous, Q2 Min PRN **OR** naloxone (NARCAN) injection 0.4 mg, 0.4 mg, Intravenous, Q2 Min PRN **OR** naloxone (NARCAN) injection 0.2  mg, 0.2 mg, Intramuscular, Q2 Min PRN **OR** naloxone (NARCAN) injection 0.4 mg, 0.4 mg, Intramuscular, Q2 Min PRN, Lina Bermudez MD    No lozenges or gum should be given while patient on BIPAP/AVAPS/AVAPS AE, , Does not apply, Continuous PRN, Jessica Narvaez MD    ondansetron (ZOFRAN ODT) ODT tab 4 mg, 4 mg, Oral, Q6H PRN **OR** ondansetron (ZOFRAN) injection 4 mg, 4 mg, Intravenous, Q6H PRN, China Cerna PA-C, 4 mg at 07/23/25 0549    oxyCODONE (ROXICODONE) tablet 5 mg, 5 mg, Oral, Q4H PRN **OR** oxyCODONE (ROXICODONE) tablet 10 mg, 10 mg, Oral, Q4H PRN, China Cerna PA-C, 10 mg at 07/23/25 0304    pantoprazole (PROTONIX) 2 mg/mL suspension 40 mg, 40 mg, Oral or NG Tube, Daily **OR** pantoprazole (PROTONIX) EC tablet 40 mg, 40 mg, Oral, Daily, China Cerna PA-C, 40 mg at 07/23/25 0850    polyethylene glycol (MIRALAX) Packet 17 g, 17 g, Oral, Daily, China Cerna PA-C, 17 g at 07/22/25 0849    pravastatin (PRAVACHOL) tablet 10 mg, 10 mg, Oral, QPM, China Cerna PA-C, 10 mg at 07/22/25 2123    prochlorperazine (COMPAZINE) injection 10 mg, 10 mg, Intravenous, Q6H PRN, 10 mg at 07/23/25 0603 **OR** prochlorperazine (COMPAZINE) tablet 10 mg, 10 mg, Oral, Q6H PRN, China Cerna PA-C    senna-docusate (SENOKOT-S/PERICOLACE) 8.6-50 MG per tablet 1 tablet, 1 tablet, Oral, BID, China Cerna PA-C, 1 tablet at 07/22/25 2123    tamsulosin (FLOMAX) capsule 0.4 mg, 0.4 mg, Oral, Daily, China Cerna PA-C, 0.4 mg at 07/23/25 0840     Allergies   Allergen Reactions    Iodine Unknown and Anaphylaxis    Shellfish-Derived Products Anaphylaxis    Prednisone Unknown and Other (See Comments)     Depression--gets emotional and angry    Depression--gets emotional and angry      Depression  pt gets emotional and angry    Lisinopril Cough    Metformin Diarrhea          Lab Results    Chemistry CBC Cardiac Enzymes/BNP/TSH/INR   Recent Labs   Lab Test  "07/23/25  0300      POTASSIUM 4.3   CHLORIDE 103   CO2 22   *   BUN 43.1*   CR 2.74*   GFRESTIMATED 25*   QUIANA 8.1*     Recent Labs   Lab Test 07/23/25  0300 07/22/25  0429 07/21/25  1606   CR 2.74* 1.97* 1.78*          Recent Labs   Lab Test 07/23/25  0300   WBC 13.3*   HGB 8.7*   HCT 27.6*   MCV 96        Recent Labs   Lab Test 07/23/25  0300 07/22/25  0429 07/21/25  1631   HGB 8.7* 9.5* 11.2*    No results for input(s): \"TROPONINI\" in the last 16602 hours.  Recent Labs   Lab Test 07/15/25  2249   NTBNP 594*     No results for input(s): \"TSH\" in the last 03247 hours.  Recent Labs   Lab Test 07/21/25  1606 07/21/25  1445 07/15/25  2253   INR 1.26* 1.40* 0.98        Janna Khan, CNP  Cardiac Electrophysiology  [facility]           "

## 2025-07-23 NOTE — PLAN OF CARE
Problem: Adult Inpatient Plan of Care  Goal: Plan of Care Review  Description: The Plan of Care Review/Shift note should be completed every shift.  The Outcome Evaluation is a brief statement about your assessment that the patient is improving, declining, or no change.  This information will be displayed automatically on your shift  note.  Flowsheets (Taken 7/23/2025 1710)  Outcome Evaluation: chest tubes dc'd, pain well managed. Another syncpal episode while up to bathroom. pacemaker plugged back in  Overall Patient Progress: no change     Problem: Skin Injury Risk Increased  Goal: Skin Health and Integrity  Intervention: Optimize Skin Protection  Recent Flowsheet Documentation  Taken 7/23/2025 1600 by Bette Awan RN  Activity Management: back to bed  Taken 7/23/2025 1200 by Bette Awan RN  Activity Management: back to bed  Taken 7/23/2025 0800 by Bette Awan RN  Activity Management: up in chair     Problem: Adult Inpatient Plan of Care  Goal: Plan of Care Review  Description: The Plan of Care Review/Shift note should be completed every shift.  The Outcome Evaluation is a brief statement about your assessment that the patient is improving, declining, or no change.  This information will be displayed automatically on your shift  note.  Flowsheets (Taken 7/23/2025 1710)  Outcome Evaluation: chest tubes dc'd, pain well managed. Another syncpal episode while up to bathroom. pacemaker plugged back in  Overall Patient Progress: no change     Problem: Skin Injury Risk Increased  Goal: Skin Health and Integrity  Intervention: Optimize Skin Protection  Recent Flowsheet Documentation  Taken 7/23/2025 1600 by Bette Awan RN  Activity Management: back to bed  Taken 7/23/2025 1200 by Bette Awan RN  Activity Management: back to bed  Taken 7/23/2025 0800 by Bette Awan RN  Activity Management: up in chair   Goal Outcome Evaluation:           Overall Patient Progress: no changeOverall Patient  Progress: no change    Outcome Evaluation: chest tubes dc'd, pain well managed. Another syncpal episode while up to bathroom. pacemaker plugged back in

## 2025-07-23 NOTE — PROGRESS NOTES
[facility]    Medicine Progress Note - Hospitalist Service    Date of Admission:  7/15/2025    Assessment & Plan     Mayito Sood is a 64 year old male with a past medical history of Type 2 Diabetes presented to the emergency room with chest pain and syncope, found to have multivessel coronary artery disease.  S/P CABG on 7/21.  Doing well today.      A/p :       NSTEMI s/p CABG 7/21  Multivessel CAD  - Chest pain started 7/15, had episode of syncope at work  - Patient with troponin 67 -> 97  - EKG with ST Depression. Cardiology not calling STEMI  - Cardiology consult, appreciate input  -Coronary angiogram shows multivessel disease  - CT Surgery performed CABG 7/21  - Echo with EF 55% mildly hypokinetic distal anterior segment no significant valvular heart disease  - s/p aspirin 325mg x1 and continue 81mg   - Continue atorvastatin 80mg daily   - Metoprolol 25mg BID  - Given ticagrelor x1, not continued -   ---hold off on ACE  ---Chest CT with faint hazy groundglass opacities, possible edema.    ---Ultrasound carotids less than 50% stenosis bilaterally  --Continue cardiac monitor and post op cares per CV Surgery     Acute Hypoxic Respiratory Failure  - Secondary to above, resolved  --Patient not on home oxygen, requiring up to 10L NC after [facility].  -  fluid overload with NSTEMI -sedation from [facility].   ---CT does show some groundglass opacities  - CXR on 7/15 showing no acute cardiopulmonary process.  --Did get Lasix 7/16.  Further diuresis per cardiology     Acute kidney injury   - Baseline CKD III   CKD Stage 4 Considered and Ruled Out   ---Cr baseline appears to be around 1.75-2.0  --cotninue to hold cozaar   ---did get diuresis earlier- now done  ---holding jardiance  -Continue to monitor renal function      Hyperkalemia  - resolved  --Potassium 5.2 on ED presentation, then 5.8 with AM labs 7/16  --s/p  insulin  lokelma x1   - recheck in am      Hyponatremia  ---resolved     Hypertension  --bp  "running high  --- increase amlodipine  ---hold cozaar   ---continue metoprolol with hold parameters for bradycardia  --- consider diuretic     Type 2 Diabetes with hyperglycemia  -Poorly controlled at baseline  --Last A1c was 9.1% on 7/15/25  - PTA Meds:  empagliflozin, no insulin for many months per patient , no victoza for a month  ---continue Lantus 25 units nightly   ---hold jardiance  - Sliding Scale Insulin with Glucose Checks     BPH  --continue home flomax     hyperlipidemia  ---was on pravastatin  ---now on atorvastatin     Hypophosphatemia,   --resolved with replacement     Hypocalcemia-  -continue to monitor     Severe Obesity, BMI 37.59  - encourage weight loss.        Diet: Low Saturated Fat Na <2400 mg    DVT Prophylaxis: Heparin GTT  Gutierrez Catheter: PRESENT, indication: ICU only: hourly urine output needed for patient care  Lines: None     Cardiac Monitoring: ACTIVE order. Indication: Open heart surgery (72 hours)  Code Status: Full Code      Clinically Significant Risk Factors        # Hyperkalemia: Highest K = 5.4 mmol/L in last 2 days, will monitor as appropriate  # Hypernatremia: Highest Na = 147 mmol/L in last 2 days, will monitor as appropriate  # Hyperchloremia: Highest Cl = 113 mmol/L in last 2 days, will monitor as appropriate      # Hypocalcemia: Lowest Ca = 7.8 mg/dL in last 2 days, will monitor and replace as appropriate     # Hypoalbuminemia: Lowest albumin = 3.2 g/dL at 7/21/2025  4:06 PM, will monitor as appropriate  # Coagulation Defect: INR = 1.26 (Ref range: 0.85 - 1.15) and/or PTT = 38 Seconds (Ref range: 22 - 38 Seconds), will monitor for bleeding    # Hypertension: Noted on problem list           # DMII: A1C = 9.1 % (Ref range: <5.7 %) within past 6 months # Severe Obesity: Estimated body mass index is 36.64 kg/m  as calculated from the following:    Height as of this encounter: 1.854 m (6' 1\").    Weight as of this encounter: 126 kg (277 lb 11.2 oz). with complications       # " History of CABG: noted on surgical history       Social Drivers of Health    Depression: At risk (5/23/2025)    Received from [facility]    PHQ-2     PHQ-2 TOTAL SCORE: 6          Disposition Plan     Medically Ready for Discharge: Anticipated in 2-4 Days             Taco Medellin MD  Hospitalist Service  [facility]  Securely message with [employer] (more info)  Text page via [employer] Paging/Directory   ______________________________________________________________________        Physical Exam   Vital Signs: Temp: 98.2  F (36.8  C) Temp src: Pulmonary Artery BP: 94/55 Pulse: 89     SpO2: 95 % O2 Device: Nasal cannula Oxygen Delivery: 4 LPM  Weight: 277 lbs 11.2 oz    General Appearance: Pleasant male no apparent distress  Respiratory: Clear to auscultation bilaterally  Cardiovascular: The rate and rhythm without murmurs rubs or gallops    Medical Decision Making             Data     I have personally reviewed the following data over the past 24 hrs:    12.1 (H)  \   9.5 (L)   / 208     147 (H) 111 (H) 38.3 (H) /  159 (H)   4.4 26 1.97 (H) \     ALT: 27 AST: 54 (H) AP: 156 (H) TBILI: 0.2   ALB: 3.3 (L) TOT PROTEIN: 5.7 (L) LIPASE: N/A       Imaging results reviewed over the past 24 hrs:   Recent Results (from the past 24 hours)   XR Chest Port 1 View    Narrative    EXAM: XR CHEST PORT 1 VIEW  LOCATION: [facility]  DATE: 7/22/2025    INDICATION: Post Op CVTS Surgery  COMPARISON: Portable chest radiograph 07/15/2025.      Impression    IMPRESSION: Interval median sternotomy and CABG. Right internal jugular [place] catheter with tip overlying the right pulmonary artery. Mediastinal drain and left chest tube. No significant pneumothorax. Enlarged cardiomediastinal silhouette. No   pulmonary vascular congestion. Streaky perihilar and left lower lung opacities, favoring atelectasis. No significant pleural fluid.

## 2025-07-23 NOTE — PLAN OF CARE
[facility] - ICU    RN Progress Note:            Pertinent Assessments:      Please refer to flowsheet rows for full assessment       VSS, A/Ox4, uses call light to make needs known - pain controlled with PRN Oxy & dilaudid, although pt thinks this could be contributing to his nausea/vomiting spells and would like to look at different options for pain management.    UOP minimal but adequate- No BM, pt reports feeling movement     Minimal output from CT- wires still in place.    Up to chair this morning with assist of 2/3- very steady on his feet. Pt did have a spell of nausea/vomiting resolved with compazine and was able to get on scale and into chair with assistance of 1-2.           Key Events - This Shift:       Nausea with initial getting up/sitting        RN Managed Protocols Ordered:  Yes  Protocols:Potassium, Magnesium, and Phosphorus  PRN'S:iCal  Protocols Status: Reviewed with Oncoming RN              Barriers to Discharge / Downgrade:       Pacer wires        Goal Outcome Evaluation:    Plan of Care Reviewed With: patient    Overall Patient Progress: improving Overall Patient Progress: improving    Outcome Evaluation: pain controlled with PRN medications, minimal CT output, up to chair this AM

## 2025-07-23 NOTE — CONSULTS
RENAL CONSULT NOTE    REQUESTING PHYSICIAN:  Chica Moseley PA-C     REASON FOR CONSULT: Post op TARUN management and recommendations     PLAN  Diuretics per cards   Avoid nephrotoxins or additional contrast   Pressors as needed, goal MAP >65 for renal perfusion  BMP in AM.     ASSESSMENT:  TARUN on CKDIII   TARUN likely prerenal with HotN gillian-operatively, ? GEORGES from angio,  sCR ranged from 1.7-1.9 throughout admission but acutely increased to 2.74 with a GFR of 25 today,. BP low/normal with MAPs in the 70s today. Avoid hypotension. Pressor support as needed   CKD is multifactorial. Does have a history of bladder neoplasm (non-malignant) and obstructive stones, 7/2024. Follows with urology for yearly checks. Other contributors likely due to diabetic nephropathy with a recent A1c of 9.1%, hypertension, and obesity. Baseline sCR appears to be 1.7s.   Avoid nephrotoxins, NSAIDs, addition contrast. Continue to hold ARB, SGLT2i, and diuretics if possible.   PTA Jardiance (on hold), ARB (on hold)    NSTEMI  Presented with a few day of chest pain, EKG with ST depression. Coronary angio with multivessel disease. S/p CABG on 7/21. ECHO shows and EF of 55% with no significant valve disease.   On BB, 81mg ASA, Given 40mg IV lasix 2x post op.  Has associated acute hypoxic respiratory failure following angio which is improving. Did have a CT showing groundless opacities believed to be edema. Is on 5L NC on exam, trace BLLE edema  Followed by cards and CT surgery     HTN (now HoTN)  BP low normal today. Hypotension yesterday. On low dose BB  PTA amlodipine and losartan on hold.   Avoid Hypotension. Low threshold for pressors as needed     DM  -190 last several days  PTA basal and rapid acting insulin, Insulin managed by [facility]  Victoza (on hold), Jardiance (on hold)  Recent A1C 9.1%. Pt notes his pharmacy has been 'out of insulin' for the last 4 months. He has been completely out of supplies to check blood sugars. Has  been taking Victoza (recently started) and Jardiance.     HLD  On statin     HPI: Mayito Sood is a 64 year old male with a medical history of CKD3, HTN, DM2, BPH, HLD, Obesity admitted to [facility] for NSTEMI. He underwent a CABG on 7/21. He has an EF of 55% with no significant valve disease. Has a history of CKD3, does not follow with a nephrologist. He states he was told he has kidney disease 'for 5 years', but he is unsure about this. He believes his CKD originated from a bladder tumor and several obstructing stones leading to hydronephrosis on the left side leading to kidney disease. Per chart review, the obstructing stone was discovered in June 2024, at that time he had a sCR of 3.1. He did have labs in March of 2024, before imaging, that showed a sCR of 2.68. Following his lithotripsy and neoplasm removal, there is one sCR on 1.74 in September 2024. This appears to be his baseline.    He also has uncontrolled DM and states he has been out of insulin for 4 months because they pharmacy is out of stock. He does not have the supplies to check his blood sugar either. He checks his BP at home and states his SBP is generally in the 160s. Suspect his CKD is also due to uncontrolled DM and HTN.    He complains of lightheadedness when he sits or stands stating he 'passed out' two times yesterday. No chest pain. Mild SOB. Has a catheter for voiding. No other acute symptoms.     REVIEW OF SYSTEMS:  COMPLETE REVIEW OF SYSTEMS: Reclining in bed. On 5L NC. Appears comfortable. ROS otherwise negative unless noted in HPI      Past Medical History:   Diagnosis Date    Acute pain     Acute sinusitis     TARUN (acute kidney injury)     Anxiety and depression 02/08/2023    Bacteremia     Cellulitis     Chronic back pain     Diabetes mellitus, type 2 (H) 08/27/2014    Dyslipidemia 07/05/2017    ED (erectile dysfunction)     HTN (hypertension) 08/29/2014    Infection involving bone (H)     Obesity     Obstructive uropathy     Osteomyelitis  "(H)     Polyneuropathy     Created by [facility] Annotation: Jul 19 2010  3:22PM - Kushal Griffin: feet  Replacement Utility updated for latest IMO load         Proliferative diabetic retinopathy of both eyes with macular edema associated with type 2 diabetes mellitus (H) 08/30/2018    Formatting of this note might be different from the original.   Last seen      SIRS (systemic inflammatory response syndrome) (H)     Stage 3b chronic kidney disease (H) 02/08/2023    Ureterolithiasis        Current Outpatient Medications   Medication Instructions    ACCU-CHEK FASTCLIX Misc [ACCU-CHEK FASTCLIX MISC] USE AS DIRECTED FOUR TIMES DAILY    ACCU-CHEK SMARTVIEW TEST STRIP Strp [ACCU-CHEK SMARTVIEW TEST STRIP STRP] TEST FOUR TIMES DAILY    acetaminophen (TYLENOL) 325 mg, EVERY 4 HOURS PRN    amLODIPine (NORVASC) 10 mg, DAILY    empagliflozin (JARDIANCE) 10 mg, DAILY    escitalopram (LEXAPRO) 20 mg, DAILY    fluticasone (FLONASE) 50 MCG/ACT nasal spray 2 sprays, DAILY    insulin glargine 24 Units, Subcutaneous, DAILY    insulin needles, disposable, (ULTICARE) 32 x 5/32 \" Ndle [INSULIN NEEDLES, DISPOSABLE, (ULTICARE) 32 X 5/32 \" NDLE] Use as Directed with Humalog    liraglutide (VICTOZA) 1.8 mg, DAILY    losartan (COZAAR) 25 mg, DAILY    pen needle, diabetic 31 gauge x 5/16\" Ndle Does not apply, PRN    pravastatin (PRAVACHOL) 10 mg, DAILY        ALLERGIES/SENSITIVITIES:  Allergies   Allergen Reactions    Iodine Unknown and Anaphylaxis    Shellfish-Derived Products Anaphylaxis    Prednisone Unknown and Other (See Comments)     Depression--gets emotional and angry    Depression--gets emotional and angry      Depression  pt gets emotional and angry    Lisinopril Cough    Metformin Diarrhea       Family History   Problem Relation Age of Onset    Cancer Mother         PHYSICAL EXAM:  Physical Exam   Temp: 98.2  F (36.8  C) Temp src: Oral BP: 103/58 Pulse: 76   Resp: 16 SpO2: 98 % O2 Device: Nasal cannula Oxygen " "Delivery: 5 LPM  Vitals:    25 0018 25 0700 25 0607   Weight: 126.6 kg (279 lb 1.6 oz) 126 kg (277 lb 11.2 oz) 133.2 kg (293 lb 11.2 oz)     Vital Signs with Ranges  Temp:  [98.2  F (36.8  C)-98.8  F (37.1  C)] 98.2  F (36.8  C)  Pulse:  [76-90] 76  Resp:  [16] 16  BP: ()/(46-65) 103/58  MAP:  [65 mmHg] 65 mmHg  Arterial Line BP: (109)/(48) 109/48  FiO2 (%):  [35 %] 35 %  SpO2:  [89 %-98 %] 98 %  I/O last 3 completed shifts:  In: 1487.33 [P.O.:1000; I.V.:387.33; IV Piggyback:100]  Out: 1365 [Urine:955; Emesis/NG output:150; Chest Tube:260]    Temp (24hrs), Av.5  F (36.9  C), Min:98.2  F (36.8  C), Max:98.8  F (37.1  C)       Vital signs:  /58 (BP Location: Left arm)   Pulse 76   Temp 98.2  F (36.8  C) (Oral)   Resp 16   Ht 1.854 m (6' 1\")   Wt 133.2 kg (293 lb 11.2 oz)   SpO2 98%   BMI 38.75 kg/m       General - A & O x 3, NAD  HEENT - PERRLA, no scleral icterus  Neck - no carotid bruits, no JVD  Respiratory - Anterior lung sounds diminished. On 5L NC.   Cardiovascular - RRR, rate in the 80s on monitor   Abdomen - soft, BS present, no bruits, no fluid wave  Extremities - well perfused, trace edema  Integumentary - intact, good turgor, no rash/lesions, Sternal incision well approximated without any edema or erythema.    Neurologic - grossly intact  Psych:  Judgement intact, affect WNL  : Gutierrez   Wt: Post CABG wt up 7kg although charted I/O since admission is net neutral. No significant external edema. Remains on O2.     Laboratory:     Recent Labs   Lab 25  0300 25  0429 25  1631 25  1609 25  1537 25  1445 25  1043 25  0621 25  0420   WBC 13.3* 12.1*  --  25.7*  --  20.6*  --  6.1 7.0   RBC 2.87* 3.11*  --  3.73*  --  3.20*  --  3.92* 3.86*   HGB 8.7* 9.5* 11.2* 11.6* 11.8* 10.0*   < > 12.2* 11.7*   HCT 27.6* 28.5* 33* 34.5*  --  29.5*  --  35.7* 35.7*    208  --  281  --  205  --  229 241    < > = values in this " interval not displayed.       Basic Metabolic Panel:  Recent Labs   Lab 07/23/25  0300 07/22/25  2101 07/22/25  1643 07/22/25  1152 07/22/25  1051 07/22/25  0859 07/22/25  0546 07/22/25  0429 07/21/25  1735 07/21/25  1631 07/21/25  1607 07/21/25  1606 07/21/25  1537 07/21/25  1443 07/21/25  0724 07/21/25  0621 07/20/25  0738 07/20/25  0420 07/19/25  0735 07/19/25  0427     --   --   --   --   --   --  147*  --  146*  --  146* 147* 145   < > 138  --  139  --  139   POTASSIUM 4.3  --   --   --   --   --   --  4.4  --  4.0  --  4.3 4.0 4.4   < > 4.3  --  4.1  --  4.4   CHLORIDE 103  --   --   --   --   --   --  111*  --   --   --  113*  --   --   --  111*  --  109*  --  110*   CO2 22  --   --   --   --   --   --  26  --   --   --  17*  --   --   --  18*  --  21*  --  20*   BUN 43.1*  --   --   --   --   --   --  38.3*  --   --   --  39.0*  --   --   --  41.1*  --  42.2*  --  43.7*   CR 2.74*  --   --   --   --   --   --  1.97*  --   --   --  1.78*  --   --   --  1.76*  --  1.74*  --  1.67*   * 154* 159* 165* 148* 122*   < > 114*  117*   < >  --    < > 175* 137* 124*   < > 116*   < > 132*   < > 147*   QUIANA 8.1*  --   --   --   --   --   --  7.8*  --   --   --  8.0*  --   --   --  8.3*  --  8.3*  --  8.2*    < > = values in this interval not displayed.       INR  Recent Labs   Lab 07/21/25  1606 07/21/25  1445   INR 1.26* 1.40*       Recent Labs   Lab Test 07/23/25  0300 07/22/25  0429   POTASSIUM 4.3 4.4   CHLORIDE 103 111*   BUN 43.1* 38.3*      Recent Labs   Lab Test 07/22/25  0429 07/21/25  1606 07/18/25  1706 07/15/25  2255   ALBUMIN 3.3* 3.2*  --   --    BILITOTAL 0.2 0.3  --   --    ALT 27 41  --   --    AST 54* 59*  --   --    PROTEIN  --   --  100* 100*       Personally reviewed today's laboratory studies      Thank you for involving us in the care of this patient. We will continue to follow along with you.      Yareli Clark, DIANE-BC  [facility]  [phone]

## 2025-07-23 NOTE — PROGRESS NOTES
Care Management Follow Up    Length of Stay (days): 7    Expected Discharge Date: 07/26/2025    Anticipated Discharge Plan:   home w/outpatient cardiac rehab    Transportation: Anticipate Family/friend    PT Recommendations:    OT Recommendations:  home with outpatient cardiac rehab, home with assist     Barriers to Discharge: medical stability  CT Surgery following. POD #2 CABG     Prior Living Situation: house with significant other    Discussed  Partnership in Safe Discharge Planning  document with patient/family: No     Handoff Completed: No, handoff not indicated or clinically appropriate    Patient/Spokesperson Updated: No    Additional Information:  Chart reviewed: OT recs home with assist and OP CR. Pt agreeable to this plan.     Next Steps: CM to follow for medical readiness to plan discharge.    Lilian Heard RN  Acute Care Management  [facility]  For further questions/concerns you can reach us at Vocera Web Console

## 2025-07-24 ENCOUNTER — APPOINTMENT (OUTPATIENT)
Dept: OCCUPATIONAL THERAPY | Facility: HOSPITAL | Age: 65
End: 2025-07-24
Payer: COMMERCIAL

## 2025-07-24 VITALS
TEMPERATURE: 98.4 F | OXYGEN SATURATION: 92 % | RESPIRATION RATE: 16 BRPM | DIASTOLIC BLOOD PRESSURE: 62 MMHG | HEIGHT: 73 IN | SYSTOLIC BLOOD PRESSURE: 133 MMHG | HEART RATE: 71 BPM | WEIGHT: 292.3 LBS | BODY MASS INDEX: 38.74 KG/M2

## 2025-07-24 LAB
ANION GAP SERPL CALCULATED.3IONS-SCNC: 15 MMOL/L (ref 7–15)
BUN SERPL-MCNC: 50.3 MG/DL (ref 8–23)
CA-I BLD-MCNC: 4.2 MG/DL (ref 4.4–5.2)
CA-I BLD-MCNC: 4.2 MG/DL (ref 4.4–5.2)
CA-I BLD-MCNC: 4.3 MG/DL (ref 4.4–5.2)
CALCIUM SERPL-MCNC: 7.8 MG/DL (ref 8.8–10.4)
CHLORIDE SERPL-SCNC: 99 MMOL/L (ref 98–107)
CREAT SERPL-MCNC: 2.82 MG/DL (ref 0.67–1.17)
EGFRCR SERPLBLD CKD-EPI 2021: 24 ML/MIN/1.73M2
GLUCOSE BLDC GLUCOMTR-MCNC: 187 MG/DL (ref 70–99)
GLUCOSE BLDC GLUCOMTR-MCNC: 194 MG/DL (ref 70–99)
GLUCOSE BLDC GLUCOMTR-MCNC: 202 MG/DL (ref 70–99)
GLUCOSE BLDC GLUCOMTR-MCNC: 223 MG/DL (ref 70–99)
GLUCOSE SERPL-MCNC: 163 MG/DL (ref 70–99)
HCO3 SERPL-SCNC: 23 MMOL/L (ref 22–29)
MAGNESIUM SERPL-MCNC: 2.1 MG/DL (ref 1.7–2.3)
PHOSPHATE SERPL-MCNC: 4.4 MG/DL (ref 2.5–4.5)
POTASSIUM SERPL-SCNC: 4.3 MMOL/L (ref 3.4–5.3)
POTASSIUM SERPL-SCNC: 4.3 MMOL/L (ref 3.4–5.3)
SODIUM SERPL-SCNC: 137 MMOL/L (ref 135–145)

## 2025-07-24 PROCEDURE — 36415 COLL VENOUS BLD VENIPUNCTURE: CPT | Performed by: THORACIC SURGERY (CARDIOTHORACIC VASCULAR SURGERY)

## 2025-07-24 PROCEDURE — 80048 BASIC METABOLIC PNL TOTAL CA: CPT | Performed by: THORACIC SURGERY (CARDIOTHORACIC VASCULAR SURGERY)

## 2025-07-24 PROCEDURE — 82330 ASSAY OF CALCIUM: CPT

## 2025-07-24 PROCEDURE — 82330 ASSAY OF CALCIUM: CPT | Performed by: THORACIC SURGERY (CARDIOTHORACIC VASCULAR SURGERY)

## 2025-07-24 PROCEDURE — 82330 ASSAY OF CALCIUM: CPT | Performed by: NURSE PRACTITIONER

## 2025-07-24 PROCEDURE — 99233 SBSQ HOSP IP/OBS HIGH 50: CPT | Performed by: INTERNAL MEDICINE

## 2025-07-24 PROCEDURE — 250N000011 HC RX IP 250 OP 636

## 2025-07-24 PROCEDURE — 200N000001 HC R&B ICU

## 2025-07-24 PROCEDURE — 250N000011 HC RX IP 250 OP 636: Performed by: NURSE PRACTITIONER

## 2025-07-24 PROCEDURE — 250N000013 HC RX MED GY IP 250 OP 250 PS 637

## 2025-07-24 PROCEDURE — 83735 ASSAY OF MAGNESIUM: CPT | Performed by: THORACIC SURGERY (CARDIOTHORACIC VASCULAR SURGERY)

## 2025-07-24 PROCEDURE — 94660 CPAP INITIATION&MGMT: CPT

## 2025-07-24 PROCEDURE — 999N000157 HC STATISTIC RCP TIME EA 10 MIN

## 2025-07-24 PROCEDURE — 97110 THERAPEUTIC EXERCISES: CPT | Mod: GO

## 2025-07-24 PROCEDURE — 99232 SBSQ HOSP IP/OBS MODERATE 35: CPT | Performed by: NURSE PRACTITIONER

## 2025-07-24 PROCEDURE — 82310 ASSAY OF CALCIUM: CPT | Performed by: NURSE PRACTITIONER

## 2025-07-24 PROCEDURE — 36415 COLL VENOUS BLD VENIPUNCTURE: CPT | Performed by: NURSE PRACTITIONER

## 2025-07-24 PROCEDURE — 84100 ASSAY OF PHOSPHORUS: CPT | Performed by: THORACIC SURGERY (CARDIOTHORACIC VASCULAR SURGERY)

## 2025-07-24 PROCEDURE — 97535 SELF CARE MNGMENT TRAINING: CPT | Mod: GO

## 2025-07-24 PROCEDURE — 94799 UNLISTED PULMONARY SVC/PX: CPT

## 2025-07-24 RX ORDER — FUROSEMIDE 10 MG/ML
40 INJECTION INTRAMUSCULAR; INTRAVENOUS ONCE
Status: COMPLETED | OUTPATIENT
Start: 2025-07-24 | End: 2025-07-24

## 2025-07-24 RX ORDER — FUROSEMIDE 10 MG/ML
40 INJECTION INTRAMUSCULAR; INTRAVENOUS ONCE
Status: DISCONTINUED | OUTPATIENT
Start: 2025-07-24 | End: 2025-07-24

## 2025-07-24 RX ORDER — PANTOPRAZOLE SODIUM 40 MG/1
40 TABLET, DELAYED RELEASE ORAL
Status: DISCONTINUED | OUTPATIENT
Start: 2025-07-25 | End: 2025-07-30 | Stop reason: HOSPADM

## 2025-07-24 RX ADMIN — ACETAMINOPHEN 975 MG: 325 TABLET, FILM COATED ORAL at 23:01

## 2025-07-24 RX ADMIN — ACETAMINOPHEN 975 MG: 325 TABLET, FILM COATED ORAL at 14:25

## 2025-07-24 RX ADMIN — ESCITALOPRAM 20 MG: 20 TABLET, FILM COATED ORAL at 20:30

## 2025-07-24 RX ADMIN — HEPARIN SODIUM 5000 UNITS: 5000 INJECTION, SOLUTION INTRAVENOUS; SUBCUTANEOUS at 14:25

## 2025-07-24 RX ADMIN — PANTOPRAZOLE SODIUM 40 MG: 40 TABLET, DELAYED RELEASE ORAL at 08:23

## 2025-07-24 RX ADMIN — PROCHLORPERAZINE EDISYLATE 10 MG: 5 INJECTION INTRAMUSCULAR; INTRAVENOUS at 05:59

## 2025-07-24 RX ADMIN — ONDANSETRON 4 MG: 2 INJECTION, SOLUTION INTRAMUSCULAR; INTRAVENOUS at 13:37

## 2025-07-24 RX ADMIN — PRAVASTATIN SODIUM 10 MG: 10 TABLET ORAL at 20:31

## 2025-07-24 RX ADMIN — FUROSEMIDE 40 MG: 10 INJECTION, SOLUTION INTRAMUSCULAR; INTRAVENOUS at 16:17

## 2025-07-24 RX ADMIN — ACETAMINOPHEN 975 MG: 325 TABLET, FILM COATED ORAL at 06:46

## 2025-07-24 RX ADMIN — INSULIN ASPART 2 UNITS: 100 INJECTION, SOLUTION INTRAVENOUS; SUBCUTANEOUS at 08:23

## 2025-07-24 RX ADMIN — CALCIUM GLUCONATE 1 G: 20 INJECTION, SOLUTION INTRAVENOUS at 06:47

## 2025-07-24 RX ADMIN — ASPIRIN 162 MG: 81 TABLET, COATED ORAL at 08:23

## 2025-07-24 RX ADMIN — INSULIN ASPART 2 UNITS: 100 INJECTION, SOLUTION INTRAVENOUS; SUBCUTANEOUS at 13:37

## 2025-07-24 RX ADMIN — HEPARIN SODIUM 5000 UNITS: 5000 INJECTION, SOLUTION INTRAVENOUS; SUBCUTANEOUS at 23:01

## 2025-07-24 RX ADMIN — HEPARIN SODIUM 5000 UNITS: 5000 INJECTION, SOLUTION INTRAVENOUS; SUBCUTANEOUS at 06:46

## 2025-07-24 RX ADMIN — TAMSULOSIN HYDROCHLORIDE 0.4 MG: 0.4 CAPSULE ORAL at 08:23

## 2025-07-24 RX ADMIN — LIDOCAINE 2 PATCH: 246 PATCH TOPICAL at 16:17

## 2025-07-24 RX ADMIN — CALCIUM GLUCONATE 1 G: 20 INJECTION, SOLUTION INTRAVENOUS at 14:50

## 2025-07-24 RX ADMIN — INSULIN ASPART 2 UNITS: 100 INJECTION, SOLUTION INTRAVENOUS; SUBCUTANEOUS at 17:17

## 2025-07-24 ASSESSMENT — ACTIVITIES OF DAILY LIVING (ADL)
ADLS_ACUITY_SCORE: 50
ADLS_ACUITY_SCORE: 46
ADLS_ACUITY_SCORE: 50
ADLS_ACUITY_SCORE: 46
ADLS_ACUITY_SCORE: 50
ADLS_ACUITY_SCORE: 50
ADLS_ACUITY_SCORE: 46
ADLS_ACUITY_SCORE: 46
ADLS_ACUITY_SCORE: 50
ADLS_ACUITY_SCORE: 46
ADLS_ACUITY_SCORE: 50

## 2025-07-24 NOTE — PROGRESS NOTES
[facility]    Medicine Progress Note - Hospitalist Service    Date of Admission:  7/15/2025    Assessment & Plan     Mayito Sood is a 64 year old male with a past medical history of Type 2 Diabetes presented to the emergency room with chest pain and syncope, found to have multivessel coronary artery disease.  Awaiting on CABG      7/21 : s/p CABG    7/23 :     Continue post op care  Chest tubes removed  Holding iv lasix, creatinine trending up 1.9--2.7  CT surgery  Cardiology consulted for symptomatic sania      7/24 :     Continue post op care  40 mg iv lasix - 1 dose today, creatinine trending up 1.9--2.7--2.82  CT surgery following  Cardiology consulted for symptomatic sania - ower pacemaker rates to 70 bpm today and continue gradual wean. Beta blocker remains on hold       Not medically ready for discharge at this time.      A/p :       NSTEMI  Multivessel CAD  - Chest pain started 7/15, had episode of syncope at work  - Patient with troponin 67 -> 97  - EKG with ST Depression. Cardiology not calling STEMI  - Cardiology consult, appreciate input  -Coronary angiogram shows multivessel disease  - CT Surgery consulted; CABG Monday  - Echo with EF 55% mildly hypokinetic distal anterior segment no significant valvular heart disease  - s/p aspirin 325mg x1 and continue 81mg   - Continue atorvastatin 80mg daily   - Metoprolol 25mg BID  - Given ticagrelor x1, not continued -   ---hold off on ACE  ---Chest CT with faint hazy groundglass opacities, possible edema.    ---Ultrasound carotids less than 50% stenosis bilaterally  --Continue cardiac monitor  --on heparin gtt     Acute Hypoxic Respiratory Failure  - Secondary to above, resolved  --Patient not on home oxygen, requiring up to 10L NC after [facility].  -  fluid overload with NSTEMI -sedation from [facility].   ---CT does show some groundglass opacities  - CXR on 7/15 showing no acute cardiopulmonary process.  --Did get Lasix 7/16.  Further diuresis per  cardiology     Acute kidney injury   - Baseline CKD III   CKD Stage 4 Considered and Ruled Out   ---Cr baseline appears to be around 1.75-2.0  --cotninue to hold cozaar   ---did get diuresis earlier- now done  ---holding jardiance  -Continue to monitor renal function      Hyperkalemia  - resolved  --Potassium 5.2 on ED presentation, then 5.8 with AM labs 7/16  --s/p  insulin  lokelma x1   - recheck in am      Hyponatremia  ---resolved     Hypertension  --bp running high  --- increase amlodipine  ---hold cozaar   ---continue metoprolol with hold parameters for bradycardia  --- consider diuretic     Type 2 Diabetes with hyperglycemia  -Poorly controlled at baseline  --Last A1c was 9.1% on 7/15/25  - PTA Meds:  empagliflozin, no insulin for many months per patient , no victoza for a month  ---continue Lantus 25 units nightly   ---hold jardiance  - Sliding Scale Insulin with Glucose Checks     BPH  --continue home flomax     hyperlipidemia  ---was on pravastatin  ---now on atorvastatin     Hypophosphatemia,   --resolved with replacement     Hypocalcemia-  -continue to monitor     Severe Obesity, BMI 37.59  - encourage weight loss.        Diet: Low Saturated Fat Na <2400 mg    DVT Prophylaxis: Heparin GTT  Gutierrez Catheter: Not present  Lines: None     Cardiac Monitoring: ACTIVE order. Indication: Open heart surgery (72 hours)  Code Status: Full Code      Clinically Significant Risk Factors           # Hypocalcemia: Lowest iCa = 4.2 mg/dL in last 2 days, will monitor and replace as appropriate     # Hypoalbuminemia: Lowest albumin = 3.2 g/dL at 7/21/2025  4:06 PM, will monitor as appropriate    # Acute Kidney Injury, unspecified: based on a >150% or 0.3 mg/dL increase in last creatinine compared to past 90 day average, will monitor renal function  # Hypertension: Noted on problem list           # DMII: A1C = 9.1 % (Ref range: <5.7 %) within past 6 months # Severe Obesity: Estimated body mass index is 38.56 kg/m  as  "calculated from the following:    Height as of this encounter: 1.854 m (6' 1\").    Weight as of this encounter: 132.6 kg (292 lb 4.8 oz). with complications       # History of CABG: noted on surgical history       Social Drivers of Health    Depression: At risk (5/23/2025)    Received from [facility]    PHQ-2     PHQ-2 TOTAL SCORE: 6          Disposition Plan     Medically Ready for Discharge: Anticipated in 5+ Days             Landon Lopez MD  Hospitalist Service  [facility]  Securely message with [employer] (more info)  Text page via [employer] Paging/Directory   ______________________________________________________________________        Physical Exam   Vital Signs:     BP: 134/66 Pulse: 88   Resp: 18 SpO2: 94 % O2 Device: Nasal cannula Oxygen Delivery: 4 LPM  Weight: 292 lbs 4.8 oz    General Appearance: Pleasant male no apparent distress  Respiratory: Clear to auscultation bilaterally  Cardiovascular: The rate and rhythm without murmurs rubs or gallops    Medical Decision Making             Data     I have personally reviewed the following data over the past 24 hrs:    N/A  \   N/A   / N/A     137 99 50.3 (H) /  194 (H)   4.3; 4.3 23 2.82 (H) \       Imaging results reviewed over the past 24 hrs:   No results found for this or any previous visit (from the past 24 hours).    "

## 2025-07-24 NOTE — PLAN OF CARE
Goal Outcome Evaluation:      Plan of Care Reviewed With: patient    Overall Patient Progress: improving    Outcome Evaluation: Continued nausea and dry-heaving, no syncope      [facility] - ICU    RN Progress Note:            Pertinent Assessments:      Please refer to flowsheet rows for full assessment     Continues having frequent nausea episodes with any amount of movement, though without any syncopal episodes. Reports minor incisional pain. VSS, requiring 6L by NC this AM. Up to chair with minimal difficulty.            Key Events - This Shift:            RN Managed Protocols Ordered:  Yes  Protocols:Potassium, Magnesium, and Phosphorus  PRN'S:iCal  Protocols Status: Reviewed with Oncoming RN                Barriers to Discharge / Downgrade:     Temporary pacer still in use.         Point of Contact Update: YES-OR-NO: Yes  If No, reason:   Name:  Phone Number:  Summary of Conversation:

## 2025-07-24 NOTE — PROGRESS NOTES
"[facility]  Cardiac Electrophysiology  [address]  [place] [address]   Office: [phone]  Fax: [phone]     Cardiac Electrophysiology Progress Note      Patient: Mayito Sood  Referring Provider: Cheng Mills  Date of admission: 7/15/2025       Assessment/Recommendations     Presyncope: recurrent presyncope with features of orthostasis and vasovagal syncope. With modest sinus bradycardia during event yesterday and no correlating events by telemetry today do not suspect arrhythmogenic contribution. No documented profound bradycardia or high grade AV block postoperatively. Will lower pacemaker rates to 70 bpm today and continue gradual wean. Beta blocker remains on hold            Interval Events     -Recurrent episode of diaphoresis, lightheadedness, nausea and vomiting with standing and ambulating while pacemaker off         Data Review     EKG/telemetry  Telemetry shows paced rhythm 90 bpm with underlying sinus  EKG 7/22/2025: atrial paced 89 bpm   7/15/2025: SR 90 bpm,  ms, QT/QTc 358/437 ms    TTE 7/16/2025  1. Normal left ventricular size and systolic performance. The visually  estimated ejection fraction is 55%.  2. On selected views, the distal anterior segment appears mildly hypokinetic.  3. No significant valvular heart disease is identified on this study.  4. Normal right ventricular size and systolic performance.  5. There is mild left atrial enlargement.  6. There is mild enlargement of the proximal ascending aorta (3.9 cm)         Physical Examination  Review of Systems   VITALS: /66   Pulse 88   Temp 98.2  F (36.8  C) (Oral)   Resp 18   Ht 1.854 m (6' 1\")   Wt 132.6 kg (292 lb 4.8 oz)   SpO2 94%   BMI 38.56 kg/m    Wt Readings from Last 3 Encounters:   07/24/25 132.6 kg (292 lb 4.8 oz)   07/10/24 126.2 kg (278 lb 4.8 oz)   06/11/24 127 kg (280 lb)       Intake/Output Summary (Last 24 hours) at 7/23/2025 1621  Last data filed at " 7/23/2025 0600  Gross per 24 hour   Intake 1120 ml   Output 870 ml   Net 250 ml       CONSTITUTIONAL: no distress  E/N/T:  Oral mucosa pink, moist mucous membranes  RESPIRATORY:  Respiratory effort is normal  CARDIOVASCULAR:  regular, normal S1 and S2  EXTREMITIES:  No clubbing or cyanosis  SKIN:  warm and dry  NEURO/PSYCH:  Oriented x 3 with normal affect.   ROS: 10 point ROS neg other than the symptoms noted above in the HPI.         Medical History  Surgical History   Past Medical History:   Diagnosis Date    Acute pain     Acute sinusitis     TARUN (acute kidney injury)     Anxiety and depression 02/08/2023    Bacteremia     Cellulitis     Chronic back pain     Diabetes mellitus, type 2 (H) 08/27/2014    Dyslipidemia 07/05/2017    ED (erectile dysfunction)     HTN (hypertension) 08/29/2014    Infection involving bone (H)     Obesity     Obstructive uropathy     Osteomyelitis (H)     Polyneuropathy     Created by [employer] [facility] Annotation: Jul 19 2010  3:22PM - Kushal Griffin: feet  Replacement Utility updated for latest IMO load         Proliferative diabetic retinopathy of both eyes with macular edema associated with type 2 diabetes mellitus (H) 08/30/2018    Formatting of this note might be different from the original.   Last seen      SIRS (systemic inflammatory response syndrome) (H)     Stage 3b chronic kidney disease (H) 02/08/2023    Ureterolithiasis     Past Surgical History:   Procedure Laterality Date    CHOLECYSTECTOMY      COMBINED CYSTOSCOPY, RETROGRADES, EXCHANGE STENT URETER(S) Left 06/12/2024    Procedure: CYSTOSCOPY, WITH RETROGRADE PYELOGRAM AND LEFT URETERAL STENT PLACEMENT;  Surgeon: Ramon Perea MD;  Location: [facility] [place]    CORONARY ARTERY BYPASS GRAFT, WITH ENDOSCOPIC VESSEL PROCUREMENT N/A 7/21/2025    Procedure: CORONARY ARTERY BYPASS GRAFT TIMES FOUR, WITH LEFT ENDOSCOPIC VESSEL PROCUREMENT, LEFT INTERNAL MAMMARY ARTERY HARVEST, EPIAORTIC ULTRASOUND;  Surgeon:  Lina Bermudez MD;  [place] [facility] [place]    CV CORONARY ANGIOGRAM N/A 7/16/2025    Procedure: Coronary Angiogram;  Surgeon: Cheng Mills MD;  Location: [facility] CATH LAB CV    CV LEFT HEART CATH N/A 7/16/2025    Procedure: Left Heart Catheterization;  Surgeon: Cheng Mills MD;  Location: [facility] CATH LAB CV    CYSTOSCOPY, BIOPSY BLADDER, COMBINED N/A 7/10/2024    Procedure: CYSTOSCOPY WITH BLADDER BIOPSY AND FULGURATION;  Surgeon: Ramon Perea MD;  [place] [facility] [place]    CYSTOURETEROSCOPY, WITH LITHOTRIPSY USING CHACE 120P LASER AND URETERAL STENT INSERTION Left 7/10/2024    Procedure: CYSTOURETEROSCOPY, WITH RETROGRADE PYELOGRAM, HOLMIUM LASER LITHOTRIPSY OF URETERAL CALCULUS, AND STENT EXCHANGE;  Surgeon: Ramon Perea MD;  [place] [facility] [place]    HC REPAIR ACHILLES TENDON,PRIMARY      Description: Primary Repair Of Ruptured Achilles Tendon;  Proc Date: 01/09/2003;    IR LUMBAR EPIDURAL STEROID INJECTION  12/04/2003    IR LUMBAR EPIDURAL STEROID INJECTION  07/07/2009    IR LUMBAR EPIDURAL STEROID INJECTION  08/20/2009    PICC  08/29/2014         TRANSESOPHAGEAL ECHOCARDIOGRAM INTRAOPERATIVE N/A 7/21/2025    Procedure: ECHOCARDIOGRAM, TRANSESOPHAGEAL, INTRAOPERATIVE;  Surgeon: Lina Bermudez MD;  Location: [facility]         Family History Social History   Family History   Problem Relation Age of Onset    Cancer Mother         Social History     Tobacco Use    Smoking status: Never     Passive exposure: Never    Smokeless tobacco: Never   Substance Use Topics    Alcohol use: Yes     Comment: rare    Drug use: No         Medications  Allergies     Current Facility-Administered Medications:     acetaminophen (TYLENOL) tablet 975 mg, 975 mg, Oral, Q8H, China Cerna PA-C, 975 mg at 07/24/25 0646    aspirin (ASA) chewable tablet 162 mg, 162 mg, Oral or NG Tube, Daily, 162 mg at 07/24/25 0823 **OR** aspirin (ASA) Suppository 300 mg, 300 mg, Rectal,  Daily, China Cerna PA-C    bisacodyl (DULCOLAX) suppository 10 mg, 10 mg, Rectal, Daily PRN, China Cerna PA-C    calcium gluconate 1 g in 50 mL in sodium chloride intermittent infusion, 1 g, Intravenous, Once PRN, China Cerna PA-C, 1 g at 07/24/25 0647    calcium gluconate 2 g in  mL intermittent infusion, 2 g, Intravenous, Once PRN, China Cerna PA-LISETH    calcium gluconate 3 g in sodium chloride 0.9 % 100 mL intermittent infusion, 3 g, Intravenous, Once PRN, China Cerna PA-LISETH    glucose gel 15-30 g, 15-30 g, Oral, Q15 Min PRN **OR** dextrose 50 % injection 25-50 mL, 25-50 mL, Intravenous, Q15 Min PRN **OR** glucagon injection 1 mg, 1 mg, Subcutaneous, Q15 Min PRN, Nida Atkinson APRN CNP    [Held by provider] empagliflozin (JARDIANCE) tablet 10 mg, 10 mg, Oral, Daily, China Cerna PA-C    escitalopram (LEXAPRO) tablet 20 mg, 20 mg, Oral, QPM, China Cerna PA-LISETH, 20 mg at 07/23/25 2152    [Held by provider] furosemide (LASIX) injection 40 mg, 40 mg, Intravenous, Once, Yareli Clark, NP    heparin ANTICOAGULANT injection 5,000 Units, 5,000 Units, Subcutaneous, Q8H, China Cerna PA-C, 5,000 Units at 07/24/25 0646    hydrALAZINE (APRESOLINE) injection 10 mg, 10 mg, Intravenous, Q30 Min PRN, China Cerna PA-C    insulin aspart (NovoLOG) injection (RAPID ACTING), 1-7 Units, Subcutaneous, TID AC, Nida Atkinson APRN CNP, 2 Units at 07/24/25 1337    insulin aspart (NovoLOG) injection (RAPID ACTING), 1-5 Units, Subcutaneous, At Bedtime, Nida Atkinson APRN CNP    insulin glargine (LANTUS PEN) injection 8 Units, 8 Units, Subcutaneous, QAM AC, Nida Atkinson, MATTHEW CNP, 8 Units at 07/24/25 0823    lactated ringers BOLUS 250 mL, 250 mL, Intravenous, Q15 Min PRN, China Cerna PA-C, 250 mL at 07/22/25 0243    Lidocaine (LIDOCARE) 4 % Patch 1-2 patch, 1-2 patch, Transdermal, Q24H, China Cerna PA-C, 1 patch  at 07/23/25 1704    magnesium hydroxide (MILK OF MAGNESIA) suspension 30 mL, 30 mL, Oral, Daily PRN, China Cerna PA-C    methocarbamol (ROBAXIN) tablet 500 mg, 500 mg, Oral, Q6H PRN, China Cerna PA-C, 500 mg at 07/23/25 1918    [Held by provider] metoprolol tartrate (LOPRESSOR) half-tab 12.5 mg, 12.5 mg, Oral, BID, Nida Atkinson APRN CNP, 12.5 mg at 07/22/25 2130    naloxone (NARCAN) injection 0.2 mg, 0.2 mg, Intravenous, Q2 Min PRN **OR** naloxone (NARCAN) injection 0.4 mg, 0.4 mg, Intravenous, Q2 Min PRN **OR** naloxone (NARCAN) injection 0.2 mg, 0.2 mg, Intramuscular, Q2 Min PRN **OR** naloxone (NARCAN) injection 0.4 mg, 0.4 mg, Intramuscular, Q2 Min PRN, Lina Bermudez MD    No lozenges or gum should be given while patient on BIPAP/AVAPS/AVAPS AE, , Does not apply, Continuous PRN, Jessica Narvaez MD    ondansetron (ZOFRAN ODT) ODT tab 4 mg, 4 mg, Oral, Q6H PRN **OR** ondansetron (ZOFRAN) injection 4 mg, 4 mg, Intravenous, Q6H PRN, China Cerna PA-C, 4 mg at 07/24/25 1337    oxyCODONE (ROXICODONE) tablet 5 mg, 5 mg, Oral, Q4H PRN **OR** oxyCODONE (ROXICODONE) tablet 10 mg, 10 mg, Oral, Q4H PRN, China Cerna PA-C, 10 mg at 07/23/25 0304    [DISCONTINUED] pantoprazole (PROTONIX) 2 mg/mL suspension 40 mg, 40 mg, Oral or NG Tube, Daily **OR** [START ON 7/25/2025] pantoprazole (PROTONIX) EC tablet 40 mg, 40 mg, Oral, Lara TEE Sara Jane, MD    polyethylene glycol (MIRALAX) Packet 17 g, 17 g, Oral, Daily, China Cerna PA-C, 17 g at 07/22/25 0849    pravastatin (PRAVACHOL) tablet 10 mg, 10 mg, Oral, QPM, China Cerna PA-C, 10 mg at 07/23/25 2152    prochlorperazine (COMPAZINE) injection 10 mg, 10 mg, Intravenous, Q6H PRN, 10 mg at 07/24/25 0503 **OR** prochlorperazine (COMPAZINE) tablet 10 mg, 10 mg, Oral, Q6H PRN, China Cerna PA-C    senna-docusate (SENOKOT-S/PERICOLACE) 8.6-50 MG per tablet 1 tablet, 1 tablet, Oral, BID, Eryn,  "China LEZAMA PA-C, 1 tablet at 07/23/25 2152    tamsulosin (FLOMAX) capsule 0.4 mg, 0.4 mg, Oral, Daily, Eryn China LEZAMA PA-C, 0.4 mg at 07/24/25 0823     Allergies   Allergen Reactions    Iodine Unknown and Anaphylaxis    Shellfish-Derived Products Anaphylaxis    Prednisone Unknown and Other (See Comments)     Depression--gets emotional and angry    Depression--gets emotional and angry      Depression  pt gets emotional and angry    Lisinopril Cough    Metformin Diarrhea          Lab Results    Chemistry CBC Cardiac Enzymes/BNP/TSH/INR   Recent Labs   Lab Test 07/23/25  0300      POTASSIUM 4.3   CHLORIDE 103   CO2 22   *   BUN 43.1*   CR 2.74*   GFRESTIMATED 25*   QUIANA 8.1*     Recent Labs   Lab Test 07/23/25  0300 07/22/25  0429 07/21/25  1606   CR 2.74* 1.97* 1.78*          Recent Labs   Lab Test 07/23/25  0300   WBC 13.3*   HGB 8.7*   HCT 27.6*   MCV 96        Recent Labs   Lab Test 07/23/25  0300 07/22/25  0429 07/21/25  1631   HGB 8.7* 9.5* 11.2*    No results for input(s): \"TROPONINI\" in the last 47552 hours.  Recent Labs   Lab Test 07/15/25  2249   NTBNP 594*     No results for input(s): \"TSH\" in the last 08552 hours.  Recent Labs   Lab Test 07/21/25  1606 07/21/25  1445 07/15/25  2253   INR 1.26* 1.40* 0.98        Janna Khan, SOLEDAD  Cardiac Electrophysiology  [facility]           "

## 2025-07-24 NOTE — PROGRESS NOTES
APRN student/NP attestation  I was present with the nurse practitioner student, Gretchen Melendrez RN who participated in the service and in the documentation of the note. I have verified the history and personally performed the physical exam, reviewed necessary labs/imaging, vitals and health record.  I personally proved the substantive portion of the medical decision makingI agree with the assessment and plan of care as documented in the note.    Yareli Clark NP  Date of Service (when I saw the patient): 7/24/2025    CC:  64 year old male admitted with NSTEMI, post CABG, TARUN gillian-op     RENAL PROGRESS NOTE    PLAN   May need low dose diuretic, Lasix 40mg x 1 today   Avoid nephrotoxins or additional contrast   Pressors as needed, goal MAP >65 for renal perfusion  EP consult pending.   Bladder scan, cath as needed.   BMP in AM.      ASSESSMENT:  TARUN on CKDIII   TARUN likely prerenal with HotN gillian-operatively, ? GEORGES from angio,  sCR ranged from 1.7-1.9 throughout admission but acutely increased to 2.74 with a GFR of 25, mild increase overnoc to 2.82,. BP low/normal with MAPs in the 70s. Another syncopal event this AM per nursing note. Appears to be related to arrhythmia/pacer disconnected. EP consulted by ICU team. Avoid hypotension. Pressor support as needed   CKD is multifactorial. Does have a history of bladder neoplasm (non-malignant) and obstructive stones, 7/2024. Follows with urology for yearly checks. Other contributors likely due to diabetic nephropathy with a recent A1c of 9.1%, hypertension, and obesity. Baseline sCR appears to be 1.7s.   Avoid nephrotoxins, NSAIDs, addition contrast. Continue to hold ARB, SGLT2i,   May need to restart diuretics    PTA Jardiance (on hold), ARB (on hold)     NSTEMI  Presented with a few day of chest pain, EKG with ST depression. Coronary angio with multivessel disease. S/p CABG on 7/21. ECHO shows and EF of 55% with no significant valve disease.   On BB, 81mg ASA,  "Given 40mg IV lasix 2x post op. May need to add low dose diuretic with increasing edema (BLLE and hands).   Has associated acute hypoxic respiratory failure following angio which is improving. Did have a CT showing groundless opacities believed to be edema. Is on 5L NC on exam, trace BLLE edema  Followed by cards and CT surgery, new EP consult per ICU     HTN (now HoTN)  BP low normal today. Hypotension yesterday. On low dose BB  PTA amlodipine and losartan on hold.   Avoid Hypotension. Low threshold for pressors as needed     Volume:  Gen edema, moderate UO, add Lasix x 1 again today     DM  -190 last several days  PTA basal and rapid acting insulin, Insulin managed by [facility]  Victoza (on hold), Jardiance (on hold)  Recent A1C 9.1%. Pt notes his pharmacy has been 'out of insulin' for the last 4 months. He has been completely out of supplies to check blood sugars. Has been taking Victoza (recently started) and Jardiance.      HLD  On statin     SUBJECTIVE:  Mayito is very flat today. He is feeling frustrated with his continued syncope episodes. Discussed how ICU has consulted the EP team. He feels the edema in his arms is slightly better but his hands \"feel tight\". He now has pitting edema in his BLLE worse on the left. Discussed the potential to restart diuretics but that cardiology would make the ultimate decision. He answers in one word statements due to depressive affect. 2nd visit discussed with RN, not voiding, straight cath x 1 today, edematous, will give Lasix, continue bladder  scan, polanco if needed.     OBJECTIVE:  Physical Exam       BP: 118/58 Pulse: 89   Resp: 18 SpO2: 95 % O2 Device: Nasal cannula Oxygen Delivery: 4 LPM  Vitals:    07/21/25 0700 07/23/25 0607 07/24/25 0640   Weight: 126 kg (277 lb 11.2 oz) 133.2 kg (293 lb 11.2 oz) 132.6 kg (292 lb 4.8 oz)     Vital Signs with Ranges  Pulse:  [] 89  Resp:  [18] 18  BP: ()/(43-69) 118/58  SpO2:  [90 %-98 %] 95 %  I/O last 3 completed " shifts:  In: 1340 [P.O.:1340]  Out: 600 [Urine:550; Emesis/NG output:50]    No data recorded.      Patient Vitals for the past 72 hrs:   Weight   07/24/25 0640 132.6 kg (292 lb 4.8 oz)   07/23/25 0607 133.2 kg (293 lb 11.2 oz)     Intake/Output Summary (Last 24 hours) at 7/24/2025 0811  Last data filed at 7/23/2025 2000  Gross per 24 hour   Intake 1340 ml   Output 600 ml   Net 740 ml       PHYSICAL EXAM:  General - Alert and oriented x3, NAD  Cardiovascular - Regular rate and rhythm, 100% paced on tele monitor, rate 89  Respiratory - on 4L NC  Abd: BS present, no guarding or pain with palpation, no ascites  Extremities - 1-2+ BLLE pitting edema L>R   Skin: dry, LLE bruising from access site.   Neuro:  Grossly intact, no focal deficits  MSK:  Grossly intact  Psych:  Flat affect  I/O: Wt stable, net neutral on I/O    LABORATORY STUDIES:     Recent Labs   Lab 07/23/25  0300 07/22/25  0429 07/21/25  1631 07/21/25  1609 07/21/25  1537 07/21/25  1445 07/21/25  1043 07/21/25  0621 07/20/25  0420   WBC 13.3* 12.1*  --  25.7*  --  20.6*  --  6.1 7.0   RBC 2.87* 3.11*  --  3.73*  --  3.20*  --  3.92* 3.86*   HGB 8.7* 9.5* 11.2* 11.6* 11.8* 10.0*   < > 12.2* 11.7*   HCT 27.6* 28.5* 33* 34.5*  --  29.5*  --  35.7* 35.7*    208  --  281  --  205  --  229 241    < > = values in this interval not displayed.       Basic Metabolic Panel:  Recent Labs   Lab 07/24/25  0421 07/23/25  2158 07/23/25  1647 07/23/25  1255 07/23/25  0300 07/22/25  2101 07/22/25  0546 07/22/25  0429 07/21/25  1735 07/21/25  1631 07/21/25  1607 07/21/25  1606 07/21/25  1537 07/21/25  0724 07/21/25  0621 07/20/25  0738 07/20/25  0420     --   --   --  139  --   --  147*  --  146*  --  146* 147*   < > 138  --  139   POTASSIUM 4.3  4.3  --   --   --  4.3  --   --  4.4  --  4.0  --  4.3 4.0   < > 4.3  --  4.1   CHLORIDE 99  --   --   --  103  --   --  111*  --   --   --  113*  --   --  111*  --  109*   CO2 23  --   --   --  22  --   --  26  --   --    --  17*  --   --  18*  --  21*   BUN 50.3*  --   --   --  43.1*  --   --  38.3*  --   --   --  39.0*  --   --  41.1*  --  42.2*   CR 2.82*  --   --   --  2.74*  --   --  1.97*  --   --   --  1.78*  --   --  1.76*  --  1.74*   * 158* 162* 175* 162* 154*   < > 114*  117*   < >  --    < > 175* 137*   < > 116*   < > 132*   QUIANA 7.8*  --   --   --  8.1*  --   --  7.8*  --   --   --  8.0*  --   --  8.3*  --  8.3*    < > = values in this interval not displayed.       INR  Recent Labs   Lab 07/21/25  1606 07/21/25  1445   INR 1.26* 1.40*        Recent Labs   Lab Test 07/23/25  0300 07/22/25  0429 07/21/25  1609 07/21/25  1606 07/21/25  1537 07/21/25  1445   INR  --   --   --  1.26*  --  1.40*   WBC 13.3* 12.1*   < >  --   --  20.6*   HGB 8.7* 9.5*   < >  --    < > 10.0*    208   < >  --   --  205    < > = values in this interval not displayed.       Personally reviewed current labs      Yareli Clark P-BC  [facility]  [phone]

## 2025-07-24 NOTE — PROGRESS NOTES
"CVTS Daily Progress Note   POD#3 s/p CABG x4 EF 55%   Attending: Laar  LOS: 8    SUBJECTIVE/INTERVAL EVENTS:  Another syncopal episode this am. Mayito is feeling \"down in the dumps\" this am.  Bladder scan this am, needs to void, Gutierrez removed this am  Patient with near syncopal/bradycardic event this morning when up to the bathroom after pacer disconnected. Pacer reconnected and patient recovered. Apparently happened POD#1 as well. Otherwise, patient progressing overall well. Normotensive. Maintaining oxygen saturations on nasal cannula. Ambulating with therapy to chair. Pain well controlled. + BM / flatus. Tolerating diet without nausea. UOP following. Hgb stable.  Creat pending this am, Cr 2.74 <1.97. Patient denies new chest pain, shortness of breath, abdominal pain, calf pain, nausea. All questions answered to patient's satisfaction.     OBJECTIVE:  Pulse:  [] 89  Resp:  [18] 18  BP: ()/(43-69) 118/58  SpO2:  [90 %-98 %] 95 %  Vitals:    07/20/25 0457 07/21/25 0018 07/21/25 0700 07/23/25 0607   Weight: 126.6 kg (279 lb 3.2 oz) 126.6 kg (279 lb 1.6 oz) 126 kg (277 lb 11.2 oz) 133.2 kg (293 lb 11.2 oz)    07/24/25 0640   Weight: 132.6 kg (292 lb 4.8 oz)       Clinically Significant Risk Factors           # Hypocalcemia: Lowest iCa = 4.2 mg/dL in last 2 days, will monitor and replace as appropriate     # Hypoalbuminemia: Lowest albumin = 3.2 g/dL at 7/21/2025  4:06 PM, will monitor as appropriate      # Acute Kidney Injury, unspecified: based on a >150% or 0.3 mg/dL increase in last creatinine compared to past 90 day average, will monitor renal function  # Hypertension: Noted on problem list           # DMII: A1C = 9.1 % (Ref range: <5.7 %) within past 6 months # Severe Obesity: Estimated body mass index is 38.56 kg/m  as calculated from the following:    Height as of this encounter: 1.854 m (6' 1\").    Weight as of this encounter: 132.6 kg (292 lb 4.8 oz). with complications       # History of CABG: " noted on surgical history               Current Medications:    Scheduled Meds:  Current Facility-Administered Medications   Medication Dose Route Frequency Provider Last Rate Last Admin    acetaminophen (TYLENOL) tablet 975 mg  975 mg Oral Q8H China Cerna PA-C   975 mg at 07/24/25 0646    aspirin (ASA) chewable tablet 162 mg  162 mg Oral or NG Tube Daily China Cerna PA-C   162 mg at 07/23/25 0849    Or    aspirin (ASA) Suppository 300 mg  300 mg Rectal Daily China Cerna PA-C        [Held by provider] empagliflozin (JARDIANCE) tablet 10 mg  10 mg Oral Daily China Cerna PA-C        escitalopram (LEXAPRO) tablet 20 mg  20 mg Oral QPM China Cerna PA-C   20 mg at 07/23/25 2152    [Held by provider] furosemide (LASIX) injection 40 mg  40 mg Intravenous BID Nida Atkinson APRN CNP   40 mg at 07/22/25 2122    heparin ANTICOAGULANT injection 5,000 Units  5,000 Units Subcutaneous Q8H China Cerna PA-C   5,000 Units at 07/24/25 0646    insulin aspart (NovoLOG) injection (RAPID ACTING)  1-7 Units Subcutaneous TID AC Nida Atkinson APRN CNP   1 Units at 07/23/25 1707    insulin aspart (NovoLOG) injection (RAPID ACTING)  1-5 Units Subcutaneous At Bedtime Nida Atkinson APRN CNP        insulin glargine (LANTUS PEN) injection 8 Units  8 Units Subcutaneous QAM AC Nida Atkinson APRN CNP        Lidocaine (LIDOCARE) 4 % Patch 1-2 patch  1-2 patch Transdermal Q24H China Cerna PA-C   1 patch at 07/23/25 1704    [Held by provider] metoprolol tartrate (LOPRESSOR) half-tab 12.5 mg  12.5 mg Oral BID Nida Atkinson APRN CNP   12.5 mg at 07/22/25 2130    pantoprazole (PROTONIX) 2 mg/mL suspension 40 mg  40 mg Oral or NG Tube Daily China Cerna PA-C        Or    pantoprazole (PROTONIX) EC tablet 40 mg  40 mg Oral Daily China Cerna PA-C   40 mg at 07/23/25 0850    polyethylene glycol (MIRALAX) Packet 17 g  17 g Oral Daily Eryn,  China LEZAMA PA-C   17 g at 07/22/25 0849    pravastatin (PRAVACHOL) tablet 10 mg  10 mg Oral QPM China Cerna PA-C   10 mg at 07/23/25 2152    senna-docusate (SENOKOT-S/PERICOLACE) 8.6-50 MG per tablet 1 tablet  1 tablet Oral BID China Cerna PA-C   1 tablet at 07/23/25 2152    tamsulosin (FLOMAX) capsule 0.4 mg  0.4 mg Oral Daily China Cerna PA-C   0.4 mg at 07/23/25 0849     Continuous Infusions:  Current Facility-Administered Medications   Medication Dose Route Frequency Provider Last Rate Last Admin    No lozenges or gum should be given while patient on BIPAP/AVAPS/AVAPS AE   Does not apply Continuous PRN Jessica Narvaez MD         PRN Meds:.  Current Facility-Administered Medications   Medication Dose Route Frequency Provider Last Rate Last Admin    bisacodyl (DULCOLAX) suppository 10 mg  10 mg Rectal Daily PRN China Cerna PA-C        calcium gluconate 1 g in 50 mL in sodium chloride intermittent infusion  1 g Intravenous Once PRN China Cerna PA-C   1 g at 07/24/25 0647    calcium gluconate 2 g in  mL intermittent infusion  2 g Intravenous Once PRN China Cerna PA-C        calcium gluconate 3 g in sodium chloride 0.9 % 100 mL intermittent infusion  3 g Intravenous Once PRN China Cerna PA-C        glucose gel 15-30 g  15-30 g Oral Q15 Min PRN Nida Atkinson APRN CNP        Or    dextrose 50 % injection 25-50 mL  25-50 mL Intravenous Q15 Min PRN Nida Atkinson APRN CNP        Or    glucagon injection 1 mg  1 mg Subcutaneous Q15 Min PRN Nida Atkinson APRN CNP        hydrALAZINE (APRESOLINE) injection 10 mg  10 mg Intravenous Q30 Min PRN China Cerna PA-C        lactated ringers BOLUS 250 mL  250 mL Intravenous Q15 Min PRN China Cerna PA-C   250 mL at 07/22/25 0243    magnesium hydroxide (MILK OF MAGNESIA) suspension 30 mL  30 mL Oral Daily PRN China Cerna, PA-C        methocarbamol (ROBAXIN)  tablet 500 mg  500 mg Oral Q6H PRN China Cerna PA-C   500 mg at 07/23/25 1918    naloxone (NARCAN) injection 0.2 mg  0.2 mg Intravenous Q2 Min PRN Lina Bermudez MD        Or    naloxone (NARCAN) injection 0.4 mg  0.4 mg Intravenous Q2 Min PRN Lina Bermudez MD        Or    naloxone (NARCAN) injection 0.2 mg  0.2 mg Intramuscular Q2 Min PRN Lina Bermudez MD        Or    naloxone (NARCAN) injection 0.4 mg  0.4 mg Intramuscular Q2 Min PRN Lina Bermudez MD        No lozenges or gum should be given while patient on BIPAP/AVAPS/AVAPS AE   Does not apply Continuous PRN Jessica Narvaez MD        ondansetron (ZOFRAN ODT) ODT tab 4 mg  4 mg Oral Q6H PRN China Cerna PA-C        Or    ondansetron (ZOFRAN) injection 4 mg  4 mg Intravenous Q6H PRN China Cerna PA-C   4 mg at 07/23/25 0549    oxyCODONE (ROXICODONE) tablet 5 mg  5 mg Oral Q4H PRN China Cerna PA-C        Or    oxyCODONE (ROXICODONE) tablet 10 mg  10 mg Oral Q4H PRN China Cerna PA-C   10 mg at 07/23/25 0304    prochlorperazine (COMPAZINE) injection 10 mg  10 mg Intravenous Q6H PRN China Cerna PA-C   10 mg at 07/24/25 0559    Or    prochlorperazine (COMPAZINE) tablet 10 mg  10 mg Oral Q6H PRN China Cerna PA-C           Cardiographics:    Telemetry monitoring demonstrates paced rhythm at 90bmp per my personal review.    Imaging:  Results for orders placed or performed during the hospital encounter of 07/15/25   XR Chest Port 1 View    Impression    IMPRESSION:     No acute cardiopulmonary process. Stable cardiomediastinal silhouette.   US Carotid Bilateral    Impression    IMPRESSION:  1.  Mild plaque formation, velocities consistent with less than 50% stenosis in the right internal carotid artery.  2.  Mild plaque formation, velocities consistent with less than 50% stenosis in the left internal carotid artery.  3.  Flow within the vertebral arteries is antegrade.   CT  Chest w/o Contrast    Impression    IMPRESSION:   1.  Chronic bronchial wall thickening and mild bronchial dilatation.  2.  Faint hazy groundglass density opacity throughout both lungs could indicate minimal edema.  3.  Strands of fibrosis and/or linear atelectasis as well as scattered micronodular opacities in the peribronchial lung parenchyma in the mid and lower lungs have increased slightly since 2019.  4.  Mild enlargement central pulmonary arteries (MPA diameter 3.6 cm) could indicate some degree of chronic pulmonary arterial hypertension.  5.  Moderate vessel coronary artery calcification.     XR Chest Port 1 View    Impression    IMPRESSION: Endotracheal tube tip is about 5.8 cm above the marco antonio. Right internal jugular Blomkest-Johnny catheter tip is probably in the proximal right pulmonary artery and is deflected back to the left. Consider withdrawal and advancement prior to obtaining   a wedge pressure.     There are mediastinal and left chest tubes. Low lung volumes. No consolidation. No pleural effusions or pneumothorax. Unchanged cardiac size. Median sternotomy. Status post CABG x4.   XR Chest Port 1 View    Impression    IMPRESSION: Interval median sternotomy and CABG. Right internal jugular Blomkest-Johnny catheter with tip overlying the right pulmonary artery. Mediastinal drain and left chest tube. No significant pneumothorax. Enlarged cardiomediastinal silhouette. No   pulmonary vascular congestion. Streaky perihilar and left lower lung opacities, favoring atelectasis. No significant pleural fluid.       Labs, personally reviewed.  Hemoglobin   Date Value Ref Range Status   07/23/2025 8.7 (L) 13.3 - 17.7 g/dL Final   07/22/2025 9.5 (L) 13.3 - 17.7 g/dL Final   07/21/2025 11.6 (L) 13.3 - 17.7 g/dL Final     Hemoglobin POCT   Date Value Ref Range Status   07/21/2025 11.2 (L) 13.3 - 17.7 g/dL Final   07/21/2025 11.8 (L) 13.3 - 17.7 g/dL Final   07/21/2025 10.5 (L) 13.3 - 17.7 g/dL Final     WBC Count   Date Value  Ref Range Status   07/23/2025 13.3 (H) 4.0 - 11.0 10e3/uL Final   07/22/2025 12.1 (H) 4.0 - 11.0 10e3/uL Final   07/21/2025 25.7 (H) 4.0 - 11.0 10e3/uL Final     Platelet Count   Date Value Ref Range Status   07/23/2025 171 150 - 450 10e3/uL Final   07/22/2025 208 150 - 450 10e3/uL Final   07/21/2025 281 150 - 450 10e3/uL Final     Creatinine   Date Value Ref Range Status   07/23/2025 2.74 (H) 0.67 - 1.17 mg/dL Final   07/22/2025 1.97 (H) 0.67 - 1.17 mg/dL Final   07/21/2025 1.78 (H) 0.67 - 1.17 mg/dL Final     Potassium   Date Value Ref Range Status   07/24/2025 4.3 3.4 - 5.3 mmol/L Final   07/23/2025 4.3 3.4 - 5.3 mmol/L Final   07/22/2025 4.4 3.4 - 5.3 mmol/L Final     Potassium POCT   Date Value Ref Range Status   07/21/2025 4.0 3.4 - 5.3 mmol/L Final   07/21/2025 4.0 3.4 - 5.3 mmol/L Final   07/21/2025 4.4 3.4 - 5.3 mmol/L Final     Magnesium   Date Value Ref Range Status   07/24/2025 2.1 1.7 - 2.3 mg/dL Final   07/23/2025 2.1 1.7 - 2.3 mg/dL Final   07/22/2025 2.2 1.7 - 2.3 mg/dL Final          I/O:  I/O last 3 completed shifts:  In: 1340 [P.O.:1340]  Out: 600 [Urine:550; Emesis/NG output:50]       Physical Exam:    General: Patient seen up in chair conversant/pleasant  HEENT: MAN, no sclera icterus, moist mucosa, nasal cannula in place  CV: Paced rhythm on monitor. 2+ peripheral pulses in all extremities. Mild edema.   Pulm: Non-labored effort on nasal cannula.  Incision C/D/I.  Abd: Soft, NT, ND  : Gutierrez removed  Ext: Mild pedal edema, SCDs in place, warm, distal pulses intact  Neuro: A&Ox3, KNIGHT, and CN grossly intact      ASSESSMENT/PLAN:    Mayito Sood is a 64 year old male with a history of history of CKD, kidney stones, HTN, DM, and obesity who presented with an NSTEMI  who is s/p CABG x4  .    Active Problems:    Status post coronary angiogram    ACS (acute coronary syndrome) (H)    ST elevation myocardial infarction (STEMI), unspecified artery (H)    Coronary artery disease involving native  coronary artery of native heart, unspecified whether angina present        NEURO:   - Scheduled Tylenol/lidocaine patches and PRN Tylenol/oxycodone/Robaxin for pain  - PTA Lexapro    CV:   - Pre-op EF 55%  - Normotensive  - EP consult to eval rhythm/not tolerating native rhythm  - Metoprolol tartrate 12.5 mg BID (held in light of EP consult)  -  mg  - Pravastatin 10mg daily  - Chest tubes removed 7/23~ TPW to remain     PULM:   - Extubated POD#1  - Maintaining oxygen saturations on nasal cannula - wean as able  - Encourage pulmonary toilet  - Needs outpatient sleep study     FEN/GI:  - Diet: ADAT to cardiac diet  - Continue electrolyte replacement protocol  - Bowel regimen    RENAL:  - Bladder scan    - Adequate UOP/hr. Continue to monitor closely.  - Cr pending this am(baseline ~ 1.7-2.0).  - Nephrology consult; appreciate recs  - Gutierrez to be removed today  - Diuresis held given TARUN    HEME:  - Acute blood loss anemia post-op.   - Hgb stable , no bleeding concerns. Hep SQ, ASA    ID:  - Jayde op ppx complete, febrile. No concerns for infection    ENDO:   - HbA1c 9 %  - BG goal < 180 to promote optimal healing  - SSI  - PTA on insulin, but not on due to inventory  - Lantus 8u daily  - PTA Jardiance held  - PTA Flomax    PPx:   - DVT: SCDs, SQ heparin TID, ambulation   - GI: Protonix 40mg PO daily    DISPO:   - Continue critical care in ICU for now  - PT/OT recs at discharge: home w/ outpt cardiac rehab  - Medically Ready for Discharge: Anticipated in 2-4 Days        Patient discussed with Dr. Bermudez.      _______  Nida Atkinson CNP  [facility]   Cardiothoracic Surgery  Secure chat or Vocera  Office: [phone]

## 2025-07-24 NOTE — PROGRESS NOTES
"Care Management Follow Up    Length of Stay (days): 8    Expected Discharge Date: 07/26/2025    Anticipated Discharge Plan:   Home with OP CR and assist     Transportation: Anticipate Family/friend    PT Recommendations:    OT Recommendations:  home with outpatient cardiac rehab, home with assist     Barriers to Discharge: medical stability/ CT Surgery following. POD #3 CABG.     Prior Living Situation:   \"SO Iraida at bedside. Patient reports he lives in his house with Iraida. He is independent with ADLs/IADLs, ambulates without devices mostly but occasionally will use a cane and has no services in community. Iraida is primary family contact and willing to transport at discharge.     Patient endorses having support at home should he discharge home.     He is working on completing his HCD.  Requesting notary once completed. \"      Discussed  Partnership in Safe Discharge Planning  document with patient/family: No     Handoff Completed: No, handoff not indicated or clinically appropriate    Patient/Spokesperson Updated: Yes. Who? Pt     Additional Information:  OT recs home with assist and OP CR. Pt agreeable to this plan.        Next Steps: Follow up with pt to see where he is at with completing HCD, see if notary avail this week? CM will continue to monitor progression of care, review team recommendations and provide discharge planning assist as needed.       Belinda Awan RN  Acute Care Management  [facility]  For further questions/concerns you can reach us at Vocera Web Console    "

## 2025-07-24 NOTE — PROGRESS NOTES
"NUTRITION EDUCATION      REASON FOR ASSESSMENT:  Received consult for S/p CV surgery diet education    LOS - pt eating well prior to surgery, no pressure injuries, weight gain likely related to IV fluids.    NUTRITION HISTORY:  Information obtained from patient.  Pt reports eating a healthy diet at home.  Pt also reports understanding how to eat with DM 2.   He does not use salt.  They call him \"the vegetable nicole\" at work.  He does not like water but, does drink with flavoring added and he drinks diet beverages.  Pt is interested in handout with carbohydrate choices/portions    Pt says he was not able to get insulin regularly for the last six months due to our of stock, or correct strips to measure BG, he has had CGM in the past.    CURRENT DIET:  Low Saturated Fat Na <2400 mg    NUTRITION DIAGNOSIS:  Food- and nutrition-related knowledge deficit R/t heart healthy eating with DM 2    INTERVENTIONS:    Nutrition Prescription:  Heart Healthy Eating with Consistent Carbohydrate    Implementation:      *  Nutrition Education (Content):   A)  Provided handout Heart Healthy Eating with Consistent Carbohydrate and bringing DM placemat to room shortly   B)  Discussed eating a heart healthy diet with consistent amounts of carbohydrate and adequate protein for healing      *  Nutrition Education (Application):   A)  Discussed current eating habits and recommended alternative food choices      *  Anticipate good compliance      *  Diet Education - refer to Education Flowsheet    Goals:      *  Patient verbalized understanding of diet.      *  All of the above goals met during the education session    Follow Up/Monitoring:      *  Provided RD contact information for future questions           "

## 2025-07-25 ENCOUNTER — APPOINTMENT (OUTPATIENT)
Dept: OCCUPATIONAL THERAPY | Facility: HOSPITAL | Age: 65
End: 2025-07-25
Payer: COMMERCIAL

## 2025-07-25 LAB
ANION GAP SERPL CALCULATED.3IONS-SCNC: 7 MMOL/L (ref 7–15)
ATRIAL RATE - MUSE: 88 BPM
BUN SERPL-MCNC: 49.6 MG/DL (ref 8–23)
CA-I BLD-MCNC: 4.2 MG/DL (ref 4.4–5.2)
CA-I BLD-MCNC: 4.3 MG/DL (ref 4.4–5.2)
CA-I BLD-MCNC: 4.3 MG/DL (ref 4.4–5.2)
CALCIUM SERPL-MCNC: 7.9 MG/DL (ref 8.8–10.4)
CHLORIDE SERPL-SCNC: 101 MMOL/L (ref 98–107)
CREAT SERPL-MCNC: 2.27 MG/DL (ref 0.67–1.17)
DIASTOLIC BLOOD PRESSURE - MUSE: NORMAL MMHG
EGFRCR SERPLBLD CKD-EPI 2021: 32 ML/MIN/1.73M2
GLUCOSE BLDC GLUCOMTR-MCNC: 161 MG/DL (ref 70–99)
GLUCOSE BLDC GLUCOMTR-MCNC: 175 MG/DL (ref 70–99)
GLUCOSE BLDC GLUCOMTR-MCNC: 183 MG/DL (ref 70–99)
GLUCOSE BLDC GLUCOMTR-MCNC: 220 MG/DL (ref 70–99)
GLUCOSE BLDC GLUCOMTR-MCNC: 222 MG/DL (ref 70–99)
GLUCOSE SERPL-MCNC: 183 MG/DL (ref 70–99)
HCO3 SERPL-SCNC: 27 MMOL/L (ref 22–29)
HOLD SPECIMEN: NORMAL
INTERPRETATION ECG - MUSE: NORMAL
MAGNESIUM SERPL-MCNC: 2.1 MG/DL (ref 1.7–2.3)
MCV RBC AUTO: 94 FL (ref 78–100)
P AXIS - MUSE: 51 DEGREES
PHOSPHATE SERPL-MCNC: 3.6 MG/DL (ref 2.5–4.5)
PLATELET # BLD AUTO: 202 10E3/UL (ref 150–450)
POTASSIUM SERPL-SCNC: 4.1 MMOL/L (ref 3.4–5.3)
PR INTERVAL - MUSE: 142 MS
QRS DURATION - MUSE: 164 MS
QT - MUSE: 422 MS
QTC - MUSE: 510 MS
R AXIS - MUSE: -37 DEGREES
SODIUM SERPL-SCNC: 135 MMOL/L (ref 135–145)
SYSTOLIC BLOOD PRESSURE - MUSE: NORMAL MMHG
T AXIS - MUSE: 69 DEGREES
VENTRICULAR RATE- MUSE: 88 BPM

## 2025-07-25 PROCEDURE — 999N000156 HC STATISTIC RCP CONSULT EA 30 MIN

## 2025-07-25 PROCEDURE — 99232 SBSQ HOSP IP/OBS MODERATE 35: CPT | Performed by: INTERNAL MEDICINE

## 2025-07-25 PROCEDURE — 80048 BASIC METABOLIC PNL TOTAL CA: CPT | Performed by: NURSE PRACTITIONER

## 2025-07-25 PROCEDURE — 84100 ASSAY OF PHOSPHORUS: CPT | Performed by: THORACIC SURGERY (CARDIOTHORACIC VASCULAR SURGERY)

## 2025-07-25 PROCEDURE — 250N000013 HC RX MED GY IP 250 OP 250 PS 637

## 2025-07-25 PROCEDURE — 94799 UNLISTED PULMONARY SVC/PX: CPT

## 2025-07-25 PROCEDURE — 85049 AUTOMATED PLATELET COUNT: CPT

## 2025-07-25 PROCEDURE — 99232 SBSQ HOSP IP/OBS MODERATE 35: CPT | Performed by: NURSE PRACTITIONER

## 2025-07-25 PROCEDURE — 999N000157 HC STATISTIC RCP TIME EA 10 MIN

## 2025-07-25 PROCEDURE — 82330 ASSAY OF CALCIUM: CPT

## 2025-07-25 PROCEDURE — 250N000013 HC RX MED GY IP 250 OP 250 PS 637: Performed by: NURSE PRACTITIONER

## 2025-07-25 PROCEDURE — 82330 ASSAY OF CALCIUM: CPT | Performed by: THORACIC SURGERY (CARDIOTHORACIC VASCULAR SURGERY)

## 2025-07-25 PROCEDURE — 93005 ELECTROCARDIOGRAM TRACING: CPT

## 2025-07-25 PROCEDURE — 94660 CPAP INITIATION&MGMT: CPT

## 2025-07-25 PROCEDURE — 99024 POSTOP FOLLOW-UP VISIT: CPT | Performed by: NURSE PRACTITIONER

## 2025-07-25 PROCEDURE — 250N000013 HC RX MED GY IP 250 OP 250 PS 637: Performed by: THORACIC SURGERY (CARDIOTHORACIC VASCULAR SURGERY)

## 2025-07-25 PROCEDURE — 97110 THERAPEUTIC EXERCISES: CPT | Mod: GO

## 2025-07-25 PROCEDURE — 36415 COLL VENOUS BLD VENIPUNCTURE: CPT

## 2025-07-25 PROCEDURE — 93010 ELECTROCARDIOGRAM REPORT: CPT | Performed by: INTERNAL MEDICINE

## 2025-07-25 PROCEDURE — 83735 ASSAY OF MAGNESIUM: CPT | Performed by: THORACIC SURGERY (CARDIOTHORACIC VASCULAR SURGERY)

## 2025-07-25 PROCEDURE — 200N000001 HC R&B ICU

## 2025-07-25 PROCEDURE — 250N000011 HC RX IP 250 OP 636

## 2025-07-25 PROCEDURE — 97535 SELF CARE MNGMENT TRAINING: CPT | Mod: GO

## 2025-07-25 PROCEDURE — 36415 COLL VENOUS BLD VENIPUNCTURE: CPT | Performed by: THORACIC SURGERY (CARDIOTHORACIC VASCULAR SURGERY)

## 2025-07-25 RX ORDER — ATORVASTATIN CALCIUM 40 MG/1
80 TABLET, FILM COATED ORAL EVERY EVENING
Status: DISCONTINUED | OUTPATIENT
Start: 2025-07-25 | End: 2025-07-30 | Stop reason: HOSPADM

## 2025-07-25 RX ADMIN — CALCIUM GLUCONATE 1 G: 20 INJECTION, SOLUTION INTRAVENOUS at 05:10

## 2025-07-25 RX ADMIN — CALCIUM GLUCONATE 1 G: 20 INJECTION, SOLUTION INTRAVENOUS at 14:42

## 2025-07-25 RX ADMIN — ONDANSETRON 4 MG: 2 INJECTION, SOLUTION INTRAMUSCULAR; INTRAVENOUS at 11:00

## 2025-07-25 RX ADMIN — TAMSULOSIN HYDROCHLORIDE 0.4 MG: 0.4 CAPSULE ORAL at 08:24

## 2025-07-25 RX ADMIN — ESCITALOPRAM 20 MG: 20 TABLET, FILM COATED ORAL at 21:19

## 2025-07-25 RX ADMIN — INSULIN ASPART 1 UNITS: 100 INJECTION, SOLUTION INTRAVENOUS; SUBCUTANEOUS at 08:31

## 2025-07-25 RX ADMIN — HEPARIN SODIUM 5000 UNITS: 5000 INJECTION, SOLUTION INTRAVENOUS; SUBCUTANEOUS at 05:11

## 2025-07-25 RX ADMIN — HEPARIN SODIUM 5000 UNITS: 5000 INJECTION, SOLUTION INTRAVENOUS; SUBCUTANEOUS at 13:42

## 2025-07-25 RX ADMIN — INSULIN ASPART 2 UNITS: 100 INJECTION, SOLUTION INTRAVENOUS; SUBCUTANEOUS at 17:57

## 2025-07-25 RX ADMIN — HYDRALAZINE HYDROCHLORIDE 10 MG: 20 INJECTION INTRAMUSCULAR; INTRAVENOUS at 11:07

## 2025-07-25 RX ADMIN — ASPIRIN 162 MG: 81 TABLET, COATED ORAL at 08:24

## 2025-07-25 RX ADMIN — POLYETHYLENE GLYCOL 3350 17 G: 17 POWDER, FOR SOLUTION ORAL at 08:24

## 2025-07-25 RX ADMIN — ACETAMINOPHEN 975 MG: 325 TABLET, FILM COATED ORAL at 13:42

## 2025-07-25 RX ADMIN — SENNOSIDES, DOCUSATE SODIUM 1 TABLET: 50; 8.6 TABLET, FILM COATED ORAL at 21:19

## 2025-07-25 RX ADMIN — HEPARIN SODIUM 5000 UNITS: 5000 INJECTION, SOLUTION INTRAVENOUS; SUBCUTANEOUS at 21:18

## 2025-07-25 RX ADMIN — ACETAMINOPHEN 975 MG: 325 TABLET, FILM COATED ORAL at 21:19

## 2025-07-25 RX ADMIN — LIDOCAINE 2 PATCH: 246 PATCH TOPICAL at 17:57

## 2025-07-25 RX ADMIN — SENNOSIDES, DOCUSATE SODIUM 1 TABLET: 50; 8.6 TABLET, FILM COATED ORAL at 08:24

## 2025-07-25 RX ADMIN — CALCIUM GLUCONATE 1 G: 20 INJECTION, SOLUTION INTRAVENOUS at 19:47

## 2025-07-25 RX ADMIN — PANTOPRAZOLE SODIUM 40 MG: 40 TABLET, DELAYED RELEASE ORAL at 08:24

## 2025-07-25 RX ADMIN — ATORVASTATIN CALCIUM 80 MG: 40 TABLET, FILM COATED ORAL at 21:18

## 2025-07-25 RX ADMIN — INSULIN ASPART 2 UNITS: 100 INJECTION, SOLUTION INTRAVENOUS; SUBCUTANEOUS at 12:56

## 2025-07-25 RX ADMIN — ACETAMINOPHEN 975 MG: 325 TABLET, FILM COATED ORAL at 05:08

## 2025-07-25 ASSESSMENT — ACTIVITIES OF DAILY LIVING (ADL)
ADLS_ACUITY_SCORE: 50

## 2025-07-25 NOTE — PROGRESS NOTES
"[facility]  Cardiac Electrophysiology  [address]  [place] [address]   Office: [phone]  Fax: [phone]     Cardiac Electrophysiology Progress Note      Patient: Mayito Sood  Referring Provider: Cheng Mills  Date of admission: 7/15/2025       Assessment/Recommendations     Presyncope: recurrent presyncope with features of orthostasis and vasovagal syncope which seems to be slowly improving as he has become more ambulatory. No correlating events by telemetry. No documented profound bradycardia or high grade AV block postoperatively. He is tolerating slow pacemaker wean which has been set to 60 bpm since yesterday afternoon with intact intrinsic conduction  --Pacemaker rate reduced to 50 bpm today  --Anticipate TPW removal in the next day or so if he continues to tolerate    EP will sign off, please call with further questions or concerns            Interval Events     -Pacemaker at 60 bpm >12 hours with intact intrinsic conduction   -Ambulating mccartney today with cardiac rehab, mild dizziness          Data Review     EKG/telemetry  Telemetry shows sinus rhythm with IVCD 70-80s  EKG 7/22/2025: atrial paced 89 bpm   7/15/2025: SR 90 bpm,  ms, QT/QTc 358/437 ms    TTE 7/16/2025  1. Normal left ventricular size and systolic performance. The visually  estimated ejection fraction is 55%.  2. On selected views, the distal anterior segment appears mildly hypokinetic.  3. No significant valvular heart disease is identified on this study.  4. Normal right ventricular size and systolic performance.  5. There is mild left atrial enlargement.  6. There is mild enlargement of the proximal ascending aorta (3.9 cm)         Physical Examination  Review of Systems   VITALS: BP (!) 154/70   Pulse 82   Temp 99  F (37.2  C)   Resp 16   Ht 1.854 m (6' 1\")   Wt 132.6 kg (292 lb 4.8 oz)   SpO2 93%   BMI 38.56 kg/m    Wt Readings from Last 3 Encounters:   07/24/25 132.6 " kg (292 lb 4.8 oz)   07/10/24 126.2 kg (278 lb 4.8 oz)   06/11/24 127 kg (280 lb)       Intake/Output Summary (Last 24 hours) at 7/23/2025 1248  Last data filed at 7/23/2025 0600  Gross per 24 hour   Intake 1120 ml   Output 870 ml   Net 250 ml       CONSTITUTIONAL: no distress  E/N/T:  Oral mucosa pink, moist mucous membranes  RESPIRATORY:  Respiratory effort is normal  CARDIOVASCULAR:  regular, normal S1 and S2  EXTREMITIES:  No clubbing or cyanosis  SKIN:  warm and dry  NEURO/PSYCH:  in chair, oriented x 3 with normal affect.   ROS: 10 point ROS neg other than the symptoms noted above in the HPI.         Medical History  Surgical History   Past Medical History:   Diagnosis Date    Acute pain     Acute sinusitis     TARUN (acute kidney injury)     Anxiety and depression 02/08/2023    Bacteremia     Cellulitis     Chronic back pain     Diabetes mellitus, type 2 (H) 08/27/2014    Dyslipidemia 07/05/2017    ED (erectile dysfunction)     HTN (hypertension) 08/29/2014    Infection involving bone (H)     Obesity     Obstructive uropathy     Osteomyelitis (H)     Polyneuropathy     Created by [email or web address] [facility] Annotation: Jul 19 2010  3:22PM - Kushal Griffin: feet  Replacement Utility updated for latest IMO load         Proliferative diabetic retinopathy of both eyes with macular edema associated with type 2 diabetes mellitus (H) 08/30/2018    Formatting of this note might be different from the original.   Last seen      SIRS (systemic inflammatory response syndrome) (H)     Stage 3b chronic kidney disease (H) 02/08/2023    Ureterolithiasis     Past Surgical History:   Procedure Laterality Date    CHOLECYSTECTOMY      COMBINED CYSTOSCOPY, RETROGRADES, EXCHANGE STENT URETER(S) Left 06/12/2024    Procedure: CYSTOSCOPY, WITH RETROGRADE PYELOGRAM AND LEFT URETERAL STENT PLACEMENT;  Surgeon: Ramon Perea MD;  Location: [facility] OR    CORONARY ARTERY BYPASS GRAFT, WITH ENDOSCOPIC VESSEL PROCUREMENT N/A  7/21/2025    Procedure: CORONARY ARTERY BYPASS GRAFT TIMES FOUR, WITH LEFT ENDOSCOPIC VESSEL PROCUREMENT, LEFT INTERNAL MAMMARY ARTERY HARVEST, EPIAORTIC ULTRASOUND;  Surgeon: Lina Bermudez MD;  Location: [facility] OR    CV CORONARY ANGIOGRAM N/A 7/16/2025    Procedure: Coronary Angiogram;  Surgeon: Cheng Mills MD;  Location: [facility] CATH LAB CV    CV LEFT HEART CATH N/A 7/16/2025    Procedure: Left Heart Catheterization;  Surgeon: Cheng Mills MD;  Location: [facility] LAB CV    CYSTOSCOPY, BIOPSY BLADDER, COMBINED N/A 7/10/2024    Procedure: CYSTOSCOPY WITH BLADDER BIOPSY AND FULGURATION;  Surgeon: Ramon Perea MD;  Location: [facility] OR    CYSTOURETEROSCOPY, WITH LITHOTRIPSY USING CHACE 120P LASER AND URETERAL STENT INSERTION Left 7/10/2024    Procedure: CYSTOURETEROSCOPY, WITH RETROGRADE PYELOGRAM, HOLMIUM LASER LITHOTRIPSY OF URETERAL CALCULUS, AND STENT EXCHANGE;  Surgeon: Ramon Perea MD;  Location: [facility] OR    HC REPAIR ACHILLES TENDON,PRIMARY      Description: Primary Repair Of Ruptured Achilles Tendon;  Proc Date: 01/09/2003;    IR LUMBAR EPIDURAL STEROID INJECTION  12/04/2003    IR LUMBAR EPIDURAL STEROID INJECTION  07/07/2009    IR LUMBAR EPIDURAL STEROID INJECTION  08/20/2009    PICC  08/29/2014         TRANSESOPHAGEAL ECHOCARDIOGRAM INTRAOPERATIVE N/A 7/21/2025    Procedure: ECHOCARDIOGRAM, TRANSESOPHAGEAL, INTRAOPERATIVE;  Surgeon: Lina Bermudez MD;  Location: [facility] OR         Family History Social History   Family History   Problem Relation Age of Onset    Cancer Mother         Social History     Tobacco Use    Smoking status: Never     Passive exposure: Never    Smokeless tobacco: Never   Substance Use Topics    Alcohol use: Yes     Comment: rare    Drug use: No         Medications  Allergies     Current Facility-Administered Medications:     acetaminophen (TYLENOL) tablet 975 mg, 975 mg, Oral, Q8H, China Cerna PA-C, 975  mg at 07/25/25 0508    aspirin (ASA) chewable tablet 162 mg, 162 mg, Oral or NG Tube, Daily, 162 mg at 07/25/25 0824 **OR** aspirin (ASA) Suppository 300 mg, 300 mg, Rectal, Daily, China Cerna PA-C    atorvastatin (LIPITOR) tablet 80 mg, 80 mg, Oral, QPM, Nida Atkinson, APRN CNP    bisacodyl (DULCOLAX) suppository 10 mg, 10 mg, Rectal, Daily PRN, China Cerna PA-C    calcium gluconate 1 g in 50 mL in sodium chloride intermittent infusion, 1 g, Intravenous, Once PRN, China Cerna PA-C, 1 g at 07/25/25 0510    calcium gluconate 2 g in  mL intermittent infusion, 2 g, Intravenous, Once PRN, China Cerna PA-C    calcium gluconate 3 g in sodium chloride 0.9 % 100 mL intermittent infusion, 3 g, Intravenous, Once PRN, China Cerna PA-C    glucose gel 15-30 g, 15-30 g, Oral, Q15 Min PRN **OR** dextrose 50 % injection 25-50 mL, 25-50 mL, Intravenous, Q15 Min PRN **OR** glucagon injection 1 mg, 1 mg, Subcutaneous, Q15 Min PRN, Nida Atkinson APRN CNP    [Held by provider] empagliflozin (JARDIANCE) tablet 10 mg, 10 mg, Oral, Daily, China Cerna PA-C    escitalopram (LEXAPRO) tablet 20 mg, 20 mg, Oral, QPM, China Cerna PA-C, 20 mg at 07/24/25 2030    heparin ANTICOAGULANT injection 5,000 Units, 5,000 Units, Subcutaneous, Q8H, China Cerna PA-C, 5,000 Units at 07/25/25 0511    hydrALAZINE (APRESOLINE) injection 10 mg, 10 mg, Intravenous, Q30 Min PRN, China Cerna PA-C, 10 mg at 07/25/25 1107    insulin aspart (NovoLOG) injection (RAPID ACTING), 1-7 Units, Subcutaneous, TID AC, Nida Atkinson APRN CNP, 1 Units at 07/25/25 0831    insulin aspart (NovoLOG) injection (RAPID ACTING), 1-5 Units, Subcutaneous, At Bedtime, Nida Atkinson APRN CNP    insulin glargine (LANTUS PEN) injection 8 Units, 8 Units, Subcutaneous, QAM ELIZABETH, Nida Atkinson APRN CNP, 8 Units at 07/25/25 0831    lactated ringers BOLUS 250 mL, 250 mL, Intravenous,  Q15 Min PRN, China Cerna PA-C, 250 mL at 07/22/25 0243    Lidocaine (LIDOCARE) 4 % Patch 1-2 patch, 1-2 patch, Transdermal, Q24H, China Cerna PA-C, 2 patch at 07/24/25 1617    magnesium hydroxide (MILK OF MAGNESIA) suspension 30 mL, 30 mL, Oral, Daily PRN, China Cerna PA-C    methocarbamol (ROBAXIN) tablet 500 mg, 500 mg, Oral, Q6H PRN, China Cerna PA-C, 500 mg at 07/23/25 1918    [Held by provider] metoprolol tartrate (LOPRESSOR) half-tab 12.5 mg, 12.5 mg, Oral, BID, Nida Atkinson, APRN CNP, 12.5 mg at 07/22/25 2130    naloxone (NARCAN) injection 0.2 mg, 0.2 mg, Intravenous, Q2 Min PRN **OR** naloxone (NARCAN) injection 0.4 mg, 0.4 mg, Intravenous, Q2 Min PRN **OR** naloxone (NARCAN) injection 0.2 mg, 0.2 mg, Intramuscular, Q2 Min PRN **OR** naloxone (NARCAN) injection 0.4 mg, 0.4 mg, Intramuscular, Q2 Min PRN, Lina Bermudez MD    No lozenges or gum should be given while patient on BIPAP/AVAPS/AVAPS AE, , Does not apply, Continuous PRN, Jessica Narvaez MD    ondansetron (ZOFRAN ODT) ODT tab 4 mg, 4 mg, Oral, Q6H PRN **OR** ondansetron (ZOFRAN) injection 4 mg, 4 mg, Intravenous, Q6H PRN, China Cerna PA-C, 4 mg at 07/25/25 1100    oxyCODONE (ROXICODONE) tablet 5 mg, 5 mg, Oral, Q4H PRN **OR** oxyCODONE (ROXICODONE) tablet 10 mg, 10 mg, Oral, Q4H PRN, China Cerna PA-C, 10 mg at 07/23/25 0304    [DISCONTINUED] pantoprazole (PROTONIX) 2 mg/mL suspension 40 mg, 40 mg, Oral or NG Tube, Daily **OR** pantoprazole (PROTONIX) EC tablet 40 mg, 40 mg, Oral, Lara TEE Sara Jane, MD, 40 mg at 07/25/25 0824    polyethylene glycol (MIRALAX) Packet 17 g, 17 g, Oral, Daily, China Cerna PA-C, 17 g at 07/25/25 0824    prochlorperazine (COMPAZINE) injection 10 mg, 10 mg, Intravenous, Q6H PRN, 10 mg at 07/24/25 0559 **OR** prochlorperazine (COMPAZINE) tablet 10 mg, 10 mg, Oral, Q6H PRN, China Cerna PA-C    senna-docusate  "(SENOKOT-S/PERICOLACE) 8.6-50 MG per tablet 1 tablet, 1 tablet, Oral, BID, China Cerna PA-C, 1 tablet at 07/25/25 0824    tamsulosin (FLOMAX) capsule 0.4 mg, 0.4 mg, Oral, Daily, China Cerna PA-C, 0.4 mg at 07/25/25 0824     Allergies   Allergen Reactions    Iodine Unknown and Anaphylaxis    Shellfish-Derived Products Anaphylaxis    Prednisone Unknown and Other (See Comments)     Depression--gets emotional and angry    Depression--gets emotional and angry      Depression  pt gets emotional and angry    Lisinopril Cough    Metformin Diarrhea          Lab Results    Chemistry CBC Cardiac Enzymes/BNP/TSH/INR   Recent Labs   Lab Test 07/23/25  0300      POTASSIUM 4.3   CHLORIDE 103   CO2 22   *   BUN 43.1*   CR 2.74*   GFRESTIMATED 25*   QUIANA 8.1*     Recent Labs   Lab Test 07/23/25  0300 07/22/25  0429 07/21/25  1606   CR 2.74* 1.97* 1.78*          Recent Labs   Lab Test 07/23/25  0300   WBC 13.3*   HGB 8.7*   HCT 27.6*   MCV 96        Recent Labs   Lab Test 07/23/25  0300 07/22/25  0429 07/21/25  1631   HGB 8.7* 9.5* 11.2*    No results for input(s): \"TROPONINI\" in the last 69758 hours.  Recent Labs   Lab Test 07/15/25  2249   NTBNP 594*     No results for input(s): \"TSH\" in the last 06620 hours.  Recent Labs   Lab Test 07/21/25  1606 07/21/25  1445 07/15/25  2253   INR 1.26* 1.40* 0.98        Janna Khan, CNP  Cardiac Electrophysiology  [facility]           "

## 2025-07-25 NOTE — PROGRESS NOTES
Attempted to place the patient on [employer] x3. Patient was awake on his tablet for majority of the night. Patient did not decline wearing it, he just wasn't ready for bed on each check.

## 2025-07-25 NOTE — PLAN OF CARE
Goal Outcome Evaluation:      Plan of Care Reviewed With: patient    Overall Patient Progress: improvingOverall Patient Progress: improving    Outcome Evaluation: Denies pain. Denies nausea at this time. Vitals stable.

## 2025-07-25 NOTE — PROGRESS NOTES
"Care Management Follow Up    Length of Stay (days): 9    Expected Discharge Date: 07/26/2025    Anticipated Discharge Plan:   Home with OP CR and assist     Transportation: Anticipate Family/friend    PT Recommendations:    OT Recommendations:  home with outpatient cardiac rehab, home with assist     Barriers to Discharge: medical stability/ CT Surgery following. POD #4 CABG    Prior Living Situation:  \"SO Iraida at bedside. Patient reports he lives in his house with Iraida. He is independent with ADLs/IADLs, ambulates without devices mostly but occasionally will use a cane and has no services in community. Iraida is primary family contact and willing to transport at discharge.     Patient endorses having support at home should he discharge home.     He is working on completing his HCD.  Requesting notary once completed. \"    Discussed  Partnership in Safe Discharge Planning  document with patient/family: No     Handoff Completed: No, handoff not indicated or clinically appropriate    Patient/Spokesperson Updated: Yes. Who? Pt     Additional Information:  OT recs home with assist and OP CR. Pt agreeable to this plan.       Next Steps: CM will continue to monitor progression of care, review team recommendations and provide discharge planning assist as needed.     Belinda Awan RN  Acute Care Management  [facility]  For further questions/concerns you can reach us at Vocera Web Console    "

## 2025-07-25 NOTE — PROGRESS NOTES
APRN student/NP attestation  I was present with the nurse practitioner student, Gretchen Melendrez RN who participated in the service and in the documentation of the note. I have verified the history and personally performed the physical exam, reviewed necessary labs/imaging, vitals and health record.  I personally proved the substantive portion of the medical decision makingI agree with the assessment and plan of care as documented in the note.    Yareli Clark NP  Date of Service (when I saw the patient): 7/24/2025    CC:  64 year old male admitted with NSTEMI, post CABG, TARUN gillian-op     RENAL PROGRESS NOTE    PLAN   Ongoing improvement in RF, good UO since polanco replaced.  No renal related changes today, defer diuretics to icu/cards  We will sign off cares, please reconsult if renal specific issues.        ASSESSMENT:  TARUN on CKDIII   Non-oliguric, TARUN likely prerenal with HotN gillian-operatively, ? GEORGES from angio, IMPROVING  sCR ranged from 1.7-1.9 throughout admission but acute rise, peak 2.8, now down 2.27 today, good UO   CKD is multifactorial. Does have a history of bladder neoplasm (non-malignant) and obstructive stones, 7/2024. Follows with urology for yearly checks. Other contributors likely due to diabetic nephropathy with a recent A1c of 9.1%, hypertension, and obesity.  PTA Jardiance (on hold), ARB (on hold)     NSTEMI  Presented with a few day of chest pain, EKG with ST depression. Coronary angio with multivessel disease. S/p CABG on 7/21. ECHO shows and EF of 55% with no significant valve disease.      HTN (now HoTN)  BP controlled   PTA amlodipine and losartan on hold.     Volume:  Good UO, wt trending down     DM  Insulin managed by [facility]  Victoza (on hold), Jardiance (on hold)  Recent A1C 9.1%.   Pt notes his pharmacy has been 'out of insulin' for the last 4 months. He has been completely out of supplies to check blood sugars. Has been taking Victoza (recently started) and Jardiance.       HLD  On statin     SUBJECTIVE:  pt seen in chair in hallway working with therapy and RN's , was walking and had another syncopal episode, feeling better, then back in room, in chair, legs elevated.  Good UO via polanco, placed yesterday d/t ongoing retention.  Weaning back off 02, now 2L , discussed with ANDRÉS Perla.     OBJECTIVE:  Physical Exam   Temp: 99  F (37.2  C) Temp src: Oral BP: 137/63 Pulse: 81   Resp: 16 SpO2: (!) 91 % O2 Device: Nasal cannula Oxygen Delivery: 2 LPM  Vitals:    07/21/25 0700 07/23/25 0607 07/24/25 0640   Weight: 126 kg (277 lb 11.2 oz) 133.2 kg (293 lb 11.2 oz) 132.6 kg (292 lb 4.8 oz)     Vital Signs with Ranges  Temp:  [98.1  F (36.7  C)-99  F (37.2  C)] 99  F (37.2  C)  Pulse:  [71-89] 81  Resp:  [16-18] 16  BP: (126-152)/(56-72) 137/63  SpO2:  [90 %-97 %] 91 %  I/O last 3 completed shifts:  In: 237 [P.O.:237]  Out: 2875 [Urine:2875]    No data recorded.      Patient Vitals for the past 72 hrs:   Weight   07/24/25 0640 132.6 kg (292 lb 4.8 oz)   07/23/25 0607 133.2 kg (293 lb 11.2 oz)     Intake/Output Summary (Last 24 hours) at 7/24/2025 0811  Last data filed at 7/23/2025 2000  Gross per 24 hour   Intake 1340 ml   Output 600 ml   Net 740 ml       PHYSICAL EXAM:  General - Alert and oriented x3, NAD, seen in hallway in chair working with therapy   Cardiovascular - Regular rate and rhythm, paced 70's   Respiratory - on 2L NC  Abd: BS present, no guarding or pain with palpation, no ascites  Extremities - 1-2+ BLLE pitting edema L>R   Skin: dry, LLE bruising from access site.   Neuro:  Grossly intact, no focal deficits  MSK:  Grossly intact  Psych:  Flat affect  I/O: Wt stable, net negatiave 2L  on I/O    LABORATORY STUDIES:     Recent Labs   Lab 07/25/25  0410 07/23/25  0300 07/22/25  0429 07/21/25  1631 07/21/25  1609 07/21/25  1537 07/21/25  1445 07/21/25  1043 07/21/25  0621 07/20/25  0420   WBC  --  13.3* 12.1*  --  25.7*  --  20.6*  --  6.1 7.0   RBC  --  2.87* 3.11*  --  3.73*  --   3.20*  --  3.92* 3.86*   HGB  --  8.7* 9.5* 11.2* 11.6* 11.8* 10.0*   < > 12.2* 11.7*   HCT  --  27.6* 28.5* 33* 34.5*  --  29.5*  --  35.7* 35.7*    171 208  --  281  --  205  --  229 241    < > = values in this interval not displayed.       Basic Metabolic Panel:  Recent Labs   Lab 07/25/25  0827 07/25/25  0410 07/24/25  2047 07/24/25  1639 07/24/25  1312 07/24/25  0808 07/24/25  0421 07/23/25  1255 07/23/25  0300 07/22/25  0546 07/22/25  0429 07/21/25  1735 07/21/25  1631 07/21/25  1607 07/21/25  1606 07/21/25  0724 07/21/25  0621   NA  --  135  --   --   --   --  137  --  139  --  147*  --  146*  --  146*   < > 138   POTASSIUM  --  4.1  --   --   --   --  4.3  4.3  --  4.3  --  4.4  --  4.0  --  4.3   < > 4.3   CHLORIDE  --  101  --   --   --   --  99  --  103  --  111*  --   --   --  113*  --  111*   CO2  --  27  --   --   --   --  23  --  22  --  26  --   --   --  17*  --  18*   BUN  --  49.6*  --   --   --   --  50.3*  --  43.1*  --  38.3*  --   --   --  39.0*  --  41.1*   CR  --  2.27*  --   --   --   --  2.82*  --  2.74*  --  1.97*  --   --   --  1.78*  --  1.76*   * 183* 187* 223* 194* 202* 163*   < > 162*   < > 114*  117*   < >  --    < > 175*   < > 116*   QUIANA  --  7.9*  --   --   --   --  7.8*  --  8.1*  --  7.8*  --   --   --  8.0*  --  8.3*    < > = values in this interval not displayed.       INR  Recent Labs   Lab 07/21/25  1606 07/21/25  1445   INR 1.26* 1.40*        Recent Labs   Lab Test 07/25/25  0410 07/23/25  0300 07/22/25  0429 07/21/25  1609 07/21/25  1606 07/21/25  1537 07/21/25  1445   INR  --   --   --   --  1.26*  --  1.40*   WBC  --  13.3* 12.1*   < >  --   --  20.6*   HGB  --  8.7* 9.5*   < >  --    < > 10.0*    171 208   < >  --   --  205    < > = values in this interval not displayed.       Personally reviewed current labs      DIANE Thurman-BC  [facility]  [phone]

## 2025-07-25 NOTE — PROGRESS NOTES
[facility]    Medicine Progress Note - Hospitalist Service    Date of Admission:  7/15/2025    Assessment & Plan     Mayito Sood is a 64 year old male with a past medical history of Type 2 Diabetes presented to the emergency room with chest pain and syncope, found to have multivessel coronary artery disease.  Awaiting on CABG      7/21 : s/p CABG    7/23 :     Continue post op care  Chest tubes removed  Holding iv lasix, creatinine trending up 1.9--2.7  CT surgery  Cardiology consulted for symptomatic sania      7/24 :     Continue post op care  40 mg iv lasix - 1 dose today, creatinine trending up 1.9--2.7--2.82  CT surgery following  Cardiology consulted for symptomatic sania - ower pacemaker rates to 70 bpm today and continue gradual wean. Beta blocker remains on hold       Not medically ready for discharge at this time.      7/25 :     Continue post op care  40 mg iv lasix - 1 dose 7/24, creatinine trending up 1.9--2.7--2.82--2.27  CT surgery following  EP consulted for symptomatic sania - Pacemaker rate reduced to 50 bpm today  --Anticipate TPW removal in the next day or so if he continues to tolerate  Holding BB      Not medically ready for discharge at this time.      A/p :       NSTEMI  Multivessel CAD  - Chest pain started 7/15, had episode of syncope at work  - Patient with troponin 67 -> 97  - EKG with ST Depression. Cardiology not calling STEMI  - Cardiology consult, appreciate input  -Coronary angiogram shows multivessel disease  - CT Surgery consulted; CABG Monday  - Echo with EF 55% mildly hypokinetic distal anterior segment no significant valvular heart disease  - s/p aspirin 325mg x1 and continue 81mg   - Continue atorvastatin 80mg daily   - Metoprolol 25mg BID  - Given ticagrelor x1, not continued -   ---hold off on ACE  ---Chest CT with faint hazy groundglass opacities, possible edema.    ---Ultrasound carotids less than 50% stenosis bilaterally  --Continue cardiac  monitor  --on heparin gtt     Acute Hypoxic Respiratory Failure  - Secondary to above, resolved  --Patient not on home oxygen, requiring up to 10L NC after [facility].  -  fluid overload with NSTEMI -sedation from [facility].   ---CT does show some groundglass opacities  - CXR on 7/15 showing no acute cardiopulmonary process.  --Did get Lasix 7/16.  Further diuresis per cardiology     Acute kidney injury   - Baseline CKD III   CKD Stage 4 Considered and Ruled Out   ---Cr baseline appears to be around 1.75-2.0  --cotninue to hold cozaar   ---did get diuresis earlier- now done  ---holding jardiance  -Continue to monitor renal function      Hyperkalemia  - resolved  --Potassium 5.2 on ED presentation, then 5.8 with AM labs 7/16  --s/p  insulin  lokelma x1   - recheck in am      Hyponatremia  ---resolved     Hypertension  --bp running high  --- increase amlodipine  ---hold cozaar   ---continue metoprolol with hold parameters for bradycardia  --- consider diuretic     Type 2 Diabetes with hyperglycemia  -Poorly controlled at baseline  --Last A1c was 9.1% on 7/15/25  - PTA Meds:  empagliflozin, no insulin for many months per patient , no victoza for a month  ---continue Lantus 25 units nightly   ---hold jardiance  - Sliding Scale Insulin with Glucose Checks     BPH  --continue home flomax     hyperlipidemia  ---was on pravastatin  ---now on atorvastatin     Hypophosphatemia,   --resolved with replacement     Hypocalcemia-  -continue to monitor     Severe Obesity, BMI 37.59  - encourage weight loss.        Diet: Low Saturated Fat Na <2400 mg    DVT Prophylaxis: Heparin GTT  Gutierrez Catheter: PRESENT, indication: ICU only: hourly urine output needed for patient care, Acute retention or obstruction  Lines: None     Cardiac Monitoring: ACTIVE order. Indication: Open heart surgery (72 hours)  Code Status: Full Code      Clinically Significant Risk Factors           # Hypocalcemia: Lowest iCa = 4.2 mg/dL in last 2 days, will monitor and  "replace as appropriate     # Hypoalbuminemia: Lowest albumin = 3.2 g/dL at 7/21/2025  4:06 PM, will monitor as appropriate     # Hypertension: Noted on problem list           # DMII: A1C = 9.1 % (Ref range: <5.7 %) within past 6 months # Severe Obesity: Estimated body mass index is 38.56 kg/m  as calculated from the following:    Height as of this encounter: 1.854 m (6' 1\").    Weight as of this encounter: 132.6 kg (292 lb 4.8 oz). with complications       # History of CABG: noted on surgical history       Social Drivers of Health    Depression: At risk (5/23/2025)    Received from [facility]    PHQ-2     PHQ-2 TOTAL SCORE: 6          Disposition Plan     Medically Ready for Discharge: Anticipated in 5+ Days             Landon Lopez MD  Hospitalist Service  [facility]  Securely message with [employer] (more info)  Text page via [employer] Paging/Directory   ______________________________________________________________________        Physical Exam   Vital Signs: Temp: 99  F (37.2  C) Temp src: Oral BP: (!) 155/65 Pulse: 97   Resp: 16 SpO2: 92 % O2 Device: Nasal cannula Oxygen Delivery: 2 LPM  Weight: 292 lbs 4.8 oz    General Appearance: Pleasant male no apparent distress  Respiratory: Clear to auscultation bilaterally  Cardiovascular: The rate and rhythm without murmurs rubs or gallops    Medical Decision Making             Data     I have personally reviewed the following data over the past 24 hrs:    N/A  \   N/A   / 202     135 101 49.6 (H) /  222 (H)   4.1 27 2.27 (H) \       Imaging results reviewed over the past 24 hrs:   No results found for this or any previous visit (from the past 24 hours).    "

## 2025-07-25 NOTE — TREATMENT PLAN
[facility]AT Treatment Plan    Patient Score: 8  Patient Acuity: 4    Clinical Indication for Therapy: atelectasis    Therapy Ordered: flutter valve/volume expansion Q4    Assessment Summary: Patient using FV and Versapap well. Had to  him to slow down. He's getting good volumes with good technique. Plan to change the flutter valve to prn and VersaPAP to QID    Colin Hidalgo, RT  7/25/2025

## 2025-07-26 ENCOUNTER — APPOINTMENT (OUTPATIENT)
Dept: OCCUPATIONAL THERAPY | Facility: HOSPITAL | Age: 65
End: 2025-07-26
Payer: COMMERCIAL

## 2025-07-26 LAB
ANION GAP SERPL CALCULATED.3IONS-SCNC: 10 MMOL/L (ref 7–15)
BUN SERPL-MCNC: 44.4 MG/DL (ref 8–23)
CA-I BLD-MCNC: 4.4 MG/DL (ref 4.4–5.2)
CA-I BLD-MCNC: 4.6 MG/DL (ref 4.4–5.2)
CALCIUM SERPL-MCNC: 8.6 MG/DL (ref 8.8–10.4)
CHLORIDE SERPL-SCNC: 99 MMOL/L (ref 98–107)
CREAT SERPL-MCNC: 1.81 MG/DL (ref 0.67–1.17)
EGFRCR SERPLBLD CKD-EPI 2021: 41 ML/MIN/1.73M2
GLUCOSE BLDC GLUCOMTR-MCNC: 171 MG/DL (ref 70–99)
GLUCOSE BLDC GLUCOMTR-MCNC: 182 MG/DL (ref 70–99)
GLUCOSE BLDC GLUCOMTR-MCNC: 195 MG/DL (ref 70–99)
GLUCOSE BLDC GLUCOMTR-MCNC: 200 MG/DL (ref 70–99)
GLUCOSE SERPL-MCNC: 217 MG/DL (ref 70–99)
HCO3 SERPL-SCNC: 27 MMOL/L (ref 22–29)
MAGNESIUM SERPL-MCNC: 2.2 MG/DL (ref 1.7–2.3)
PHOSPHATE SERPL-MCNC: 2.7 MG/DL (ref 2.5–4.5)
POTASSIUM SERPL-SCNC: 4.3 MMOL/L (ref 3.4–5.3)
SODIUM SERPL-SCNC: 136 MMOL/L (ref 135–145)

## 2025-07-26 PROCEDURE — 84100 ASSAY OF PHOSPHORUS: CPT | Performed by: THORACIC SURGERY (CARDIOTHORACIC VASCULAR SURGERY)

## 2025-07-26 PROCEDURE — 97110 THERAPEUTIC EXERCISES: CPT | Mod: GO

## 2025-07-26 PROCEDURE — 999N000157 HC STATISTIC RCP TIME EA 10 MIN

## 2025-07-26 PROCEDURE — 36415 COLL VENOUS BLD VENIPUNCTURE: CPT | Performed by: NURSE PRACTITIONER

## 2025-07-26 PROCEDURE — 250N000013 HC RX MED GY IP 250 OP 250 PS 637

## 2025-07-26 PROCEDURE — 94799 UNLISTED PULMONARY SVC/PX: CPT

## 2025-07-26 PROCEDURE — 83735 ASSAY OF MAGNESIUM: CPT | Performed by: THORACIC SURGERY (CARDIOTHORACIC VASCULAR SURGERY)

## 2025-07-26 PROCEDURE — 82330 ASSAY OF CALCIUM: CPT

## 2025-07-26 PROCEDURE — 250N000013 HC RX MED GY IP 250 OP 250 PS 637: Performed by: THORACIC SURGERY (CARDIOTHORACIC VASCULAR SURGERY)

## 2025-07-26 PROCEDURE — 250N000011 HC RX IP 250 OP 636: Mod: JZ

## 2025-07-26 PROCEDURE — 250N000012 HC RX MED GY IP 250 OP 636 PS 637

## 2025-07-26 PROCEDURE — 94660 CPAP INITIATION&MGMT: CPT

## 2025-07-26 PROCEDURE — 120N000013 HC R&B IMCU

## 2025-07-26 PROCEDURE — 82374 ASSAY BLOOD CARBON DIOXIDE: CPT | Performed by: NURSE PRACTITIONER

## 2025-07-26 PROCEDURE — 99232 SBSQ HOSP IP/OBS MODERATE 35: CPT | Performed by: INTERNAL MEDICINE

## 2025-07-26 RX ORDER — BUMETANIDE 0.25 MG/ML
1 INJECTION, SOLUTION INTRAMUSCULAR; INTRAVENOUS ONCE
Status: COMPLETED | OUTPATIENT
Start: 2025-07-26 | End: 2025-07-26

## 2025-07-26 RX ADMIN — CALCIUM GLUCONATE 1 G: 20 INJECTION, SOLUTION INTRAVENOUS at 00:15

## 2025-07-26 RX ADMIN — POTASSIUM & SODIUM PHOSPHATES POWDER PACK 280-160-250 MG 1 PACKET: 280-160-250 PACK at 08:08

## 2025-07-26 RX ADMIN — POLYETHYLENE GLYCOL 3350 17 G: 17 POWDER, FOR SOLUTION ORAL at 08:08

## 2025-07-26 RX ADMIN — ACETAMINOPHEN 975 MG: 325 TABLET, FILM COATED ORAL at 13:52

## 2025-07-26 RX ADMIN — HEPARIN SODIUM 5000 UNITS: 5000 INJECTION, SOLUTION INTRAVENOUS; SUBCUTANEOUS at 05:24

## 2025-07-26 RX ADMIN — PANTOPRAZOLE SODIUM 40 MG: 40 TABLET, DELAYED RELEASE ORAL at 06:40

## 2025-07-26 RX ADMIN — LIDOCAINE 1 PATCH: 246 PATCH TOPICAL at 15:40

## 2025-07-26 RX ADMIN — HEPARIN SODIUM 5000 UNITS: 5000 INJECTION, SOLUTION INTRAVENOUS; SUBCUTANEOUS at 21:36

## 2025-07-26 RX ADMIN — HEPARIN SODIUM 5000 UNITS: 5000 INJECTION, SOLUTION INTRAVENOUS; SUBCUTANEOUS at 13:54

## 2025-07-26 RX ADMIN — TAMSULOSIN HYDROCHLORIDE 0.4 MG: 0.4 CAPSULE ORAL at 08:08

## 2025-07-26 RX ADMIN — PROCHLORPERAZINE EDISYLATE 10 MG: 5 INJECTION INTRAMUSCULAR; INTRAVENOUS at 14:02

## 2025-07-26 RX ADMIN — INSULIN ASPART 1 UNITS: 100 INJECTION, SOLUTION INTRAVENOUS; SUBCUTANEOUS at 11:27

## 2025-07-26 RX ADMIN — METOPROLOL TARTRATE 6.25 MG: 25 TABLET, FILM COATED ORAL at 21:36

## 2025-07-26 RX ADMIN — POTASSIUM & SODIUM PHOSPHATES POWDER PACK 280-160-250 MG 1 PACKET: 280-160-250 PACK at 05:24

## 2025-07-26 RX ADMIN — BUMETANIDE 1 MG: 0.25 INJECTION INTRAMUSCULAR; INTRAVENOUS at 13:52

## 2025-07-26 RX ADMIN — SENNOSIDES, DOCUSATE SODIUM 1 TABLET: 50; 8.6 TABLET, FILM COATED ORAL at 08:08

## 2025-07-26 RX ADMIN — ONDANSETRON 4 MG: 2 INJECTION, SOLUTION INTRAMUSCULAR; INTRAVENOUS at 08:08

## 2025-07-26 RX ADMIN — ATORVASTATIN CALCIUM 80 MG: 40 TABLET, FILM COATED ORAL at 21:36

## 2025-07-26 RX ADMIN — ASPIRIN 162 MG: 81 TABLET, COATED ORAL at 08:08

## 2025-07-26 RX ADMIN — ACETAMINOPHEN 975 MG: 325 TABLET, FILM COATED ORAL at 21:36

## 2025-07-26 RX ADMIN — INSULIN ASPART 3 UNITS: 100 INJECTION, SOLUTION INTRAVENOUS; SUBCUTANEOUS at 15:40

## 2025-07-26 RX ADMIN — METOPROLOL TARTRATE 6.25 MG: 25 TABLET, FILM COATED ORAL at 13:52

## 2025-07-26 RX ADMIN — INSULIN ASPART 2 UNITS: 100 INJECTION, SOLUTION INTRAVENOUS; SUBCUTANEOUS at 08:11

## 2025-07-26 RX ADMIN — ESCITALOPRAM 20 MG: 20 TABLET, FILM COATED ORAL at 21:36

## 2025-07-26 RX ADMIN — ACETAMINOPHEN 975 MG: 325 TABLET, FILM COATED ORAL at 05:24

## 2025-07-26 ASSESSMENT — ACTIVITIES OF DAILY LIVING (ADL)
ADLS_ACUITY_SCORE: 44
ADLS_ACUITY_SCORE: 44
ADLS_ACUITY_SCORE: 43
ADLS_ACUITY_SCORE: 49
ADLS_ACUITY_SCORE: 44
ADLS_ACUITY_SCORE: 50
ADLS_ACUITY_SCORE: 45
ADLS_ACUITY_SCORE: 44
ADLS_ACUITY_SCORE: 45
ADLS_ACUITY_SCORE: 45
ADLS_ACUITY_SCORE: 49
ADLS_ACUITY_SCORE: 45
ADLS_ACUITY_SCORE: 44
ADLS_ACUITY_SCORE: 49
ADLS_ACUITY_SCORE: 49
ADLS_ACUITY_SCORE: 45
ADLS_ACUITY_SCORE: 44
ADLS_ACUITY_SCORE: 43
ADLS_ACUITY_SCORE: 45
ADLS_ACUITY_SCORE: 44
ADLS_ACUITY_SCORE: 49

## 2025-07-26 NOTE — PLAN OF CARE
[facility] - ICU    RN Progress Note:            Pertinent Assessments:      Please refer to flowsheet rows for full assessment     - intermittent nausea and vomiting  - heart rate = 70-80s  -  ambulated around the [place]              Key Events - This Shift:       - Zofran and compazine PRN was given prior to ambulation for nausea and the patient said that it was effective, did not have any nausea and vomiting episodes  - downgraded to intermediate care     RN Managed Protocols Ordered:  Yes  Protocols:Potassium, Magnesium, and Phosphorus  Protocols Status: Endorsed to Oncoming RN

## 2025-07-26 NOTE — PROGRESS NOTES
[facility]    Medicine Progress Note - Hospitalist Service    Date of Admission:  7/15/2025    Assessment & Plan     Mayito Sood is a 64 year old male with a past medical history of Type 2 Diabetes presented to the emergency room with chest pain and syncope, found to have multivessel coronary artery disease.  Awaiting on CABG      7/21 : s/p CABG    7/23 :     Continue post op care  Chest tubes removed  Holding iv lasix, creatinine trending up 1.9--2.7  CT surgery  Cardiology consulted for symptomatic sania      7/24 :     Continue post op care  40 mg iv lasix - 1 dose today, creatinine trending up 1.9--2.7--2.82  CT surgery following  Cardiology consulted for symptomatic sania - ower pacemaker rates to 70 bpm today and continue gradual wean. Beta blocker remains on hold         7/25 :     Continue post op care  40 mg iv lasix - 1 dose 7/24, creatinine trending up 1.9--2.7--2.82--2.27  CT surgery following  EP consulted for symptomatic sania - Pacemaker rate reduced to 50 bpm today  --Anticipate TPW removal in the next day or so if he continues to tolerate  Holding BB          7/26 :     Continue post op care  40 mg iv lasix - 1 dose 7/24, bumex 1 mg iv - 1 dose 7/25,  creatinine trending up 1.9--2.7--2.82--2.27--1.81  CT surgery following  EP consulted for symptomatic sania - Pacemaker rate reduced to 50 bpm today  --Anticipate TPW removal in the next day or so if he continues to tolerate  Holding BB      Not medically ready for discharge at this time.  Needs CT surgery clearance, TARUN  Will likely discharge home      A/p :       NSTEMI  Multivessel CAD  - Chest pain started 7/15, had episode of syncope at work  - Patient with troponin 67 -> 97  - EKG with ST Depression. Cardiology not calling STEMI  - Cardiology consult, appreciate input  -Coronary angiogram shows multivessel disease  - CT Surgery consulted; CABG Monday  - Echo with EF 55% mildly hypokinetic distal anterior segment no  significant valvular heart disease  - s/p aspirin 325mg x1 and continue 81mg   - Continue atorvastatin 80mg daily   - Metoprolol 25mg BID  - Given ticagrelor x1, not continued -   ---hold off on ACE  ---Chest CT with faint hazy groundglass opacities, possible edema.    ---Ultrasound carotids less than 50% stenosis bilaterally  --Continue cardiac monitor  --on heparin gtt     Acute Hypoxic Respiratory Failure  - Secondary to above, resolved  --Patient not on home oxygen, requiring up to 10L NC after [facility].  -  fluid overload with NSTEMI -sedation from [facility].   ---CT does show some groundglass opacities  - CXR on 7/15 showing no acute cardiopulmonary process.  --Did get Lasix 7/16.  Further diuresis per cardiology     Acute kidney injury   - Baseline CKD III   CKD Stage 4 Considered and Ruled Out   ---Cr baseline appears to be around 1.75-2.0  --cotninue to hold cozaar   ---did get diuresis earlier- now done  ---holding jardiance  -Continue to monitor renal function      Hyperkalemia  - resolved  --Potassium 5.2 on ED presentation, then 5.8 with AM labs 7/16  --s/p  insulin  lokelma x1   - recheck in am      Hyponatremia  ---resolved     Hypertension  --bp running high  --- increase amlodipine  ---hold cozaar   ---continue metoprolol with hold parameters for bradycardia  --- consider diuretic     Type 2 Diabetes with hyperglycemia  -Poorly controlled at baseline  --Last A1c was 9.1% on 7/15/25  - PTA Meds:  empagliflozin, no insulin for many months per patient , no victoza for a month  ---continue Lantus 25 units nightly   ---hold jardiance  - Sliding Scale Insulin with Glucose Checks     BPH  --continue home flomax     hyperlipidemia  ---was on pravastatin  ---now on atorvastatin     Hypophosphatemia,   --resolved with replacement     Hypocalcemia-  -continue to monitor     Severe Obesity, BMI 37.59  - encourage weight loss.        Diet: Low Saturated Fat Na <2400 mg    DVT Prophylaxis: Heparin GTT  Gutierrez Catheter:  "PRESENT, indication: ICU only: hourly urine output needed for patient care, Acute retention or obstruction  Lines: None     Cardiac Monitoring: ACTIVE order. Indication: Open heart surgery (72 hours)  Code Status: Full Code      Clinically Significant Risk Factors           # Hypocalcemia: Lowest iCa = 4.2 mg/dL in last 2 days, will monitor and replace as appropriate     # Hypoalbuminemia: Lowest albumin = 3.2 g/dL at 7/21/2025  4:06 PM, will monitor as appropriate     # Hypertension: Noted on problem list           # DMII: A1C = 9.1 % (Ref range: <5.7 %) within past 6 months # Severe Obesity: Estimated body mass index is 38.22 kg/m  as calculated from the following:    Height as of this encounter: 1.854 m (6' 1\").    Weight as of this encounter: 131.4 kg (289 lb 11.2 oz). with complications       # History of CABG: noted on surgical history       Social Drivers of Health    Depression: At risk (5/23/2025)    Received from [facility]    PHQ-2     PHQ-2 TOTAL SCORE: 6          Disposition Plan     Medically Ready for Discharge: Anticipated in 5+ Days             Landon Lopez MD  Hospitalist Service  [facility]  Securely message with [employer] (more info)  Text page via [employer] Paging/Directory   ______________________________________________________________________        Physical Exam   Vital Signs: Temp: 98.5  F (36.9  C) Temp src: Oral BP: (!) 153/68 (after walking) Pulse: 83   Resp: 21 SpO2: 95 % O2 Device: None (Room air) Oxygen Delivery: 1 LPM  Weight: 289 lbs 11.2 oz    General Appearance: Pleasant male no apparent distress  Respiratory: Clear to auscultation bilaterally  Cardiovascular: The rate and rhythm without murmurs rubs or gallops    Medical Decision Making             Data     I have personally reviewed the following data over the past 24 hrs:    N/A  \   N/A   / N/A     136 99 44.4 (H) /  182 (H)   4.3 27 1.81 (H) \       Imaging results " reviewed over the past 24 hrs:   No results found for this or any previous visit (from the past 24 hours).

## 2025-07-26 NOTE — PROGRESS NOTES
"CVTS Daily Progress Note   POD#5 s/p CABG x4 EF 55%   Attending: Lara  LOS: 10    SUBJECTIVE/INTERVAL EVENTS:  Remains in NSR without need for pacing. Per EP, will trial beta blocker today. Continues with polanco. Feels well.   Denies syncopal episodes. Otherwise, patient progressing overall well. Normotensive. Maintaining oxygen saturations on nasal cannula. Ambulating with therapy to chair. Pain well controlled. + BM / flatus. Tolerating diet without nausea. UOP adequate (with Polanco). Hgb stable.  Creat 2.2 (trending down) Patient denies new chest pain, shortness of breath, abdominal pain, calf pain, nausea. All questions answered to patient's satisfaction.     OBJECTIVE:  Temp:  [97.9  F (36.6  C)-99  F (37.2  C)] 98.5  F (36.9  C)  Pulse:  [] 84  Resp:  [16-24] 23  BP: (118-170)/(58-84) 142/60  SpO2:  [89 %-99 %] 95 %  Vitals:    07/21/25 0018 07/21/25 0700 07/23/25 0607 07/24/25 0640   Weight: 126.6 kg (279 lb 1.6 oz) 126 kg (277 lb 11.2 oz) 133.2 kg (293 lb 11.2 oz) 132.6 kg (292 lb 4.8 oz)    07/26/25 0600   Weight: 131.4 kg (289 lb 11.2 oz)       Clinically Significant Risk Factors           # Hypocalcemia: Lowest iCa = 4.2 mg/dL in last 2 days, will monitor and replace as appropriate     # Hypoalbuminemia: Lowest albumin = 3.2 g/dL at 7/21/2025  4:06 PM, will monitor as appropriate       # Hypertension: Noted on problem list           # DMII: A1C = 9.1 % (Ref range: <5.7 %) within past 6 months # Severe Obesity: Estimated body mass index is 38.22 kg/m  as calculated from the following:    Height as of this encounter: 1.854 m (6' 1\").    Weight as of this encounter: 131.4 kg (289 lb 11.2 oz). with complications       # History of CABG: noted on surgical history               Current Medications:    Scheduled Meds:  Current Facility-Administered Medications   Medication Dose Route Frequency Provider Last Rate Last Admin    acetaminophen (TYLENOL) tablet 975 mg  975 mg Oral Q8H China Cerna, " ASHLEY   975 mg at 07/26/25 0524    aspirin (ASA) chewable tablet 162 mg  162 mg Oral or NG Tube Daily China Cerna PA-C   162 mg at 07/26/25 0808    Or    aspirin (ASA) Suppository 300 mg  300 mg Rectal Daily China Cerna PA-C        atorvastatin (LIPITOR) tablet 80 mg  80 mg Oral QPM Nida Atkinson APRN CNP   80 mg at 07/25/25 2118    [Held by provider] empagliflozin (JARDIANCE) tablet 10 mg  10 mg Oral Daily China Cerna PA-C        escitalopram (LEXAPRO) tablet 20 mg  20 mg Oral QPM China Cerna PA-C   20 mg at 07/25/25 2119    heparin ANTICOAGULANT injection 5,000 Units  5,000 Units Subcutaneous Q8H China Cerna PA-C   5,000 Units at 07/26/25 0524    insulin aspart (NovoLOG) injection (RAPID ACTING)  1-7 Units Subcutaneous TID  Nida Atkinson APRN CNP   1 Units at 07/26/25 1127    insulin aspart (NovoLOG) injection (RAPID ACTING)  1-5 Units Subcutaneous At Bedtime Nida Atkinson APRN CNP        insulin glargine (LANTUS PEN) injection 8 Units  8 Units Subcutaneous QAM  Nida Atkinson APRN CNP   8 Units at 07/26/25 0812    Lidocaine (LIDOCARE) 4 % Patch 1-2 patch  1-2 patch Transdermal Q24H China Cerna PA-C   2 patch at 07/25/25 1757    metoprolol tartrate (LOPRESSOR) quarter-tab 6.25 mg  6.25 mg Oral BID China Cerna PA-C        pantoprazole (PROTONIX) EC tablet 40 mg  40 mg Oral QAM AC Lina Bermudez MD   40 mg at 07/26/25 0640    polyethylene glycol (MIRALAX) Packet 17 g  17 g Oral Daily China Cerna PA-C   17 g at 07/26/25 0808    senna-docusate (SENOKOT-S/PERICOLACE) 8.6-50 MG per tablet 1 tablet  1 tablet Oral BID China Cerna PA-C   1 tablet at 07/26/25 0808    tamsulosin (FLOMAX) capsule 0.4 mg  0.4 mg Oral Daily China Cerna PA-C   0.4 mg at 07/26/25 0808     Continuous Infusions:  Current Facility-Administered Medications   Medication Dose Route Frequency Provider Last Rate Last Admin    No  lozenges or gum should be given while patient on BIPAP/AVAPS/AVAPS AE   Does not apply Continuous PRN Jessica Narvaez MD         PRN Meds:.  Current Facility-Administered Medications   Medication Dose Route Frequency Provider Last Rate Last Admin    bisacodyl (DULCOLAX) suppository 10 mg  10 mg Rectal Daily PRN China Cerna PA-C        calcium gluconate 1 g in 50 mL in sodium chloride intermittent infusion  1 g Intravenous Once PRN China Cerna PA-C   1 g at 07/26/25 0015    calcium gluconate 2 g in  mL intermittent infusion  2 g Intravenous Once PRN China Cerna PA-C        calcium gluconate 3 g in sodium chloride 0.9 % 100 mL intermittent infusion  3 g Intravenous Once PRN China Cerna PA-C        glucose gel 15-30 g  15-30 g Oral Q15 Min PRN ChinaNida parsons APRBERNADETTE CNP        Or    dextrose 50 % injection 25-50 mL  25-50 mL Intravenous Q15 Min PRN Nida Atkinson APRN CNP        Or    glucagon injection 1 mg  1 mg Subcutaneous Q15 Min PRN Nida Atkinson APRBERNADETTE CNP        hydrALAZINE (APRESOLINE) injection 10 mg  10 mg Intravenous Q30 Min PRN China Cerna PA-C   10 mg at 07/25/25 1107    lactated ringers BOLUS 250 mL  250 mL Intravenous Q15 Min PRN China Cerna PA-C   250 mL at 07/22/25 0243    magnesium hydroxide (MILK OF MAGNESIA) suspension 30 mL  30 mL Oral Daily PRN China Cerna PA-C        methocarbamol (ROBAXIN) tablet 500 mg  500 mg Oral Q6H PRN China Cerna PA-C   500 mg at 07/23/25 1918    naloxone (NARCAN) injection 0.2 mg  0.2 mg Intravenous Q2 Min PRN Lina Bermudez MD        Or    naloxone (NARCAN) injection 0.4 mg  0.4 mg Intravenous Q2 Min PRN Lina Bermudez MD        Or    naloxone (NARCAN) injection 0.2 mg  0.2 mg Intramuscular Q2 Min PRN Lina Bermudez MD        Or    naloxone (NARCAN) injection 0.4 mg  0.4 mg Intramuscular Q2 Min PRN Lina Bermudez MD        No lozenges or gum should be  given while patient on BIPAP/AVAPS/AVAPS AE   Does not apply Continuous PRN Jessica Narvaez MD        ondansetron (ZOFRAN ODT) ODT tab 4 mg  4 mg Oral Q6H PRN Eryn China K, PA-C        Or    ondansetron (ZOFRAN) injection 4 mg  4 mg Intravenous Q6H PRN Eryn China K, PA-C   4 mg at 07/26/25 0808    oxyCODONE (ROXICODONE) tablet 5 mg  5 mg Oral Q4H PRN Marcuse, China K, PA-C        Or    oxyCODONE (ROXICODONE) tablet 10 mg  10 mg Oral Q4H PRN Eryn, China K, PA-C   10 mg at 07/23/25 0304    prochlorperazine (COMPAZINE) injection 10 mg  10 mg Intravenous Q6H PRN Eryn, China K, PA-C   10 mg at 07/24/25 0559    Or    prochlorperazine (COMPAZINE) tablet 10 mg  10 mg Oral Q6H PRN Eryn China K, PA-C           Cardiographics:    Telemetry monitoring demonstrates paced rhythm at 80bpm per my personal review.    Imaging:  Results for orders placed or performed during the hospital encounter of 07/15/25   XR Chest Port 1 View    Impression    IMPRESSION:     No acute cardiopulmonary process. Stable cardiomediastinal silhouette.   US Carotid Bilateral    Impression    IMPRESSION:  1.  Mild plaque formation, velocities consistent with less than 50% stenosis in the right internal carotid artery.  2.  Mild plaque formation, velocities consistent with less than 50% stenosis in the left internal carotid artery.  3.  Flow within the vertebral arteries is antegrade.   CT Chest w/o Contrast    Impression    IMPRESSION:   1.  Chronic bronchial wall thickening and mild bronchial dilatation.  2.  Faint hazy groundglass density opacity throughout both lungs could indicate minimal edema.  3.  Strands of fibrosis and/or linear atelectasis as well as scattered micronodular opacities in the peribronchial lung parenchyma in the mid and lower lungs have increased slightly since 2019.  4.  Mild enlargement central pulmonary arteries (MPA diameter 3.6 cm) could indicate some degree of chronic pulmonary  arterial hypertension.  5.  Moderate vessel coronary artery calcification.     XR Chest Port 1 View    Impression    IMPRESSION: Endotracheal tube tip is about 5.8 cm above the marco antonio. Right internal jugular [place] catheter tip is probably in the proximal right pulmonary artery and is deflected back to the left. Consider withdrawal and advancement prior to obtaining   a wedge pressure.     There are mediastinal and left chest tubes. Low lung volumes. No consolidation. No pleural effusions or pneumothorax. Unchanged cardiac size. Median sternotomy. Status post CABG x4.   XR Chest Port 1 View    Impression    IMPRESSION: Interval median sternotomy and CABG. Right internal jugular [place] catheter with tip overlying the right pulmonary artery. Mediastinal drain and left chest tube. No significant pneumothorax. Enlarged cardiomediastinal silhouette. No   pulmonary vascular congestion. Streaky perihilar and left lower lung opacities, favoring atelectasis. No significant pleural fluid.       Labs, personally reviewed.  Hemoglobin   Date Value Ref Range Status   07/23/2025 8.7 (L) 13.3 - 17.7 g/dL Final   07/22/2025 9.5 (L) 13.3 - 17.7 g/dL Final   07/21/2025 11.6 (L) 13.3 - 17.7 g/dL Final     Hemoglobin POCT   Date Value Ref Range Status   07/21/2025 11.2 (L) 13.3 - 17.7 g/dL Final   07/21/2025 11.8 (L) 13.3 - 17.7 g/dL Final   07/21/2025 10.5 (L) 13.3 - 17.7 g/dL Final     WBC Count   Date Value Ref Range Status   07/23/2025 13.3 (H) 4.0 - 11.0 10e3/uL Final   07/22/2025 12.1 (H) 4.0 - 11.0 10e3/uL Final   07/21/2025 25.7 (H) 4.0 - 11.0 10e3/uL Final     Platelet Count   Date Value Ref Range Status   07/25/2025 202 150 - 450 10e3/uL Final   07/23/2025 171 150 - 450 10e3/uL Final   07/22/2025 208 150 - 450 10e3/uL Final     Creatinine   Date Value Ref Range Status   07/26/2025 1.81 (H) 0.67 - 1.17 mg/dL Final   07/25/2025 2.27 (H) 0.67 - 1.17 mg/dL Final   07/24/2025 2.82 (H) 0.67 - 1.17 mg/dL Final     Potassium    Date Value Ref Range Status   07/26/2025 4.3 3.4 - 5.3 mmol/L Final   07/25/2025 4.1 3.4 - 5.3 mmol/L Final   07/24/2025 4.3 3.4 - 5.3 mmol/L Final   07/24/2025 4.3 3.4 - 5.3 mmol/L Final     Potassium POCT   Date Value Ref Range Status   07/21/2025 4.0 3.4 - 5.3 mmol/L Final   07/21/2025 4.0 3.4 - 5.3 mmol/L Final   07/21/2025 4.4 3.4 - 5.3 mmol/L Final     Magnesium   Date Value Ref Range Status   07/26/2025 2.2 1.7 - 2.3 mg/dL Final   07/25/2025 2.1 1.7 - 2.3 mg/dL Final   07/24/2025 2.1 1.7 - 2.3 mg/dL Final          I/O:  I/O last 3 completed shifts:  In: 2280 [P.O.:2000; I.V.:280]  Out: 2300 [Urine:2300]       Physical Exam:    General: Patient seen up in chair conversant/pleasant  HEENT: MAN, no sclera icterus, moist mucosa, nasal cannula in place  CV: Paced rhythm on monitor. 2+ peripheral pulses in all extremities. Mild edema.   Pulm: Non-labored effort on nasal cannula.  Incision C/D/I.  Abd: Soft, NT, ND  : Gutierrez replaced 7/24  Ext: Mild pedal edema, SCDs in place, warm, distal pulses intact  Neuro: A&Ox3, KNIGHT, and CN grossly intact      ASSESSMENT/PLAN:    Mayito Sood is a 64 year old male with a history of history of CKD, kidney stones, HTN, DM, and obesity who presented with an NSTEMI  who is s/p CABG x4 .    Active Problems:    Status post coronary angiogram    ACS (acute coronary syndrome) (H)    ST elevation myocardial infarction (STEMI), unspecified artery (H)    Coronary artery disease involving native coronary artery of native heart, unspecified whether angina present        NEURO:   - Scheduled Tylenol/lidocaine patches and PRN Tylenol/oxycodone/Robaxin for pain  - PTA Lexapro    CV:   - Pre-op EF 55%  - Normotensive  - Appreciate EP  - Metoprolol tartrate 6.25 mg BID  - discussed with EP NP, will trial beta blocker today prior to wires out tomorrow.  -  mg  - Atorvastatin 80mg daily  - Chest tubes removed 7/23~ TPW to remain     PULM:   - Extubated POD#1  - Maintaining oxygen  saturations on nasal cannula - wean as able  - Encourage pulmonary toilet  - Needs outpatient sleep study     FEN/GI:  - Diet: ADAT to cardiac diet  - Continue electrolyte replacement protocol  - Bowel regimen    RENAL:  - Adequate UOP/hr. Continue to monitor closely.  - Cr improving, Creat 2.2 (baseline ~ 1.7-2.0).  - Nephrology consult; appreciate recs  - Gutierrez replaced 7/24 - plan for TOV tomorrow   - Diuresis per Nephrology, Lasix x 1 yesterday  - Bumex 1 mg once today  - PTA Flomax    HEME:  - Acute blood loss anemia post-op.   - Hgb stable , no bleeding concerns. Hep SQ, ASA    ID:  - Jayde op ppx complete, febrile. No concerns for infection    ENDO:   - HbA1c 9 %  - BG goal < 180 to promote optimal healing  - SSI  - PTA on insulin, but not on due to inventory  - Lantus 10u daily  - PTA Jardiance held    PPx:   - DVT: SCDs, SQ heparin TID, ambulation   - GI: Protonix 40mg PO daily    DISPO:   - Ok to transfer to general tele care   - PT/OT recs at discharge: home w/ outpt cardiac rehab  - Medically Ready for Discharge: Anticipated in 2-4 Days        Patient discussed with Dr. Mulvihill. Dr. Bermudez updated.      Sigrid Cerna PA-C  [facility] Cardiothoracic Surgery  Vocera or Secure Chat  July 26, 2025

## 2025-07-26 NOTE — PLAN OF CARE
Goal Outcome Evaluation:      Plan of Care Reviewed With: patient    Overall Patient Progress: improvingOverall Patient Progress: improving    Outcome Evaluation: Patient has not required any PM support, HR has not dropped below 70.    [facility] - ICU    RN Progress Note:            Pertinent Assessments:      Please refer to flowsheet rows for full assessment     Neuro intact.    NSR with BBB, BP WNL.    Clear diminished lung sounds, on 2L via NC.    Active bowel sounds.    Gutierrez in place.    Incisions CDI, approximated, bruising noted on L thigh and calf.           Key Events - This Shift:       No significant pain overnight.     Calcium replaced x2. Phos being replaced this AM.    Patient had a normal BM around 0145 last night.    Urine output has been WNL.    PM capped this AM. Patient has had no other drops in HR.    Nausea has improved.     RN Managed Protocols Ordered:  Yes  Protocols:Potassium, Magnesium, and Phosphorus  PRN'S:iCal  Protocols Status: Reviewed with Oncoming RN                Barriers to Discharge / Downgrade:     None         Problem: Cardiovascular Surgery  Goal: Improved Activity Tolerance  Outcome: Progressing  Intervention: Optimize Tolerance for Activity  Recent Flowsheet Documentation  Taken 7/26/2025 0000 by Lauren Andersen RN  Self-Care Promotion: independence encouraged  Taken 7/25/2025 2000 by Lauren Andersen RN  Self-Care Promotion: independence encouraged     Problem: Cardiovascular Surgery  Goal: Absence of Bleeding  Outcome: Progressing     Problem: Cardiovascular Surgery  Goal: Effective Bowel Elimination  Outcome: Progressing  Intervention: Enhance Bowel Motility and Elimination  Recent Flowsheet Documentation  Taken 7/26/2025 0000 by Lauren Andersen RN  Bowel Motility Enhancement:   ambulation promoted   fluid intake encouraged   oral intake encouraged  Bowel Elimination Management: relaxation techniques promoted  Taken 7/25/2025 2000 by  Lauren Andersen RN  Bowel Motility Enhancement:   ambulation promoted   fluid intake encouraged   oral intake encouraged  Bowel Elimination Management: relaxation techniques promoted     Problem: Cardiovascular Surgery  Goal: Effective Cardiac Function  Outcome: Progressing     Problem: Cardiovascular Surgery  Goal: Optimal Cerebral Tissue Perfusion  Outcome: Progressing  Intervention: Protect and Optimize Cerebral Perfusion  Recent Flowsheet Documentation  Taken 7/26/2025 0145 by Lauren Andersen RN  Head of Bed (HOB) Positioning: HOB at 30 degrees  Taken 7/26/2025 0000 by Lauren Andersen RN  Glycemic Management: blood glucose monitored  Taken 7/25/2025 2323 by Lauren Andersen RN  Head of Bed (HOB) Positioning: HOB at 30-45 degrees  Taken 7/25/2025 2000 by Lauren Andersen RN  Glycemic Management: blood glucose monitored     Problem: Cardiovascular Surgery  Goal: Fluid and Electrolyte Balance  Outcome: Progressing  Intervention: Monitor and Manage Fluid and Electrolyte Balance  Recent Flowsheet Documentation  Taken 7/26/2025 0000 by Lauren Andersen RN  Fluid/Electrolyte Management: electrolyte supplement adjusted  Taken 7/25/2025 2000 by Lauren Andersen RN  Fluid/Electrolyte Management: electrolyte supplement adjusted     Problem: Cardiovascular Surgery  Goal: Blood Glucose Level Within Targeted Range  Outcome: Progressing  Intervention: Optimize Glycemic Control  Recent Flowsheet Documentation  Taken 7/26/2025 0000 by Lauren Andersen RN  Glycemic Management: blood glucose monitored  Taken 7/25/2025 2000 by Lauren Andersen RN  Glycemic Management: blood glucose monitored     Problem: Cardiovascular Surgery  Goal: Acceptable Pain Control  Outcome: Progressing  Intervention: Prevent or Manage Pain  Recent Flowsheet Documentation  Taken 7/25/2025 2000 by Lauren Andersen RN  Pain Management Interventions: declines     Problem: Cardiovascular Surgery  Goal: Nausea and Vomiting Relief  Outcome:  Progressing  Intervention: Prevent or Manage Nausea and Vomiting  Recent Flowsheet Documentation  Taken 7/26/2025 0000 by Lauren Andersen RN  Nausea/Vomiting Interventions:   nausea triggers minimized   acupressure applied  Taken 7/25/2025 2000 by Lauren Andersen RN  Nausea/Vomiting Interventions:   nausea triggers minimized   acupressure applied     Problem: Cardiovascular Surgery  Goal: Effective Urinary Elimination  Outcome: Progressing  Intervention: Monitor and Manage Urinary Retention  Recent Flowsheet Documentation  Taken 7/26/2025 0000 by Lauren Andersen RN  Urinary Elimination Promotion: catheter patency maintained  Taken 7/25/2025 2000 by Lauren Andersen RN  Urinary Elimination Promotion: catheter patency maintained     Problem: Cardiovascular Surgery  Goal: Effective Oxygenation and Ventilation  Outcome: Progressing

## 2025-07-27 ENCOUNTER — APPOINTMENT (OUTPATIENT)
Dept: OCCUPATIONAL THERAPY | Facility: HOSPITAL | Age: 65
End: 2025-07-27
Payer: COMMERCIAL

## 2025-07-27 ENCOUNTER — APPOINTMENT (OUTPATIENT)
Dept: CARDIOLOGY | Facility: HOSPITAL | Age: 65
End: 2025-07-27
Payer: COMMERCIAL

## 2025-07-27 LAB
ANION GAP SERPL CALCULATED.3IONS-SCNC: 11 MMOL/L (ref 7–15)
BUN SERPL-MCNC: 39.9 MG/DL (ref 8–23)
CA-I BLD-MCNC: 4.4 MG/DL (ref 4.4–5.2)
CA-I BLD-MCNC: 4.4 MG/DL (ref 4.4–5.2)
CA-I BLD-MCNC: 4.6 MG/DL (ref 4.4–5.2)
CALCIUM SERPL-MCNC: 8.1 MG/DL (ref 8.8–10.4)
CHLORIDE SERPL-SCNC: 101 MMOL/L (ref 98–107)
CREAT SERPL-MCNC: 1.72 MG/DL (ref 0.67–1.17)
EGFRCR SERPLBLD CKD-EPI 2021: 44 ML/MIN/1.73M2
ERYTHROCYTE [DISTWIDTH] IN BLOOD BY AUTOMATED COUNT: 12.9 % (ref 10–15)
GLUCOSE BLDC GLUCOMTR-MCNC: 161 MG/DL (ref 70–99)
GLUCOSE BLDC GLUCOMTR-MCNC: 170 MG/DL (ref 70–99)
GLUCOSE BLDC GLUCOMTR-MCNC: 177 MG/DL (ref 70–99)
GLUCOSE BLDC GLUCOMTR-MCNC: 194 MG/DL (ref 70–99)
GLUCOSE SERPL-MCNC: 179 MG/DL (ref 70–99)
HCO3 SERPL-SCNC: 26 MMOL/L (ref 22–29)
HCT VFR BLD AUTO: 23.7 % (ref 40–53)
HGB BLD-MCNC: 8 G/DL (ref 13.3–17.7)
HOLD SPECIMEN: NORMAL
LVEF ECHO: NORMAL
MAGNESIUM SERPL-MCNC: 1.9 MG/DL (ref 1.7–2.3)
MCH RBC QN AUTO: 31.1 PG (ref 26.5–33)
MCHC RBC AUTO-ENTMCNC: 33.8 G/DL (ref 31.5–36.5)
MCV RBC AUTO: 92 FL (ref 78–100)
PHOSPHATE SERPL-MCNC: 2.9 MG/DL (ref 2.5–4.5)
PLATELET # BLD AUTO: 268 10E3/UL (ref 150–450)
POTASSIUM SERPL-SCNC: 3.8 MMOL/L (ref 3.4–5.3)
RBC # BLD AUTO: 2.57 10E6/UL (ref 4.4–5.9)
SODIUM SERPL-SCNC: 138 MMOL/L (ref 135–145)
WBC # BLD AUTO: 7.1 10E3/UL (ref 4–11)

## 2025-07-27 PROCEDURE — 250N000012 HC RX MED GY IP 250 OP 636 PS 637

## 2025-07-27 PROCEDURE — 255N000002 HC RX 255 OP 636

## 2025-07-27 PROCEDURE — 250N000013 HC RX MED GY IP 250 OP 250 PS 637: Performed by: INTERNAL MEDICINE

## 2025-07-27 PROCEDURE — 84100 ASSAY OF PHOSPHORUS: CPT

## 2025-07-27 PROCEDURE — 97110 THERAPEUTIC EXERCISES: CPT | Mod: GO

## 2025-07-27 PROCEDURE — 99232 SBSQ HOSP IP/OBS MODERATE 35: CPT | Performed by: INTERNAL MEDICINE

## 2025-07-27 PROCEDURE — 999N000208 ECHOCARDIOGRAM LIMITED

## 2025-07-27 PROCEDURE — 93321 DOPPLER ECHO F-UP/LMTD STD: CPT | Mod: 26 | Performed by: INTERNAL MEDICINE

## 2025-07-27 PROCEDURE — 250N000013 HC RX MED GY IP 250 OP 250 PS 637: Performed by: THORACIC SURGERY (CARDIOTHORACIC VASCULAR SURGERY)

## 2025-07-27 PROCEDURE — 83735 ASSAY OF MAGNESIUM: CPT

## 2025-07-27 PROCEDURE — 36415 COLL VENOUS BLD VENIPUNCTURE: CPT

## 2025-07-27 PROCEDURE — 250N000011 HC RX IP 250 OP 636

## 2025-07-27 PROCEDURE — 93325 DOPPLER ECHO COLOR FLOW MAPG: CPT | Mod: 26 | Performed by: INTERNAL MEDICINE

## 2025-07-27 PROCEDURE — 82435 ASSAY OF BLOOD CHLORIDE: CPT

## 2025-07-27 PROCEDURE — 82330 ASSAY OF CALCIUM: CPT | Performed by: THORACIC SURGERY (CARDIOTHORACIC VASCULAR SURGERY)

## 2025-07-27 PROCEDURE — 250N000013 HC RX MED GY IP 250 OP 250 PS 637

## 2025-07-27 PROCEDURE — 93308 TTE F-UP OR LMTD: CPT | Mod: 26 | Performed by: INTERNAL MEDICINE

## 2025-07-27 PROCEDURE — 82330 ASSAY OF CALCIUM: CPT

## 2025-07-27 PROCEDURE — 85018 HEMOGLOBIN: CPT

## 2025-07-27 PROCEDURE — 250N000011 HC RX IP 250 OP 636: Performed by: THORACIC SURGERY (CARDIOTHORACIC VASCULAR SURGERY)

## 2025-07-27 PROCEDURE — 210N000001 HC R&B IMCU HEART CARE

## 2025-07-27 PROCEDURE — 999N000157 HC STATISTIC RCP TIME EA 10 MIN

## 2025-07-27 PROCEDURE — 36415 COLL VENOUS BLD VENIPUNCTURE: CPT | Performed by: THORACIC SURGERY (CARDIOTHORACIC VASCULAR SURGERY)

## 2025-07-27 RX ORDER — BUMETANIDE 0.25 MG/ML
1 INJECTION, SOLUTION INTRAMUSCULAR; INTRAVENOUS DAILY
Status: DISCONTINUED | OUTPATIENT
Start: 2025-07-27 | End: 2025-07-27

## 2025-07-27 RX ORDER — MAGNESIUM SULFATE HEPTAHYDRATE 40 MG/ML
2 INJECTION, SOLUTION INTRAVENOUS ONCE
Status: COMPLETED | OUTPATIENT
Start: 2025-07-27 | End: 2025-07-27

## 2025-07-27 RX ORDER — POTASSIUM CHLORIDE 1500 MG/1
20 TABLET, EXTENDED RELEASE ORAL ONCE
Status: COMPLETED | OUTPATIENT
Start: 2025-07-27 | End: 2025-07-27

## 2025-07-27 RX ADMIN — ACETAMINOPHEN 975 MG: 325 TABLET, FILM COATED ORAL at 05:23

## 2025-07-27 RX ADMIN — ASPIRIN 162 MG: 81 TABLET, COATED ORAL at 08:02

## 2025-07-27 RX ADMIN — Medication 3 MG: at 21:51

## 2025-07-27 RX ADMIN — PANTOPRAZOLE SODIUM 40 MG: 40 TABLET, DELAYED RELEASE ORAL at 06:48

## 2025-07-27 RX ADMIN — ACETAMINOPHEN 975 MG: 325 TABLET, FILM COATED ORAL at 13:57

## 2025-07-27 RX ADMIN — INSULIN ASPART 2 UNITS: 100 INJECTION, SOLUTION INTRAVENOUS; SUBCUTANEOUS at 07:33

## 2025-07-27 RX ADMIN — POTASSIUM CHLORIDE 20 MEQ: 1500 TABLET, EXTENDED RELEASE ORAL at 05:23

## 2025-07-27 RX ADMIN — POLYETHYLENE GLYCOL 3350 17 G: 17 POWDER, FOR SOLUTION ORAL at 08:05

## 2025-07-27 RX ADMIN — BUMETANIDE 1 MG: 0.25 INJECTION INTRAMUSCULAR; INTRAVENOUS at 09:07

## 2025-07-27 RX ADMIN — CALCIUM GLUCONATE 1 G: 20 INJECTION, SOLUTION INTRAVENOUS at 05:12

## 2025-07-27 RX ADMIN — CALCIUM GLUCONATE 1 G: 20 INJECTION, SOLUTION INTRAVENOUS at 10:58

## 2025-07-27 RX ADMIN — INSULIN ASPART 3 UNITS: 100 INJECTION, SOLUTION INTRAVENOUS; SUBCUTANEOUS at 12:06

## 2025-07-27 RX ADMIN — TAMSULOSIN HYDROCHLORIDE 0.4 MG: 0.4 CAPSULE ORAL at 08:02

## 2025-07-27 RX ADMIN — HEPARIN SODIUM 5000 UNITS: 5000 INJECTION, SOLUTION INTRAVENOUS; SUBCUTANEOUS at 21:46

## 2025-07-27 RX ADMIN — INSULIN GLARGINE 12 UNITS: 100 INJECTION, SOLUTION SUBCUTANEOUS at 07:34

## 2025-07-27 RX ADMIN — HEPARIN SODIUM 5000 UNITS: 5000 INJECTION, SOLUTION INTRAVENOUS; SUBCUTANEOUS at 13:57

## 2025-07-27 RX ADMIN — INSULIN ASPART 2 UNITS: 100 INJECTION, SOLUTION INTRAVENOUS; SUBCUTANEOUS at 18:20

## 2025-07-27 RX ADMIN — MAGNESIUM SULFATE HEPTAHYDRATE 2 G: 2 INJECTION, SOLUTION INTRAVENOUS at 05:23

## 2025-07-27 RX ADMIN — PERFLUTREN 2.5 ML (DILUTED): 6.52 INJECTION, SUSPENSION INTRAVENOUS at 13:30

## 2025-07-27 RX ADMIN — ATORVASTATIN CALCIUM 80 MG: 40 TABLET, FILM COATED ORAL at 21:46

## 2025-07-27 RX ADMIN — METOPROLOL TARTRATE 12.5 MG: 25 TABLET, FILM COATED ORAL at 08:02

## 2025-07-27 RX ADMIN — SENNOSIDES, DOCUSATE SODIUM 1 TABLET: 50; 8.6 TABLET, FILM COATED ORAL at 08:05

## 2025-07-27 RX ADMIN — HEPARIN SODIUM 5000 UNITS: 5000 INJECTION, SOLUTION INTRAVENOUS; SUBCUTANEOUS at 05:23

## 2025-07-27 RX ADMIN — ESCITALOPRAM 20 MG: 20 TABLET, FILM COATED ORAL at 21:45

## 2025-07-27 RX ADMIN — LIDOCAINE 1 PATCH: 246 PATCH TOPICAL at 18:21

## 2025-07-27 RX ADMIN — ACETAMINOPHEN 975 MG: 325 TABLET, FILM COATED ORAL at 21:45

## 2025-07-27 ASSESSMENT — ACTIVITIES OF DAILY LIVING (ADL)
ADLS_ACUITY_SCORE: 43
ADLS_ACUITY_SCORE: 43
ADLS_ACUITY_SCORE: 45
ADLS_ACUITY_SCORE: 43
ADLS_ACUITY_SCORE: 45
ADLS_ACUITY_SCORE: 43
ADLS_ACUITY_SCORE: 45
ADLS_ACUITY_SCORE: 43
ADLS_ACUITY_SCORE: 45
ADLS_ACUITY_SCORE: 43
ADLS_ACUITY_SCORE: 45

## 2025-07-27 NOTE — PLAN OF CARE
"Goal Outcome Evaluation:       [facility] - ICU    RN Progress Note:            Pertinent Assessments:      Please refer to flowsheet rows for full assessment     Patient alert and oriented, Blood pressure stable, heart rate stable.   Complains of no pain.  Patient did unfortunately get dizzy, diaphoretic, \"spaced out\" while walking with rehab.  Sigrid with [facility] made aware.  Dose of metoprolol cut back down to 6.25mg.             Key Events - This Shift:       Dizziness episode     RN Managed Protocols Ordered:  yes  Protocols:kt, mag, phos,   PRN'S:addressed and given  Protocols Status: recheck in am                Barriers to Discharge / Downgrade:     Currently IM                      "

## 2025-07-27 NOTE — PLAN OF CARE
Goal Outcome Evaluation:      Plan of Care Reviewed With: patient      Outcome Evaluation: Up in chair and to BR with assist of one.  Vital signs stable, remains on room air.    Patient comfortable with scheduled Tylenol and Lidocaine patch.  Report called to ANDRÉS Chavira on P3.  To transfer to room 323 via wheelchair.  Medications and belongings sent with Patient.

## 2025-07-27 NOTE — PROGRESS NOTES
"Patient was ambulating in hallway with cardiac rehab.  I was following along with a recliner.  Patient did have an episode where he \"stutter stepped\" he was not listening to direction.    He then sat in recliner, diaphoretic,  when back in room his heart rate was stable 70-80's, blood pressure was high 186/84 initially.  Prior to walk he had his metoprolol 12.5mg. Sigrid from [facility] has been notified.  Patient currently reclined in chair and currently bp 127/58, HR 61.  "

## 2025-07-27 NOTE — PLAN OF CARE
Goal Outcome Evaluation:      Plan of Care Reviewed With: patient    Overall Patient Progress: improvingOverall Patient Progress: improving    Outcome Evaluation: Patient had an uneventful night, just waiting for a bed for him to get out of ICU.    [facility] - ICU    RN Progress Note:            Pertinent Assessments:      Please refer to flowsheet rows for full assessment     SR with BBB, BP WNL.    Clear diminished lung sounds, on 2L via NC.    Gutierrez in place.    Incisions CDI, approximated, left leg bruised.           Key Events - This Shift:       Uneventful.    Large urine output in my 12 hours.    Tylenol for pain.    No reports of nausea, no episodes of bradycardia.     RN Managed Protocols Ordered:  Yes  Protocols:Potassium, Magnesium, and Phosphorus  PRN'S:iCal  Protocols Status: Reviewed with Oncoming RN                Barriers to Discharge / Downgrade:     Patient is already downgraded.         Point of Contact Update: YES-OR-NO: Yes  If No, reason: Patient is updating family on his own.         Problem: Cardiovascular Surgery  Goal: Improved Activity Tolerance  Outcome: Progressing  Intervention: Optimize Tolerance for Activity  Recent Flowsheet Documentation  Taken 7/27/2025 0400 by Lauren Andersen RN  Environmental Support: calm environment promoted  Taken 7/27/2025 0000 by Lauren Andersen RN  Environmental Support: calm environment promoted  Taken 7/26/2025 2000 by Lauren Andersen RN  Environmental Support: calm environment promoted     Problem: Cardiovascular Surgery  Goal: Effective Bowel Elimination  Outcome: Progressing  Intervention: Enhance Bowel Motility and Elimination  Recent Flowsheet Documentation  Taken 7/27/2025 0400 by Lauren Andersen, RN  Bowel Motility Enhancement:   ambulation promoted   fluid intake encouraged   oral intake encouraged  Bowel Elimination Management:   hygiene measures promoted   relaxation techniques promoted   toileting  offered  Taken 7/27/2025 0000 by Lauren Andersen RN  Bowel Motility Enhancement:   ambulation promoted   fluid intake encouraged   oral intake encouraged  Bowel Elimination Management:   hygiene measures promoted   relaxation techniques promoted   toileting offered  Taken 7/26/2025 2000 by Lauren Andersen RN  Bowel Motility Enhancement:   ambulation promoted   fluid intake encouraged   oral intake encouraged  Bowel Elimination Management:   hygiene measures promoted   relaxation techniques promoted   toileting offered     Problem: Cardiovascular Surgery  Goal: Effective Cardiac Function  Outcome: Progressing     Problem: Cardiovascular Surgery  Goal: Optimal Cerebral Tissue Perfusion  Outcome: Progressing  Intervention: Protect and Optimize Cerebral Perfusion  Recent Flowsheet Documentation  Taken 7/27/2025 0400 by Lauren Andersen RN  Sensory Stimulation Regulation:   quiet environment promoted   care clustered  Cerebral Perfusion Promotion:   blood pressure monitored   normothermia promoted  Glycemic Management: blood glucose monitored  Head of Bed (HOB) Positioning: HOB at 20-30 degrees  Taken 7/27/2025 0200 by Lauren Andersen RN  Head of Bed (HOB) Positioning: HOB at 20-30 degrees  Taken 7/27/2025 0000 by Lauren Andersen RN  Sensory Stimulation Regulation:   quiet environment promoted   care clustered  Cerebral Perfusion Promotion:   blood pressure monitored   normothermia promoted  Glycemic Management: blood glucose monitored  Taken 7/26/2025 2315 by Lauren Andersen RN  Head of Bed (HOB) Positioning: HOB at 20-30 degrees  Taken 7/26/2025 2200 by Lauren Andersen RN  Head of Bed (HOB) Positioning: HOB at 20-30 degrees  Taken 7/26/2025 2000 by Lauren Andersen RN  Cerebral Perfusion Promotion:   blood pressure monitored   normothermia promoted  Glycemic Management: blood glucose monitored     Problem: Cardiovascular Surgery  Goal: Fluid and Electrolyte Balance  Outcome: Progressing     Problem:  Cardiovascular Surgery  Goal: Blood Glucose Level Within Targeted Range  Outcome: Progressing  Intervention: Optimize Glycemic Control  Recent Flowsheet Documentation  Taken 7/27/2025 0400 by Lauren Andersen RN  Glycemic Management: blood glucose monitored  Taken 7/27/2025 0000 by Lauren Andersen RN  Glycemic Management: blood glucose monitored  Taken 7/26/2025 2000 by Lauren Andersen RN  Glycemic Management: blood glucose monitored     Problem: Cardiovascular Surgery  Goal: Acceptable Pain Control  Outcome: Progressing     Problem: Cardiovascular Surgery  Goal: Nausea and Vomiting Relief  Outcome: Progressing     Problem: Cardiovascular Surgery  Goal: Effective Urinary Elimination  Outcome: Progressing  Intervention: Monitor and Manage Urinary Retention  Recent Flowsheet Documentation  Taken 7/27/2025 0400 by Lauren Andersen RN  Urinary Elimination Promotion: catheter patency maintained  Taken 7/27/2025 0000 by Lauren Andersen RN  Urinary Elimination Promotion: catheter patency maintained  Taken 7/26/2025 2000 by Lauren Andersen RN  Urinary Elimination Promotion: catheter patency maintained     Problem: Cardiovascular Surgery  Goal: Effective Oxygenation and Ventilation  Outcome: Progressing

## 2025-07-27 NOTE — PROGRESS NOTES
[facility]    Medicine Progress Note - Hospitalist Service    Date of Admission:  7/15/2025    Assessment & Plan     Mayito Sood is a 64 year old male with a past medical history of Type 2 Diabetes presented to the emergency room with chest pain and syncope, found to have multivessel coronary artery disease.  Awaiting on CABG      7/21 : s/p CABG    7/23 :     Continue post op care  Chest tubes removed  Holding iv lasix, creatinine trending up 1.9--2.7  CT surgery  Cardiology consulted for symptomatic sania      7/24 :     Continue post op care  40 mg iv lasix - 1 dose today, creatinine trending up 1.9--2.7--2.82  CT surgery following  Cardiology consulted for symptomatic sania - ower pacemaker rates to 70 bpm today and continue gradual wean. Beta blocker remains on hold         7/25 :     Continue post op care  40 mg iv lasix - 1 dose 7/24, creatinine trending up 1.9--2.7--2.82--2.27  CT surgery following  EP consulted for symptomatic sania - Pacemaker rate reduced to 50 bpm today  --Anticipate TPW removal in the next day or so if he continues to tolerate  Holding BB          7/26 :     Continue post op care  40 mg iv lasix - 1 dose 7/24, bumex 1 mg iv - 1 dose 7/25,  creatinine trending up 1.9--2.7--2.82--2.27--1.81  CT surgery following  EP consulted for symptomatic sania - Pacemaker rate reduced to 50 bpm today  --Anticipate TPW removal in the next day or so if he continues to tolerate  Holding BB        7/27:     Continue post op care  40 mg iv lasix - 1 dose 7/24, bumex 1 mg iv - 1 dose 7/25,  creatinine trending up 1.9--2.7--2.82--2.27--1.81--1.72  CT surgery following  EP consulted for symptomatic sania - Pacemaker rate reduced to 50 bpm today  --Anticipate TPW removal in the next day or so if he continues to tolerate  Holding BB      Not medically ready for discharge at this time.  Needs CT surgery clearance, TARUN  Will likely discharge home 1-2 days      A/p :        NSTEMI  Multivessel CAD  - Chest pain started 7/15, had episode of syncope at work  - Patient with troponin 67 -> 97  - EKG with ST Depression. Cardiology not calling STEMI  - Cardiology consult, appreciate input  -Coronary angiogram shows multivessel disease  - CT Surgery consulted; CABG Monday  - Echo with EF 55% mildly hypokinetic distal anterior segment no significant valvular heart disease  - s/p aspirin 325mg x1 and continue 81mg   - Continue atorvastatin 80mg daily   - Metoprolol 25mg BID  - Given ticagrelor x1, not continued -   ---hold off on ACE  ---Chest CT with faint hazy groundglass opacities, possible edema.    ---Ultrasound carotids less than 50% stenosis bilaterally  --Continue cardiac monitor  --on heparin gtt     Acute Hypoxic Respiratory Failure  - Secondary to above, resolved  --Patient not on home oxygen, requiring up to 10L NC after [facility].  -  fluid overload with NSTEMI -sedation from [facility].   ---CT does show some groundglass opacities  - CXR on 7/15 showing no acute cardiopulmonary process.  --Did get Lasix 7/16.  Further diuresis per cardiology     Acute kidney injury   - Baseline CKD III   CKD Stage 4 Considered and Ruled Out   ---Cr baseline appears to be around 1.75-2.0  --cotninue to hold cozaar   ---did get diuresis earlier- now done  ---holding jardiance  -Continue to monitor renal function      Hyperkalemia  - resolved  --Potassium 5.2 on ED presentation, then 5.8 with AM labs 7/16  --s/p  insulin  lokelma x1   - recheck in am      Hyponatremia  ---resolved     Hypertension  --bp running high  --- increase amlodipine  ---hold cozaar   ---continue metoprolol with hold parameters for bradycardia  --- consider diuretic     Type 2 Diabetes with hyperglycemia  -Poorly controlled at baseline  --Last A1c was 9.1% on 7/15/25  - PTA Meds:  empagliflozin, no insulin for many months per patient , no victoza for a month  ---continue Lantus 25 units nightly   ---hold jardiance  - Sliding Scale Insulin  "with Glucose Checks     BPH  --continue home flomax     hyperlipidemia  ---was on pravastatin  ---now on atorvastatin     Hypophosphatemia,   --resolved with replacement     Hypocalcemia-  -continue to monitor     Severe Obesity, BMI 37.59  - encourage weight loss.        Diet: Low Saturated Fat Na <2400 mg    DVT Prophylaxis: Heparin GTT  Gutierrez Catheter: Not present  Lines: None     Cardiac Monitoring: ACTIVE order. Indication: Open heart surgery (72 hours)  Code Status: Full Code      Clinically Significant Risk Factors           # Hypocalcemia: Lowest iCa = 4.2 mg/dL in last 2 days, will monitor and replace as appropriate     # Hypoalbuminemia: Lowest albumin = 3.2 g/dL at 7/21/2025  4:06 PM, will monitor as appropriate     # Hypertension: Noted on problem list           # DMII: A1C = 9.1 % (Ref range: <5.7 %) within past 6 months # Severe Obesity: Estimated body mass index is 37.77 kg/m  as calculated from the following:    Height as of this encounter: 1.854 m (6' 1\").    Weight as of this encounter: 129.9 kg (286 lb 4.8 oz). with complications       # History of CABG: noted on surgical history       Social Drivers of Health    Depression: At risk (5/23/2025)    Received from [facility]    PHQ-2     PHQ-2 TOTAL SCORE: 6          Disposition Plan     Medically Ready for Discharge: Anticipated in 5+ Days             Landon Lopez MD  Hospitalist Service  [facility]  Securely message with [employer] (more info)  Text page via yetu Paging/Directory   ______________________________________________________________________        Physical Exam   Vital Signs: Temp: 97.9  F (36.6  C) Temp src: Oral BP: (!) 146/69 Pulse: 63   Resp: 16 SpO2: 93 % O2 Device: None (Room air) Oxygen Delivery: 2 LPM  Weight: 286 lbs 4.8 oz    General Appearance: Pleasant male no apparent distress  Respiratory: Clear to auscultation bilaterally  Cardiovascular: The rate and rhythm without " murmurs rubs or gallops    Medical Decision Making             Data     I have personally reviewed the following data over the past 24 hrs:    7.1  \   8.0 (L)   / 268     138 101 39.9 (H) /  194 (H)   3.8 26 1.72 (H) \       Imaging results reviewed over the past 24 hrs:   No results found for this or any previous visit (from the past 24 hours).

## 2025-07-27 NOTE — PROGRESS NOTES
"CVTS Daily Progress Note   POD#6 s/p CABG x4 EF 55%   Attending: Lara  LOS: 11    SUBJECTIVE/INTERVAL EVENTS:  Remains in NSR without need for pacing. Beta blocker trialed yesterday per EP - tolerated well.  Gutierrez out for TOV. Feels well.   Denies syncopal episodes. Otherwise, patient progressing overall well. Normotensive. Maintaining oxygen saturations on nasal cannula. Ambulating with therapy to chair. Pain well controlled. + BM / flatus. Tolerating diet without nausea. UOP adequate. Hgb stable.  Creat 1.72.  Patient denies new chest pain, shortness of breath, abdominal pain, calf pain, nausea. All questions answered to patient's satisfaction.     OBJECTIVE:  Temp:  [98.1  F (36.7  C)-98.7  F (37.1  C)] 98.6  F (37  C)  Pulse:  [68-99] 69  Resp:  [16-24] 18  BP: (121-170)/(56-84) 162/71  SpO2:  [92 %-99 %] 96 %  Vitals:    07/21/25 0700 07/23/25 0607 07/24/25 0640 07/26/25 0600   Weight: 126 kg (277 lb 11.2 oz) 133.2 kg (293 lb 11.2 oz) 132.6 kg (292 lb 4.8 oz) 131.4 kg (289 lb 11.2 oz)    07/27/25 0500   Weight: 129.9 kg (286 lb 4.8 oz)       Clinically Significant Risk Factors           # Hypocalcemia: Lowest iCa = 4.2 mg/dL in last 2 days, will monitor and replace as appropriate     # Hypoalbuminemia: Lowest albumin = 3.2 g/dL at 7/21/2025  4:06 PM, will monitor as appropriate       # Hypertension: Noted on problem list           # DMII: A1C = 9.1 % (Ref range: <5.7 %) within past 6 months # Severe Obesity: Estimated body mass index is 37.77 kg/m  as calculated from the following:    Height as of this encounter: 1.854 m (6' 1\").    Weight as of this encounter: 129.9 kg (286 lb 4.8 oz). with complications       # History of CABG: noted on surgical history               Current Medications:    Scheduled Meds:  Current Facility-Administered Medications   Medication Dose Route Frequency Provider Last Rate Last Admin    acetaminophen (TYLENOL) tablet 975 mg  975 mg Oral Q8H China Cerna PA-C   975 mg at " 07/27/25 0523    aspirin (ASA) chewable tablet 162 mg  162 mg Oral or NG Tube Daily China Cerna PA-C   162 mg at 07/26/25 0808    atorvastatin (LIPITOR) tablet 80 mg  80 mg Oral QPM China Cerna PA-C   80 mg at 07/26/25 2136    bumetanide (BUMEX) injection 1 mg  1 mg Intravenous Daily China Cerna PA-C        escitalopram (LEXAPRO) tablet 20 mg  20 mg Oral QPM China Cerna PA-C   20 mg at 07/26/25 2136    heparin ANTICOAGULANT injection 5,000 Units  5,000 Units Subcutaneous Q8H China Cerna PA-C   5,000 Units at 07/27/25 0523    insulin aspart (NovoLOG) injection (RAPID ACTING)  1-10 Units Subcutaneous TID AC China Cerna PA-C   2 Units at 07/27/25 0733    insulin aspart (NovoLOG) injection (RAPID ACTING)  1-7 Units Subcutaneous At Bedtime Cihna Cerna PA-C        insulin glargine (LANTUS PEN) injection 12 Units  12 Units Subcutaneous QAM AC China Cerna PA-C   12 Units at 07/27/25 0734    Lidocaine (LIDOCARE) 4 % Patch 1-2 patch  1-2 patch Transdermal Q24H China Cerna PA-C   1 patch at 07/26/25 1540    metoprolol tartrate (LOPRESSOR) half-tab 12.5 mg  12.5 mg Oral BID China Cerna PA-C        pantoprazole (PROTONIX) EC tablet 40 mg  40 mg Oral QAM AC China Cerna PA-C   40 mg at 07/27/25 0648    polyethylene glycol (MIRALAX) Packet 17 g  17 g Oral Daily China Cerna PA-C   17 g at 07/26/25 0808    senna-docusate (SENOKOT-S/PERICOLACE) 8.6-50 MG per tablet 1 tablet  1 tablet Oral BID China Cerna PA-C   1 tablet at 07/26/25 0808    tamsulosin (FLOMAX) capsule 0.4 mg  0.4 mg Oral Daily China Cerna PA-C   0.4 mg at 07/26/25 0808     Continuous Infusions:  Current Facility-Administered Medications   Medication Dose Route Frequency Provider Last Rate Last Admin    No lozenges or gum should be given while patient on BIPAP/AVAPS/AVAPS AE   Does not apply Continuous PRN China Cerna PA-C          PRN Meds:.  Current Facility-Administered Medications   Medication Dose Route Frequency Provider Last Rate Last Admin    bisacodyl (DULCOLAX) suppository 10 mg  10 mg Rectal Daily PRN China Cerna PA-C        calcium gluconate 1 g in 50 mL in sodium chloride intermittent infusion  1 g Intravenous Once PRN China Cerna PA-C   1 g at 07/27/25 0512    calcium gluconate 2 g in  mL intermittent infusion  2 g Intravenous Once PRN China Cerna PA-C        calcium gluconate 3 g in sodium chloride 0.9 % 100 mL intermittent infusion  3 g Intravenous Once PRN China Cerna PA-C        glucose gel 15-30 g  15-30 g Oral Q15 Min PRN China Cerna PA-C        Or    dextrose 50 % injection 25-50 mL  25-50 mL Intravenous Q15 Min PRN China Cerna PA-C        Or    glucagon injection 1 mg  1 mg Subcutaneous Q15 Min PRN China Cerna PA-C        hydrALAZINE (APRESOLINE) injection 10 mg  10 mg Intravenous Q30 Min PRN China Cerna PA-C   10 mg at 07/25/25 1107    lactated ringers BOLUS 250 mL  250 mL Intravenous Q15 Min PRN China Cerna PA-C   250 mL at 07/22/25 0243    magnesium hydroxide (MILK OF MAGNESIA) suspension 30 mL  30 mL Oral Daily PRN China Cerna PA-C        methocarbamol (ROBAXIN) tablet 500 mg  500 mg Oral Q6H PRN China Cerna PA-C   500 mg at 07/23/25 1918    naloxone (NARCAN) injection 0.2 mg  0.2 mg Intravenous Q2 Min PRN China Cerna PA-C        Or    naloxone (NARCAN) injection 0.4 mg  0.4 mg Intravenous Q2 Min PRN China Cerna PA-C        Or    naloxone (NARCAN) injection 0.2 mg  0.2 mg Intramuscular Q2 Min PRN China Cerna PA-C        Or    naloxone (NARCAN) injection 0.4 mg  0.4 mg Intramuscular Q2 Min PRN China Cerna PA-C        No lozenges or gum should be given while patient on BIPAP/AVAPS/AVAPS AE   Does not apply Continuous PRN China Cerna PA-C         ondansetron (ZOFRAN ODT) ODT tab 4 mg  4 mg Oral Q6H PRN China Cerna PA-C        Or    ondansetron (ZOFRAN) injection 4 mg  4 mg Intravenous Q6H PRN Jose Cernarinayan LEZAMA, PA-C   4 mg at 07/26/25 0808    oxyCODONE (ROXICODONE) tablet 5 mg  5 mg Oral Q4H PRN David Cernaelle TEJA PA-C        Or    oxyCODONE (ROXICODONE) tablet 10 mg  10 mg Oral Q4H PRN Jose Cernarinayan LEZAMA, PA-C   10 mg at 07/23/25 0304    prochlorperazine (COMPAZINE) injection 10 mg  10 mg Intravenous Q6H PRN China Cerna, PA-C   10 mg at 07/26/25 1402    Or    prochlorperazine (COMPAZINE) tablet 10 mg  10 mg Oral Q6H PRN China Cerna PA-C           Cardiographics:    Telemetry monitoring demonstrates sinus rhythm in the at 70s per my personal review.    Imaging:  Results for orders placed or performed during the hospital encounter of 07/15/25   XR Chest Port 1 View    Impression    IMPRESSION:     No acute cardiopulmonary process. Stable cardiomediastinal silhouette.   US Carotid Bilateral    Impression    IMPRESSION:  1.  Mild plaque formation, velocities consistent with less than 50% stenosis in the right internal carotid artery.  2.  Mild plaque formation, velocities consistent with less than 50% stenosis in the left internal carotid artery.  3.  Flow within the vertebral arteries is antegrade.   CT Chest w/o Contrast    Impression    IMPRESSION:   1.  Chronic bronchial wall thickening and mild bronchial dilatation.  2.  Faint hazy groundglass density opacity throughout both lungs could indicate minimal edema.  3.  Strands of fibrosis and/or linear atelectasis as well as scattered micronodular opacities in the peribronchial lung parenchyma in the mid and lower lungs have increased slightly since 2019.  4.  Mild enlargement central pulmonary arteries (MPA diameter 3.6 cm) could indicate some degree of chronic pulmonary arterial hypertension.  5.  Moderate vessel coronary artery calcification.     XR Chest Port 1 View     Impression    IMPRESSION: Endotracheal tube tip is about 5.8 cm above the marco antonio. Right internal jugular [place] catheter tip is probably in the proximal right pulmonary artery and is deflected back to the left. Consider withdrawal and advancement prior to obtaining   a wedge pressure.     There are mediastinal and left chest tubes. Low lung volumes. No consolidation. No pleural effusions or pneumothorax. Unchanged cardiac size. Median sternotomy. Status post CABG x4.   XR Chest Port 1 View    Impression    IMPRESSION: Interval median sternotomy and CABG. Right internal jugular [place] catheter with tip overlying the right pulmonary artery. Mediastinal drain and left chest tube. No significant pneumothorax. Enlarged cardiomediastinal silhouette. No   pulmonary vascular congestion. Streaky perihilar and left lower lung opacities, favoring atelectasis. No significant pleural fluid.       Labs, personally reviewed.  Hemoglobin   Date Value Ref Range Status   07/27/2025 8.0 (L) 13.3 - 17.7 g/dL Final   07/23/2025 8.7 (L) 13.3 - 17.7 g/dL Final   07/22/2025 9.5 (L) 13.3 - 17.7 g/dL Final     WBC Count   Date Value Ref Range Status   07/27/2025 7.1 4.0 - 11.0 10e3/uL Final   07/23/2025 13.3 (H) 4.0 - 11.0 10e3/uL Final   07/22/2025 12.1 (H) 4.0 - 11.0 10e3/uL Final     Platelet Count   Date Value Ref Range Status   07/27/2025 268 150 - 450 10e3/uL Final   07/25/2025 202 150 - 450 10e3/uL Final   07/23/2025 171 150 - 450 10e3/uL Final     Creatinine   Date Value Ref Range Status   07/27/2025 1.72 (H) 0.67 - 1.17 mg/dL Final   07/26/2025 1.81 (H) 0.67 - 1.17 mg/dL Final   07/25/2025 2.27 (H) 0.67 - 1.17 mg/dL Final     Potassium   Date Value Ref Range Status   07/27/2025 3.8 3.4 - 5.3 mmol/L Final   07/26/2025 4.3 3.4 - 5.3 mmol/L Final   07/25/2025 4.1 3.4 - 5.3 mmol/L Final     Potassium POCT   Date Value Ref Range Status   07/21/2025 4.0 3.4 - 5.3 mmol/L Final   07/21/2025 4.0 3.4 - 5.3 mmol/L Final   07/21/2025 4.4  3.4 - 5.3 mmol/L Final     Magnesium   Date Value Ref Range Status   07/27/2025 1.9 1.7 - 2.3 mg/dL Final   07/26/2025 2.2 1.7 - 2.3 mg/dL Final   07/25/2025 2.1 1.7 - 2.3 mg/dL Final          I/O:  I/O last 3 completed shifts:  In: 2695 [P.O.:2640; I.V.:55]  Out: 2940 [Urine:2940]       Physical Exam:    General: Patient seen up in chair conversant/pleasant  HEENT: MAN, no sclera icterus, moist mucosa, nasal cannula in place  CV: Paced rhythm on monitor. 2+ peripheral pulses in all extremities. Mild edema.   Pulm: Non-labored effort on nasal cannula.  Incision C/D/I.  Abd: Soft, NT, ND  : TOV today  Ext: Mild pedal edema, SCDs in place, warm, distal pulses intact  Neuro: A&Ox3, KINGHT, and CN grossly intact      ASSESSMENT/PLAN:    Mayito Sood is a 64 year old male with a history of history of CKD, kidney stones, HTN, DM, and obesity who presented with an NSTEMI  who is s/p CABG x4 .    Active Problems:    Status post coronary angiogram    ACS (acute coronary syndrome) (H)    ST elevation myocardial infarction (STEMI), unspecified artery (H)    Coronary artery disease involving native coronary artery of native heart, unspecified whether angina present        NEURO:   - Scheduled Tylenol/lidocaine patches and PRN Tylenol/oxycodone/Robaxin for pain  - PTA Lexapro    CV:   - Pre-op EF 55%  - Normotensive  - Appreciate EP  - Metoprolol tartrate 12.5 mg BID  -  mg  - Atorvastatin 80mg daily  - Chest tubes removed 7/23. TPW to be removed this AM.     PULM:   - Extubated POD#1  - Maintaining oxygen saturations on nasal cannula - wean as able  - Encourage pulmonary toilet  - Needs outpatient sleep study     FEN/GI:  - Diet: ADAT to cardiac diet  - Continue electrolyte replacement protocol  - Bowel regimen, LBM 7/26    RENAL:  - Adequate UOP/hr. Continue to monitor closely.  - Creatinine stable within baseline of 1.7-2.0  - Nephrology consult; appreciate recs - signed off  - Gutierrez replaced 7/24 - TOV today  -  Bumex 1 mg IV daily for now  - PTA Flomax    HEME:  - Acute blood loss anemia post-op.   - Hgb stable , no bleeding concerns. Hep SQ, ASA    ID:  - Jayde op ppx complete, febrile. No concerns for infection    ENDO:   - HbA1c 9 %  - BG goal < 180 to promote optimal healing  - SSI  - PTA on insulin, but not on due to inventory  - Lantus 12u daily  - PTA Jardiance held    PPx:   - DVT: SCDs, SQ heparin TID, ambulation   - GI: Protonix 40mg PO daily    DISPO:   - Continue general tele care (transferred POD#5)   - PT/OT recs at discharge: home w/ outpt cardiac rehab  - Medically Ready for Discharge: Anticipated Tomorrow pending beta blocker tolerance and no more syncopal episodes        Patient discussed with Dr. France. Dr. Bermudez updated.      Sigrid Cerna PA-C  [facility] Cardiothoracic Surgery  Vocera or Secure Chat  July 27, 2025

## 2025-07-28 ENCOUNTER — APPOINTMENT (OUTPATIENT)
Dept: OCCUPATIONAL THERAPY | Facility: HOSPITAL | Age: 65
End: 2025-07-28
Payer: COMMERCIAL

## 2025-07-28 LAB
CA-I BLD-MCNC: 4.4 MG/DL (ref 4.4–5.2)
CA-I BLD-MCNC: 4.6 MG/DL (ref 4.4–5.2)
CREAT SERPL-MCNC: 1.59 MG/DL (ref 0.67–1.17)
EGFRCR SERPLBLD CKD-EPI 2021: 48 ML/MIN/1.73M2
GLUCOSE BLDC GLUCOMTR-MCNC: 154 MG/DL (ref 70–99)
GLUCOSE BLDC GLUCOMTR-MCNC: 171 MG/DL (ref 70–99)
GLUCOSE BLDC GLUCOMTR-MCNC: 172 MG/DL (ref 70–99)
GLUCOSE BLDC GLUCOMTR-MCNC: 187 MG/DL (ref 70–99)
HGB BLD-MCNC: 7.8 G/DL (ref 13.3–17.7)
MAGNESIUM SERPL-MCNC: 2.1 MG/DL (ref 1.7–2.3)
MCV RBC AUTO: 95 FL (ref 78–100)
MCV RBC AUTO: 95 FL (ref 78–100)
PHOSPHATE SERPL-MCNC: 3.4 MG/DL (ref 2.5–4.5)
PLATELET # BLD AUTO: 314 10E3/UL (ref 150–450)
POTASSIUM SERPL-SCNC: 4.1 MMOL/L (ref 3.4–5.3)

## 2025-07-28 PROCEDURE — 999N000157 HC STATISTIC RCP TIME EA 10 MIN

## 2025-07-28 PROCEDURE — 82565 ASSAY OF CREATININE: CPT | Performed by: INTERNAL MEDICINE

## 2025-07-28 PROCEDURE — 94799 UNLISTED PULMONARY SVC/PX: CPT

## 2025-07-28 PROCEDURE — 250N000011 HC RX IP 250 OP 636

## 2025-07-28 PROCEDURE — 210N000001 HC R&B IMCU HEART CARE

## 2025-07-28 PROCEDURE — 999N000156 HC STATISTIC RCP CONSULT EA 30 MIN

## 2025-07-28 PROCEDURE — 82330 ASSAY OF CALCIUM: CPT | Performed by: THORACIC SURGERY (CARDIOTHORACIC VASCULAR SURGERY)

## 2025-07-28 PROCEDURE — 36415 COLL VENOUS BLD VENIPUNCTURE: CPT | Performed by: THORACIC SURGERY (CARDIOTHORACIC VASCULAR SURGERY)

## 2025-07-28 PROCEDURE — 84132 ASSAY OF SERUM POTASSIUM: CPT | Performed by: THORACIC SURGERY (CARDIOTHORACIC VASCULAR SURGERY)

## 2025-07-28 PROCEDURE — 85049 AUTOMATED PLATELET COUNT: CPT

## 2025-07-28 PROCEDURE — 99254 IP/OBS CNSLTJ NEW/EST MOD 60: CPT | Performed by: PSYCHIATRY & NEUROLOGY

## 2025-07-28 PROCEDURE — 83735 ASSAY OF MAGNESIUM: CPT | Performed by: THORACIC SURGERY (CARDIOTHORACIC VASCULAR SURGERY)

## 2025-07-28 PROCEDURE — 97110 THERAPEUTIC EXERCISES: CPT | Mod: GO

## 2025-07-28 PROCEDURE — 94660 CPAP INITIATION&MGMT: CPT

## 2025-07-28 PROCEDURE — 250N000013 HC RX MED GY IP 250 OP 250 PS 637

## 2025-07-28 PROCEDURE — 99232 SBSQ HOSP IP/OBS MODERATE 35: CPT | Performed by: INTERNAL MEDICINE

## 2025-07-28 PROCEDURE — 84100 ASSAY OF PHOSPHORUS: CPT | Performed by: THORACIC SURGERY (CARDIOTHORACIC VASCULAR SURGERY)

## 2025-07-28 PROCEDURE — 97535 SELF CARE MNGMENT TRAINING: CPT | Mod: GO

## 2025-07-28 PROCEDURE — 85018 HEMOGLOBIN: CPT

## 2025-07-28 PROCEDURE — 250N000013 HC RX MED GY IP 250 OP 250 PS 637: Performed by: INTERNAL MEDICINE

## 2025-07-28 RX ORDER — BUMETANIDE 1 MG/1
1 TABLET ORAL DAILY
Status: DISCONTINUED | OUTPATIENT
Start: 2025-07-28 | End: 2025-07-30 | Stop reason: HOSPADM

## 2025-07-28 RX ORDER — CARVEDILOL 3.12 MG/1
3.12 TABLET ORAL 2 TIMES DAILY WITH MEALS
Status: DISCONTINUED | OUTPATIENT
Start: 2025-07-28 | End: 2025-07-30 | Stop reason: HOSPADM

## 2025-07-28 RX ADMIN — TAMSULOSIN HYDROCHLORIDE 0.4 MG: 0.4 CAPSULE ORAL at 08:10

## 2025-07-28 RX ADMIN — HEPARIN SODIUM 5000 UNITS: 5000 INJECTION, SOLUTION INTRAVENOUS; SUBCUTANEOUS at 13:58

## 2025-07-28 RX ADMIN — HEPARIN SODIUM 5000 UNITS: 5000 INJECTION, SOLUTION INTRAVENOUS; SUBCUTANEOUS at 21:30

## 2025-07-28 RX ADMIN — SENNOSIDES, DOCUSATE SODIUM 1 TABLET: 50; 8.6 TABLET, FILM COATED ORAL at 08:10

## 2025-07-28 RX ADMIN — CARVEDILOL 3.12 MG: 3.12 TABLET, FILM COATED ORAL at 16:43

## 2025-07-28 RX ADMIN — INSULIN GLARGINE 12 UNITS: 100 INJECTION, SOLUTION SUBCUTANEOUS at 08:05

## 2025-07-28 RX ADMIN — INSULIN ASPART 1 UNITS: 100 INJECTION, SOLUTION INTRAVENOUS; SUBCUTANEOUS at 16:42

## 2025-07-28 RX ADMIN — PANTOPRAZOLE SODIUM 40 MG: 40 TABLET, DELAYED RELEASE ORAL at 06:27

## 2025-07-28 RX ADMIN — INSULIN ASPART 2 UNITS: 100 INJECTION, SOLUTION INTRAVENOUS; SUBCUTANEOUS at 08:05

## 2025-07-28 RX ADMIN — HEPARIN SODIUM 5000 UNITS: 5000 INJECTION, SOLUTION INTRAVENOUS; SUBCUTANEOUS at 06:27

## 2025-07-28 RX ADMIN — INSULIN ASPART 2 UNITS: 100 INJECTION, SOLUTION INTRAVENOUS; SUBCUTANEOUS at 12:34

## 2025-07-28 RX ADMIN — ACETAMINOPHEN 975 MG: 325 TABLET, FILM COATED ORAL at 06:25

## 2025-07-28 RX ADMIN — ACETAMINOPHEN 975 MG: 325 TABLET, FILM COATED ORAL at 21:30

## 2025-07-28 RX ADMIN — ACETAMINOPHEN 975 MG: 325 TABLET, FILM COATED ORAL at 13:58

## 2025-07-28 RX ADMIN — CALCIUM GLUCONATE 1 G: 20 INJECTION, SOLUTION INTRAVENOUS at 06:47

## 2025-07-28 RX ADMIN — ATORVASTATIN CALCIUM 80 MG: 40 TABLET, FILM COATED ORAL at 21:30

## 2025-07-28 RX ADMIN — BUMETANIDE 1 MG: 1 TABLET ORAL at 14:00

## 2025-07-28 RX ADMIN — ESCITALOPRAM 20 MG: 20 TABLET, FILM COATED ORAL at 21:33

## 2025-07-28 RX ADMIN — ASPIRIN 162 MG: 81 TABLET, COATED ORAL at 08:10

## 2025-07-28 RX ADMIN — CARVEDILOL 3.12 MG: 3.12 TABLET, FILM COATED ORAL at 12:35

## 2025-07-28 RX ADMIN — Medication 3 MG: at 21:30

## 2025-07-28 ASSESSMENT — ACTIVITIES OF DAILY LIVING (ADL)
ADLS_ACUITY_SCORE: 45
ADLS_ACUITY_SCORE: 42
ADLS_ACUITY_SCORE: 45
ADLS_ACUITY_SCORE: 42
ADLS_ACUITY_SCORE: 45

## 2025-07-28 NOTE — PLAN OF CARE
Problem: Adult Inpatient Plan of Care  Goal: Absence of Hospital-Acquired Illness or Injury  Intervention: Identify and Manage Fall Risk  Recent Flowsheet Documentation  Taken 7/27/2025 1958 by Cait Spence RN  Safety Promotion/Fall Prevention:   activity supervised   increased rounding and observation   increase visualization of patient   nonskid shoes/slippers when out of bed   room door open   room near nurse's station   room organization consistent   safety round/check completed     Problem: Comorbidity Management  Goal: Blood Pressure in Desired Range  Intervention: Maintain Blood Pressure Management  Recent Flowsheet Documentation  Taken 7/27/2025 1958 by Cait Spence RN  Medication Review/Management: medications reviewed   Goal Outcome Evaluation:       sc  Patient Name: Mayito Sood   MRN: [id number]   Date of Admission: 7/15/2025    Procedure: Procedure(s):  CORONARY ARTERY BYPASS GRAFT TIMES FOUR, WITH LEFT ENDOSCOPIC VESSEL PROCUREMENT, LEFT INTERNAL MAMMARY ARTERY HARVEST, EPIAORTIC ULTRASOUND  ECHOCARDIOGRAM, TRANSESOPHAGEAL, INTRAOPERATIVE    Post Op day #:6    Subjective (Patient focus/Primary Problem for shift): Pt wants to get some sleep tonight          Pain Goal 0 Pain Rating 0           Pain Medication/ Regime effective to reduce patient pain scheduled tylenol    Objective (Physical assessment):           Rhythm: normal sinus rhythm with BBB            Bowel Activity: yes if Yes indicate when: today          Bowel Medications: no            Incision: healing well          Incentive Spirometry Q 1-2 hour when awake:  yes Volume: 2000 ml          Epicardial Pacing Wires:  yes; capped            Patient Activity:           Up to chair for meals: yes          Ambulation with RN x2 (Not including CR): not applicable           Shower Daily {YES/NO/NA:[id number]          Nasal  Nozin BID AM/PM : not applicable              Chest Tubes   Pleural: no Draining: no                Suction: no              Mediastinal: no Draining: no               Suction: no   Dressing Change Daily:yes If No, why?done this AM                     Urinary Catheter: no           Preventative WOC consult (need MD order): no       Assessment (Nursing primary shift focus): Pt is alert and oriented x 4, denies pain or SOB. Lung sounds clear, on RA, while awake, applied O2 at HS per pt's request due to VINNIE. He does not use CPAP at home. Pt verbalized he feels dizzy when he gets up and walk. Assist of 1 with transfer using gait belt. SCD in place. Melatonin given at HS per pt's request.Discharge video shown to pt, he verbalized understanding.    Plan (Patient Care Plan/focus): continue use of IS and flutter valve; provide rest period in between cares.      Cait Spence RN   7/27/2025   10:25 PM

## 2025-07-28 NOTE — PROGRESS NOTES
[facility]    Medicine Progress Note - Hospitalist Service    Date of Admission:  7/15/2025    Assessment & Plan     Mayito Sood is a 64 year old male with a past medical history of Type 2 Diabetes presented to the emergency room with chest pain and syncope, found to have multivessel coronary artery disease.  Awaiting on CABG      7/21 : s/p CABG    7/23 :     Continue post op care  Chest tubes removed  Holding iv lasix, creatinine trending up 1.9--2.7  CT surgery  Cardiology consulted for symptomatic sania      7/24 :     Continue post op care  40 mg iv lasix - 1 dose today, creatinine trending up 1.9--2.7--2.82  CT surgery following  Cardiology consulted for symptomatic sania - ower pacemaker rates to 70 bpm today and continue gradual wean. Beta blocker remains on hold         7/25 :     Continue post op care  40 mg iv lasix - 1 dose 7/24, creatinine trending up 1.9--2.7--2.82--2.27  CT surgery following  EP consulted for symptomatic sania - Pacemaker rate reduced to 50 bpm today  --Anticipate TPW removal in the next day or so if he continues to tolerate  Holding BB          7/26 :     Continue post op care  40 mg iv lasix - 1 dose 7/24, bumex 1 mg iv - 1 dose 7/25,  creatinine trending up 1.9--2.7--2.82--2.27--1.81  CT surgery following  EP consulted for symptomatic sania - Pacemaker rate reduced to 50 bpm today  --Anticipate TPW removal in the next day or so if he continues to tolerate  Holding BB        7/27:     Continue post op care  40 mg iv lasix - 1 dose 7/24, bumex 1 mg iv - 1 dose 7/25,  creatinine trending up 1.9--2.7--2.82--2.27--1.81--1.72  CT surgery following  EP consulted for symptomatic sania - Pacemaker rate reduced to 50 bpm today  --Anticipate TPW removal in the next day or so if he continues to tolerate  Holding BB            7/28:     Continue post op care  40 mg iv lasix - 1 dose 7/24, bumex 1 mg iv - 1 dose 7/25,  creatinine trending up  1.9--2.7--2.82--2.27--1.81--1.72--1.59  CT surgery following  EP consulted for symptomatic sania - Pacemaker rate reduced to 50 bpm today  --Anticipate TPW removal in the next day or so if he continues to tolerate  Holding BB      Likely discharge in am if ok with CT surgery          A/p :       NSTEMI  Multivessel CAD  - Chest pain started 7/15, had episode of syncope at work  - Patient with troponin 67 -> 97  - EKG with ST Depression. Cardiology not calling STEMI  - Cardiology consult, appreciate input  -Coronary angiogram shows multivessel disease  - CT Surgery consulted; CABG Monday  - Echo with EF 55% mildly hypokinetic distal anterior segment no significant valvular heart disease  - s/p aspirin 325mg x1 and continue 81mg   - Continue atorvastatin 80mg daily   - Metoprolol 25mg BID  - Given ticagrelor x1, not continued -   ---hold off on ACE  ---Chest CT with faint hazy groundglass opacities, possible edema.    ---Ultrasound carotids less than 50% stenosis bilaterally  --Continue cardiac monitor  --on heparin gtt     Acute Hypoxic Respiratory Failure  - Secondary to above, resolved  --Patient not on home oxygen, requiring up to 10L NC after [facility].  -  fluid overload with NSTEMI -sedation from [facility].   ---CT does show some groundglass opacities  - CXR on 7/15 showing no acute cardiopulmonary process.  --Did get Lasix 7/16.  Further diuresis per cardiology     Acute kidney injury   - Baseline CKD III   CKD Stage 4 Considered and Ruled Out   ---Cr baseline appears to be around 1.75-2.0  --cotninue to hold cozaar   ---did get diuresis earlier- now done  ---holding jardiance  -Continue to monitor renal function      Hyperkalemia  - resolved  --Potassium 5.2 on ED presentation, then 5.8 with AM labs 7/16  --s/p  insulin  lokelma x1   - recheck in am      Hyponatremia  ---resolved     Hypertension  --bp running high  --- increase amlodipine  ---hold cozaar   ---continue metoprolol with hold parameters for bradycardia  ---  "consider diuretic     Type 2 Diabetes with hyperglycemia  -Poorly controlled at baseline  --Last A1c was 9.1% on 7/15/25  - PTA Meds:  empagliflozin, no insulin for many months per patient , no victoza for a month  ---continue Lantus 25 units nightly   ---hold jardiance  - Sliding Scale Insulin with Glucose Checks     BPH  --continue home flomax     hyperlipidemia  ---was on pravastatin  ---now on atorvastatin     Hypophosphatemia,   --resolved with replacement     Hypocalcemia-  -continue to monitor     Severe Obesity, BMI 37.59  - encourage weight loss.        Diet: Low Saturated Fat Na <2400 mg    DVT Prophylaxis: Heparin GTT  Gutierrez Catheter: Not present  Lines: None     Cardiac Monitoring: ACTIVE order. Indication: Open heart surgery (72 hours)  Code Status: Full Code      Clinically Significant Risk Factors               # Hypoalbuminemia: Lowest albumin = 3.2 g/dL at 7/21/2025  4:06 PM, will monitor as appropriate     # Hypertension: Noted on problem list           # DMII: A1C = 9.1 % (Ref range: <5.7 %) within past 6 months # Severe Obesity: Estimated body mass index is 38.16 kg/m  as calculated from the following:    Height as of this encounter: 1.854 m (6' 1\").    Weight as of this encounter: 131.2 kg (289 lb 3.2 oz). with complications       # History of CABG: noted on surgical history       Social Drivers of Health    Depression: At risk (5/23/2025)    Received from [facility]    PHQ-2     PHQ-2 TOTAL SCORE: 6          Disposition Plan     Medically Ready for Discharge: Anticipated in 5+ Days             Landon Lopez MD  Hospitalist Service  [facility]  Securely message with [employer] (more info)  Text page via [email or web address] Paging/Directory   ______________________________________________________________________        Physical Exam   Vital Signs: Temp: 98.7  F (37.1  C) Temp src: Oral BP: (!) 141/70 Pulse: 65   Resp: 20 SpO2: 95 % O2 Device: None (Room " air) Oxygen Delivery: 2 LPM  Weight: 289 lbs 3.2 oz    General Appearance: Pleasant male no apparent distress  Respiratory: Clear to auscultation bilaterally  Cardiovascular: The rate and rhythm without murmurs rubs or gallops    Medical Decision Making             Data     I have personally reviewed the following data over the past 24 hrs:    N/A  \   7.8 (L)   / 314     N/A N/A N/A /  154 (H)   4.1 N/A 1.59 (H) \       Imaging results reviewed over the past 24 hrs:   No results found for this or any previous visit (from the past 24 hours).

## 2025-07-28 NOTE — PROGRESS NOTES
[facility] Treatment Plan    Patient Score: 6  Patient Acuity: 4    Clinical Indication for Therapy: prevent atelectasis    Therapy Ordered: PEP/PAP BID    Assessment Summary: Pt admitted post CABG. Pt on RA. BS are clear with crackles in the bases. No recent CXR. Changed to BID per [facility].    [place], RT  7/28/2025

## 2025-07-28 NOTE — PLAN OF CARE
"  Problem: Adult Inpatient Plan of Care  Goal: Plan of Care Review  Description: The Plan of Care Review/Shift note should be completed every shift.  The Outcome Evaluation is a brief statement about your assessment that the patient is improving, declining, or no change.  This information will be displayed automatically on your shift  note.  Outcome: Progressing  Flowsheets (Taken 7/28/2025 1058)  Plan of Care Reviewed With: patient  Goal: Patient-Specific Goal (Individualized)  Description: You can add care plan individualizations to a care plan. Examples of Individualization might be:  \"Parent requests to be called daily at 9am for status\", \"I have a hard time hearing out of my right ear\", or \"Do not touch me to wake me up as it startles  me\".  Outcome: Progressing  Goal: Readiness for Transition of Care  Outcome: Progressing     Problem: Acute Coronary Syndrome  Goal: Optimal Adaptation to Illness  Outcome: Progressing  Intervention: Support Adjustment to Life-Change Event  Recent Flowsheet Documentation  Taken 7/28/2025 0800 by Laurence Noriega RN  Family/Support System Care: involvement promoted  Goal: Normalized Cardiac Rhythm  Outcome: Progressing  Goal: Effective Cardiac Pump Function  Outcome: Progressing     Problem: Comorbidity Management  Goal: Blood Glucose Levels Within Targeted Range  Outcome: Progressing  Intervention: Monitor and Manage Glycemia  Recent Flowsheet Documentation  Taken 7/28/2025 0800 by Laurence Noriega RN  Medication Review/Management: medications reviewed  Goal: Blood Pressure in Desired Range  Outcome: Progressing  Intervention: Maintain Blood Pressure Management  Recent Flowsheet Documentation  Taken 7/28/2025 0800 by Laurence Noriega RN  Medication Review/Management: medications reviewed     Problem: Skin Injury Risk Increased  Goal: Skin Health and Integrity  Outcome: Progressing  Intervention: Plan: Nurse Driven Intervention: Moisture Management  Recent Flowsheet " Documentation  Taken 7/28/2025 0800 by Laurence Noriega RN  Moisture Interventions: Encourage regular toileting  Intervention: Plan: Nurse Driven Intervention: Friction and Shear  Recent Flowsheet Documentation  Taken 7/28/2025 0800 by Laurence Noriega RN  Friction/Shear Interventions: HOB 30 degrees or less  Intervention: Optimize Skin Protection  Recent Flowsheet Documentation  Taken 7/28/2025 0800 by Laurence Noriega RN  Activity Management: activity adjusted per tolerance  Head of Bed (HOB) Positioning: HOB at 60 degrees     Problem: Chest Pain  Goal: Resolution of Chest Pain Symptoms  Outcome: Progressing     Problem: Cardiovascular Surgery  Goal: Improved Activity Tolerance  Outcome: Progressing  Goal: Optimal Coping with Heart Surgery  Outcome: Progressing  Intervention: Support Psychosocial Response to Surgery  Recent Flowsheet Documentation  Taken 7/28/2025 0800 by Laurence Noriega RN  Family/Support System Care: involvement promoted  Goal: Absence of Bleeding  Outcome: Progressing  Goal: Effective Bowel Elimination  Outcome: Progressing  Goal: Effective Cardiac Function  Outcome: Progressing  Goal: Optimal Cerebral Tissue Perfusion  Outcome: Progressing  Intervention: Protect and Optimize Cerebral Perfusion  Recent Flowsheet Documentation  Taken 7/28/2025 0800 by Laurence Noriega RN  Head of Bed (HOB) Positioning: HOB at 60 degrees  Goal: Fluid and Electrolyte Balance  Outcome: Progressing  Goal: Blood Glucose Level Within Targeted Range  Outcome: Progressing  Goal: Absence of Infection Signs and Symptoms  Outcome: Progressing  Intervention: Prevent or Manage Infection  Recent Flowsheet Documentation  Taken 7/28/2025 0800 by Laurence Noriega RN  Infection Prevention:   equipment surfaces disinfected   hand hygiene promoted  Goal: Anesthesia/Sedation Recovery  Outcome: Progressing  Intervention: Optimize Anesthesia Recovery  Recent Flowsheet Documentation  Taken 7/28/2025 0800 by Laurence Noriega RN  Safety  Promotion/Fall Prevention:   activity supervised   increased rounding and observation   increase visualization of patient   nonskid shoes/slippers when out of bed   room door open   room near nurse's station   room organization consistent   safety round/check completed  Goal: Acceptable Pain Control  Outcome: Progressing  Goal: Nausea and Vomiting Relief  Outcome: Progressing  Goal: Effective Urinary Elimination  Outcome: Progressing  Goal: Effective Oxygenation and Ventilation  Outcome: Progressing     Problem: Risk for Delirium  Goal: Optimal Coping  Outcome: Progressing  Intervention: Optimize Psychosocial Adjustment to Delirium  Recent Flowsheet Documentation  Taken 7/28/2025 0800 by Laurence Noriega RN  Family/Support System Care: involvement promoted  Goal: Improved Behavioral Control  Outcome: Progressing  Intervention: Minimize Safety Risk  Recent Flowsheet Documentation  Taken 7/28/2025 0800 by Laurence Noriega RN  Enhanced Safety Measures:   review medications for side effects with activity   room near unit station  Trust Relationship/Rapport:   care explained   choices provided   emotional support provided   empathic listening provided   questions answered   questions encouraged   reassurance provided   thoughts/feelings acknowledged  Goal: Improved Attention and Thought Clarity  Outcome: Progressing  Goal: Improved Sleep  Outcome: Progressing   Goal Outcome Evaluation:      Plan of Care Reviewed With: patient             Pt is alert and oriented x4. Pt is med complaint. Pt denies pain. Pt's v/s is stable.pt is up with assist of 1. Pt no complain. Continue to monitor pt.

## 2025-07-28 NOTE — PLAN OF CARE
Problem: Adult Inpatient Plan of Care  Goal: Optimal Comfort and Wellbeing  Intervention: Provide Person-Centered Care  Recent Flowsheet Documentation  Taken 7/28/2025 0400 by Cait Spence RN  Trust Relationship/Rapport:   care explained   choices provided   emotional support provided   empathic listening provided   questions answered   questions encouraged   reassurance provided   thoughts/feelings acknowledged  Taken 7/28/2025 0030 by Cait Spence RN  Trust Relationship/Rapport:   care explained   choices provided   emotional support provided   empathic listening provided   questions answered   questions encouraged   reassurance provided   thoughts/feelings acknowledged    Problem: Acute Coronary Syndrome  Goal: Optimal Adaptation to Illness  Intervention: Support Adjustment to Life-Change Event  Recent Flowsheet Documentation  Taken 7/28/2025 0400 by Cait Spence RN  Family/Support System Care: involvement promoted  Taken 7/28/2025 0030 by Cait Spence RN    Problem: Comorbidity Management  Goal: Blood Pressure in Desired Range  Intervention: Maintain Blood Pressure Management  Recent Flowsheet Documentation  Taken 7/28/2025 0400 by Cait Spence RN  Medication Review/Management: medications reviewed  Taken 7/28/2025 0030 by Cait Spence RN  Medication Review/Management: medications reviewed  Taken 7/27/2025 1958 by Cait Spence RN  Medication Review/Management: medications reviewed    Goal Outcome Evaluation:       sc  Patient Name: Mayito Sood   MRN: [id number]   Date of Admission: 7/15/2025    Procedure: Procedure(s):  CORONARY ARTERY BYPASS GRAFT TIMES FOUR, WITH LEFT ENDOSCOPIC VESSEL PROCUREMENT, LEFT INTERNAL MAMMARY ARTERY HARVEST, EPIAORTIC ULTRASOUND  ECHOCARDIOGRAM, TRANSESOPHAGEAL, INTRAOPERATIVE    Post Op day #:7    Subjective (Patient focus/Primary Problem for shift): pt wants to have a good night sleep           Pain  Goal 0 Pain Rating 0           Pain Medication/ Regime effective to reduce patient pain scheduled Tylenol    Objective (Physical assessment):           Rhythm: normal sinus rhythm with BBB            Bowel Activity: yes if Yes indicate when: 07/28          Bowel Medications: not applicable            Incision: healing well          Incentive Spirometry Q 1-2 hour when awake:  not applicable Volume: 2000 to 2500 ml          Epicardial Pacing Wires:  yes; capped            Patient Activity:           Up to chair for meals: yes          Ambulation with RN x2 (Not including CR): no           Shower Daily {YES/NO/NA:[id number]          Nasal  Nozin BID AM/PM : not applicable              Chest Tubes   Pleural: no Draining: no               Suction: no              Mediastinal: no Draining: no               Suction: no   Dressing Change Daily:yes  If No, why?NA                     Urinary Catheter: no           Preventative WOC consult (need MD order): no       Assessment (Nursing primary shift focus): Pt sleeping in between cares.Had a BM early in the shift, verbalized had a little dizziness when he went to the bathroom    Plan (Patient Care Plan/focus): Promote rest periods in between sleep, up in the recliner before breakfast.    Cait Spence RN   7/28/2025   5:39 AM

## 2025-07-28 NOTE — CONSULTS
[facility] Neurology  [place]    Mayito Sood MRN# [id number]   Age: 64 year old YOB: 1960               Assessment and Plan:      Intermittent migraine headaches, longstanding     With coronary artery disease he is not a good candidate for triptan medications.  I recommended that if his headache recurs he could try 2 Excedrin and 2 ibuprofen at the same time to see if this shortens or aborts the episode.  If this is not sufficient the CGRP blockers such as Nurtec and Ubrelvy do not have the same vasoconstrictor properties and should be safe, though they are not available here at the hospital.  He could check in with his primary regarding 1 of those prescriptions or referring to the neurology clinic.  He is not having the headache or other symptoms now.    Thank you for this consult, I'll sign off.             Chief Complaint/HPI:     This patient is a 64-year-old gentleman seen for neurologic evaluation today regarding concern for syncope.  When I asked the patient about his symptoms he describes severe pain at the back of the head accompanied by nausea, light and sound sensitivity.  These episodes have been occurring since 1977.  When this happens he knows to go to a dark and quiet room and let it run its course.  He remembers brain imaging and percutaneous EEG testing and seeing psychiatrists.  He remembers being prescribed butalbital with codeine which helped nicely for the headaches.  They can occur several times over a week or 2 and then he might go many months without having any of the headaches.  When they are occurring, they are random and not at the same time each day.  They are not unilateral and are not associated with eye tearing or other cranial nerve symptoms.  With these headaches he has trouble lifting his head up, and can feel dizzy with that.  Interictally his neck is fine, no limitation of movement, no dizziness or balance difficulties.            Past Medical History:     has a past medical history of Acute pain, Acute sinusitis, TARUN (acute kidney injury), Anxiety and depression (02/08/2023), Bacteremia, Cellulitis, Chronic back pain, Diabetes mellitus, type 2 (H) (08/27/2014), Dyslipidemia (07/05/2017), ED (erectile dysfunction), HTN (hypertension) (08/29/2014), Infection involving bone (H), Obesity, Obstructive uropathy, Osteomyelitis (H), Polyneuropathy, Proliferative diabetic retinopathy of both eyes with macular edema associated with type 2 diabetes mellitus (H) (08/30/2018), SIRS (systemic inflammatory response syndrome) (H), Stage 3b chronic kidney disease (H) (02/08/2023), and Ureterolithiasis.          Past Surgical History:    has a past surgical history that includes IR Lumbar Epidural Steroid Injection (12/04/2003); IR Lumbar Epidural Steroid Injection (07/07/2009); IR Lumbar Epidural Steroid Injection (08/20/2009); REPAIR ACHILLES TENDON,PRIMARY; Picc (08/29/2014); Combined cystoscopy, retrogrades, exchange stent ureter(s) (Left, 06/12/2024); Cholecystectomy; Cystoureteroscopy, With Lithotripsy Using Matt 120P Laser And Ureteral Stent Insertion (Left, 7/10/2024); Cystoscopy, biopsy bladder, combined (N/A, 7/10/2024); Coronary Angiogram (N/A, 7/16/2025); Left Heart Catheterization (N/A, 7/16/2025); Coronary Artery Bypass Graft, With Endoscopic Vessel Procurement (N/A, 7/21/2025); and Transesophageal echocardiogram intraoperative (N/A, 7/21/2025).          Social History:     Social History     Tobacco Use    Smoking status: Never     Passive exposure: Never    Smokeless tobacco: Never   Substance Use Topics    Alcohol use: Yes     Comment: rare             Family History:     Family History   Problem Relation Age of Onset    Cancer Mother                 Allergies:     Allergies   Allergen Reactions    Iodine Unknown and Anaphylaxis    Shellfish-Derived Products Anaphylaxis    Prednisone Unknown and Other (See Comments)     Depression--gets emotional and  angry    Depression--gets emotional and angry      Depression  pt gets emotional and angry    Lisinopril Cough    Metformin Diarrhea             Medications:     Current Facility-Administered Medications:     acetaminophen (TYLENOL) tablet 975 mg, 975 mg, Oral, Q8H, China Cerna PA-C, 975 mg at 07/28/25 1358    aspirin (ASA) chewable tablet 162 mg, 162 mg, Oral or NG Tube, Daily, 162 mg at 07/28/25 0810 **OR** [DISCONTINUED] aspirin (ASA) Suppository 300 mg, 300 mg, Rectal, Daily, China Cerna PA-C    atorvastatin (LIPITOR) tablet 80 mg, 80 mg, Oral, QPM, China Cerna PA-C, 80 mg at 07/27/25 2146    bisacodyl (DULCOLAX) suppository 10 mg, 10 mg, Rectal, Daily PRN, China Cerna PA-C    bumetanide (BUMEX) tablet 1 mg, 1 mg, Oral, Daily, China Cerna PA-C, 1 mg at 07/28/25 1400    calcium gluconate 1 g in 50 mL in sodium chloride intermittent infusion, 1 g, Intravenous, Once PRN, China Cerna PA-C, 1 g at 07/28/25 0647    calcium gluconate 2 g in  mL intermittent infusion, 2 g, Intravenous, Once PRN, China Cerna PA-LISETH    calcium gluconate 3 g in sodium chloride 0.9 % 100 mL intermittent infusion, 3 g, Intravenous, Once PRN, China Cerna PA-C    carvedilol (COREG) tablet 3.125 mg, 3.125 mg, Oral, BID w/meals, China Cerna PA-LISETH, 3.125 mg at 07/28/25 1235    glucose gel 15-30 g, 15-30 g, Oral, Q15 Min PRN **OR** dextrose 50 % injection 25-50 mL, 25-50 mL, Intravenous, Q15 Min PRN **OR** glucagon injection 1 mg, 1 mg, Subcutaneous, Q15 Min PRN, China Cerna PA-C    escitalopram (LEXAPRO) tablet 20 mg, 20 mg, Oral, QPM, China Cerna PA-LISETH, 20 mg at 07/27/25 2145    heparin ANTICOAGULANT injection 5,000 Units, 5,000 Units, Subcutaneous, Q8H, China Cerna PA-C, 5,000 Units at 07/28/25 1358    hydrALAZINE (APRESOLINE) injection 10 mg, 10 mg, Intravenous, Q30 Min PRN, China Cerna PA-C, 10 mg at 07/25/25  1107    insulin aspart (NovoLOG) injection (RAPID ACTING), 1-10 Units, Subcutaneous, TID AC, China Cerna PA-C, 2 Units at 07/28/25 1234    insulin aspart (NovoLOG) injection (RAPID ACTING), 1-7 Units, Subcutaneous, At Bedtime, China Cerna PA-C    [START ON 7/29/2025] insulin glargine (LANTUS PEN) injection 14 Units, 14 Units, Subcutaneous, QAM AC, China Cerna PA-C    lactated ringers BOLUS 250 mL, 250 mL, Intravenous, Q15 Min PRN, China Cerna PA-C, 250 mL at 07/22/25 0243    Lidocaine (LIDOCARE) 4 % Patch 1-2 patch, 1-2 patch, Transdermal, Q24H, China Cerna PA-C, 1 patch at 07/27/25 1821    magnesium hydroxide (MILK OF MAGNESIA) suspension 30 mL, 30 mL, Oral, Daily PRN, China Cerna PA-C    melatonin tablet 3 mg, 3 mg, Oral, At Bedtime PRN, Kymberly Huerta MD, 3 mg at 07/27/25 2151    methocarbamol (ROBAXIN) tablet 500 mg, 500 mg, Oral, Q6H PRN, China Cerna PA-C, 500 mg at 07/23/25 1918    naloxone (NARCAN) injection 0.2 mg, 0.2 mg, Intravenous, Q2 Min PRN **OR** naloxone (NARCAN) injection 0.4 mg, 0.4 mg, Intravenous, Q2 Min PRN **OR** naloxone (NARCAN) injection 0.2 mg, 0.2 mg, Intramuscular, Q2 Min PRN **OR** naloxone (NARCAN) injection 0.4 mg, 0.4 mg, Intramuscular, Q2 Min PRN, China Cerna PA-C    No lozenges or gum should be given while patient on BIPAP/AVAPS/AVAPS AE, , Does not apply, Continuous PRN, China Cerna PA-C    ondansetron (ZOFRAN ODT) ODT tab 4 mg, 4 mg, Oral, Q6H PRN **OR** ondansetron (ZOFRAN) injection 4 mg, 4 mg, Intravenous, Q6H PRN, China Cerna PA-C, 4 mg at 07/26/25 0808    oxyCODONE (ROXICODONE) tablet 5 mg, 5 mg, Oral, Q4H PRN **OR** oxyCODONE (ROXICODONE) tablet 10 mg, 10 mg, Oral, Q4H PRN, China Cerna PA-C, 10 mg at 07/23/25 0304    [DISCONTINUED] pantoprazole (PROTONIX) 2 mg/mL suspension 40 mg, 40 mg, Oral or NG Tube, Daily **OR** pantoprazole (PROTONIX) EC tablet 40 mg, 40 mg,  "Oral, QAM AC, China Cerna PA-C, 40 mg at 07/28/25 0627    polyethylene glycol (MIRALAX) Packet 17 g, 17 g, Oral, Daily, China Cerna PA-C, 17 g at 07/27/25 0805    prochlorperazine (COMPAZINE) injection 10 mg, 10 mg, Intravenous, Q6H PRN, 10 mg at 07/26/25 1402 **OR** prochlorperazine (COMPAZINE) tablet 10 mg, 10 mg, Oral, Q6H PRN, China Cerna PA-C    senna-docusate (SENOKOT-S/PERICOLACE) 8.6-50 MG per tablet 1 tablet, 1 tablet, Oral, BID, China Cerna PA-C, 1 tablet at 07/28/25 0810    tamsulosin (FLOMAX) capsule 0.4 mg, 0.4 mg, Oral, Daily, China Cerna PA-C, 0.4 mg at 07/28/25 0810              Physical Exam:      Vitals: BP (!) 148/67 (BP Location: Right arm)   Pulse 69   Temp 98.4  F (36.9  C) (Oral)   Resp 18   Ht 1.854 m (6' 1\")   Wt 131.2 kg (289 lb 3.2 oz)   SpO2 94%   BMI 38.16 kg/m    BMI= Body mass index is 38.16 kg/m .     Patient is alert and in no acute distress. Neck was supple, no carotid bruits, thyromegaly, lymphadenopathy or JVD noted.   Neurological Exam:   Mental status: Patient is alert and oriented x 3. Speech is clear and fluent    CN II: Visual fields are full to confrontation.  PERRLA.   CN III, IV, VI: EOMI.    CN VII: Face is symmetric with normal eye closure and smile.   CN VII: Hearing is normal to conversation   CN IX, X: Palate elevates symmetrically. Phonation is normal.    CN XI: Head turning and shoulder shrug are intact   CN XII: Tongue is midline with normal movements and no atrophy or fasciculations.   Motor: Muscle bulk and tone are normal. No pronator drift. Strength is 5/5 bilaterally. No fasciculations noted.   Reflexes: Reflexes are symmetric, diminished throughout. Plantar responses are flexor.   Sensory: Light touch, pinprick, and vibration sense are intact bilaterally.    Coordination: Rapid alternating movements and fine finger movements are intact. There is no dysmetria on finger-to-nose testing.    Gait: gait is " steady and independent      Marlon Eddy MD       More than 60 minutes spent reviewing records and results, evaluating patient, discussing with pt and family and on documentation

## 2025-07-28 NOTE — PROGRESS NOTES
"CVTS Daily Progress Note   POD#7 s/p CABG x4 EF 55%   Attending: Lara  LOS: 12    SUBJECTIVE/INTERVAL EVENTS:  One near-syncopal episode yesterday. Metoprolol stopped, switched to coreg. Discussing with patient, he is now recalling that near syncope has been an issue for him intermittently throughout his life. Reports feeling hot and tingly up the back of his head as well as nauseous and light headed - c/w vasovagal prodrome.  Otherwise, patient progressing overall well. Normotensive. Maintaining oxygen saturations on room air. Ambulating with therapy to chair. Pain well controlled. + BM / flatus. Tolerating diet without nausea. UOP adequate. Hgb stable.  Creat well within baseline.  Patient denies new chest pain, shortness of breath, abdominal pain, calf pain, nausea. All questions answered to patient's satisfaction.     OBJECTIVE:  Temp:  [97.9  F (36.6  C)-99  F (37.2  C)] 98.4  F (36.9  C)  Pulse:  [62-72] 69  Resp:  [16-18] 18  BP: (116-152)/(56-70) 148/67  SpO2:  [92 %-99 %] 94 %  Vitals:    07/23/25 0607 07/24/25 0640 07/26/25 0600 07/27/25 0500   Weight: 133.2 kg (293 lb 11.2 oz) 132.6 kg (292 lb 4.8 oz) 131.4 kg (289 lb 11.2 oz) 129.9 kg (286 lb 4.8 oz)    07/28/25 0300   Weight: 131.2 kg (289 lb 3.2 oz)       Clinically Significant Risk Factors               # Hypoalbuminemia: Lowest albumin = 3.2 g/dL at 7/21/2025  4:06 PM, will monitor as appropriate       # Hypertension: Noted on problem list           # DMII: A1C = 9.1 % (Ref range: <5.7 %) within past 6 months # Severe Obesity: Estimated body mass index is 38.16 kg/m  as calculated from the following:    Height as of this encounter: 1.854 m (6' 1\").    Weight as of this encounter: 131.2 kg (289 lb 3.2 oz). with complications       # History of CABG: noted on surgical history               Current Medications:    Scheduled Meds:  Current Facility-Administered Medications   Medication Dose Route Frequency Provider Last Rate Last Admin    " acetaminophen (TYLENOL) tablet 975 mg  975 mg Oral Q8H China Cerna PA-C   975 mg at 07/28/25 0625    aspirin (ASA) chewable tablet 162 mg  162 mg Oral or NG Tube Daily China Cerna PA-C   162 mg at 07/28/25 0810    atorvastatin (LIPITOR) tablet 80 mg  80 mg Oral QPM China Cerna PA-C   80 mg at 07/27/25 2146    carvedilol (COREG) tablet 3.125 mg  3.125 mg Oral BID w/meals China Cerna PA-C        escitalopram (LEXAPRO) tablet 20 mg  20 mg Oral QPM China Cerna PA-C   20 mg at 07/27/25 2145    heparin ANTICOAGULANT injection 5,000 Units  5,000 Units Subcutaneous Q8H China Cerna PA-C   5,000 Units at 07/28/25 0627    insulin aspart (NovoLOG) injection (RAPID ACTING)  1-10 Units Subcutaneous TID AC China Cerna PA-C   2 Units at 07/28/25 0805    insulin aspart (NovoLOG) injection (RAPID ACTING)  1-7 Units Subcutaneous At Bedtime China Cerna PA-C        insulin glargine (LANTUS PEN) injection 12 Units  12 Units Subcutaneous QAM AC China Cerna PA-C   12 Units at 07/28/25 0805    Lidocaine (LIDOCARE) 4 % Patch 1-2 patch  1-2 patch Transdermal Q24H China Cerna PA-C   1 patch at 07/27/25 1821    pantoprazole (PROTONIX) EC tablet 40 mg  40 mg Oral QAM AC China Cerna PA-C   40 mg at 07/28/25 0627    polyethylene glycol (MIRALAX) Packet 17 g  17 g Oral Daily China Cerna PA-C   17 g at 07/27/25 0805    senna-docusate (SENOKOT-S/PERICOLACE) 8.6-50 MG per tablet 1 tablet  1 tablet Oral BID China Cerna PA-C   1 tablet at 07/28/25 0810    tamsulosin (FLOMAX) capsule 0.4 mg  0.4 mg Oral Daily China Cerna PA-C   0.4 mg at 07/28/25 0810     Continuous Infusions:  Current Facility-Administered Medications   Medication Dose Route Frequency Provider Last Rate Last Admin    No lozenges or gum should be given while patient on BIPAP/AVAPS/AVAPS AE   Does not apply Continuous PRN China Cerna PA-C          PRN Meds:.  Current Facility-Administered Medications   Medication Dose Route Frequency Provider Last Rate Last Admin    bisacodyl (DULCOLAX) suppository 10 mg  10 mg Rectal Daily PRN China Cerna PA-C        calcium gluconate 1 g in 50 mL in sodium chloride intermittent infusion  1 g Intravenous Once PRN China Cerna PA-C   1 g at 07/28/25 0647    calcium gluconate 2 g in  mL intermittent infusion  2 g Intravenous Once PRN China Cerna PA-C        calcium gluconate 3 g in sodium chloride 0.9 % 100 mL intermittent infusion  3 g Intravenous Once PRN China Cerna PA-C        glucose gel 15-30 g  15-30 g Oral Q15 Min PRN China Cerna PA-C        Or    dextrose 50 % injection 25-50 mL  25-50 mL Intravenous Q15 Min PRN China Cerna PA-C        Or    glucagon injection 1 mg  1 mg Subcutaneous Q15 Min PRN China Cerna PA-C        hydrALAZINE (APRESOLINE) injection 10 mg  10 mg Intravenous Q30 Min PRN China Cerna PA-C   10 mg at 07/25/25 1107    lactated ringers BOLUS 250 mL  250 mL Intravenous Q15 Min PRN China Cerna PA-C   250 mL at 07/22/25 0243    magnesium hydroxide (MILK OF MAGNESIA) suspension 30 mL  30 mL Oral Daily PRN China Cerna PA-C        melatonin tablet 3 mg  3 mg Oral At Bedtime PRN Kymberly Huerta MD   3 mg at 07/27/25 2151    methocarbamol (ROBAXIN) tablet 500 mg  500 mg Oral Q6H PRN China Cerna PA-C   500 mg at 07/23/25 1918    naloxone (NARCAN) injection 0.2 mg  0.2 mg Intravenous Q2 Min PRN China Cerna PA-C        Or    naloxone (NARCAN) injection 0.4 mg  0.4 mg Intravenous Q2 Min PRN China Cerna PA-C        Or    naloxone (NARCAN) injection 0.2 mg  0.2 mg Intramuscular Q2 Min PRN China Cerna PA-C        Or    naloxone (NARCAN) injection 0.4 mg  0.4 mg Intramuscular Q2 Min PRN China Cerna PA-C        No lozenges or gum should be given while patient on  BIPAP/AVAPS/AVAPS AE   Does not apply Continuous PRN China Cerna, PA-C        ondansetron (ZOFRAN ODT) ODT tab 4 mg  4 mg Oral Q6H PRN China Cerna PA-C        Or    ondansetron (ZOFRAN) injection 4 mg  4 mg Intravenous Q6H PRN Jose Cernarielle K, PA-C   4 mg at 07/26/25 0808    oxyCODONE (ROXICODONE) tablet 5 mg  5 mg Oral Q4H PRN Eryn, China K, PA-C        Or    oxyCODONE (ROXICODONE) tablet 10 mg  10 mg Oral Q4H PRN China Cerna, PA-C   10 mg at 07/23/25 0304    prochlorperazine (COMPAZINE) injection 10 mg  10 mg Intravenous Q6H PRN Jose Cernarielle TEJA, PA-C   10 mg at 07/26/25 1402    Or    prochlorperazine (COMPAZINE) tablet 10 mg  10 mg Oral Q6H PRN China Cerna, PA-C           Cardiographics:    Telemetry monitoring demonstrates sinus rhythm in the at 60s per my personal review.    Imaging:  Results for orders placed or performed during the hospital encounter of 07/15/25   XR Chest Port 1 View    Impression    IMPRESSION:     No acute cardiopulmonary process. Stable cardiomediastinal silhouette.   US Carotid Bilateral    Impression    IMPRESSION:  1.  Mild plaque formation, velocities consistent with less than 50% stenosis in the right internal carotid artery.  2.  Mild plaque formation, velocities consistent with less than 50% stenosis in the left internal carotid artery.  3.  Flow within the vertebral arteries is antegrade.   CT Chest w/o Contrast    Impression    IMPRESSION:   1.  Chronic bronchial wall thickening and mild bronchial dilatation.  2.  Faint hazy groundglass density opacity throughout both lungs could indicate minimal edema.  3.  Strands of fibrosis and/or linear atelectasis as well as scattered micronodular opacities in the peribronchial lung parenchyma in the mid and lower lungs have increased slightly since 2019.  4.  Mild enlargement central pulmonary arteries (MPA diameter 3.6 cm) could indicate some degree of chronic pulmonary arterial  hypertension.  5.  Moderate vessel coronary artery calcification.     XR Chest Port 1 View    Impression    IMPRESSION: Endotracheal tube tip is about 5.8 cm above the marco antonio. Right internal jugular Timberlake-Johnny catheter tip is probably in the proximal right pulmonary artery and is deflected back to the left. Consider withdrawal and advancement prior to obtaining   a wedge pressure.     There are mediastinal and left chest tubes. Low lung volumes. No consolidation. No pleural effusions or pneumothorax. Unchanged cardiac size. Median sternotomy. Status post CABG x4.   XR Chest Port 1 View    Impression    IMPRESSION: Interval median sternotomy and CABG. Right internal jugular Timberlake-Johnny catheter with tip overlying the right pulmonary artery. Mediastinal drain and left chest tube. No significant pneumothorax. Enlarged cardiomediastinal silhouette. No   pulmonary vascular congestion. Streaky perihilar and left lower lung opacities, favoring atelectasis. No significant pleural fluid.       Labs, personally reviewed.  Hemoglobin   Date Value Ref Range Status   07/28/2025 7.8 (L) 13.3 - 17.7 g/dL Final   07/27/2025 8.0 (L) 13.3 - 17.7 g/dL Final   07/23/2025 8.7 (L) 13.3 - 17.7 g/dL Final     WBC Count   Date Value Ref Range Status   07/27/2025 7.1 4.0 - 11.0 10e3/uL Final   07/23/2025 13.3 (H) 4.0 - 11.0 10e3/uL Final   07/22/2025 12.1 (H) 4.0 - 11.0 10e3/uL Final     Platelet Count   Date Value Ref Range Status   07/28/2025 314 150 - 450 10e3/uL Final   07/27/2025 268 150 - 450 10e3/uL Final   07/25/2025 202 150 - 450 10e3/uL Final     Creatinine   Date Value Ref Range Status   07/27/2025 1.72 (H) 0.67 - 1.17 mg/dL Final   07/26/2025 1.81 (H) 0.67 - 1.17 mg/dL Final   07/25/2025 2.27 (H) 0.67 - 1.17 mg/dL Final     Potassium   Date Value Ref Range Status   07/28/2025 4.1 3.4 - 5.3 mmol/L Final   07/27/2025 3.8 3.4 - 5.3 mmol/L Final   07/26/2025 4.3 3.4 - 5.3 mmol/L Final     Potassium POCT   Date Value Ref Range Status    07/21/2025 4.0 3.4 - 5.3 mmol/L Final   07/21/2025 4.0 3.4 - 5.3 mmol/L Final   07/21/2025 4.4 3.4 - 5.3 mmol/L Final     Magnesium   Date Value Ref Range Status   07/28/2025 2.1 1.7 - 2.3 mg/dL Final   07/27/2025 1.9 1.7 - 2.3 mg/dL Final   07/26/2025 2.2 1.7 - 2.3 mg/dL Final          I/O:  I/O last 3 completed shifts:  In: 925 [P.O.:800; I.V.:20; IV Piggyback:105]  Out: 975 [Urine:975]       Physical Exam:    General: Patient seen up in chair conversant/pleasant  HEENT: MAN, no sclera icterus, moist mucosa, no O2 device in place  CV: Paced rhythm on monitor. 2+ peripheral pulses in all extremities. Mild edema.   Pulm: Non-labored effort on room air.  Incision C/D/I.  Abd: Soft, NT, ND  : TOV today  Ext: Mild pedal edema, SCDs in place, warm, distal pulses intact  Neuro: A&Ox3, KNIGHT, and CN grossly intact      ASSESSMENT/PLAN:    Mayito Sood is a 64 year old male with a history of history of CKD, kidney stones, HTN, DM, and obesity who presented with an NSTEMI  who is s/p CABG x4 .    Active Problems:    Status post coronary angiogram    ACS (acute coronary syndrome) (H)    ST elevation myocardial infarction (STEMI), unspecified artery (H)    Coronary artery disease involving native coronary artery of native heart, unspecified whether angina present        NEURO:   - Scheduled Tylenol/lidocaine patches and PRN Tylenol/oxycodone/Robaxin for pain  - PTA Lexapro  - Neuro consult for any recs related to vasovagal syncope. 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    CV:   - Pre-op EF 55%  - Normotensive  - Appreciate EP  - Coreg 3.125 mg BID - trial for greater BP effect and lesser HR effect  -  mg  - Atorvastatin 80mg daily  - Chest tubes removed 7/23. TPW left in place, will remove after tolerates coreg    PULM:   - Extubated POD#1  - Maintaining oxygen saturations on room air - wean as able  - Encourage pulmonary toilet  -  Needs outpatient sleep study     FEN/GI:  - Diet: ADAT to cardiac diet  - Continue electrolyte replacement protocol  - Bowel regimen, LBM 7/26    RENAL:  - Adequate UOP/hr. Continue to monitor closely.  - Creatinine stable within baseline of 1.7-2.0  - Nephrology consult; appreciate recs - signed off  - Gutierrez replaced 7/24-7/27, now voiding well  - Bumex 1 mg PO daily for now  - PTA Flomax    HEME:  - Acute blood loss anemia post-op.   - Hgb stable , no bleeding concerns. Hep SQ, ASA    ID:  - Jayde op ppx complete, febrile. No concerns for infection    ENDO:   - HbA1c 9 %  - BG goal < 180 to promote optimal healing  - SSI  - PTA prescribed insulin, but not taking due to ?inventory  - Lantus 14u daily  - PTA Jardiance held    PPx:   - DVT: SCDs, SQ heparin TID, ambulation   - GI: Protonix 40mg PO daily    DISPO:   - Continue general tele care (transferred POD#5)   - PT/OT recs at discharge: home w/ outpt cardiac rehab  - Medically Ready for Discharge: Anticipated Tomorrow pending beta blocker tolerance, neuro recs, and no more syncopal episodes        Patient discussed with Dr. Tomas and Dr. Suarez. Dr. Bermudez updated.      Sigrid Cerna PA-C  [facility] Cardiothoracic Surgery  Vocera or Secure Chat  July 28, 2025

## 2025-07-28 NOTE — CONSULTS
Care Management Follow Up    Length of Stay (days): 12    Expected Discharge Date: 1-2 days     Anticipated Discharge Plan:       Transportation: Anticipate Family/friend    PT Recommendations:    OT Recommendations:  home with outpatient cardiac rehab     Barriers to Discharge: medical stability    Prior Living Situation: house with significant other    Discussed  Partnership in Safe Discharge Planning  document with patient/family: No     Handoff Completed: No, handoff not indicated or clinically appropriate    Patient/Spokesperson Updated: No    Additional Information:  Chart reviewed. Consult received for elevated risk score. CM already following. Anticipate home with outpatient cardiac rehab when medically ready     Next Steps: continue to follow     Carmina Trejo RN  Acute Care Management  [facility]  For further questions/concerns you can reach us at Vocera Web Console

## 2025-07-28 NOTE — PROGRESS NOTES
SPIRITUAL HEALTH SERVICES (SHS)  SPIRITUAL ASSESSMENT Progress Note  [facility]. [address]    REFERRAL SOURCE: L of Stay      Mayito had quintuple bypass surgery and is in good spirits as is his significant other, Iraida (together 28 years)  Iraida became teary as we talked about what it was like when he had the surgery and Mayito seemed to wipe a tear or two away. Both were very relieved at the success of the surgery and are looking forward to Mayito's discharge. Both work at the [employer] and have good medical benefits so they will be financially secure during Mayito's recovery. They are also open to SHS visit though not connected to a local Faith. Mayito is Zoroastrian and his cecy is very important to him. Iraida is Religion. Neither is affiliated.      PLAN: [facility] will follow until discharge.      Eufemia Stapleton, Ph.D., [facility]      Securely Message with Learnhive or TextPaGroopt Pager.    Non-urgent needs:  Phone  to leave a message

## 2025-07-29 ENCOUNTER — APPOINTMENT (OUTPATIENT)
Dept: OCCUPATIONAL THERAPY | Facility: HOSPITAL | Age: 65
End: 2025-07-29
Payer: COMMERCIAL

## 2025-07-29 ENCOUNTER — ORDERS ONLY (AUTO-RELEASED) (OUTPATIENT)
Dept: MEDSURG UNIT | Facility: HOSPITAL | Age: 65
End: 2025-07-29

## 2025-07-29 DIAGNOSIS — I25.10 CORONARY ARTERY DISEASE INVOLVING NATIVE CORONARY ARTERY OF NATIVE HEART, UNSPECIFIED WHETHER ANGINA PRESENT: ICD-10-CM

## 2025-07-29 DIAGNOSIS — R55 PRE-SYNCOPE: ICD-10-CM

## 2025-07-29 PROBLEM — I24.9 ACS (ACUTE CORONARY SYNDROME) (H): Status: RESOLVED | Noted: 2025-07-16 | Resolved: 2025-07-29

## 2025-07-29 PROBLEM — I21.3 ST ELEVATION MYOCARDIAL INFARCTION (STEMI), UNSPECIFIED ARTERY (H): Status: RESOLVED | Noted: 2025-07-16 | Resolved: 2025-07-29

## 2025-07-29 LAB
GLUCOSE BLDC GLUCOMTR-MCNC: 154 MG/DL (ref 70–99)
GLUCOSE BLDC GLUCOMTR-MCNC: 175 MG/DL (ref 70–99)
GLUCOSE BLDC GLUCOMTR-MCNC: 182 MG/DL (ref 70–99)
GLUCOSE BLDC GLUCOMTR-MCNC: 186 MG/DL (ref 70–99)
GLUCOSE BLDC GLUCOMTR-MCNC: 197 MG/DL (ref 70–99)
HGB BLD-MCNC: 8.1 G/DL (ref 13.3–17.7)
MAGNESIUM SERPL-MCNC: 2 MG/DL (ref 1.7–2.3)
MCV RBC AUTO: 95 FL (ref 78–100)
PHOSPHATE SERPL-MCNC: 3.3 MG/DL (ref 2.5–4.5)
POTASSIUM SERPL-SCNC: 4.2 MMOL/L (ref 3.4–5.3)

## 2025-07-29 PROCEDURE — 97110 THERAPEUTIC EXERCISES: CPT | Mod: GO

## 2025-07-29 PROCEDURE — 36415 COLL VENOUS BLD VENIPUNCTURE: CPT

## 2025-07-29 PROCEDURE — 250N000013 HC RX MED GY IP 250 OP 250 PS 637: Performed by: STUDENT IN AN ORGANIZED HEALTH CARE EDUCATION/TRAINING PROGRAM

## 2025-07-29 PROCEDURE — 250N000013 HC RX MED GY IP 250 OP 250 PS 637

## 2025-07-29 PROCEDURE — 210N000001 HC R&B IMCU HEART CARE

## 2025-07-29 PROCEDURE — 250N000013 HC RX MED GY IP 250 OP 250 PS 637: Performed by: THORACIC SURGERY (CARDIOTHORACIC VASCULAR SURGERY)

## 2025-07-29 PROCEDURE — 83735 ASSAY OF MAGNESIUM: CPT | Performed by: THORACIC SURGERY (CARDIOTHORACIC VASCULAR SURGERY)

## 2025-07-29 PROCEDURE — 85018 HEMOGLOBIN: CPT

## 2025-07-29 PROCEDURE — 250N000011 HC RX IP 250 OP 636

## 2025-07-29 PROCEDURE — 999N000157 HC STATISTIC RCP TIME EA 10 MIN

## 2025-07-29 PROCEDURE — 84100 ASSAY OF PHOSPHORUS: CPT | Performed by: THORACIC SURGERY (CARDIOTHORACIC VASCULAR SURGERY)

## 2025-07-29 PROCEDURE — 99232 SBSQ HOSP IP/OBS MODERATE 35: CPT | Performed by: INTERNAL MEDICINE

## 2025-07-29 PROCEDURE — 84132 ASSAY OF SERUM POTASSIUM: CPT | Performed by: THORACIC SURGERY (CARDIOTHORACIC VASCULAR SURGERY)

## 2025-07-29 PROCEDURE — 250N000013 HC RX MED GY IP 250 OP 250 PS 637: Performed by: INTERNAL MEDICINE

## 2025-07-29 RX ORDER — MAGNESIUM OXIDE 400 MG/1
400 TABLET ORAL EVERY 4 HOURS
Status: COMPLETED | OUTPATIENT
Start: 2025-07-29 | End: 2025-07-29

## 2025-07-29 RX ORDER — LOSARTAN POTASSIUM 25 MG/1
25 TABLET ORAL DAILY
Status: DISCONTINUED | OUTPATIENT
Start: 2025-07-29 | End: 2025-07-30 | Stop reason: HOSPADM

## 2025-07-29 RX ORDER — HYDRALAZINE HYDROCHLORIDE 20 MG/ML
10 INJECTION INTRAMUSCULAR; INTRAVENOUS EVERY 30 MIN PRN
Status: DISCONTINUED | OUTPATIENT
Start: 2025-07-29 | End: 2025-07-30 | Stop reason: HOSPADM

## 2025-07-29 RX ORDER — TAMSULOSIN HYDROCHLORIDE 0.4 MG/1
0.4 CAPSULE ORAL DAILY
Qty: 30 CAPSULE | Refills: 1 | Status: SHIPPED | OUTPATIENT
Start: 2025-07-29

## 2025-07-29 RX ORDER — LOPERAMIDE HYDROCHLORIDE 2 MG/1
2 CAPSULE ORAL 4 TIMES DAILY PRN
Status: DISCONTINUED | OUTPATIENT
Start: 2025-07-29 | End: 2025-07-30 | Stop reason: HOSPADM

## 2025-07-29 RX ORDER — POLYETHYLENE GLYCOL 3350 17 G/17G
17 POWDER, FOR SOLUTION ORAL DAILY PRN
Qty: 510 G | Refills: 0 | Status: SHIPPED | OUTPATIENT
Start: 2025-07-29

## 2025-07-29 RX ORDER — DIPHENHYDRAMINE HCL 25 MG
25 CAPSULE ORAL
Status: DISCONTINUED | OUTPATIENT
Start: 2025-07-29 | End: 2025-07-30 | Stop reason: HOSPADM

## 2025-07-29 RX ORDER — ASPIRIN 81 MG/1
162 TABLET, CHEWABLE ORAL DAILY
Qty: 180 TABLET | Refills: 3 | Status: SHIPPED | OUTPATIENT
Start: 2025-07-30

## 2025-07-29 RX ORDER — DIPHENHYDRAMINE HCL 25 MG
25 CAPSULE ORAL
Status: COMPLETED | OUTPATIENT
Start: 2025-07-29 | End: 2025-07-29

## 2025-07-29 RX ORDER — ATORVASTATIN CALCIUM 80 MG/1
80 TABLET, FILM COATED ORAL EVERY EVENING
Qty: 90 TABLET | Refills: 3 | Status: SHIPPED | OUTPATIENT
Start: 2025-07-29

## 2025-07-29 RX ORDER — LIDOCAINE 4 G/G
1-2 PATCH TOPICAL EVERY 24 HOURS
Qty: 6 PATCH | Refills: 0 | Status: SHIPPED | OUTPATIENT
Start: 2025-07-29

## 2025-07-29 RX ADMIN — INSULIN ASPART 2 UNITS: 100 INJECTION, SOLUTION INTRAVENOUS; SUBCUTANEOUS at 12:28

## 2025-07-29 RX ADMIN — LOSARTAN POTASSIUM 25 MG: 25 TABLET, FILM COATED ORAL at 08:39

## 2025-07-29 RX ADMIN — ESCITALOPRAM 20 MG: 20 TABLET, FILM COATED ORAL at 21:39

## 2025-07-29 RX ADMIN — HYDRALAZINE HYDROCHLORIDE 10 MG: 20 INJECTION INTRAMUSCULAR; INTRAVENOUS at 03:31

## 2025-07-29 RX ADMIN — DIPHENHYDRAMINE HYDROCHLORIDE 25 MG: 25 CAPSULE ORAL at 21:39

## 2025-07-29 RX ADMIN — PANTOPRAZOLE SODIUM 40 MG: 40 TABLET, DELAYED RELEASE ORAL at 06:38

## 2025-07-29 RX ADMIN — INSULIN ASPART 3 UNITS: 100 INJECTION, SOLUTION INTRAVENOUS; SUBCUTANEOUS at 16:48

## 2025-07-29 RX ADMIN — ACETAMINOPHEN 975 MG: 325 TABLET, FILM COATED ORAL at 13:00

## 2025-07-29 RX ADMIN — HYDRALAZINE HYDROCHLORIDE 10 MG: 20 INJECTION INTRAMUSCULAR; INTRAVENOUS at 00:42

## 2025-07-29 RX ADMIN — HEPARIN SODIUM 5000 UNITS: 5000 INJECTION, SOLUTION INTRAVENOUS; SUBCUTANEOUS at 21:40

## 2025-07-29 RX ADMIN — BUMETANIDE 1 MG: 1 TABLET ORAL at 08:03

## 2025-07-29 RX ADMIN — ATORVASTATIN CALCIUM 80 MG: 40 TABLET, FILM COATED ORAL at 21:40

## 2025-07-29 RX ADMIN — ASPIRIN 162 MG: 81 TABLET, COATED ORAL at 08:03

## 2025-07-29 RX ADMIN — ACETAMINOPHEN 975 MG: 325 TABLET, FILM COATED ORAL at 06:38

## 2025-07-29 RX ADMIN — CARVEDILOL 3.12 MG: 3.12 TABLET, FILM COATED ORAL at 08:03

## 2025-07-29 RX ADMIN — INSULIN ASPART 2 UNITS: 100 INJECTION, SOLUTION INTRAVENOUS; SUBCUTANEOUS at 08:02

## 2025-07-29 RX ADMIN — LOPERAMIDE HYDROCHLORIDE 2 MG: 2 CAPSULE ORAL at 21:40

## 2025-07-29 RX ADMIN — CARVEDILOL 3.12 MG: 3.12 TABLET, FILM COATED ORAL at 16:43

## 2025-07-29 RX ADMIN — ACETAMINOPHEN 975 MG: 325 TABLET, FILM COATED ORAL at 21:40

## 2025-07-29 RX ADMIN — HEPARIN SODIUM 5000 UNITS: 5000 INJECTION, SOLUTION INTRAVENOUS; SUBCUTANEOUS at 13:01

## 2025-07-29 RX ADMIN — HEPARIN SODIUM 5000 UNITS: 5000 INJECTION, SOLUTION INTRAVENOUS; SUBCUTANEOUS at 06:38

## 2025-07-29 RX ADMIN — TAMSULOSIN HYDROCHLORIDE 0.4 MG: 0.4 CAPSULE ORAL at 08:03

## 2025-07-29 RX ADMIN — MAGNESIUM OXIDE TAB 400 MG (241.3 MG ELEMENTAL MG) 400 MG: 400 (241.3 MG) TAB at 12:26

## 2025-07-29 RX ADMIN — MAGNESIUM OXIDE TAB 400 MG (241.3 MG ELEMENTAL MG) 400 MG: 400 (241.3 MG) TAB at 08:03

## 2025-07-29 RX ADMIN — DIPHENHYDRAMINE HYDROCHLORIDE 25 MG: 25 CAPSULE ORAL at 03:56

## 2025-07-29 ASSESSMENT — ACTIVITIES OF DAILY LIVING (ADL)
ADLS_ACUITY_SCORE: 42
ADLS_ACUITY_SCORE: 46
ADLS_ACUITY_SCORE: 42
ADLS_ACUITY_SCORE: 50
ADLS_ACUITY_SCORE: 42

## 2025-07-29 NOTE — PROGRESS NOTES
Pt reports feeling anxious can not sleep. Melatonin given at evening not helpful. BP  elevated, PRN hydralazine given x1, BP not improve. Page CC. Benadryl order and given to help with sleep.

## 2025-07-29 NOTE — PLAN OF CARE
Problem: Adult Inpatient Plan of Care  Goal: Plan of Care Review: with Patient    Problem: Comorbidity Management  Goal: Blood Pressure in Desired Range  Intervention: Maintain Blood Pressure Management  Recent Flowsheet Documentation  Taken 7/29/2025 0030 by Maribel Garcia RN  Medication Review/Management: medications reviewed  Taken 7/28/2025 2030 by Maribel Garcia RN  Medication Review/Management: medications reviewed     Problem: Cardiovascular Surgery  Goal: Improved Activity Tolerance  Outcome: Progressing  Intervention: Optimize Tolerance for Activity  Recent Flowsheet Documentation  Taken 7/29/2025 0030 by Maribel Garcia RN  Environmental Support: calm environment promoted  Taken 7/28/2025 2030 by Maribel Garcia RN  Environmental Support: calm environment promoted     Outcome: Progressing   Goal Outcome Evaluation:    A/O x4. Denies pain or sob. Pt has some BUCKLEY. Tele is NSR BBB, on 3L of  O2 via nasal cannula. PRN  hydralazine 10 mg given for high BP with improvements. Pt has x 2 BM overnight. SBA with activity.     K/Mag/Phos protocols run, K/Phos recheck tomorrow.     Mag replace  x2 dose, then recheck next AM.

## 2025-07-29 NOTE — DISCHARGE INSTRUCTIONS
AFTER YOU GO HOME FROM YOUR HEART SURGERY  (CABG on 7/21/2025 with Dr. Bermudez)    You had a sternotomy, avoid lifting anything greater than ten pounds for 8 weeks after surgery, then 20 pounds for an additional 4 weeks.   Do not reach backwards or use arms to push out of chair.   Do not let people pull on your arms to assist with standing.   Avoid twisting or reaching too far across your body.    Do not sleep on your side for >12 weeks after surgery.  Avoid strenuous activities such as bowling, vacuuming, raking, shoveling, golf or tennis for 12 weeks after your surgery.   It is okay to resume sex if you feel comfortable in doing so. You may have to try different positions with your partner.    Splint your chest incision by hugging a pillow or bringing your arms across your chest when coughing or sneezing.     No driving for 4 weeks after surgery or while on pain medication.    Shower or wash your incisions daily with soap and water (or as instructed), then pat dry. Do not scrub at the incision, and do not let the water hit your chest directly until fully healed.  No baths or swimming for 3 months.   Cover chest tube sites with dry gauze until they stop draining, then leave open to air. It is normal for chest tube sites to drain yellowish/clear fluid for up to 2-3 weeks after surgery.   Watch for signs of infection: increased redness, tenderness, warmth or any drainage that appears infected (pus like) or is persistent.  Also a temperature > 100.5 F or chills. Call your surgeon or primary care provider's office immediately if you see any of these signs.  Remove any skin glue left on incisions after 10-14 days. This will not affect your incision and can speed up healing.    Exercise is very important in your recovery. Please follow the guidelines set up for you in your cardiac rehab classes at the hospital. If outpatient cardiac rehab was ordered for you, we highly recommend you participate. If you have problems  arranging your cardiac rehab, please call [phone] for all locations, with the exception of [place], please call [phone] and [place], please call [phone].    Avoid sitting for prolonged periods of time, try to walk every hour during the day. If you have a leg incision, elevate your leg often when you are not walking.    Take your blood pressure daily. If the top number is consistently <90 or >130 please call our office for further instructions.    Check your weight when you get home from the hospital and continue to check it daily for at least a month. If you notice a weight gain of 3 pounds overnight or 5 pounds in a week, call your surgeon or cardiologist.     Bowel activity may be slow after surgery. If necessary, you may take an over the counter laxative such as Milk of Magnesia or Miralax. You may have stool softeners prescribed (docusate sodium, Senokot). We recommend using stool softeners while using narcotics for pain (oxycodone/percocet, hydrocodone/vicodin, hydromorphone/dilaudid).      Wean OFF of narcotics (oxycodone, dilaudid, hydrocodone) as soon as possible. You should continue taking acetaminophen as long as you have any surgical pain as the first choice for pain control and add narcotics as necessary for pain to be tolerable.      DO NOT SMOKE. IF YOU NEED HELP QUITTING, PLEASE TALK WITH YOUR CARDIOLOGIST OR PRIMARY DOCTOR.    REGARDING PRESCRIPTION REFILLS.  If you need a refill on your pain medication contact us to discuss your pain and a possible one time refill.   All other medications will be adjusted, discontinued and re-filled by your primary care physician and/or your cardiologist as they were prior to your surgery. We have given you enough for one to three month with possibly one refill.    POST-OPERATIVE CLINIC VISITS  You have a follow up visit with CVT Surgery Advance Care Practitioners at the [facility] on 8/20/2025 at 11:30am.  You will then return to  the care of your primary provider and your cardiologist. Future medication refills should come from your PCP or Cardiologist.   You should see your primary care provider in 1-2 weeks after discharge. Call to schedule this appointment ASAP.  It is important to see your cardiologist about 4-6 weeks after discharge. You are currently scheduled on 9/10/2025 at 12:50pm with Dr. Duron.   Additional follow-up: Diabetes education appointment: Referral placed, schedule within the next couple of weeks.    If there is a need to return to see CT Surgery prior to your scheduled appointment, please call [phone]    SURGICAL QUESTIONS  Please call Brianna Vasquez RNCC at [phone] with surgical recovery and medication questions.  She will assist you with your needs and contact other surgery care team members as indicated.    On weekends or after hours, please call [phone] and ask the  to page the Cardiothoracic Surgery fellow on call.      Thank you,    Your Cardiothoracic Surgery Team

## 2025-07-29 NOTE — PROGRESS NOTES
"CVTS Daily Progress Note   POD#7 s/p CABG x4 EF 55%   Attending: Lara  LOS: 13    SUBJECTIVE/INTERVAL EVENTS:  No acute events overnight - tolerating coreg. Patient progressing overall well. Normotensive. Maintaining oxygen saturations on room air. Ambulating with therapy to chair. Pain well controlled. + BM. Tolerating diet without nausea. UOP adequate. Hgb stable.  Creat well within baseline.  Patient denies new chest pain, shortness of breath, abdominal pain, calf pain, nausea. All questions answered to patient's satisfaction.     OBJECTIVE:  Temp:  [97.8  F (36.6  C)-98.7  F (37.1  C)] 97.8  F (36.6  C)  Pulse:  [65-75] 73  Resp:  [18-20] 18  BP: (126-160)/(60-76) 148/69  SpO2:  [92 %-99 %] 92 %  Vitals:    07/24/25 0640 07/26/25 0600 07/27/25 0500 07/28/25 0300   Weight: 132.6 kg (292 lb 4.8 oz) 131.4 kg (289 lb 11.2 oz) 129.9 kg (286 lb 4.8 oz) 131.2 kg (289 lb 3.2 oz)    07/29/25 0324   Weight: 129.4 kg (285 lb 3.2 oz)       Clinically Significant Risk Factors               # Hypoalbuminemia: Lowest albumin = 3.2 g/dL at 7/21/2025  4:06 PM, will monitor as appropriate       # Hypertension: Noted on problem list           # DMII: A1C = 9.1 % (Ref range: <5.7 %) within past 6 months # Severe Obesity: Estimated body mass index is 37.63 kg/m  as calculated from the following:    Height as of this encounter: 1.854 m (6' 1\").    Weight as of this encounter: 129.4 kg (285 lb 3.2 oz). with complications       # History of CABG: noted on surgical history               Current Medications:    Scheduled Meds:  Current Facility-Administered Medications   Medication Dose Route Frequency Provider Last Rate Last Admin    acetaminophen (TYLENOL) tablet 975 mg  975 mg Oral Q8H China Cerna PA-C   975 mg at 07/29/25 0638    aspirin (ASA) chewable tablet 162 mg  162 mg Oral or NG Tube Daily China Cerna PA-C   162 mg at 07/29/25 0803    atorvastatin (LIPITOR) tablet 80 mg  80 mg Oral QPM China Cerna " ASHLEY LEZAMA   80 mg at 07/28/25 2130    bumetanide (BUMEX) tablet 1 mg  1 mg Oral Daily China Cerna PA-C   1 mg at 07/29/25 0803    carvedilol (COREG) tablet 3.125 mg  3.125 mg Oral BID w/meals China Cerna PA-C   3.125 mg at 07/29/25 0803    escitalopram (LEXAPRO) tablet 20 mg  20 mg Oral QPM China Cerna PA-C   20 mg at 07/28/25 2133    heparin ANTICOAGULANT injection 5,000 Units  5,000 Units Subcutaneous Q8H China Cerna PA-C   5,000 Units at 07/29/25 0638    insulin aspart (NovoLOG) injection (RAPID ACTING)  1-10 Units Subcutaneous TID AC China Cerna PA-C   2 Units at 07/29/25 0802    insulin aspart (NovoLOG) injection (RAPID ACTING)  1-7 Units Subcutaneous At Bedtime China Cerna PA-C        [START ON 7/30/2025] insulin glargine (LANTUS PEN) injection 16 Units  16 Units Subcutaneous QAM AC China Cerna PA-C        Lidocaine (LIDOCARE) 4 % Patch 1-2 patch  1-2 patch Transdermal Q24H China Cerna PA-C   1 patch at 07/27/25 1821    losartan (COZAAR) tablet 25 mg  25 mg Oral Daily China Cerna PA-C   25 mg at 07/29/25 0839    magnesium oxide (MAG-OX) tablet 400 mg  400 mg Oral Q4H Lina Bermudez MD   400 mg at 07/29/25 0803    pantoprazole (PROTONIX) EC tablet 40 mg  40 mg Oral QAM AC China Cerna PA-C   40 mg at 07/29/25 0638    polyethylene glycol (MIRALAX) Packet 17 g  17 g Oral Daily China Cerna PA-C   17 g at 07/27/25 0805    senna-docusate (SENOKOT-S/PERICOLACE) 8.6-50 MG per tablet 1 tablet  1 tablet Oral BID China Cerna PA-C   1 tablet at 07/28/25 0810    tamsulosin (FLOMAX) capsule 0.4 mg  0.4 mg Oral Daily China Cerna PA-C   0.4 mg at 07/29/25 0803     Continuous Infusions:  Current Facility-Administered Medications   Medication Dose Route Frequency Provider Last Rate Last Admin    No lozenges or gum should be given while patient on BIPAP/AVAPS/AVAPS AE   Does not apply Continuous PRN  China Cerna PA-C         PRN Meds:.  Current Facility-Administered Medications   Medication Dose Route Frequency Provider Last Rate Last Admin    bisacodyl (DULCOLAX) suppository 10 mg  10 mg Rectal Daily PRN China Cerna PA-C        calcium gluconate 1 g in 50 mL in sodium chloride intermittent infusion  1 g Intravenous Once PRN China Cerna PA-C   1 g at 07/28/25 0647    calcium gluconate 2 g in  mL intermittent infusion  2 g Intravenous Once PRN China Cerna PA-C        calcium gluconate 3 g in sodium chloride 0.9 % 100 mL intermittent infusion  3 g Intravenous Once PRN China Cerna PA-C        glucose gel 15-30 g  15-30 g Oral Q15 Min PRN China Cerna PA-C        Or    dextrose 50 % injection 25-50 mL  25-50 mL Intravenous Q15 Min PRN China Cerna PA-C        Or    glucagon injection 1 mg  1 mg Subcutaneous Q15 Min PRN China Cerna PA-C        hydrALAZINE (APRESOLINE) injection 10 mg  10 mg Intravenous Q30 Min PRN China Cerna PA-C        lactated ringers BOLUS 250 mL  250 mL Intravenous Q15 Min PRN China Cerna PA-C   250 mL at 07/22/25 0243    magnesium hydroxide (MILK OF MAGNESIA) suspension 30 mL  30 mL Oral Daily PRN China Cerna PA-C        melatonin tablet 3 mg  3 mg Oral At Bedtime PRN Kymberly Huerta MD   3 mg at 07/28/25 2130    methocarbamol (ROBAXIN) tablet 500 mg  500 mg Oral Q6H PRN China Cerna PA-C   500 mg at 07/23/25 1918    naloxone (NARCAN) injection 0.2 mg  0.2 mg Intravenous Q2 Min PRN China Cerna PA-C        Or    naloxone (NARCAN) injection 0.4 mg  0.4 mg Intravenous Q2 Min PRN China Cerna PA-C        Or    naloxone (NARCAN) injection 0.2 mg  0.2 mg Intramuscular Q2 Min PRN China Cerna PA-C        Or    naloxone (NARCAN) injection 0.4 mg  0.4 mg Intramuscular Q2 Min PRN China Cerna PA-C        No lozenges or gum should be given while  patient on BIPAP/AVAPS/AVAPS AE   Does not apply Continuous PRN Jose Cernarielle K, PA-C        ondansetron (ZOFRAN ODT) ODT tab 4 mg  4 mg Oral Q6H PRN Eryn, China K, PA-C        Or    ondansetron (ZOFRAN) injection 4 mg  4 mg Intravenous Q6H PRN Marcuse, China K, PA-C   4 mg at 07/26/25 0808    oxyCODONE (ROXICODONE) tablet 5 mg  5 mg Oral Q4H PRN Marihue, China K, PA-C        Or    oxyCODONE (ROXICODONE) tablet 10 mg  10 mg Oral Q4H PRN Eryn, China K, PA-C   10 mg at 07/23/25 0304    prochlorperazine (COMPAZINE) injection 10 mg  10 mg Intravenous Q6H PRN Marcuse, China K, PA-C   10 mg at 07/26/25 1402    Or    prochlorperazine (COMPAZINE) tablet 10 mg  10 mg Oral Q6H PRN Eryn, China K, PA-C           Cardiographics:    Telemetry monitoring demonstrates sinus rhythm in the at 60s per my personal review.    Imaging:  Results for orders placed or performed during the hospital encounter of 07/15/25   XR Chest Port 1 View    Impression    IMPRESSION:     No acute cardiopulmonary process. Stable cardiomediastinal silhouette.   US Carotid Bilateral    Impression    IMPRESSION:  1.  Mild plaque formation, velocities consistent with less than 50% stenosis in the right internal carotid artery.  2.  Mild plaque formation, velocities consistent with less than 50% stenosis in the left internal carotid artery.  3.  Flow within the vertebral arteries is antegrade.   CT Chest w/o Contrast    Impression    IMPRESSION:   1.  Chronic bronchial wall thickening and mild bronchial dilatation.  2.  Faint hazy groundglass density opacity throughout both lungs could indicate minimal edema.  3.  Strands of fibrosis and/or linear atelectasis as well as scattered micronodular opacities in the peribronchial lung parenchyma in the mid and lower lungs have increased slightly since 2019.  4.  Mild enlargement central pulmonary arteries (MPA diameter 3.6 cm) could indicate some degree of chronic pulmonary  arterial hypertension.  5.  Moderate vessel coronary artery calcification.     XR Chest Port 1 View    Impression    IMPRESSION: Endotracheal tube tip is about 5.8 cm above the marco antonio. Right internal jugular Nunica-Johnny catheter tip is probably in the proximal right pulmonary artery and is deflected back to the left. Consider withdrawal and advancement prior to obtaining   a wedge pressure.     There are mediastinal and left chest tubes. Low lung volumes. No consolidation. No pleural effusions or pneumothorax. Unchanged cardiac size. Median sternotomy. Status post CABG x4.   XR Chest Port 1 View    Impression    IMPRESSION: Interval median sternotomy and CABG. Right internal jugular Nunica-Johnny catheter with tip overlying the right pulmonary artery. Mediastinal drain and left chest tube. No significant pneumothorax. Enlarged cardiomediastinal silhouette. No   pulmonary vascular congestion. Streaky perihilar and left lower lung opacities, favoring atelectasis. No significant pleural fluid.       Labs, personally reviewed.  Hemoglobin   Date Value Ref Range Status   07/29/2025 8.1 (L) 13.3 - 17.7 g/dL Final   07/28/2025 7.8 (L) 13.3 - 17.7 g/dL Final   07/27/2025 8.0 (L) 13.3 - 17.7 g/dL Final     WBC Count   Date Value Ref Range Status   07/27/2025 7.1 4.0 - 11.0 10e3/uL Final   07/23/2025 13.3 (H) 4.0 - 11.0 10e3/uL Final   07/22/2025 12.1 (H) 4.0 - 11.0 10e3/uL Final     Platelet Count   Date Value Ref Range Status   07/28/2025 314 150 - 450 10e3/uL Final   07/27/2025 268 150 - 450 10e3/uL Final   07/25/2025 202 150 - 450 10e3/uL Final     Creatinine   Date Value Ref Range Status   07/28/2025 1.59 (H) 0.67 - 1.17 mg/dL Final   07/27/2025 1.72 (H) 0.67 - 1.17 mg/dL Final   07/26/2025 1.81 (H) 0.67 - 1.17 mg/dL Final     Potassium   Date Value Ref Range Status   07/29/2025 4.2 3.4 - 5.3 mmol/L Final   07/28/2025 4.1 3.4 - 5.3 mmol/L Final   07/27/2025 3.8 3.4 - 5.3 mmol/L Final     Potassium POCT   Date Value Ref Range  Status   07/21/2025 4.0 3.4 - 5.3 mmol/L Final   07/21/2025 4.0 3.4 - 5.3 mmol/L Final   07/21/2025 4.4 3.4 - 5.3 mmol/L Final     Magnesium   Date Value Ref Range Status   07/29/2025 2.0 1.7 - 2.3 mg/dL Final   07/28/2025 2.1 1.7 - 2.3 mg/dL Final   07/27/2025 1.9 1.7 - 2.3 mg/dL Final          I/O:  I/O last 3 completed shifts:  In: 490 [P.O.:490]  Out: 2700 [Urine:2700]       Physical Exam:    General: Patient seen up in chair conversant/pleasant  HEENT: MAN, no sclera icterus, moist mucosa, no O2 device in place  CV: Paced rhythm on monitor. 2+ peripheral pulses in all extremities. Mild edema.   Pulm: Non-labored effort on room air.  Incision C/D/I.  Abd: Soft, NT, ND  : TOV today  Ext: Mild pedal edema, SCDs in place, warm, distal pulses intact  Neuro: A&Ox3, KNIGHT, and CN grossly intact      ASSESSMENT/PLAN:    Mayito Sood is a 64 year old male with a history of history of CKD, kidney stones, HTN, DM, and obesity who presented with an NSTEMI  who is s/p CABG x4 .    Active Problems:    Status post coronary angiogram    ACS (acute coronary syndrome) (H)    ST elevation myocardial infarction (STEMI), unspecified artery (H)    Coronary artery disease involving native coronary artery of native heart, unspecified whether angina present        NEURO:   - Scheduled Tylenol/lidocaine patches and PRN Tylenol/oxycodone/Robaxin for pain  - PTA Lexapro  - Neuro consult for any recs related to vasovagal syncope. 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    CV:   - Pre-op EF 55%  - Normotensive  - Appreciate EP  - Coreg 3.125 mg BID  - Restarted PTA losartan for HTN  -  mg  - Atorvastatin 80mg daily  - Chest tubes removed 7/23. TPW removed 7/29. Bedrest x30 minutes.    PULM:   - Extubated POD#1  - Maintaining oxygen saturations on room air - wean as able  - Encourage pulmonary toilet  - Needs outpatient sleep study     FEN/GI:  - Diet:  ADAT to cardiac diet  - Continue electrolyte replacement protocol  - Bowel regimen, LBM 7/28    RENAL:  - Adequate UOP/hr. Continue to monitor closely.  - Creatinine stable within baseline of 1.7-2.0  - Nephrology consult; appreciate recs - signed off  - Gutierrez replaced 7/24-7/27, now voiding well  - Bumex 1 mg PO daily for now  - PTA Flomax    HEME:  - Acute blood loss anemia post-op.   - Hgb stable , no bleeding concerns. Hep SQ, ASA    ID:  - Jayde op ppx complete, febrile. No concerns for infection    ENDO:   - HbA1c 9 %  - BG goal < 180 to promote optimal healing  - SSI  - PTA prescribed insulin, but not taking due to ?inventory  - Lantus 16u daily  - PTA Jardiance held    PPx:   - DVT: SCDs, SQ heparin TID, ambulation   - GI: Protonix 40mg PO daily    DISPO:   - Continue general tele care (transferred POD#5)   - PT/OT recs at discharge: home w/ outpt cardiac rehab  - Medically Ready for Discharge: Anticipated Tomorrow        Patient discussed with Dr. Bermudez.      Sigrid Cerna PA-C  [facility] Cardiothoracic Surgery  Vocera or Secure Chat  July 29, 2025

## 2025-07-29 NOTE — PLAN OF CARE
"  Problem: Adult Inpatient Plan of Care  Goal: Plan of Care Review  Description: The Plan of Care Review/Shift note should be completed every shift.  The Outcome Evaluation is a brief statement about your assessment that the patient is improving, declining, or no change.  This information will be displayed automatically on your shift  note.  Outcome: Progressing  Flowsheets (Taken 7/29/2025 1117)  Plan of Care Reviewed With: patient  Goal: Patient-Specific Goal (Individualized)  Description: You can add care plan individualizations to a care plan. Examples of Individualization might be:  \"Parent requests to be called daily at 9am for status\", \"I have a hard time hearing out of my right ear\", or \"Do not touch me to wake me up as it startles  me\".  Outcome: Progressing  Goal: Readiness for Transition of Care  Outcome: Progressing     Problem: Acute Coronary Syndrome  Goal: Optimal Adaptation to Illness  Outcome: Progressing  Intervention: Support Adjustment to Life-Change Event  Recent Flowsheet Documentation  Taken 7/29/2025 0800 by Laurence Noriega RN  Supportive Measures: active listening utilized  Goal: Normalized Cardiac Rhythm  Outcome: Progressing  Goal: Effective Cardiac Pump Function  Outcome: Progressing  Intervention: Optimize Cardiac Function and Blood Flow  Recent Flowsheet Documentation  Taken 7/29/2025 0800 by Laurence Noriega RN  Environmental Support: calm environment promoted     Problem: Comorbidity Management  Goal: Blood Glucose Levels Within Targeted Range  Outcome: Progressing  Intervention: Monitor and Manage Glycemia  Recent Flowsheet Documentation  Taken 7/29/2025 0800 by Laurence Noriega RN  Medication Review/Management: medications reviewed  Goal: Blood Pressure in Desired Range  Outcome: Progressing  Intervention: Maintain Blood Pressure Management  Recent Flowsheet Documentation  Taken 7/29/2025 0800 by Laurence Noriega RN  Medication Review/Management: medications reviewed     Problem: " Skin Injury Risk Increased  Goal: Skin Health and Integrity  Outcome: Progressing  Intervention: Plan: Nurse Driven Intervention: Moisture Management  Recent Flowsheet Documentation  Taken 7/29/2025 0800 by Laurence Noriega RN  Moisture Interventions: Encourage regular toileting  Plan: Moisture Management:   no brief in bed   perineal cleanser (Kiersten spray, Shield wipes)  Intervention: Plan: Nurse Driven Intervention: Friction and Shear  Recent Flowsheet Documentation  Taken 7/29/2025 0800 by Laurence Noriega RN  Friction/Shear Interventions: HOB 30 degrees or less  Intervention: Optimize Skin Protection  Recent Flowsheet Documentation  Taken 7/29/2025 0800 by Laurence Noriega RN  Pressure Reduction Techniques: frequent weight shift encouraged  Activity Management: up in chair  Head of Bed (HOB) Positioning: HOB at 60 degrees     Problem: Chest Pain  Goal: Resolution of Chest Pain Symptoms  Outcome: Progressing     Problem: Cardiovascular Surgery  Goal: Improved Activity Tolerance  Outcome: Progressing  Intervention: Optimize Tolerance for Activity  Recent Flowsheet Documentation  Taken 7/29/2025 0800 by Laurence Noriega RN  Environmental Support: calm environment promoted  Goal: Optimal Coping with Heart Surgery  Outcome: Progressing  Intervention: Support Psychosocial Response to Surgery  Recent Flowsheet Documentation  Taken 7/29/2025 0800 by Laurence Noriega RN  Supportive Measures: active listening utilized  Goal: Absence of Bleeding  Outcome: Progressing  Goal: Effective Bowel Elimination  Outcome: Progressing  Goal: Effective Cardiac Function  Outcome: Progressing  Goal: Optimal Cerebral Tissue Perfusion  Outcome: Progressing  Intervention: Protect and Optimize Cerebral Perfusion  Recent Flowsheet Documentation  Taken 7/29/2025 0800 by Laurence Noriega RN  Sensory Stimulation Regulation:   care clustered   lighting decreased  Head of Bed (HOB) Positioning: HOB at 60 degrees  Goal: Fluid and Electrolyte  Balance  Outcome: Progressing  Goal: Blood Glucose Level Within Targeted Range  Outcome: Progressing  Goal: Absence of Infection Signs and Symptoms  Outcome: Progressing  Goal: Anesthesia/Sedation Recovery  Outcome: Progressing  Intervention: Optimize Anesthesia Recovery  Recent Flowsheet Documentation  Taken 7/29/2025 0800 by Laurence Noriega RN  Safety Promotion/Fall Prevention:   activity supervised   nonskid shoes/slippers when out of bed   clutter free environment maintained   safety round/check completed  Reorientation Measures:   calendar in view   clock in view  Goal: Acceptable Pain Control  Outcome: Progressing  Goal: Nausea and Vomiting Relief  Outcome: Progressing  Goal: Effective Urinary Elimination  Outcome: Progressing  Goal: Effective Oxygenation and Ventilation  Outcome: Progressing     Problem: Risk for Delirium  Goal: Optimal Coping  Outcome: Progressing  Intervention: Optimize Psychosocial Adjustment to Delirium  Recent Flowsheet Documentation  Taken 7/29/2025 0800 by Laurence Noriega RN  Supportive Measures: active listening utilized  Goal: Improved Behavioral Control  Outcome: Progressing  Intervention: Prevent and Manage Agitation  Recent Flowsheet Documentation  Taken 7/29/2025 0800 by Laurence Noriega RN  Environment Familiarity/Consistency: daily routine followed  Intervention: Minimize Safety Risk  Recent Flowsheet Documentation  Taken 7/29/2025 0800 by Laurence Noriega RN  Enhanced Safety Measures: review medications for side effects with activity  Trust Relationship/Rapport: care explained  Goal: Improved Attention and Thought Clarity  Outcome: Progressing  Intervention: Maximize Cognitive Function  Recent Flowsheet Documentation  Taken 7/29/2025 0800 by Laurence Noriega RN  Sensory Stimulation Regulation:   care clustered   lighting decreased  Reorientation Measures:   calendar in view   clock in view  Goal: Improved Sleep  Outcome: Progressing   Goal Outcome Evaluation:      Plan of  Care Reviewed With: patient             Pt is alert and oriented x4. Pt is med complaint. Pt denies pain. Pt's v/s is stable. Pt is up with assist of 1. No complain. Continue to monitor pt.

## 2025-07-29 NOTE — PROGRESS NOTES
[facility]    Medicine Progress Note - Hospitalist Service    Date of Admission:  7/15/2025    Assessment & Plan     Mayito Sood is a 64 year old male with a past medical history of Type 2 Diabetes presented to the emergency room with chest pain and syncope, found to have multivessel coronary artery disease.  Awaiting on CABG      7/21 : s/p CABG    7/23 :     Continue post op care  Chest tubes removed  Holding iv lasix, creatinine trending up 1.9--2.7  CT surgery  Cardiology consulted for symptomatic sania      7/24 :     Continue post op care  40 mg iv lasix - 1 dose today, creatinine trending up 1.9--2.7--2.82  CT surgery following  Cardiology consulted for symptomatic sania - ower pacemaker rates to 70 bpm today and continue gradual wean. Beta blocker remains on hold         7/25 :     Continue post op care  40 mg iv lasix - 1 dose 7/24, creatinine trending up 1.9--2.7--2.82--2.27  CT surgery following  EP consulted for symptomatic sania - Pacemaker rate reduced to 50 bpm today  --Anticipate TPW removal in the next day or so if he continues to tolerate  Holding BB          7/26 :     Continue post op care  40 mg iv lasix - 1 dose 7/24, bumex 1 mg iv - 1 dose 7/25,  creatinine trending up 1.9--2.7--2.82--2.27--1.81  CT surgery following  EP consulted for symptomatic sania - Pacemaker rate reduced to 50 bpm today  --Anticipate TPW removal in the next day or so if he continues to tolerate  Holding BB        7/27:     Continue post op care  40 mg iv lasix - 1 dose 7/24, bumex 1 mg iv - 1 dose 7/25,  creatinine trending up 1.9--2.7--2.82--2.27--1.81--1.72  CT surgery following  EP consulted for symptomatic saina - Pacemaker rate reduced to 50 bpm today  --Anticipate TPW removal in the next day or so if he continues to tolerate  Holding BB            7/28:     Continue post op care  40 mg iv lasix - 1 dose 7/24, bumex 1 mg iv - 1 dose 7/25,  creatinine trending up  1.9--2.7--2.82--2.27--1.81--1.72--1.59  CT surgery following  EP consulted for symptomatic sania - Pacemaker rate reduced to 50 bpm today  --Anticipate TPW removal in the next day or so if he continues to tolerate  Holding BB      7/29:     Continue post op care  40 mg iv lasix - 1 dose 7/24, bumex 1 mg iv - 1 dose 7/25,  creatinine trending up 1.9--2.7--2.82--2.27--1.81--1.72--1.59  CT surgery following  EP consulted for symptomatic sania - Pacemaker rate reduced to 50 bpm today  --Anticipate TPW removal in the next day or so if he continues to tolerate  Holding BB    Neuro was consulted for vasovagal syncope, recommended that if his headache recurs he could try 2 Excedrin and 2 ibuprofen at the same time to see if this shortens or aborts the episode. If this is not sufficient the CGRP blockers such as Nurtec and Ubrelvy do not have the same vasoconstrictor properties and should be safe, though they are not available here at the hospital.  He could check in with his primary regarding 1 of those prescriptions or referring to the neurology clinic.  He is not having the headache or other symptoms now.  signed off    Likely discharge in am if ok with CT surgery        A/p :       NSTEMI  Multivessel CAD  - Chest pain started 7/15, had episode of syncope at work  - Patient with troponin 67 -> 97  - EKG with ST Depression. Cardiology not calling STEMI  - Cardiology consult, appreciate input  -Coronary angiogram shows multivessel disease  - CT Surgery consulted; CABG Monday  - Echo with EF 55% mildly hypokinetic distal anterior segment no significant valvular heart disease  - s/p aspirin 325mg x1 and continue 81mg   - Continue atorvastatin 80mg daily   - Metoprolol 25mg BID  - Given ticagrelor x1, not continued -   ---hold off on ACE  ---Chest CT with faint hazy groundglass opacities, possible edema.    ---Ultrasound carotids less than 50% stenosis bilaterally  --Continue cardiac monitor  --on heparin gtt     Acute  Hypoxic Respiratory Failure  - Secondary to above, resolved  --Patient not on home oxygen, requiring up to 10L NC after [facility].  -  fluid overload with NSTEMI -sedation from [facility].   ---CT does show some groundglass opacities  - CXR on 7/15 showing no acute cardiopulmonary process.  --Did get Lasix 7/16.  Further diuresis per cardiology     Acute kidney injury   - Baseline CKD III   CKD Stage 4 Considered and Ruled Out   ---Cr baseline appears to be around 1.75-2.0  --cotninue to hold cozaar   ---did get diuresis earlier- now done  ---holding jardiance  -Continue to monitor renal function      Hyperkalemia  - resolved  --Potassium 5.2 on ED presentation, then 5.8 with AM labs 7/16  --s/p  insulin  lokelma x1   - recheck in am      Hyponatremia  ---resolved     Hypertension  --bp running high  --- increase amlodipine  ---hold cozaar   ---continue metoprolol with hold parameters for bradycardia  --- consider diuretic     Type 2 Diabetes with hyperglycemia  -Poorly controlled at baseline  --Last A1c was 9.1% on 7/15/25  - PTA Meds:  empagliflozin, no insulin for many months per patient , no victoza for a month  ---continue Lantus 25 units nightly   ---hold jardiance  - Sliding Scale Insulin with Glucose Checks     BPH  --continue home flomax     hyperlipidemia  ---was on pravastatin  ---now on atorvastatin     Hypophosphatemia,   --resolved with replacement     Hypocalcemia-  -continue to monitor     Severe Obesity, BMI 37.59  - encourage weight loss.        Diet: Low Saturated Fat Na <2400 mg    DVT Prophylaxis: Heparin GTT  Gutierrez Catheter: Not present  Lines: None     Cardiac Monitoring: ACTIVE order. Indication: Open heart surgery (72 hours)  Code Status: Full Code      Clinically Significant Risk Factors               # Hypoalbuminemia: Lowest albumin = 3.2 g/dL at 7/21/2025  4:06 PM, will monitor as appropriate     # Hypertension: Noted on problem list           # DMII: A1C = 9.1 % (Ref range: <5.7 %) within past 6  "months # Severe Obesity: Estimated body mass index is 37.63 kg/m  as calculated from the following:    Height as of this encounter: 1.854 m (6' 1\").    Weight as of this encounter: 129.4 kg (285 lb 3.2 oz). with complications       # History of CABG: noted on surgical history       Social Drivers of Health    Depression: At risk (5/23/2025)    Received from [facility]    PHQ-2     PHQ-2 TOTAL SCORE: 6          Disposition Plan     Medically Ready for Discharge: Anticipated in 5+ Days             Landon Lopez MD  Hospitalist Service  [facility]  Securely message with [employer] (more info)  Text page via "[employer] Paging/Directory   ______________________________________________________________________        Physical Exam   Vital Signs: Temp: 98.4  F (36.9  C) Temp src: Oral BP: 138/63 Pulse: 63   Resp: 18 SpO2: 92 % O2 Device: None (Room air)    Weight: 285 lbs 3.2 oz    General Appearance: Pleasant male no apparent distress  Respiratory: Clear to auscultation bilaterally  Cardiovascular: The rate and rhythm without murmurs rubs or gallops    Medical Decision Making             Data     I have personally reviewed the following data over the past 24 hrs:    N/A  \   8.1 (L)   / N/A     N/A N/A N/A /  197 (H)   4.2 N/A N/A \       Imaging results reviewed over the past 24 hrs:   No results found for this or any previous visit (from the past 24 hours).    "

## 2025-07-29 NOTE — DISCHARGE SUMMARY
Cardiovascular Surgery Discharge Summary    Primary Care Physician:  Simona Morgan  Discharge Provider: China Cerna PA-C   Admission Date: 7/15/2025   Admission Diagnoses: ACS (acute coronary syndrome) (H) [I24.9]  ST elevation myocardial infarction (STEMI), unspecified artery (H) [I21.3]  Status post coronary angiogram [Z98.890]  Coronary artery disease involving native coronary artery of native heart, unspecified whether angina present [I25.10]   Discharge Date: 7/29/2025   Disposition: Home   Condition at Discharge: Good  Code Status: Full Code     Principal Diagnosis:   ST elevation myocardial infarction (STEMI), unspecified artery (H) s/p CABG x4    Discharge Diagnoses:    Active Problems:  Patient Active Problem List   Diagnosis    Obesity    Polyneuropathy    Lumbar Disc Degeneration    Male Erectile Disorder    Acute Sinusitis    Cellulitis    Hyperglycemia    Acute pain    Neuropathy    SIRS (systemic inflammatory response syndrome) (H)    Bacteremia    Osteomyelitis (H)    Diabetes mellitus, type 2 (H)    HTN (hypertension)    Infection involving bone (H)    Ureterolithiasis    TARUN (acute kidney injury)    Anxiety and depression    Dyslipidemia    Proliferative diabetic retinopathy of both eyes with macular edema associated with type 2 diabetes mellitus (H)    Rupture of left Achilles tendon    Stage 3b chronic kidney disease (H)    Status post coronary angiogram    Coronary artery disease involving native coronary artery of native heart, unspecified whether angina present          Consult/s: Dietary, critical care medicine, cardiology, electrophysiology cardiology, neurology      Surgery:   7/21/2025 with Dr. Bermudez  PROCEDURES PERFORMED:   Median sternotomy   Take down of the left internal mammary artery    Endoscopic greater saphenous vein procurement from the left lower extremity    Epiaortic ultrasound of the ascending aorta    Placement on central cardiopulmonary bypass    Quadruple  vessel coronary artery bypass grafting;   -  Left internal mammary artery to the left anterior descending coronary artery  -  Separate reversed saphenous vein grafts to the left anterior descending diagonal branch, the obtuse marginal 1 and the obtuse marginal 2 coronary arteries.     Placement of temporary atrial and ventricular pacing wires    FINDINGS AT OPERATION:  His ascending aorta was free of any plaque seen on epiaortic ultrasound.  His pulmonary artery pressures were one third systemic.  His overall left ventricular function was preserved.  The left internal mammary artery was a 3 mm in diameter conduit with excellent flow.  The reversed saphenous vein graft measured 4 to 5 mm in diameter and was of good quality.  The obtuse marginal 2 was a 1.5 mm in diameter target vessel, a fair to good vessel for bypass grafting.  The obtuse marginal 1 was a 2.5 mm in diameter target vessel, an excellent vessel for bypass grafting.  The left anterior descending diagonal branch was a 2 mm in diameter target vessel, an excellent vessel for bypass grafting.  The left anterior descending coronary artery in its apical third was a 2 mm in diameter target vessel, an excellent vessel for bypass grafting.  His heart was globally enlarged.        Discharge Medications:        Review of your medicines        PAUSE taking these medications        Dose / Directions   empagliflozin 10 MG Tabs tablet  Wait to take this until your doctor or other care provider tells you to start again.  Commonly known as: JARDIANCE      Dose: 10 mg  Take 10 mg by mouth daily.  Refills: 0     liraglutide 18 MG/3ML solution  Wait to take this until your doctor or other care provider tells you to start again.  Commonly known as: VICTOZA      Dose: 1.8 mg  Inject 1.8 mg subcutaneously daily.  Refills: 0            START taking        Dose / Directions   Alcohol Swabs Pads  Used for: Type 2 diabetes mellitus with hyperglycemia, unspecified whether long  term insulin use (H)      Use to swab the area of the injection or suzi as directed Per insurance coverage  Quantity: 200 each  Refills: 0     aspirin 81 MG chewable tablet  Commonly known as: ASA      Dose: 162 mg  Take 2 tablets (162 mg) by mouth daily.  Quantity: 180 tablet  Refills: 3     atorvastatin 80 MG tablet  Commonly known as: LIPITOR      Dose: 80 mg  Take 1 tablet (80 mg) by mouth every evening.  Quantity: 90 tablet  Refills: 3     blood glucose calibration solution  Commonly known as: NO BRAND SPECIFIED  Used for: Type 2 diabetes mellitus with hyperglycemia, unspecified whether long term insulin use (H)      Used to calibrate the blood glucose monitor as needed and as directed.  To accompany  blood glucose brands per insurance coverage  Quantity: 1 each  Refills: 0     blood glucose lancets standard  Commonly known as: NO BRAND SPECIFIED  Used for: Type 2 diabetes mellitus with hyperglycemia, unspecified whether long term insulin use (H)  Replaces: blood glucose lancing device      To use to test glucose level in the blood Use to test blood sugar  3  times daily as directed. To accompany glucose monitor brands per insurance coverage.  Quantity: 200 each  Refills: 1     blood glucose monitoring meter device kit  Commonly known as: NO BRAND SPECIFIED  Used for: Type 2 diabetes mellitus with hyperglycemia, unspecified whether long term insulin use (H)      Use as directed Per insurance coverage  Quantity: 1 kit  Refills: 0     bumetanide 1 MG tablet  Commonly known as: BUMEX  Used for: Dyslipidemia, Stage 3b chronic kidney disease (H)      Dose: 1 mg  Take 1 tablet (1 mg) by mouth daily. Stop when you run out.  Quantity: 5 tablet  Refills: 0     carvedilol 3.125 MG tablet  Commonly known as: COREG  Used for: Dyslipidemia      Dose: 3.125 mg  Take 1 tablet (3.125 mg) by mouth 2 times daily (with meals).  Quantity: 60 tablet  Refills: 1     glucose 4 g chewable tablet  Commonly known as: BD GLUCOSE  Used  for: Type 2 diabetes mellitus with hyperglycemia, unspecified whether long term insulin use (H)      Dose: 4 tablet  Take 4 tablets by mouth every 15 minutes as needed for low blood sugar.  Quantity: 50 tablet  Refills: 0     insulin aspart 100 UNIT/ML pen  Commonly known as: NovoLOG PEN  Used for: Type 2 diabetes mellitus with hyperglycemia, unspecified whether long term insulin use (H)      Dose: 1-10 Units  Inject 1-10 Units subcutaneously 3 times daily (before meals). Correction Scale - HIGH INSULIN RESISTANCE DOSING Do Not give Correction Insulin if Pre-Meal BG less than 140. For Pre-Meal  - 164 give 1 unit. For Pre-Meal  - 189 give 2 units. For Pre-Meal  - 214 give 3 units. For Pre-Meal  - 239 give 4 units. For Pre-Meal  - 264 give 5 units. For Pre-Meal  - 289 give 6 units. For Pre-Meal  - 314 give 7 units. For Pre-Meal  - 339 give 8 units. For Pre-Meal  - 364 give 9 units. For Pre-Meal BG greater than or equal to 365 give 10 units.  Quantity: 10 mL  Refills: 0     Lidocaine 4 % Patch  Commonly known as: LIDOCARE      Dose: 1-2 patch  Place 1-2 patches over 12 hours onto the skin every 24 hours. To prevent lidocaine toxicity, patient should be patch free for 12 hrs daily.  Quantity: 6 patch  Refills: 0     loperamide 2 MG capsule  Commonly known as: IMODIUM      Dose: 2 mg  Take 1 capsule (2 mg) by mouth 4 times daily as needed for diarrhea.  Quantity: 8 capsule  Refills: 0     polyethylene glycol 17 GM/Dose powder  Commonly known as: MIRALAX      Dose: 17 g  Take 17 g by mouth daily as needed for constipation.  Quantity: 510 g  Refills: 0     potassium chloride jennifer ER 20 MEQ CR tablet  Commonly known as: KLOR-CON M20  Used for: Stage 3b chronic kidney disease (H)      Dose: 20 mEq  Take 1 tablet (20 mEq) by mouth daily. Take daily with bumex (bumetanide). Stop when you run out/when you stop bumex.  Quantity: 5 tablet  Refills: 0     Sharps Container  Misc  Used for: Type 2 diabetes mellitus with hyperglycemia, unspecified whether long term insulin use (H)      Use as directed to dispose of needles, lancets and other sharps  Quantity: 1 each  Refills: 0            CONTINUE these medicines which may have CHANGED, or have new prescriptions. If we are uncertain of the size of tablets/capsules you have at home, strength may be listed as something that might have changed.        Dose / Directions   amLODIPine 5 MG tablet  Commonly known as: NORVASC  This may have changed:   medication strength  how much to take  Used for: Dyslipidemia      Dose: 5 mg  Take 1 tablet (5 mg) by mouth daily.  Quantity: 30 tablet  Refills: 1     blood glucose test strip  Commonly known as: NO BRAND SPECIFIED  This may have changed: See the new instructions.  Used for: Type 2 diabetes mellitus with hyperglycemia, unspecified whether long term insulin use (H)      To use to test glucose level in the blood Use to test blood sugar  3 times daily as directed. To accompany glucose monitor brands per insurance coverage.  Quantity: 300 strip  Refills: 0     insulin glargine 100 UNIT/ML pen  This may have changed: how much to take  Used for: Type 2 diabetes mellitus with hyperglycemia, unspecified whether long term insulin use (H)  Generic drug: insulin glargine      Dose: 16 Units  Inject 16 Units subcutaneously daily.  Quantity: 5 mL  Refills: 1     insulin pen needle 32G X 4 MM miscellaneous  Commonly known as: 32G X 4 MM  This may have changed:   See the new instructions.  Another medication with the same name was removed. Continue taking this medication, and follow the directions you see here.  Used for: Type 2 diabetes mellitus with hyperglycemia, unspecified whether long term insulin use (H)      Use as directed by provider Per insurance coverage  Quantity: 200 each  Refills: 0            CONTINUE these medicines which have NOT CHANGED        Dose / Directions   acetaminophen 325 MG  tablet  Commonly known as: TYLENOL      Dose: 325 mg  Take 325 mg by mouth every 4 hours as needed for mild pain.  Refills: 0     escitalopram 20 MG tablet  Commonly known as: LEXAPRO      Dose: 20 mg  Take 20 mg by mouth daily.  Refills: 0     fluticasone 50 MCG/ACT nasal spray  Commonly known as: FLONASE      Dose: 2 spray  Spray 2 sprays into both nostrils daily.  Refills: 0     losartan 25 MG tablet  Commonly known as: COZAAR      Dose: 25 mg  Take 25 mg by mouth daily.  Refills: 0     tamsulosin 0.4 MG capsule  Commonly known as: Flomax      Dose: 0.4 mg  Take 1 capsule (0.4 mg) by mouth daily.  Quantity: 30 capsule  Refills: 1            STOP taking      blood glucose lancing device  Replaced by: blood glucose lancets standard        pravastatin 10 MG tablet  Commonly known as: PRAVACHOL                  Where to get your medicines        These medications were sent to [facility] [address] [address]      Phone: [phone]   Alcohol Swabs Pads  amLODIPine 5 MG tablet  aspirin 81 MG chewable tablet  atorvastatin 80 MG tablet  blood glucose calibration solution  blood glucose lancets standard  blood glucose monitoring meter device kit  blood glucose test strip  bumetanide 1 MG tablet  carvedilol 3.125 MG tablet  glucose 4 g chewable tablet  insulin aspart 100 UNIT/ML pen  insulin glargine 100 UNIT/ML pen  insulin pen needle 32G X 4 MM miscellaneous  Lidocaine 4 % Patch  loperamide 2 MG capsule  polyethylene glycol 17 GM/Dose powder  potassium chloride jennifer ER 20 MEQ CR tablet  Sharps Container Misc  tamsulosin 0.4 MG capsule           Discharge Instructions:    Follow up appointment with Primary Care Physician: Simona Morgan within 7 days of discharge.  Follow up appointment with Specialist:    Follow with CV Surgery as scheduled.   Follow-up with cardiology as scheduled.    Diet:  "Cardiac    Activity/Restrictions: As tolerated with sternal precautions in mind (see below). No driving for 4 weeks or while on pain medication.     - Shower and wash your incisions daily with soap and water. No tub baths/hot tubs for 4 weeks. An antibacterial soap such as Dial or Safeguard is recommended.    - Check your incisions every day. If you notice any redness, drainage, or anything unusual, please call the surgeons office.    - No driving for 4 weeks after surgery or while on pain medication     - Do not lift anything more than 10 pounds for 8 weeks after surgery. After 8 weeks, advance lifting gradually as tolerated.    - You may have watery drainage from your chest tube site for 2-3 weeks after surgery. Your may cover with a Band-Aid to protect your clothing. Remove the Band-Aid every day and wash the site.    - If you have a leg lesion, you may have swelling for 2-3 months. Elevate your leg any time you are not walking.    - If you feel any \"popping\" or \"clicking\" sensations in your chest, your arms are out too far or you are putting too much weight into arm movements. Do not reach over your head or out to the side to pull something. Do not do any arm exercises or use any exercise equipment that involves arm movement. If you feel your sternum moving, call the surgeon's office.    - Increase your daily activity as explained by Cardiac Rehab. You are encouraged to enroll in an Outpatient Cardiac Rehab Program.    - No active sports using your upper arms for 3 months. This includes fishing, hunting, bowling, swimming, tennis or golf.    - No physical activity such as cutting the grass, raking, vacuuming, changing sheets on your bed, snow shoveling, or using a  for 3 months.    - Use incentive spirometer 6-8 times per day for 2 weeks.       Hospital Summary:   Mayito Sood is a 64 year old year-old male who has a PMH of CKD, s/p bladder biopsy, h/o kidney stones, Hyperkalemia, CAD, hypertension " and DM2 who presented to [facility] for chest pain and admitted for NSTEMI on 7/15/2025. Coronary angiogram performed demonstrates severe multi-vessel coronary artery disease. CV Surgery is consulted for possible coronary artery bypass surgery.     Patient was deemed an appropriate candidate for cardiac surgery, and was taken to the operating room on 7/21/2025 where patient underwent quadruple vessel coronary artery bypass grafting. Surgery was uneventful and patient was brought to the ICU post-operatively. He was extubated on POD#1 and weaned from pressors. Patient was awake and alert, afebrile, and with stable vitals. Insulin drip was discontinued and he was transitioned to a sliding scale. He was transferred to general telemetry status on POD#5 where patient has had return of bowel function, is maintaining oxygen saturations on room air, had their chest tubes removed, and has no complaints of chest pain or shortness of breath. On 07/30/25 (POD#8) patient was stable enough to be discharged to home.    Discharge Meds:  ASA: yes, 162 mg daily  Statin: yes, atorvastatin 80 mg daily  Plavix:no  Anticoagulation: no  Beta Blocker: yes, coreg 3.125 mg BID  Calcium Channel Blocker: yes    Of note:  - Patient presented with presyncope and had some episodes of presyncope while working with therapies. Per patient this has been a lifelong intermittent issue. He was evaluated by EP - no indication for intervention, and neurology - nothing to do on an inpatient basis. He was vitally stable during these episodes and had not had one within 48 prior to discharge. Nevertheless, he will be sent with a Zio patch at discharge x14 days.  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 Restarted PTA flomax  - Prescribed insulin outpt, but was not taking d/t issues with inventory per patient. He will be sent with lantus 16u  "daily and high dose SSI. Will defer restarting PTA farxiga and victoza to PCP. Supplies for checking BG and administering insulin dispensed. DM Education referral placed.  - Patient remains weight up from preop, so will be sent w/ additional diuresis and K+ supplementation for 5 days. Patient should call clinic if they gain > 3lbs in one day or > 5lbs in one week.      Vital signs:  BP (!) 148/66 (BP Location: Right arm)   Pulse 66   Temp 98.4  F (36.9  C) (Oral)   Resp 20   Ht 1.854 m (6' 1\")   Wt 128.5 kg (283 lb 6.4 oz)   SpO2 92%   BMI 37.39 kg/m     283 lbs 6.4 oz   Body mass index is 37.39 kg/m .       Physical Exam:    Pertinent exam findings on day of discharge include:  Gen: Seen up in chair. NAD. Pleasant and conversant.   CV: RRR on monitor. No edema.  Pulm: Non-labored breathing on room air.  Abd: Soft, non-tender, non-distended  Neuro: CNs grossly intact  Inc: C/D/I      Sigrid Cerna PA-C  [facility] Cardiothoracic Surgery  Pager: [phone]  July 30, 2025    "

## 2025-07-30 ENCOUNTER — PATIENT OUTREACH (OUTPATIENT)
Dept: CARE COORDINATION | Facility: CLINIC | Age: 65
End: 2025-07-30
Payer: COMMERCIAL

## 2025-07-30 VITALS
OXYGEN SATURATION: 92 % | DIASTOLIC BLOOD PRESSURE: 66 MMHG | BODY MASS INDEX: 37.56 KG/M2 | SYSTOLIC BLOOD PRESSURE: 141 MMHG | TEMPERATURE: 98.4 F | WEIGHT: 283.4 LBS | RESPIRATION RATE: 20 BRPM | HEART RATE: 66 BPM | HEIGHT: 73 IN

## 2025-07-30 LAB
GLUCOSE BLDC GLUCOMTR-MCNC: 163 MG/DL (ref 70–99)
GLUCOSE BLDC GLUCOMTR-MCNC: 187 MG/DL (ref 70–99)
MAGNESIUM SERPL-MCNC: 1.9 MG/DL (ref 1.7–2.3)
PHOSPHATE SERPL-MCNC: 3.3 MG/DL (ref 2.5–4.5)
POTASSIUM SERPL-SCNC: 3.8 MMOL/L (ref 3.4–5.3)

## 2025-07-30 PROCEDURE — 36415 COLL VENOUS BLD VENIPUNCTURE: CPT | Performed by: THORACIC SURGERY (CARDIOTHORACIC VASCULAR SURGERY)

## 2025-07-30 PROCEDURE — 250N000013 HC RX MED GY IP 250 OP 250 PS 637

## 2025-07-30 PROCEDURE — 250N000013 HC RX MED GY IP 250 OP 250 PS 637: Performed by: THORACIC SURGERY (CARDIOTHORACIC VASCULAR SURGERY)

## 2025-07-30 PROCEDURE — 250N000011 HC RX IP 250 OP 636

## 2025-07-30 PROCEDURE — 83735 ASSAY OF MAGNESIUM: CPT | Performed by: THORACIC SURGERY (CARDIOTHORACIC VASCULAR SURGERY)

## 2025-07-30 PROCEDURE — 84132 ASSAY OF SERUM POTASSIUM: CPT | Performed by: THORACIC SURGERY (CARDIOTHORACIC VASCULAR SURGERY)

## 2025-07-30 PROCEDURE — 84100 ASSAY OF PHOSPHORUS: CPT | Performed by: THORACIC SURGERY (CARDIOTHORACIC VASCULAR SURGERY)

## 2025-07-30 PROCEDURE — 999N000157 HC STATISTIC RCP TIME EA 10 MIN

## 2025-07-30 RX ORDER — CARVEDILOL 3.12 MG/1
3.12 TABLET ORAL 2 TIMES DAILY WITH MEALS
Qty: 60 TABLET | Refills: 1 | Status: SHIPPED | OUTPATIENT
Start: 2025-07-30

## 2025-07-30 RX ORDER — INSULIN GLARGINE 100 [IU]/ML
16 INJECTION, SOLUTION SUBCUTANEOUS DAILY
Qty: 5 ML | Refills: 1 | Status: SHIPPED | OUTPATIENT
Start: 2025-07-30

## 2025-07-30 RX ORDER — BLOOD PRESSURE TEST KIT
KIT MISCELLANEOUS
Qty: 200 EACH | Refills: 0 | Status: SHIPPED | OUTPATIENT
Start: 2025-07-30

## 2025-07-30 RX ORDER — POTASSIUM CHLORIDE 1500 MG/1
20 TABLET, EXTENDED RELEASE ORAL ONCE
Status: COMPLETED | OUTPATIENT
Start: 2025-07-30 | End: 2025-07-30

## 2025-07-30 RX ORDER — POTASSIUM CHLORIDE 1500 MG/1
20 TABLET, EXTENDED RELEASE ORAL DAILY
Qty: 5 TABLET | Refills: 0 | Status: SHIPPED | OUTPATIENT
Start: 2025-07-30

## 2025-07-30 RX ORDER — AMLODIPINE BESYLATE 5 MG/1
5 TABLET ORAL DAILY
Status: DISCONTINUED | OUTPATIENT
Start: 2025-07-30 | End: 2025-07-30 | Stop reason: HOSPADM

## 2025-07-30 RX ORDER — BUMETANIDE 1 MG/1
1 TABLET ORAL DAILY
Qty: 5 TABLET | Refills: 0 | Status: SHIPPED | OUTPATIENT
Start: 2025-07-30

## 2025-07-30 RX ORDER — LOPERAMIDE HYDROCHLORIDE 2 MG/1
2 CAPSULE ORAL 4 TIMES DAILY PRN
Qty: 8 CAPSULE | Refills: 0 | Status: SHIPPED | OUTPATIENT
Start: 2025-07-30

## 2025-07-30 RX ORDER — CONTAINER,EMPTY
EACH MISCELLANEOUS
Qty: 1 EACH | Refills: 0 | Status: SHIPPED | OUTPATIENT
Start: 2025-07-30

## 2025-07-30 RX ORDER — MAGNESIUM OXIDE 400 MG/1
400 TABLET ORAL EVERY 4 HOURS
Status: DISCONTINUED | OUTPATIENT
Start: 2025-07-30 | End: 2025-07-30 | Stop reason: HOSPADM

## 2025-07-30 RX ORDER — AMLODIPINE BESYLATE 5 MG/1
5 TABLET ORAL DAILY
Qty: 30 TABLET | Refills: 1 | Status: SHIPPED | OUTPATIENT
Start: 2025-07-30

## 2025-07-30 RX ADMIN — HEPARIN SODIUM 5000 UNITS: 5000 INJECTION, SOLUTION INTRAVENOUS; SUBCUTANEOUS at 06:31

## 2025-07-30 RX ADMIN — MAGNESIUM OXIDE TAB 400 MG (241.3 MG ELEMENTAL MG) 400 MG: 400 (241.3 MG) TAB at 08:43

## 2025-07-30 RX ADMIN — POTASSIUM CHLORIDE 20 MEQ: 1500 TABLET, EXTENDED RELEASE ORAL at 08:43

## 2025-07-30 RX ADMIN — ASPIRIN 162 MG: 81 TABLET, COATED ORAL at 08:42

## 2025-07-30 RX ADMIN — BUMETANIDE 1 MG: 1 TABLET ORAL at 08:44

## 2025-07-30 RX ADMIN — AMLODIPINE BESYLATE 5 MG: 5 TABLET ORAL at 08:44

## 2025-07-30 RX ADMIN — CARVEDILOL 3.12 MG: 3.12 TABLET, FILM COATED ORAL at 08:43

## 2025-07-30 RX ADMIN — INSULIN ASPART 1 UNITS: 100 INJECTION, SOLUTION INTRAVENOUS; SUBCUTANEOUS at 08:44

## 2025-07-30 RX ADMIN — ACETAMINOPHEN 975 MG: 325 TABLET, FILM COATED ORAL at 06:29

## 2025-07-30 RX ADMIN — PANTOPRAZOLE SODIUM 40 MG: 40 TABLET, DELAYED RELEASE ORAL at 06:29

## 2025-07-30 RX ADMIN — LOSARTAN POTASSIUM 25 MG: 25 TABLET, FILM COATED ORAL at 08:44

## 2025-07-30 ASSESSMENT — ACTIVITIES OF DAILY LIVING (ADL)
ADLS_ACUITY_SCORE: 50
ADLS_ACUITY_SCORE: 50
ADLS_ACUITY_SCORE: 45
ADLS_ACUITY_SCORE: 49
ADLS_ACUITY_SCORE: 50
ADLS_ACUITY_SCORE: 50
ADLS_ACUITY_SCORE: 49
ADLS_ACUITY_SCORE: 49
ADLS_ACUITY_SCORE: 45
ADLS_ACUITY_SCORE: 49

## 2025-07-30 NOTE — PROGRESS NOTES
Care Management Discharge Note    Discharge Date: 07/30/2025       Discharge Disposition:  home, outpatient cardiac rehab     Discharge Services:      Discharge DME:      Discharge Transportation: family or friend will provide    Private pay costs discussed: Not applicable    Does the patient's insurance plan have a 3 day qualifying hospital stay waiver?  Yes     Which insurance plan 3 day waiver is available? Alternative insurance waiver    Will the waiver be used for post-acute placement? No    PAS Confirmation Code:    Patient/family educated on Medicare website which has current facility and service quality ratings:      Education Provided on the Discharge Plan:    Persons Notified of Discharge Plans: patient   Patient/Family in Agreement with the Plan:      Handoff Referral Completed: No, handoff not indicated or clinically appropriate    Additional Information:  Home with outpatient cardiac rehab     Carmina Trejo RN

## 2025-07-30 NOTE — PLAN OF CARE
Cardiac Rehab Discharge Summary    Reason for therapy discharge:    Discharged to home with outpatient therapy.    Progress towards therapy goal(s). See goals on Care Plan in Epic electronic health record for goal details.  Goals partially met.  Barriers to achieving goals:   discharge from facility.    Therapy recommendation(s):    Continued therapy is recommended.  Rationale/Recommendations:  home w/ outpatient cardiac rehab and support from S.O as needed.    SAMINA Smith, OTR/L, 7/30/2025, 11:20 AM

## 2025-07-30 NOTE — PROGRESS NOTES
sc  Patient Name: Mayito Sood   MRN: [id number]   Date of Admission: 7/15/2025    Procedure: Procedure(s):  CORONARY ARTERY BYPASS GRAFT TIMES FOUR, WITH LEFT ENDOSCOPIC VESSEL PROCUREMENT, LEFT INTERNAL MAMMARY ARTERY HARVEST, EPIAORTIC ULTRASOUND  ECHOCARDIOGRAM, TRANSESOPHAGEAL, INTRAOPERATIVE    Post Op day #:9    Subjective (Patient focus/Primary Problem for shift): BP          Pain Goal0 Pain Rating0           Pain Medication/ Regime effective to reduce patient painno pain    Objective (Physical assessment):           Rhythm: normal sinus rhythm            Bowel Activity: yes if Yes indicate when: yesterday          Bowel Medications: not applicable            Incision: healing well          Incentive Spirometry Q 1-2 hour when awake:  yes Volume: 2500          Epicardial Pacing Wires:  not applicable            Patient Activity:           Up to chair for meals: yes          Ambulation with RN x2 (Not including CR): yes           Shower Daily {YES/NO/NA:[id number]          Nasal  Nozin BID AM/PM : yes              Chest Tubes   Pleural: yes Draining: not applicable               Suction: not applicable              Mediastinal: not applicable Draining: not applicable               Suction: no   Dressing Change Daily:not applicable If No, why?none                     Urinary Catheter: not applicable           Preventative WOC consult (need MD order): not applicable       Assessment (Nursing primary shift focus): Patient ready to go home since writer came on shift. Happy to be discharged today. Medication teaching reviewed. Wife bedside and involved in cares. Belongings packed and ready to go home. PA came to do discharge post op care teaching as well. Patient had no further questions.     Plan (Patient Care Plan/focus): Transport took the patient to the main entrance in a w/c with belongings. Patient instructed on post op cares. Aware of follow up appointments. Discharge to home with wife.       Laura  TORY Stokes RN   7/30/2025   10:25 AM

## 2025-07-30 NOTE — PLAN OF CARE
Goal Outcome Evaluation:               sc  Patient Name: Mayito Sood   MRN: [id number]   Date of Admission: 7/15/2025    Procedure: Procedure(s):  CORONARY ARTERY BYPASS GRAFT TIMES FOUR, WITH LEFT ENDOSCOPIC VESSEL PROCUREMENT, LEFT INTERNAL MAMMARY ARTERY HARVEST, EPIAORTIC ULTRASOUND  ECHOCARDIOGRAM, TRANSESOPHAGEAL, INTRAOPERATIVE    Post Op day #:8    Subjective (Patient focus/Primary Problem for shift): bruising of LLE          Pain Goal 0 Pain Rating 0           Pain Medication/ Regime effective to reduce patient pain Scheduled  meds effective.     Objective (Physical assessment):           Rhythm: normal sinus rhythm            Bowel Activity: yes if Yes indicate when: 7/29          Bowel Medications: Having diarrhea, holding laxatives and stool softeners, Gave imodium in the evening.             Incision: healing well          Incentive Spirometry Q 1-2 hour when awake:  yes Volume:           Epicardial Pacing Wires:  no            Patient Activity:           Up to chair for meals: yes          Ambulation with RN x2 (Not including CR): yes           Shower Daily {YES/NO/NA:[id number]          Nasal  Nozin BID AM/PM : yes              Chest Tubes   Pleural: not applicable Draining: not applicable               Suction: not applicable              Mediastinal: not applicable Draining: not applicable               Suction: not applicable   Dressing Change Daily:not applicable If No, why? Open to air.                     Urinary Catheter: not applicable           Preventative WOC consult (need MD order): not applicable       Assessment (Nursing primary shift focus): Pt A&Ox4. Tele, SR w/BBB. RA. Uses bedside urinal. SBA. LLE is stefania swollen/tight and bruised. Pt states the MD is aware of it and that its not new. Denies pain, SOB or chest pain. Calf graft site was oozing a small amount of clear drainage. Pt also c/o of diarrhea, paged  (Cross [facility]) and got imodium ordered and got senna and  laxatives held.     Plan (Patient Care Plan/focus): continue plan of care, encourage ambulation.       Beatrice Bynum RN   7/29/2025   11:36 PM

## 2025-08-18 ENCOUNTER — HOSPITAL ENCOUNTER (OUTPATIENT)
Dept: CARDIAC REHAB | Facility: HOSPITAL | Age: 65
Discharge: HOME | End: 2025-08-18
Attending: INTERNAL MEDICINE
Payer: COMMERCIAL

## 2025-08-18 PROCEDURE — 93798 PHYS/QHP OP CAR RHAB W/ECG: CPT

## 2025-08-20 ENCOUNTER — OFFICE VISIT (OUTPATIENT)
Dept: CARDIOLOGY | Facility: CLINIC | Age: 65
End: 2025-08-20
Payer: COMMERCIAL

## 2025-08-20 ENCOUNTER — HOSPITAL ENCOUNTER (OUTPATIENT)
Dept: CARDIAC REHAB | Facility: HOSPITAL | Age: 65
Discharge: HOME | End: 2025-08-20
Attending: INTERNAL MEDICINE
Payer: COMMERCIAL

## 2025-08-20 VITALS
WEIGHT: 275 LBS | BODY MASS INDEX: 36.28 KG/M2 | SYSTOLIC BLOOD PRESSURE: 114 MMHG | DIASTOLIC BLOOD PRESSURE: 70 MMHG | HEART RATE: 65 BPM | RESPIRATION RATE: 16 BRPM

## 2025-08-20 DIAGNOSIS — L03.116 CELLULITIS OF LEFT LOWER EXTREMITY: Primary | ICD-10-CM

## 2025-08-20 PROCEDURE — 93798 PHYS/QHP OP CAR RHAB W/ECG: CPT

## 2025-08-20 PROCEDURE — 3078F DIAST BP <80 MM HG: CPT | Performed by: NURSE PRACTITIONER

## 2025-08-20 PROCEDURE — 3074F SYST BP LT 130 MM HG: CPT | Performed by: NURSE PRACTITIONER

## 2025-08-20 PROCEDURE — 99024 POSTOP FOLLOW-UP VISIT: CPT | Performed by: NURSE PRACTITIONER

## 2025-08-20 RX ORDER — CEPHALEXIN 500 MG/1
500 CAPSULE ORAL 3 TIMES DAILY
Qty: 15 CAPSULE | Refills: 0 | Status: SHIPPED | OUTPATIENT
Start: 2025-08-20 | End: 2025-08-25

## 2025-08-22 ENCOUNTER — HOSPITAL ENCOUNTER (OUTPATIENT)
Dept: CARDIAC REHAB | Facility: HOSPITAL | Age: 65
Discharge: HOME | End: 2025-08-22
Attending: INTERNAL MEDICINE
Payer: COMMERCIAL

## 2025-08-22 PROCEDURE — 93798 PHYS/QHP OP CAR RHAB W/ECG: CPT

## 2025-08-25 ENCOUNTER — HOSPITAL ENCOUNTER (OUTPATIENT)
Dept: CARDIAC REHAB | Facility: HOSPITAL | Age: 65
Discharge: HOME | End: 2025-08-25
Attending: INTERNAL MEDICINE
Payer: COMMERCIAL

## 2025-08-25 PROCEDURE — 93798 PHYS/QHP OP CAR RHAB W/ECG: CPT

## 2025-08-27 ENCOUNTER — HOSPITAL ENCOUNTER (OUTPATIENT)
Dept: CARDIAC REHAB | Facility: HOSPITAL | Age: 65
Discharge: HOME | End: 2025-08-27
Attending: INTERNAL MEDICINE
Payer: COMMERCIAL

## 2025-08-27 PROCEDURE — 93798 PHYS/QHP OP CAR RHAB W/ECG: CPT

## 2025-08-29 ENCOUNTER — HOSPITAL ENCOUNTER (OUTPATIENT)
Dept: CARDIAC REHAB | Facility: HOSPITAL | Age: 65
Discharge: HOME | End: 2025-08-29
Attending: INTERNAL MEDICINE
Payer: COMMERCIAL

## 2025-08-29 PROCEDURE — 93798 PHYS/QHP OP CAR RHAB W/ECG: CPT

## 2025-09-03 ENCOUNTER — HOSPITAL ENCOUNTER (OUTPATIENT)
Dept: CARDIAC REHAB | Facility: HOSPITAL | Age: 65
Discharge: HOME | End: 2025-09-03
Attending: INTERNAL MEDICINE
Payer: COMMERCIAL

## 2025-09-03 LAB — GLUCOSE BLDC GLUCOMTR-MCNC: 118 MG/DL (ref 70–99)

## 2025-09-03 PROCEDURE — 93798 PHYS/QHP OP CAR RHAB W/ECG: CPT

## (undated) DEVICE — ESU ELEC BLADE NEPTUNE E-SEP 125MM 0703-125-002

## (undated) DEVICE — Device

## (undated) DEVICE — DEVICE TISSUE STABILIZATION OCTOBASE 28707

## (undated) DEVICE — GUIDEWIRE SENSOR DUAL FLEX STR 0.035"X150CM M0066703080

## (undated) DEVICE — LIGACLIP LARGE AESCULAP O4120-1

## (undated) DEVICE — GEL ULTRASOUND AQUASONIC 20GM 01-01

## (undated) DEVICE — CONNECTOR BLAKE DRAIN SGL BCC1

## (undated) DEVICE — SUCTION MANIFOLD NEPTUNE 2 SYS 1 PORT 702-025-000

## (undated) DEVICE — GLOVE GAMMEX NEOPRENE ULTRA SZ 7 LF 8514

## (undated) DEVICE — GLOVE BIOGEL PI ULTRATOUCH G SZ 7.0 42170

## (undated) DEVICE — KIT HAND CONTROL ACIST 014644 AR-P54

## (undated) DEVICE — PREP CHLORAPREP 26ML TINTED HI-LITE ORANGE 930815

## (undated) DEVICE — SU MYOSTERNAL WIRE  046-267

## (undated) DEVICE — SYR ANGIOGRAPHY MULTIUSE KIT ACIST 014612

## (undated) DEVICE — SOL WATER IRRIG 3000ML BAG 2B7117

## (undated) DEVICE — SPONGE KITNER DISSECTING 7102*

## (undated) DEVICE — KIT ENDO FIRST STEP DISINFECTANT 200ML W/POUCH EP-4

## (undated) DEVICE — SOL NACL 0.9% IRRIG 1000ML BOTTLE 2F7124

## (undated) DEVICE — MAT FLOOR SURGICAL 40X38 0702140238

## (undated) DEVICE — PACK MAJOR BASIN 673

## (undated) DEVICE — DRSG DRAIN 4X4" 7086

## (undated) DEVICE — TUBING SET THERMEDX UROLOGY SGL USE LL0006

## (undated) DEVICE — MANIFOLD KIT ANGIO AUTOMATED 014613

## (undated) DEVICE — SHTH INTRO 0.021IN ID 6FR DIA

## (undated) DEVICE — ADAPTER CATH CHECK-FLO 9FR FLL 050885 G15476

## (undated) DEVICE — GOWN IMPERVIOUS BREATHABLE SMART XLG 89045

## (undated) DEVICE — GOWN IMPERVIOUS BREATHABLE SMART LG 89015

## (undated) DEVICE — RX SURGIFLO HEMOSTATIC MATRIX W/THROMBIN 8ML 2994

## (undated) DEVICE — CATH ANGIO JUDKINS R4 6FRX100CM INFINITI 534621T

## (undated) DEVICE — GLOVE UNDER INDICATOR PI SZ 6.5 LF 41665

## (undated) DEVICE — TUBING IRRIG TUR Y TYPE 96" LF 6543-01

## (undated) DEVICE — SU STRATAFIX PDS PLUS 2-0 SPIRAL CT-1 30CM SXPP1B410

## (undated) DEVICE — CUSTOM PACK CORONARY SAN5BCRHEA

## (undated) DEVICE — SURGICEL POWDER ABSORBABLE HEMOSTAT 3GM 3013SP

## (undated) DEVICE — SOLUTION IRRIG 2B7127 .9NS 3000ML BAG

## (undated) DEVICE — DRAPE NEW SLUSH/WARMER HUSHSLUSH 2.0 ESD340 ESD340

## (undated) DEVICE — GLOVE BIOGEL PI SZ 8.0 40880

## (undated) DEVICE — TUBING SUCTION MEDI-VAC 1/4"X20' N620A

## (undated) DEVICE — GLOVE SURG PI ULTRA TOUCH M SZ 6 LF

## (undated) DEVICE — SOL WATER IRRIG 1000ML BOTTLE 2F7114

## (undated) DEVICE — SLEEVE TR BAND RADIAL COMPRESSION DEVICE 24CM TRB24-REG

## (undated) DEVICE — SU PROLENE 6-0 C-1DA 30" 8706H

## (undated) DEVICE — SU DERMABOND ADVANCED .7ML DNX12

## (undated) DEVICE — PREP DYNA-HEX 4% CHG SCRUB 4OZ BOTTLE MDS098710

## (undated) DEVICE — CELL SAVER

## (undated) DEVICE — CUSTOM PACK CYSTO PREFERRED SOT5BCYHEA

## (undated) DEVICE — CABLE MYO/LEAD PACING BLUE DISP 019-535

## (undated) DEVICE — SUCTION TIP YANKAUER STR K87

## (undated) DEVICE — SHEATH URETERAL ACCESS NAVIGATOR HD 12/14FRX46CM M0062502260

## (undated) DEVICE — SU STERNAL WIRE SINGLE #6 046-032

## (undated) DEVICE — CATH URETERAL OPEN END 5FRX70CM M0064002010

## (undated) DEVICE — CABLE MYO/LEAD PACING WHITE DISP 019-530

## (undated) DEVICE — DRSG TEGADERM 2 3/8X2 3/4" 1624W

## (undated) DEVICE — KIT ENDO VASOVIEW ACCESSORY PACK VH-2004

## (undated) DEVICE — SOLUTION IRRIGATION 0.9% NACL 1000ML BOTTLE R5200-01

## (undated) DEVICE — PAD DEFIBRILLATOR ECG ADULT STERILE 2502

## (undated) DEVICE — GLOVE LINER COTTON MED 32-2076

## (undated) DEVICE — KIT TURNOVER FAIRVIEW SOUTHDALE FULL SP3889

## (undated) DEVICE — ESU GROUND PAD ADULT REM W/15' CORD E7507DB

## (undated) DEVICE — SU ETHIBOND 3-0 BBDA 36" X588H

## (undated) DEVICE — CLIP HORIZON MULTI SM YELLOW 001204

## (undated) DEVICE — ELECTRODE DEFIB CADENCE 22550R

## (undated) DEVICE — CONTAINER URINE SPEC 4OZ STRL 1053

## (undated) DEVICE — SUTURE STRATAFIX PDS 3-0 SH

## (undated) DEVICE — GLOVE UNDER INDICATOR PI SZ 6 LF 41660

## (undated) DEVICE — ESU ELEC BLADE 2.75" COATED/INSULATED E1455

## (undated) DEVICE — SU PLEDGET SOFT TFE 3/8"X3/26"X1/16" PCP40

## (undated) DEVICE — CLIP HORIZON MULTI MED BLUE 002204

## (undated) DEVICE — LEAD PACER MYOCARDIAL BIPOLAR TEMPORARY 53CM 6495F

## (undated) DEVICE — PITCHER STERILE 1000ML  SSK9004A

## (undated) DEVICE — KIT ENDO VASOVIEW HEMOPRO 2 VH-4000

## (undated) DEVICE — SOL ISOPROPYL RUBBING ALCOHOL USP 70% 4OZ HDX-20 I0020

## (undated) DEVICE — SUCTION DRY CHEST DRAIN OASIS 3600-100

## (undated) DEVICE — SU PROLENE 7-0 BV175-6 24" 8735H

## (undated) DEVICE — DRSG GAUZE 4X4" TRAY 6939

## (undated) DEVICE — SU SILK 3-0 SH 30" K832H

## (undated) DEVICE — SET CANNULATION TOUNIQUET TS-10061

## (undated) DEVICE — SU ETHIBOND 2-0 SHDA 30" X563H

## (undated) DEVICE — CLIP SPRING FOGARTY SOFTJAW CSOFT6

## (undated) DEVICE — SU STRATAFIX MONOCRYL 4-0 SPIRAL PS-2 SXMP1B118

## (undated) DEVICE — CUSTOM PACK CAB SCV5BCBHEA

## (undated) DEVICE — ESU PENCIL SMOKE EVAC W/ROCKER SWITCH 0703-047-000

## (undated) DEVICE — SU STRATAFIX PDS PLUS 0 CT 45CM SXPP1A406

## (undated) DEVICE — HEMOSTASIS BONE OSTENE 2.5G SYNTHETIC 1503832

## (undated) DEVICE — CATH ANGIO JUDKINS JL3.5 6FRX100CM INFINITI 534618T

## (undated) DEVICE — CATH SUCTION 14FR W/O CTRL DYND41962

## (undated) DEVICE — SU PROLENE 7-0 BV175-6 24" 2 ARM MONOFILAMENT M8735

## (undated) DEVICE — CUSTOM PACK CV ST JOES SCV5BCVHEA

## (undated) DEVICE — TUBING INSUFFLATION W/FILTER 28-0207

## (undated) DEVICE — CATH THORACIC STRAIGHT CLOTSTOP 32FR

## (undated) DEVICE — BLANKET BAIR ADLT PLYMR 60X36IN 63000

## (undated) DEVICE — SUCTION CANISTER MEDIVAC LINER 3000ML W/LID 65651-530

## (undated) RX ORDER — FENTANYL CITRATE-0.9 % NACL/PF 10 MCG/ML
PLASTIC BAG, INJECTION (ML) INTRAVENOUS
Status: DISPENSED
Start: 2025-07-21

## (undated) RX ORDER — ONDANSETRON 2 MG/ML
INJECTION INTRAMUSCULAR; INTRAVENOUS
Status: DISPENSED
Start: 2025-07-16

## (undated) RX ORDER — INDOMETHACIN 25 MG/1
CAPSULE ORAL
Status: DISPENSED
Start: 2025-07-21

## (undated) RX ORDER — VANCOMYCIN HYDROCHLORIDE 1 G/20ML
INJECTION, POWDER, LYOPHILIZED, FOR SOLUTION INTRAVENOUS
Status: DISPENSED
Start: 2025-07-21

## (undated) RX ORDER — FENTANYL CITRATE 50 UG/ML
INJECTION, SOLUTION INTRAMUSCULAR; INTRAVENOUS
Status: DISPENSED
Start: 2024-07-10

## (undated) RX ORDER — FENTANYL CITRATE 50 UG/ML
INJECTION, SOLUTION INTRAMUSCULAR; INTRAVENOUS
Status: DISPENSED
Start: 2025-07-16

## (undated) RX ORDER — FENTANYL CITRATE 50 UG/ML
INJECTION, SOLUTION INTRAMUSCULAR; INTRAVENOUS
Status: DISPENSED
Start: 2025-07-21

## (undated) RX ORDER — LIDOCAINE HYDROCHLORIDE 10 MG/ML
INJECTION, SOLUTION EPIDURAL; INFILTRATION; INTRACAUDAL; PERINEURAL
Status: DISPENSED
Start: 2025-07-21

## (undated) RX ORDER — PROPOFOL 10 MG/ML
INJECTION, EMULSION INTRAVENOUS
Status: DISPENSED
Start: 2025-07-21

## (undated) RX ORDER — LIDOCAINE HYDROCHLORIDE 20 MG/ML
JELLY TOPICAL
Status: DISPENSED
Start: 2024-06-12

## (undated) RX ORDER — DIPHENHYDRAMINE HYDROCHLORIDE 50 MG/ML
INJECTION, SOLUTION INTRAMUSCULAR; INTRAVENOUS
Status: DISPENSED
Start: 2025-07-16

## (undated) RX ORDER — FENTANYL CITRATE-0.9 % NACL/PF 10 MCG/ML
PLASTIC BAG, INJECTION (ML) INTRAVENOUS
Status: DISPENSED
Start: 2024-07-10

## (undated) RX ORDER — ONDANSETRON 2 MG/ML
INJECTION INTRAMUSCULAR; INTRAVENOUS
Status: DISPENSED
Start: 2024-07-10

## (undated) RX ORDER — PROPOFOL 10 MG/ML
INJECTION, EMULSION INTRAVENOUS
Status: DISPENSED
Start: 2024-07-10

## (undated) RX ORDER — FENTANYL CITRATE-0.9 % NACL/PF 10 MCG/ML
PLASTIC BAG, INJECTION (ML) INTRAVENOUS
Status: DISPENSED
Start: 2024-06-12

## (undated) RX ORDER — GLYCOPYRROLATE 0.2 MG/ML
INJECTION, SOLUTION INTRAMUSCULAR; INTRAVENOUS
Status: DISPENSED
Start: 2025-07-21

## (undated) RX ORDER — EPHEDRINE SULFATE 50 MG/ML
INJECTION, SOLUTION INTRAMUSCULAR; INTRAVENOUS; SUBCUTANEOUS
Status: DISPENSED
Start: 2024-07-10

## (undated) RX ORDER — DEXAMETHASONE SODIUM PHOSPHATE 10 MG/ML
INJECTION, SOLUTION INTRAMUSCULAR; INTRAVENOUS
Status: DISPENSED
Start: 2024-07-10

## (undated) RX ORDER — FENTANYL CITRATE 50 UG/ML
INJECTION, SOLUTION INTRAMUSCULAR; INTRAVENOUS
Status: DISPENSED
Start: 2024-06-12

## (undated) RX ORDER — ONDANSETRON 2 MG/ML
INJECTION INTRAMUSCULAR; INTRAVENOUS
Status: DISPENSED
Start: 2025-07-21

## (undated) RX ORDER — NEOSTIGMINE METHYLSULFATE 1 MG/ML
VIAL (ML) INJECTION
Status: DISPENSED
Start: 2025-07-21

## (undated) RX ORDER — LIDOCAINE HYDROCHLORIDE 10 MG/ML
INJECTION, SOLUTION EPIDURAL; INFILTRATION; INTRACAUDAL; PERINEURAL
Status: DISPENSED
Start: 2024-07-10

## (undated) RX ORDER — METHYLPREDNISOLONE SODIUM SUCCINATE 125 MG/2ML
INJECTION INTRAMUSCULAR; INTRAVENOUS
Status: DISPENSED
Start: 2025-07-16

## (undated) RX ORDER — DIPHENHYDRAMINE HYDROCHLORIDE 50 MG/ML
INJECTION INTRAMUSCULAR; INTRAVENOUS
Status: DISPENSED
Start: 2024-06-12